# Patient Record
Sex: MALE | Race: WHITE | Employment: OTHER | URBAN - METROPOLITAN AREA
[De-identification: names, ages, dates, MRNs, and addresses within clinical notes are randomized per-mention and may not be internally consistent; named-entity substitution may affect disease eponyms.]

---

## 2018-03-19 ENCOUNTER — HOSPITAL ENCOUNTER (OUTPATIENT)
Age: 83
Discharge: HOME | End: 2018-03-21
Payer: MEDICARE

## 2018-03-19 LAB
ALBUMIN SERPL-MCNC: 4 G/DL (ref 3.5–5.2)
ALP BLD-CCNC: 94 U/L (ref 40–129)
ALT SERPL-CCNC: <5 U/L (ref 0–40)
ANION GAP SERPL CALCULATED.3IONS-SCNC: 15 MMOL/L (ref 7–16)
AST SERPL-CCNC: 12 U/L (ref 0–39)
BASOPHILS ABSOLUTE: 0.04 E9/L (ref 0–0.2)
BASOPHILS RELATIVE PERCENT: 0.7 % (ref 0–2)
BILIRUB SERPL-MCNC: 0.6 MG/DL (ref 0–1.2)
BUN BLDV-MCNC: 25 MG/DL (ref 8–23)
CALCIUM SERPL-MCNC: 9 MG/DL (ref 8.6–10.2)
CHLORIDE BLD-SCNC: 102 MMOL/L (ref 98–107)
CO2: 25 MMOL/L (ref 22–29)
CREAT SERPL-MCNC: 1.1 MG/DL (ref 0.7–1.2)
EOSINOPHILS ABSOLUTE: 0.09 E9/L (ref 0.05–0.5)
EOSINOPHILS RELATIVE PERCENT: 1.5 % (ref 0–6)
GFR AFRICAN AMERICAN: >60
GFR NON-AFRICAN AMERICAN: >60 ML/MIN/1.73
GLUCOSE BLD-MCNC: 96 MG/DL (ref 74–109)
HCT VFR BLD CALC: 35.8 % (ref 37–54)
HEMOGLOBIN: 11.5 G/DL (ref 12.5–16.5)
IMMATURE GRANULOCYTES #: 0.04 E9/L
IMMATURE GRANULOCYTES %: 0.7 % (ref 0–5)
LYMPHOCYTES ABSOLUTE: 1.51 E9/L (ref 1.5–4)
LYMPHOCYTES RELATIVE PERCENT: 25.4 % (ref 20–42)
MCH RBC QN AUTO: 33.8 PG (ref 26–35)
MCHC RBC AUTO-ENTMCNC: 32.1 % (ref 32–34.5)
MCV RBC AUTO: 105.3 FL (ref 80–99.9)
MONOCYTES ABSOLUTE: 0.5 E9/L (ref 0.1–0.95)
MONOCYTES RELATIVE PERCENT: 8.4 % (ref 2–12)
NEUTROPHILS ABSOLUTE: 3.76 E9/L (ref 1.8–7.3)
NEUTROPHILS RELATIVE PERCENT: 63.3 % (ref 43–80)
PDW BLD-RTO: 12.8 FL (ref 11.5–15)
PLATELET # BLD: 215 E9/L (ref 130–450)
PMV BLD AUTO: 10.2 FL (ref 7–12)
POTASSIUM SERPL-SCNC: 4.4 MMOL/L (ref 3.5–5)
RBC # BLD: 3.4 E12/L (ref 3.8–5.8)
SODIUM BLD-SCNC: 142 MMOL/L (ref 132–146)
TOTAL PROTEIN: 5.8 G/DL (ref 6.4–8.3)
WBC # BLD: 5.9 E9/L (ref 4.5–11.5)

## 2018-03-19 PROCEDURE — 80053 COMPREHEN METABOLIC PANEL: CPT

## 2018-03-19 PROCEDURE — 85025 COMPLETE CBC W/AUTO DIFF WBC: CPT

## 2018-03-19 PROCEDURE — 36415 COLL VENOUS BLD VENIPUNCTURE: CPT

## 2018-04-30 ENCOUNTER — OFFICE VISIT (OUTPATIENT)
Dept: NEUROSURGERY | Age: 83
End: 2018-04-30
Payer: MEDICARE

## 2018-04-30 VITALS
SYSTOLIC BLOOD PRESSURE: 110 MMHG | BODY MASS INDEX: 23.61 KG/M2 | DIASTOLIC BLOOD PRESSURE: 62 MMHG | HEART RATE: 76 BPM | HEIGHT: 74 IN | WEIGHT: 184 LBS

## 2018-04-30 DIAGNOSIS — G95.19 NEUROGENIC CLAUDICATION (HCC): ICD-10-CM

## 2018-04-30 DIAGNOSIS — M43.16 SPONDYLOLISTHESIS AT L4-L5 LEVEL: ICD-10-CM

## 2018-04-30 DIAGNOSIS — M81.0 AGE-RELATED OSTEOPOROSIS WITHOUT CURRENT PATHOLOGICAL FRACTURE: ICD-10-CM

## 2018-04-30 DIAGNOSIS — M85.80 OSTEOPENIA, UNSPECIFIED LOCATION: ICD-10-CM

## 2018-04-30 DIAGNOSIS — M48.061 SPINAL STENOSIS AT L4-L5 LEVEL: Primary | ICD-10-CM

## 2018-04-30 PROCEDURE — 99204 OFFICE O/P NEW MOD 45 MIN: CPT | Performed by: NEUROLOGICAL SURGERY

## 2018-04-30 RX ORDER — LEVOTHYROXINE SODIUM 0.03 MG/1
25 TABLET ORAL DAILY
COMMUNITY
End: 2019-05-13 | Stop reason: SDUPTHER

## 2018-04-30 RX ORDER — FOLIC ACID 1 MG/1
1 TABLET ORAL DAILY
COMMUNITY
End: 2019-11-13 | Stop reason: SDUPTHER

## 2018-04-30 ASSESSMENT — ENCOUNTER SYMPTOMS
PHOTOPHOBIA: 0
NAUSEA: 0
SHORTNESS OF BREATH: 0
STRIDOR: 0
BLURRED VISION: 0
BACK PAIN: 1
COUGH: 0
VOMITING: 0

## 2018-05-07 ENCOUNTER — HOSPITAL ENCOUNTER (OUTPATIENT)
Age: 83
Discharge: HOME | End: 2018-05-09
Payer: MEDICARE

## 2018-05-07 ENCOUNTER — HOSPITAL ENCOUNTER (OUTPATIENT)
Dept: GENERAL RADIOLOGY | Age: 83
Discharge: HOME | End: 2018-05-09
Payer: MEDICARE

## 2018-05-07 DIAGNOSIS — M48.061 SPINAL STENOSIS AT L4-L5 LEVEL: ICD-10-CM

## 2018-05-07 DIAGNOSIS — M43.16 SPONDYLOLISTHESIS AT L4-L5 LEVEL: ICD-10-CM

## 2018-05-07 PROCEDURE — 72120 X-RAY BEND ONLY L-S SPINE: CPT

## 2018-05-10 ENCOUNTER — OFFICE VISIT (OUTPATIENT)
Dept: NEUROLOGY | Age: 83
End: 2018-05-10
Payer: MEDICARE

## 2018-05-10 ENCOUNTER — TELEPHONE (OUTPATIENT)
Dept: NEUROSURGERY | Age: 83
End: 2018-05-10

## 2018-05-10 VITALS
DIASTOLIC BLOOD PRESSURE: 62 MMHG | TEMPERATURE: 98.2 F | SYSTOLIC BLOOD PRESSURE: 95 MMHG | BODY MASS INDEX: 23.61 KG/M2 | HEIGHT: 74 IN | RESPIRATION RATE: 18 BRPM | OXYGEN SATURATION: 97 % | WEIGHT: 184 LBS

## 2018-05-10 DIAGNOSIS — M54.17 LUMBOSACRAL RADICULOPATHY: ICD-10-CM

## 2018-05-10 DIAGNOSIS — G20 PARKINSON'S DISEASE (HCC): Primary | ICD-10-CM

## 2018-05-10 PROCEDURE — 99215 OFFICE O/P EST HI 40 MIN: CPT | Performed by: PSYCHIATRY & NEUROLOGY

## 2018-05-17 ENCOUNTER — HOSPITAL ENCOUNTER (OUTPATIENT)
Dept: GENERAL RADIOLOGY | Age: 83
Discharge: HOME | End: 2018-05-19
Payer: MEDICARE

## 2018-05-17 DIAGNOSIS — M43.16 SPONDYLOLISTHESIS AT L4-L5 LEVEL: ICD-10-CM

## 2018-05-17 DIAGNOSIS — M81.0 AGE-RELATED OSTEOPOROSIS WITHOUT CURRENT PATHOLOGICAL FRACTURE: ICD-10-CM

## 2018-05-17 DIAGNOSIS — M85.80 OSTEOPENIA, UNSPECIFIED LOCATION: ICD-10-CM

## 2018-05-17 PROCEDURE — 77080 DXA BONE DENSITY AXIAL: CPT

## 2018-06-21 ENCOUNTER — HOSPITAL ENCOUNTER (OUTPATIENT)
Age: 83
Discharge: HOME | End: 2018-06-23
Payer: MEDICARE

## 2018-06-21 LAB
ALBUMIN SERPL-MCNC: 4.5 G/DL (ref 3.5–5.2)
ALP BLD-CCNC: 100 U/L (ref 40–129)
ALT SERPL-CCNC: 8 U/L (ref 0–40)
ANION GAP SERPL CALCULATED.3IONS-SCNC: 13 MMOL/L (ref 7–16)
AST SERPL-CCNC: 15 U/L (ref 0–39)
BASOPHILS ABSOLUTE: 0.03 E9/L (ref 0–0.2)
BASOPHILS RELATIVE PERCENT: 0.5 % (ref 0–2)
BILIRUB SERPL-MCNC: 0.6 MG/DL (ref 0–1.2)
BUN BLDV-MCNC: 28 MG/DL (ref 8–23)
C-REACTIVE PROTEIN: <0.1 MG/DL (ref 0–0.4)
CALCIUM SERPL-MCNC: 9.4 MG/DL (ref 8.6–10.2)
CHLORIDE BLD-SCNC: 102 MMOL/L (ref 98–107)
CO2: 26 MMOL/L (ref 22–29)
CREAT SERPL-MCNC: 1.2 MG/DL (ref 0.7–1.2)
EOSINOPHILS ABSOLUTE: 0.07 E9/L (ref 0.05–0.5)
EOSINOPHILS RELATIVE PERCENT: 1.2 % (ref 0–6)
GFR AFRICAN AMERICAN: >60
GFR NON-AFRICAN AMERICAN: 58 ML/MIN/1.73
GLUCOSE BLD-MCNC: 106 MG/DL (ref 74–109)
HCT VFR BLD CALC: 37.3 % (ref 37–54)
HEMOGLOBIN: 12.3 G/DL (ref 12.5–16.5)
IMMATURE GRANULOCYTES #: 0.03 E9/L
IMMATURE GRANULOCYTES %: 0.5 % (ref 0–5)
LYMPHOCYTES ABSOLUTE: 1.14 E9/L (ref 1.5–4)
LYMPHOCYTES RELATIVE PERCENT: 19.8 % (ref 20–42)
MCH RBC QN AUTO: 34.3 PG (ref 26–35)
MCHC RBC AUTO-ENTMCNC: 33 % (ref 32–34.5)
MCV RBC AUTO: 103.9 FL (ref 80–99.9)
MONOCYTES ABSOLUTE: 0.56 E9/L (ref 0.1–0.95)
MONOCYTES RELATIVE PERCENT: 9.7 % (ref 2–12)
NEUTROPHILS ABSOLUTE: 3.94 E9/L (ref 1.8–7.3)
NEUTROPHILS RELATIVE PERCENT: 68.3 % (ref 43–80)
PDW BLD-RTO: 13 FL (ref 11.5–15)
PLATELET # BLD: 224 E9/L (ref 130–450)
PMV BLD AUTO: 10.2 FL (ref 7–12)
POTASSIUM SERPL-SCNC: 4.5 MMOL/L (ref 3.5–5)
RBC # BLD: 3.59 E12/L (ref 3.8–5.8)
SODIUM BLD-SCNC: 141 MMOL/L (ref 132–146)
TOTAL PROTEIN: 6.8 G/DL (ref 6.4–8.3)
WBC # BLD: 5.8 E9/L (ref 4.5–11.5)

## 2018-06-21 PROCEDURE — 86140 C-REACTIVE PROTEIN: CPT

## 2018-06-21 PROCEDURE — 80053 COMPREHEN METABOLIC PANEL: CPT

## 2018-06-21 PROCEDURE — 85025 COMPLETE CBC W/AUTO DIFF WBC: CPT

## 2018-10-04 ENCOUNTER — HOSPITAL ENCOUNTER (OUTPATIENT)
Age: 83
Discharge: HOME | End: 2018-10-06
Payer: MEDICARE

## 2018-10-04 PROCEDURE — 36415 COLL VENOUS BLD VENIPUNCTURE: CPT

## 2018-10-04 PROCEDURE — 80053 COMPREHEN METABOLIC PANEL: CPT

## 2018-10-04 PROCEDURE — 85025 COMPLETE CBC W/AUTO DIFF WBC: CPT

## 2018-10-04 PROCEDURE — 82607 VITAMIN B-12: CPT

## 2018-10-05 LAB
ALBUMIN SERPL-MCNC: 4.2 G/DL (ref 3.5–5.2)
ALP BLD-CCNC: 104 U/L (ref 40–129)
ALT SERPL-CCNC: <5 U/L (ref 0–40)
ANION GAP SERPL CALCULATED.3IONS-SCNC: 15 MMOL/L (ref 7–16)
AST SERPL-CCNC: 18 U/L (ref 0–39)
BASOPHILS ABSOLUTE: 0.03 E9/L (ref 0–0.2)
BASOPHILS RELATIVE PERCENT: 0.3 % (ref 0–2)
BILIRUB SERPL-MCNC: 0.5 MG/DL (ref 0–1.2)
BUN BLDV-MCNC: 23 MG/DL (ref 8–23)
CALCIUM SERPL-MCNC: 9.2 MG/DL (ref 8.6–10.2)
CHLORIDE BLD-SCNC: 99 MMOL/L (ref 98–107)
CO2: 25 MMOL/L (ref 22–29)
CREAT SERPL-MCNC: 1.2 MG/DL (ref 0.7–1.2)
EOSINOPHILS ABSOLUTE: 0.22 E9/L (ref 0.05–0.5)
EOSINOPHILS RELATIVE PERCENT: 2 % (ref 0–6)
GFR AFRICAN AMERICAN: >60
GFR NON-AFRICAN AMERICAN: 58 ML/MIN/1.73
GLUCOSE BLD-MCNC: 98 MG/DL (ref 74–109)
HCT VFR BLD CALC: 36.9 % (ref 37–54)
HEMOGLOBIN: 11.8 G/DL (ref 12.5–16.5)
IMMATURE GRANULOCYTES #: 0.2 E9/L
IMMATURE GRANULOCYTES %: 1.9 % (ref 0–5)
LYMPHOCYTES ABSOLUTE: 1.74 E9/L (ref 1.5–4)
LYMPHOCYTES RELATIVE PERCENT: 16.2 % (ref 20–42)
MCH RBC QN AUTO: 33.5 PG (ref 26–35)
MCHC RBC AUTO-ENTMCNC: 32 % (ref 32–34.5)
MCV RBC AUTO: 104.8 FL (ref 80–99.9)
MONOCYTES ABSOLUTE: 1.15 E9/L (ref 0.1–0.95)
MONOCYTES RELATIVE PERCENT: 10.7 % (ref 2–12)
NEUTROPHILS ABSOLUTE: 7.41 E9/L (ref 1.8–7.3)
NEUTROPHILS RELATIVE PERCENT: 68.9 % (ref 43–80)
PDW BLD-RTO: 13.4 FL (ref 11.5–15)
PLATELET # BLD: 223 E9/L (ref 130–450)
PMV BLD AUTO: 10.4 FL (ref 7–12)
POTASSIUM SERPL-SCNC: 5 MMOL/L (ref 3.5–5)
RBC # BLD: 3.52 E12/L (ref 3.8–5.8)
SODIUM BLD-SCNC: 139 MMOL/L (ref 132–146)
TOTAL PROTEIN: 6.3 G/DL (ref 6.4–8.3)
VITAMIN B-12: 406 PG/ML (ref 211–946)
WBC # BLD: 10.8 E9/L (ref 4.5–11.5)

## 2018-11-19 ENCOUNTER — OFFICE VISIT (OUTPATIENT)
Dept: NEUROLOGY | Age: 83
End: 2018-11-19
Payer: MEDICARE

## 2018-11-19 VITALS
BODY MASS INDEX: 24.26 KG/M2 | RESPIRATION RATE: 18 BRPM | OXYGEN SATURATION: 97 % | HEART RATE: 64 BPM | SYSTOLIC BLOOD PRESSURE: 128 MMHG | WEIGHT: 189 LBS | TEMPERATURE: 97.8 F | DIASTOLIC BLOOD PRESSURE: 72 MMHG | HEIGHT: 74 IN

## 2018-11-19 DIAGNOSIS — G20 PARKINSON'S DISEASE (HCC): Primary | ICD-10-CM

## 2018-11-19 DIAGNOSIS — M54.17 LUMBOSACRAL RADICULOPATHY: ICD-10-CM

## 2018-11-19 PROCEDURE — 99215 OFFICE O/P EST HI 40 MIN: CPT | Performed by: PSYCHIATRY & NEUROLOGY

## 2018-11-19 RX ORDER — IPRATROPIUM BROMIDE 42 UG/1
2 SPRAY, METERED NASAL PRN
COMMUNITY
End: 2019-03-19

## 2018-11-19 RX ORDER — LORATADINE 10 MG/1
10 TABLET ORAL DAILY
COMMUNITY
End: 2019-05-13 | Stop reason: SDUPTHER

## 2019-01-31 ENCOUNTER — HOSPITAL ENCOUNTER (OUTPATIENT)
Age: 84
Discharge: HOME | End: 2019-02-02
Payer: MEDICARE

## 2019-01-31 LAB
ALBUMIN SERPL-MCNC: 4.4 G/DL (ref 3.5–5.2)
ALP BLD-CCNC: 99 U/L (ref 40–129)
ALT SERPL-CCNC: 5 U/L (ref 0–40)
ANION GAP SERPL CALCULATED.3IONS-SCNC: 12 MMOL/L (ref 7–16)
AST SERPL-CCNC: 15 U/L (ref 0–39)
BILIRUB SERPL-MCNC: 0.3 MG/DL (ref 0–1.2)
BUN BLDV-MCNC: 25 MG/DL (ref 8–23)
C-REACTIVE PROTEIN: <0.1 MG/DL (ref 0–0.4)
CALCIUM SERPL-MCNC: 9.3 MG/DL (ref 8.6–10.2)
CHLORIDE BLD-SCNC: 101 MMOL/L (ref 98–107)
CO2: 26 MMOL/L (ref 22–29)
CREAT SERPL-MCNC: 1.4 MG/DL (ref 0.7–1.2)
GFR AFRICAN AMERICAN: 58
GFR NON-AFRICAN AMERICAN: 48 ML/MIN/1.73
GLUCOSE BLD-MCNC: 98 MG/DL (ref 74–99)
POTASSIUM SERPL-SCNC: 4.4 MMOL/L (ref 3.5–5)
RHEUMATOID FACTOR: <10 IU/ML (ref 0–13)
SODIUM BLD-SCNC: 139 MMOL/L (ref 132–146)
TOTAL PROTEIN: 6.5 G/DL (ref 6.4–8.3)

## 2019-01-31 PROCEDURE — 36415 COLL VENOUS BLD VENIPUNCTURE: CPT

## 2019-01-31 PROCEDURE — 86140 C-REACTIVE PROTEIN: CPT

## 2019-01-31 PROCEDURE — 86200 CCP ANTIBODY: CPT

## 2019-01-31 PROCEDURE — 86431 RHEUMATOID FACTOR QUANT: CPT

## 2019-01-31 PROCEDURE — 80053 COMPREHEN METABOLIC PANEL: CPT

## 2019-02-03 LAB — CCP IGG ANTIBODIES: 18 UNITS (ref 0–19)

## 2019-03-19 ENCOUNTER — OFFICE VISIT (OUTPATIENT)
Dept: NEUROLOGY | Age: 84
End: 2019-03-19
Payer: MEDICARE

## 2019-03-19 VITALS
RESPIRATION RATE: 18 BRPM | TEMPERATURE: 97.1 F | BODY MASS INDEX: 25.05 KG/M2 | WEIGHT: 189 LBS | HEART RATE: 57 BPM | DIASTOLIC BLOOD PRESSURE: 69 MMHG | OXYGEN SATURATION: 94 % | SYSTOLIC BLOOD PRESSURE: 119 MMHG | HEIGHT: 73 IN

## 2019-03-19 DIAGNOSIS — G20 PARKINSON'S DISEASE (HCC): Primary | ICD-10-CM

## 2019-03-19 DIAGNOSIS — M54.17 LUMBOSACRAL RADICULOPATHY: ICD-10-CM

## 2019-03-19 DIAGNOSIS — M72.2 PLANTAR FASCIITIS: ICD-10-CM

## 2019-03-19 PROCEDURE — 99215 OFFICE O/P EST HI 40 MIN: CPT | Performed by: PSYCHIATRY & NEUROLOGY

## 2019-03-25 RX ORDER — PREGABALIN 75 MG/1
75 CAPSULE ORAL DAILY
COMMUNITY
End: 2019-05-13

## 2019-03-25 RX ORDER — IPRATROPIUM BROMIDE 42 UG/1
2 SPRAY, METERED NASAL 4 TIMES DAILY
COMMUNITY
End: 2019-11-11

## 2019-05-07 ENCOUNTER — HOSPITAL ENCOUNTER (OUTPATIENT)
Age: 84
Discharge: HOME | End: 2019-05-09
Payer: MEDICARE

## 2019-05-07 LAB
ALBUMIN SERPL-MCNC: 4.1 G/DL (ref 3.5–5.2)
ALP BLD-CCNC: 99 U/L (ref 40–129)
ALT SERPL-CCNC: <5 U/L (ref 0–40)
ANION GAP SERPL CALCULATED.3IONS-SCNC: 16 MMOL/L (ref 7–16)
AST SERPL-CCNC: 17 U/L (ref 0–39)
BASOPHILS ABSOLUTE: 0.03 E9/L (ref 0–0.2)
BASOPHILS RELATIVE PERCENT: 0.5 % (ref 0–2)
BILIRUB SERPL-MCNC: 0.6 MG/DL (ref 0–1.2)
BUN BLDV-MCNC: 28 MG/DL (ref 8–23)
CALCIUM SERPL-MCNC: 9.2 MG/DL (ref 8.6–10.2)
CHLORIDE BLD-SCNC: 103 MMOL/L (ref 98–107)
CHOLESTEROL, TOTAL: 124 MG/DL (ref 0–199)
CO2: 22 MMOL/L (ref 22–29)
CREAT SERPL-MCNC: 1.2 MG/DL (ref 0.7–1.2)
EOSINOPHILS ABSOLUTE: 0.14 E9/L (ref 0.05–0.5)
EOSINOPHILS RELATIVE PERCENT: 2.3 % (ref 0–6)
GFR AFRICAN AMERICAN: >60
GFR NON-AFRICAN AMERICAN: 57 ML/MIN/1.73
GLUCOSE BLD-MCNC: 124 MG/DL (ref 74–99)
HBA1C MFR BLD: 5.6 % (ref 4–5.6)
HCT VFR BLD CALC: 36.5 % (ref 37–54)
HDLC SERPL-MCNC: 44 MG/DL
HEMOGLOBIN: 12.2 G/DL (ref 12.5–16.5)
IMMATURE GRANULOCYTES #: 0.02 E9/L
IMMATURE GRANULOCYTES %: 0.3 % (ref 0–5)
IRON: 86 MCG/DL (ref 59–158)
LDL CHOLESTEROL CALCULATED: 63 MG/DL (ref 0–99)
LYMPHOCYTES ABSOLUTE: 1.56 E9/L (ref 1.5–4)
LYMPHOCYTES RELATIVE PERCENT: 25.5 % (ref 20–42)
MAGNESIUM: 2.1 MG/DL (ref 1.6–2.6)
MCH RBC QN AUTO: 33.9 PG (ref 26–35)
MCHC RBC AUTO-ENTMCNC: 33.4 % (ref 32–34.5)
MCV RBC AUTO: 101.4 FL (ref 80–99.9)
MONOCYTES ABSOLUTE: 0.64 E9/L (ref 0.1–0.95)
MONOCYTES RELATIVE PERCENT: 10.5 % (ref 2–12)
NEUTROPHILS ABSOLUTE: 3.72 E9/L (ref 1.8–7.3)
NEUTROPHILS RELATIVE PERCENT: 60.9 % (ref 43–80)
PDW BLD-RTO: 12.4 FL (ref 11.5–15)
PLATELET # BLD: 222 E9/L (ref 130–450)
PMV BLD AUTO: 10.4 FL (ref 7–12)
POTASSIUM SERPL-SCNC: 4.2 MMOL/L (ref 3.5–5)
RBC # BLD: 3.6 E12/L (ref 3.8–5.8)
SODIUM BLD-SCNC: 141 MMOL/L (ref 132–146)
T4 TOTAL: 5.9 MCG/DL (ref 4.5–11.7)
TOTAL PROTEIN: 6.4 G/DL (ref 6.4–8.3)
TRIGL SERPL-MCNC: 84 MG/DL (ref 0–149)
TSH SERPL DL<=0.05 MIU/L-ACNC: 3.33 UIU/ML (ref 0.27–4.2)
VITAMIN D 25-HYDROXY: 33 NG/ML (ref 30–100)
VLDLC SERPL CALC-MCNC: 17 MG/DL
WBC # BLD: 6.1 E9/L (ref 4.5–11.5)

## 2019-05-07 PROCEDURE — 84436 ASSAY OF TOTAL THYROXINE: CPT

## 2019-05-07 PROCEDURE — 36415 COLL VENOUS BLD VENIPUNCTURE: CPT

## 2019-05-07 PROCEDURE — 84443 ASSAY THYROID STIM HORMONE: CPT

## 2019-05-07 PROCEDURE — 83540 ASSAY OF IRON: CPT

## 2019-05-07 PROCEDURE — 85025 COMPLETE CBC W/AUTO DIFF WBC: CPT

## 2019-05-07 PROCEDURE — 80053 COMPREHEN METABOLIC PANEL: CPT

## 2019-05-07 PROCEDURE — 80061 LIPID PANEL: CPT

## 2019-05-07 PROCEDURE — 82306 VITAMIN D 25 HYDROXY: CPT

## 2019-05-07 PROCEDURE — 83036 HEMOGLOBIN GLYCOSYLATED A1C: CPT

## 2019-05-07 PROCEDURE — 83735 ASSAY OF MAGNESIUM: CPT

## 2019-05-13 ENCOUNTER — OFFICE VISIT (OUTPATIENT)
Dept: PRIMARY CARE CLINIC | Age: 84
End: 2019-05-13
Payer: MEDICARE

## 2019-05-13 VITALS
HEIGHT: 74 IN | DIASTOLIC BLOOD PRESSURE: 78 MMHG | TEMPERATURE: 97.9 F | BODY MASS INDEX: 24.26 KG/M2 | WEIGHT: 189 LBS | SYSTOLIC BLOOD PRESSURE: 126 MMHG

## 2019-05-13 DIAGNOSIS — K21.9 GERD WITHOUT ESOPHAGITIS: ICD-10-CM

## 2019-05-13 DIAGNOSIS — J30.1 SEASONAL ALLERGIC RHINITIS DUE TO POLLEN: ICD-10-CM

## 2019-05-13 DIAGNOSIS — I10 ESSENTIAL HYPERTENSION: ICD-10-CM

## 2019-05-13 DIAGNOSIS — E11.9 TYPE 2 DIABETES MELLITUS WITHOUT COMPLICATION, WITHOUT LONG-TERM CURRENT USE OF INSULIN (HCC): ICD-10-CM

## 2019-05-13 DIAGNOSIS — E03.9 ACQUIRED HYPOTHYROIDISM: ICD-10-CM

## 2019-05-13 DIAGNOSIS — E78.2 MIXED HYPERLIPIDEMIA: Primary | ICD-10-CM

## 2019-05-13 PROCEDURE — 99214 OFFICE O/P EST MOD 30 MIN: CPT | Performed by: FAMILY MEDICINE

## 2019-05-13 RX ORDER — LOVASTATIN 20 MG/1
20 TABLET ORAL NIGHTLY
Qty: 90 TABLET | Refills: 12 | Status: SHIPPED | OUTPATIENT
Start: 2019-05-13 | End: 2019-08-14 | Stop reason: SDUPTHER

## 2019-05-13 RX ORDER — METOPROLOL SUCCINATE 25 MG/1
TABLET, EXTENDED RELEASE ORAL
Qty: 45 TABLET | Refills: 12 | Status: SHIPPED | OUTPATIENT
Start: 2019-05-13 | End: 2019-08-14 | Stop reason: SDUPTHER

## 2019-05-13 RX ORDER — PAROXETINE HYDROCHLORIDE 20 MG/1
20 TABLET, FILM COATED ORAL EVERY MORNING
Qty: 90 TABLET | Refills: 12 | Status: SHIPPED | OUTPATIENT
Start: 2019-05-13 | End: 2019-08-13 | Stop reason: SDUPTHER

## 2019-05-13 RX ORDER — OMEPRAZOLE 20 MG/1
20 CAPSULE, DELAYED RELEASE ORAL DAILY
Qty: 90 CAPSULE | Refills: 12 | Status: SHIPPED | OUTPATIENT
Start: 2019-05-13 | End: 2019-08-14 | Stop reason: SDUPTHER

## 2019-05-13 RX ORDER — LEVOTHYROXINE SODIUM 0.03 MG/1
25 TABLET ORAL DAILY
Qty: 90 TABLET | Refills: 12 | Status: SHIPPED | OUTPATIENT
Start: 2019-05-13 | End: 2019-08-14 | Stop reason: SDUPTHER

## 2019-05-13 RX ORDER — TERAZOSIN 1 MG/1
2 CAPSULE ORAL NIGHTLY
Qty: 90 CAPSULE | Refills: 12 | Status: SHIPPED | OUTPATIENT
Start: 2019-05-13 | End: 2019-06-12 | Stop reason: SDUPTHER

## 2019-05-13 RX ORDER — LEVOTHYROXINE SODIUM 0.03 MG/1
25 TABLET ORAL DAILY
Qty: 90 TABLET | Refills: 12 | Status: SHIPPED | OUTPATIENT
Start: 2019-05-13 | End: 2019-07-11 | Stop reason: SDUPTHER

## 2019-05-13 RX ORDER — LORATADINE 10 MG/1
10 TABLET ORAL DAILY
Qty: 90 TABLET | Refills: 12 | Status: SHIPPED | OUTPATIENT
Start: 2019-05-13 | End: 2019-08-14 | Stop reason: SDUPTHER

## 2019-05-13 RX ORDER — IPRATROPIUM BROMIDE 42 UG/1
SPRAY, METERED NASAL
Qty: 1 BOTTLE | Refills: 12 | Status: SHIPPED | OUTPATIENT
Start: 2019-05-13 | End: 2019-11-13 | Stop reason: SDUPTHER

## 2019-05-13 ASSESSMENT — ENCOUNTER SYMPTOMS
ALLERGIC/IMMUNOLOGIC NEGATIVE: 1
EYES NEGATIVE: 1
RESPIRATORY NEGATIVE: 1
GASTROINTESTINAL NEGATIVE: 1
BACK PAIN: 1

## 2019-05-13 NOTE — PROGRESS NOTES
19  Name: Burt Byrne    : 1933    Sex: male    Age: 80 y.o. Subjective:  Chief Complaint: Patient is here for 6 mo ck re bp and arthritis     6 mo ck  And   Mul c/o back and  Leg pain----no cp or sob    He  watns to see a back surgeon some day    Lab ntoed---ros neg  nose runs wit heating    gerd at times      Review of Systems   Constitutional: Negative. HENT: Negative. Eyes: Negative. Respiratory: Negative. Cardiovascular: Negative. Gastrointestinal: Negative. Endocrine: Negative. Genitourinary: Negative. Musculoskeletal: Positive for back pain and myalgias. Skin: Negative. Allergic/Immunologic: Negative. Neurological: Negative. Hematological: Negative. Psychiatric/Behavioral: Negative.           Current Outpatient Medications:     levothyroxine (SYNTHROID) 25 MCG tablet, Take 1 tablet by mouth Daily, Disp: 90 tablet, Rfl: 12    metoprolol succinate (TOPROL XL) 25 MG extended release tablet, Takes 1/2 tablet daily, Disp: 45 tablet, Rfl: 12    omeprazole (PRILOSEC) 20 MG delayed release capsule, Take 1 capsule by mouth daily, Disp: 90 capsule, Rfl: 12    lovastatin (MEVACOR) 20 MG tablet, Take 1 tablet by mouth nightly, Disp: 90 tablet, Rfl: 12    PARoxetine (PAXIL) 20 MG tablet, Take 1 tablet by mouth every morning, Disp: 90 tablet, Rfl: 12    terazosin (HYTRIN) 1 MG capsule, Take 2 capsules by mouth nightly, Disp: 90 capsule, Rfl: 12    levothyroxine (SYNTHROID) 25 MCG tablet, Take 1 tablet by mouth daily, Disp: 90 tablet, Rfl: 12    loratadine (CLARITIN) 10 MG tablet, Take 1 tablet by mouth daily, Disp: 90 tablet, Rfl: 12    ipratropium (ATROVENT) 0.06 % nasal spray, One squirt qid prn runny nose, Disp: 1 Bottle, Rfl: 12    ipratropium (ATROVENT) 0.06 % nasal spray, 2 sprays by Nasal route 4 times daily, Disp: , Rfl:     carbidopa-levodopa (SINEMET CR)  MG per extended release tablet, TAKE ONE TABLET BY MOUTH EVERY 4 HOURS (while awake), Disp: 120 tablet, Rfl: 11    diclofenac sodium 1 % GEL, Apply 2 g topically 2 times daily, Disp: , Rfl:     folic acid (FOLVITE) 1 MG tablet, Take 1 mg by mouth daily, Disp: , Rfl:     aspirin 81 MG tablet, Take 81 mg by mouth daily, Disp: , Rfl:     nitroGLYCERIN (NITROSTAT) 0.4 MG SL tablet, , Disp: , Rfl:     Multiple Vitamins-Minerals (OCUVITE) TABS oral tablet, Take 1 tablet by mouth daily, Disp: , Rfl:     vitamin D (CHOLECALCIFEROL) 1000 UNIT TABS tablet, Take 1,000 Units by mouth daily, Disp: , Rfl:     vitamin B-12 (CYANOCOBALAMIN) 1000 MCG tablet, Take 1,000 mcg by mouth Twice a Week , Disp: , Rfl:     methotrexate (RHEUMATREX) 2.5 MG chemo tablet, Take 2.5 mg by mouth once a week 4 tablets weekly. , Disp: , Rfl:   Allergies   Allergen Reactions    Pcn [Penicillins]     Prednisone      Social History     Socioeconomic History    Marital status:      Spouse name: Not on file    Number of children: Not on file    Years of education: Not on file    Highest education level: Not on file   Occupational History    Not on file   Social Needs    Financial resource strain: Not on file    Food insecurity:     Worry: Not on file     Inability: Not on file    Transportation needs:     Medical: Not on file     Non-medical: Not on file   Tobacco Use    Smoking status: Former Smoker     Years: 10.00     Types: Pipe, Cigars    Smokeless tobacco: Never Used   Substance and Sexual Activity    Alcohol use:  Yes     Alcohol/week: 0.0 oz     Comment: social    Drug use: No    Sexual activity: Not on file   Lifestyle    Physical activity:     Days per week: Not on file     Minutes per session: Not on file    Stress: Not on file   Relationships    Social connections:     Talks on phone: Not on file     Gets together: Not on file     Attends Muslim service: Not on file     Active member of club or organization: Not on file     Attends meetings of clubs or organizations: Not on file Hearing loss     Hematuria     Hyperlipidemia     Hypertension     Hypothyroidism     Impacted cerumen     Knee injuries     Low back pain     Lumbar spinal stenosis     severe    Macular degeneration     Osteoarthritis     Parkinson disease ([address]     Precancerous skin lesion     RA (rheumatoid arthritis) (HCC)     Shoulder joint pain     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 He appears well-developed and well-nourished. HENT:   Head: Normocephalic. Eyes: Pupils are equal, round, and reactive to light. EOM are normal.   Neck: Normal range of motion. Cardiovascular: Normal rate and regular rhythm. Pulmonary/Chest: Effort normal and breath sounds normal.   Abdominal: Soft. Musculoskeletal: Normal range of motion. He exhibits deformity (dec rom lumbar spine). Neurological: He is alert and oriented to person, place, and time. Skin: Skin is warm. Psychiatric: He has a normal mood and affect. His behavior is normal.   Vitals reviewed. Julito Houston was seen today for other. Diagnoses and all orders for this visit:    Mixed hyperlipidemia  -     Lipid Panel; Future    Essential hypertension  -     Comprehensive Metabolic Panel; Future    Acquired hypothyroidism  -     TSH without Reflex;  Future  -     T4; Future    GERD without esophagitis    Seasonal allergic rhinitis due to pollen  -     ipratropium (ATROVENT) 0.06 % nasal spray; One squirt qid prn runny nose    Type 2 diabetes mellitus without complication, without long-term current use of insulin (HCC)  -     Hemoglobin A1C; Future    Other orders  -     levothyroxine (SYNTHROID) 25 MCG tablet; Take 1 tablet by mouth Daily  -     metoprolol succinate (TOPROL XL) 25 MG extended release tablet; Takes 1/2 tablet daily  -     omeprazole (PRILOSEC) 20 MG delayed release capsule; Take 1 capsule by mouth daily  -     lovastatin (MEVACOR) 20 MG tablet; Take 1 tablet by mouth nightly  -     PARoxetine (PAXIL) 20 MG tablet; Take 1 tablet by mouth every morning  -     terazosin (HYTRIN) 1 MG capsule; Take 2 capsules by mouth nightly  -     levothyroxine (SYNTHROID) 25 MCG tablet; Take 1 tablet by mouth daily  -     loratadine (CLARITIN) 10 MG tablet; Take 1 tablet by mouth daily        Comments: add atrovent   Diet exer       [facility] cardio and rheum          Call if  Choking sx    A great deal of time spent reviewing medications, diet, exercise, social issues. Also reviewing the chart before entering the room with patient and finishing charting after leaving patient's room. More than half of that time was spent face to face with the patient in counseling and coordinating care. Follow Up: Return in about 6 months (around 11/13/2019) for ov and lab  before.      Seen by:  Philip Juarez DO

## 2019-05-30 ENCOUNTER — HOSPITAL ENCOUNTER (OUTPATIENT)
Age: 84
Discharge: HOME | End: 2019-06-01
Payer: MEDICARE

## 2019-05-30 LAB — PROSTATE SPECIFIC ANTIGEN: 1 NG/ML (ref 0–4)

## 2019-05-30 PROCEDURE — G0103 PSA SCREENING: HCPCS

## 2019-06-12 RX ORDER — TERAZOSIN 1 MG/1
2 CAPSULE ORAL NIGHTLY
Qty: 90 CAPSULE | Refills: 12 | Status: SHIPPED | OUTPATIENT
Start: 2019-06-12 | End: 2019-08-14 | Stop reason: SDUPTHER

## 2019-06-20 RX ORDER — TERAZOSIN 2 MG/1
2 CAPSULE ORAL NIGHTLY
Qty: 90 CAPSULE | Refills: 3 | Status: SHIPPED | OUTPATIENT
Start: 2019-06-20 | End: 2019-07-11 | Stop reason: SDUPTHER

## 2019-06-20 RX ORDER — TERAZOSIN 2 MG/1
1 CAPSULE ORAL 2 TIMES DAILY
COMMUNITY
Start: 2019-04-04 | End: 2019-06-20 | Stop reason: SDUPTHER

## 2019-07-11 ENCOUNTER — OFFICE VISIT (OUTPATIENT)
Dept: NEUROLOGY | Age: 84
End: 2019-07-11
Payer: MEDICARE

## 2019-07-11 VITALS
HEART RATE: 70 BPM | DIASTOLIC BLOOD PRESSURE: 63 MMHG | WEIGHT: 184 LBS | OXYGEN SATURATION: 95 % | HEIGHT: 74 IN | SYSTOLIC BLOOD PRESSURE: 108 MMHG | BODY MASS INDEX: 23.61 KG/M2 | RESPIRATION RATE: 18 BRPM

## 2019-07-11 DIAGNOSIS — G20 PARKINSON'S DISEASE (HCC): Primary | ICD-10-CM

## 2019-07-11 DIAGNOSIS — M72.2 PLANTAR FASCIITIS: ICD-10-CM

## 2019-07-11 DIAGNOSIS — M54.17 LUMBOSACRAL RADICULOPATHY: ICD-10-CM

## 2019-07-11 PROCEDURE — 99215 OFFICE O/P EST HI 40 MIN: CPT | Performed by: PSYCHIATRY & NEUROLOGY

## 2019-07-11 NOTE — PROGRESS NOTES
without rigidity or cogwheeling. His lungs were clear to auscultation. Cardiac examination was unrevealing---he displayed well-healed midline thoracotomy scars. Peripheral pulses were +1 without bruits. His limbs revealed arthritic knees with a well-healed right knee surgical scar. He still displayed flat feet one walking. Neurological examination produced an intact mental status. His voice was softer. Cranial nerve examination was unremarkable. Extraocular movements were intact. I found normal tone and bulk with 5/5 strength throughout. There was no resting tremor of the right hand today. I found no bradykinesia. Reflexes were barely elicited in the arms, +2 at the knees and absent at the ankles. There were no pathological reflexes. Sensory exam was intact to pinprick. Vibration was minimally reduced in both ankles, as before. Coordination was unrevealing. He walked with a slight limp as before. There was no festination. His neurological examination remained unchanged. Laboratory data were not pending. 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 He has responded moderately well to Sinemet----- 50/200 tablets every 4 hours while awake. However, his difficulties are again related to his missing his afternoon dose. He must take this medication every 4 hours. He will carry a pillbox with him every day and his son will obtain a timer watch for him. He must also exercise more to help his parkinsonism. I again find no evidence of dementia. He suffers from lumbosacral radiculopathies, fortunately without motor compromise. He also suffers from plantar fasciitis. He must wear his shoe inserts at all time. Medically he is stable despite his rheumatoid arthritis, degenerative joint disease and coronary artery disease. 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 There have been no recurrences. He will return in 4 months.   His son will

## 2019-08-13 ENCOUNTER — OFFICE VISIT (OUTPATIENT)
Dept: PRIMARY CARE CLINIC | Age: 84
End: 2019-08-13
Payer: MEDICARE

## 2019-08-13 VITALS
OXYGEN SATURATION: 98 % | TEMPERATURE: 97.2 F | HEART RATE: 68 BPM | BODY MASS INDEX: 24.65 KG/M2 | SYSTOLIC BLOOD PRESSURE: 128 MMHG | DIASTOLIC BLOOD PRESSURE: 78 MMHG | WEIGHT: 192 LBS

## 2019-08-13 DIAGNOSIS — E78.2 MIXED HYPERLIPIDEMIA: ICD-10-CM

## 2019-08-13 DIAGNOSIS — K21.9 GASTROESOPHAGEAL REFLUX DISEASE WITHOUT ESOPHAGITIS: ICD-10-CM

## 2019-08-13 DIAGNOSIS — N40.0 BENIGN PROSTATIC HYPERPLASIA WITHOUT LOWER URINARY TRACT SYMPTOMS: ICD-10-CM

## 2019-08-13 DIAGNOSIS — E03.9 ACQUIRED HYPOTHYROIDISM: ICD-10-CM

## 2019-08-13 DIAGNOSIS — F41.9 ANXIETY: Primary | ICD-10-CM

## 2019-08-13 DIAGNOSIS — I10 ESSENTIAL HYPERTENSION: ICD-10-CM

## 2019-08-13 DIAGNOSIS — J30.9 ALLERGIC RHINITIS, UNSPECIFIED SEASONALITY, UNSPECIFIED TRIGGER: ICD-10-CM

## 2019-08-13 DIAGNOSIS — E11.9 TYPE 2 DIABETES MELLITUS WITHOUT COMPLICATION, WITHOUT LONG-TERM CURRENT USE OF INSULIN (HCC): ICD-10-CM

## 2019-08-13 PROCEDURE — 99213 OFFICE O/P EST LOW 20 MIN: CPT | Performed by: FAMILY MEDICINE

## 2019-08-13 ASSESSMENT — ENCOUNTER SYMPTOMS
GASTROINTESTINAL NEGATIVE: 1
ALLERGIC/IMMUNOLOGIC NEGATIVE: 1
RESPIRATORY NEGATIVE: 1
EYES NEGATIVE: 1

## 2019-08-13 ASSESSMENT — PATIENT HEALTH QUESTIONNAIRE - PHQ9
SUM OF ALL RESPONSES TO PHQ QUESTIONS 1-9: 0
SUM OF ALL RESPONSES TO PHQ QUESTIONS 1-9: 0
2. FEELING DOWN, DEPRESSED OR HOPELESS: 0
1. LITTLE INTEREST OR PLEASURE IN DOING THINGS: 0
SUM OF ALL RESPONSES TO PHQ9 QUESTIONS 1 & 2: 0

## 2019-08-13 NOTE — PROGRESS NOTES
diclofenac sodium 1 % GEL, Apply 2 g topically 2 times daily, Disp: , Rfl:     folic acid (FOLVITE) 1 MG tablet, Take 1 mg by mouth daily, Disp: , Rfl:     aspirin 81 MG tablet, Take 81 mg by mouth daily, Disp: , Rfl:     nitroGLYCERIN (NITROSTAT) 0.4 MG SL tablet, , Disp: , Rfl:     Multiple Vitamins-Minerals (OCUVITE) TABS oral tablet, Take 1 tablet by mouth daily, Disp: , Rfl:     vitamin D (CHOLECALCIFEROL) 1000 UNIT TABS tablet, Take 1,000 Units by mouth daily, Disp: , Rfl:     vitamin B-12 (CYANOCOBALAMIN) 1000 MCG tablet, Take 1,000 mcg by mouth Twice a Week , Disp: , Rfl:     methotrexate (RHEUMATREX) 2.5 MG chemo tablet, Take 2.5 mg by mouth once a week 4 tablets weekly. , Disp: , Rfl:   Allergies   Allergen Reactions    Pcn [Penicillins]     Prednisone      Social History     Socioeconomic History    Marital status:      Spouse name: Not on file    Number of children: Not on file    Years of education: Not on file    Highest education level: Not on file   Occupational History    Not on file   Social Needs    Financial resource strain: Not on file    Food insecurity:     Worry: Not on file     Inability: Not on file    Transportation needs:     Medical: Not on file     Non-medical: Not on file   Tobacco Use    Smoking status: Former Smoker     Years: 10.00     Types: Pipe, Cigars    Smokeless tobacco: Never Used   Substance and Sexual Activity    Alcohol use:  Yes     Alcohol/week: 0.0 standard drinks     Comment: social    Drug use: No    Sexual activity: Not on file   Lifestyle    Physical activity:     Days per week: Not on file     Minutes per session: Not on file    Stress: Not on file   Relationships    Social connections:     Talks on phone: Not on file     Gets together: Not on file     Attends Latter day service: Not on file     Active member of club or organization: Not on file     Attends meetings of clubs or organizations: Not on file     Relationship status: Not on file    Intimate partner violence:     Fear of current or ex partner: Not on file     Emotionally abused: Not on file     Physically abused: Not on file     Forced sexual activity: Not on file   Other Topics Concern    Not on file   Social History Narrative        Colonoscopy - (10/29/2008)    Colonoscopy - (1/10/2014)    Zoster/Shingles Vaccine - given at [facility] ZOstavax    CAD WITH CABG    EARLY HYPOTHYROID----HYPOTHRYOID 3-18    ANXIETY    DEPRESSION    FATIGUE    BPH    MACULAR DEGENERATION    SKIN CA    APPY    BILAT ING HERNIA---ONE DONE THINKS WAS R    L ING HERNIA    COLON 6-99 AND 6-02----5T O 10 YRS DR POWERS---PT STATES WAS TOLD THAT HE SHOULD NTO    HAVE AGAIN DUE TO AGE    L KNEE OR X 2 DR Kymberly Knight AND CHRISSY    ANXIETY FROM PREDNISONE 2009    COLON 4-11------NEG---YRUICH    EGD 4-11 PEPTIC ULCER    L KNEE OR 6-11    [facility] CHINO YADI---TALL  AT [address] [place] [address] DR More Pedersen    EGD 12-13 DR CHEN--- ESO DILITATION    COLON DR CHEN 1-14----- TICS    PARKINSON DIC DX WITH DR JERNIGAN 2014-- THEN CHG TO DR RUVALCABA---then dr Jignesh Gupta    ----[facility] STUDY-WITH LIQUID ASPIRATION    R TOTAL KNEE 1-24-17 DR WEI    HEMATURIA 2-17 DR LAWRENCE Ceja [facility] PAIN--DR DANIELS THEN  [facility]    SEVERE LUMBAR SPINAL STENOSIS 2-18 DR BARTON--L-4-5 WITH ADRENAL    Dr. Steffi Humphreys, [facility]    Dr. Steffi Humphreys, [facility]---REFERRED TO NEURO SURG DR VINSON---THEN TO DR Anna Varela--    eval DR Darlin Carmona 5-18 AND SAID SHOT AND AND PT REFUSED DUE TO CONCERN WITH    PREDNISONE-----TO SEE HIM BACK      Past Medical History:   Diagnosis Date    Ankle pain     left    Anxiety     Ascending aortic aneurysm (HCC)     BPH (benign prostatic hyperplasia)     CAD (coronary artery disease)     Cancer ([facility]     Skin cacner removal 2/2019    Coronary arteriosclerosis     Depression     DJD (degenerative joint disease)     Fatigue     Hearing loss     Hematuria

## 2019-08-14 RX ORDER — LORATADINE 10 MG/1
10 TABLET ORAL DAILY
Qty: 90 TABLET | Refills: 12 | Status: SHIPPED | OUTPATIENT
Start: 2019-08-14 | End: 2019-11-22 | Stop reason: ALTCHOICE

## 2019-08-14 RX ORDER — METOPROLOL SUCCINATE 25 MG/1
TABLET, EXTENDED RELEASE ORAL
Qty: 45 TABLET | Refills: 12 | Status: SHIPPED | OUTPATIENT
Start: 2019-08-14 | End: 2019-11-14 | Stop reason: SDUPTHER

## 2019-08-14 RX ORDER — OMEPRAZOLE 20 MG/1
20 CAPSULE, DELAYED RELEASE ORAL DAILY
Qty: 90 CAPSULE | Refills: 12 | Status: SHIPPED | OUTPATIENT
Start: 2019-08-14 | End: 2019-11-13 | Stop reason: SDUPTHER

## 2019-08-14 RX ORDER — PAROXETINE HYDROCHLORIDE 20 MG/1
20 TABLET, FILM COATED ORAL EVERY MORNING
Qty: 90 TABLET | Refills: 12 | Status: SHIPPED | OUTPATIENT
Start: 2019-08-14 | End: 2019-11-13 | Stop reason: SDUPTHER

## 2019-08-14 RX ORDER — LEVOTHYROXINE SODIUM 0.03 MG/1
25 TABLET ORAL DAILY
Qty: 90 TABLET | Refills: 12 | Status: SHIPPED | OUTPATIENT
Start: 2019-08-14 | End: 2019-11-13 | Stop reason: SDUPTHER

## 2019-08-14 RX ORDER — LOVASTATIN 20 MG/1
20 TABLET ORAL NIGHTLY
Qty: 90 TABLET | Refills: 12 | Status: SHIPPED
Start: 2019-08-14 | End: 2020-06-24 | Stop reason: SDUPTHER

## 2019-08-14 RX ORDER — TERAZOSIN 1 MG/1
2 CAPSULE ORAL NIGHTLY
Qty: 180 CAPSULE | Refills: 12 | Status: SHIPPED | OUTPATIENT
Start: 2019-08-14 | End: 2019-11-13 | Stop reason: SDUPTHER

## 2019-11-04 ENCOUNTER — HOSPITAL ENCOUNTER (OUTPATIENT)
Age: 84
Discharge: HOME | End: 2019-11-06
Payer: MEDICARE

## 2019-11-04 DIAGNOSIS — E78.2 MIXED HYPERLIPIDEMIA: ICD-10-CM

## 2019-11-04 DIAGNOSIS — E03.9 ACQUIRED HYPOTHYROIDISM: ICD-10-CM

## 2019-11-04 DIAGNOSIS — I10 ESSENTIAL HYPERTENSION: ICD-10-CM

## 2019-11-04 DIAGNOSIS — E11.9 TYPE 2 DIABETES MELLITUS WITHOUT COMPLICATION, WITHOUT LONG-TERM CURRENT USE OF INSULIN (HCC): ICD-10-CM

## 2019-11-04 LAB
ALBUMIN SERPL-MCNC: 4.1 G/DL (ref 3.5–5.2)
ALP BLD-CCNC: 108 U/L (ref 40–129)
ALT SERPL-CCNC: 12 U/L (ref 0–40)
ANION GAP SERPL CALCULATED.3IONS-SCNC: 13 MMOL/L (ref 7–16)
AST SERPL-CCNC: 17 U/L (ref 0–39)
BILIRUB SERPL-MCNC: 0.9 MG/DL (ref 0–1.2)
BUN BLDV-MCNC: 23 MG/DL (ref 8–23)
CALCIUM SERPL-MCNC: 9.2 MG/DL (ref 8.6–10.2)
CHLORIDE BLD-SCNC: 101 MMOL/L (ref 98–107)
CHOLESTEROL, TOTAL: 118 MG/DL (ref 0–199)
CO2: 23 MMOL/L (ref 22–29)
CREAT SERPL-MCNC: 1.3 MG/DL (ref 0.7–1.2)
GFR AFRICAN AMERICAN: >60
GFR NON-AFRICAN AMERICAN: 52 ML/MIN/1.73
GLUCOSE BLD-MCNC: 109 MG/DL (ref 74–99)
HBA1C MFR BLD: 6.1 % (ref 4–5.6)
HDLC SERPL-MCNC: 42 MG/DL
LDL CHOLESTEROL CALCULATED: 59 MG/DL (ref 0–99)
POTASSIUM SERPL-SCNC: 4.5 MMOL/L (ref 3.5–5)
SODIUM BLD-SCNC: 137 MMOL/L (ref 132–146)
T4 TOTAL: 6.9 MCG/DL (ref 4.5–11.7)
TOTAL PROTEIN: 6.5 G/DL (ref 6.4–8.3)
TRIGL SERPL-MCNC: 84 MG/DL (ref 0–149)
TSH SERPL DL<=0.05 MIU/L-ACNC: 3.41 UIU/ML (ref 0.27–4.2)
VLDLC SERPL CALC-MCNC: 17 MG/DL

## 2019-11-04 PROCEDURE — 80053 COMPREHEN METABOLIC PANEL: CPT

## 2019-11-04 PROCEDURE — 80061 LIPID PANEL: CPT

## 2019-11-04 PROCEDURE — 36415 COLL VENOUS BLD VENIPUNCTURE: CPT

## 2019-11-04 PROCEDURE — 84436 ASSAY OF TOTAL THYROXINE: CPT

## 2019-11-04 PROCEDURE — 83036 HEMOGLOBIN GLYCOSYLATED A1C: CPT

## 2019-11-04 PROCEDURE — 84443 ASSAY THYROID STIM HORMONE: CPT

## 2019-11-11 ENCOUNTER — OFFICE VISIT (OUTPATIENT)
Dept: NEUROLOGY | Age: 84
End: 2019-11-11
Payer: MEDICARE

## 2019-11-11 VITALS
RESPIRATION RATE: 18 BRPM | HEART RATE: 74 BPM | WEIGHT: 189 LBS | DIASTOLIC BLOOD PRESSURE: 63 MMHG | HEIGHT: 74 IN | BODY MASS INDEX: 24.26 KG/M2 | SYSTOLIC BLOOD PRESSURE: 119 MMHG | OXYGEN SATURATION: 97 %

## 2019-11-11 DIAGNOSIS — M72.2 PLANTAR FASCIITIS: ICD-10-CM

## 2019-11-11 DIAGNOSIS — G20 PARKINSON'S DISEASE (HCC): Primary | ICD-10-CM

## 2019-11-11 DIAGNOSIS — M54.17 LUMBOSACRAL RADICULOPATHY: ICD-10-CM

## 2019-11-11 PROCEDURE — 99215 OFFICE O/P EST HI 40 MIN: CPT | Performed by: PSYCHIATRY & NEUROLOGY

## 2019-11-11 RX ORDER — KETOROLAC TROMETHAMINE 5 MG/ML
SOLUTION OPHTHALMIC
Refills: 0 | COMMUNITY
Start: 2019-09-23 | End: 2020-05-19 | Stop reason: ALTCHOICE

## 2019-11-11 RX ORDER — FINASTERIDE 5 MG/1
5 TABLET, FILM COATED ORAL
COMMUNITY
Start: 2014-11-24 | End: 2019-11-12

## 2019-11-12 ENCOUNTER — OFFICE VISIT (OUTPATIENT)
Dept: PRIMARY CARE CLINIC | Age: 84
End: 2019-11-12
Payer: MEDICARE

## 2019-11-12 VITALS
OXYGEN SATURATION: 92 % | WEIGHT: 192.4 LBS | RESPIRATION RATE: 16 BRPM | HEART RATE: 125 BPM | DIASTOLIC BLOOD PRESSURE: 72 MMHG | HEIGHT: 74 IN | SYSTOLIC BLOOD PRESSURE: 132 MMHG | BODY MASS INDEX: 24.69 KG/M2

## 2019-11-12 DIAGNOSIS — R73.03 PRE-DIABETES: ICD-10-CM

## 2019-11-12 DIAGNOSIS — E03.9 ACQUIRED HYPOTHYROIDISM: Chronic | ICD-10-CM

## 2019-11-12 DIAGNOSIS — I10 ESSENTIAL HYPERTENSION: Primary | Chronic | ICD-10-CM

## 2019-11-12 DIAGNOSIS — K21.9 GERD WITHOUT ESOPHAGITIS: Chronic | ICD-10-CM

## 2019-11-12 DIAGNOSIS — G20 PARKINSON'S DISEASE (HCC): ICD-10-CM

## 2019-11-12 DIAGNOSIS — E78.2 MIXED HYPERLIPIDEMIA: Chronic | ICD-10-CM

## 2019-11-12 PROCEDURE — 99214 OFFICE O/P EST MOD 30 MIN: CPT | Performed by: FAMILY MEDICINE

## 2019-11-13 DIAGNOSIS — N40.0 BENIGN PROSTATIC HYPERPLASIA WITHOUT LOWER URINARY TRACT SYMPTOMS: ICD-10-CM

## 2019-11-13 DIAGNOSIS — J30.1 SEASONAL ALLERGIC RHINITIS DUE TO POLLEN: ICD-10-CM

## 2019-11-13 DIAGNOSIS — I10 ESSENTIAL HYPERTENSION: ICD-10-CM

## 2019-11-13 DIAGNOSIS — K21.9 GASTROESOPHAGEAL REFLUX DISEASE WITHOUT ESOPHAGITIS: ICD-10-CM

## 2019-11-13 DIAGNOSIS — E03.9 ACQUIRED HYPOTHYROIDISM: ICD-10-CM

## 2019-11-13 DIAGNOSIS — F41.9 ANXIETY: ICD-10-CM

## 2019-11-13 PROBLEM — G20 PARKINSON'S DISEASE (HCC): Status: ACTIVE | Noted: 2019-11-13

## 2019-11-13 PROBLEM — E78.2 MIXED HYPERLIPIDEMIA: Chronic | Status: ACTIVE | Noted: 2019-05-13

## 2019-11-13 PROBLEM — G20.A1 PARKINSON'S DISEASE: Status: ACTIVE | Noted: 2019-11-13

## 2019-11-13 ASSESSMENT — ENCOUNTER SYMPTOMS
EYES NEGATIVE: 1
RESPIRATORY NEGATIVE: 1
GASTROINTESTINAL NEGATIVE: 1
ALLERGIC/IMMUNOLOGIC NEGATIVE: 1

## 2019-11-13 ASSESSMENT — PATIENT HEALTH QUESTIONNAIRE - PHQ9
2. FEELING DOWN, DEPRESSED OR HOPELESS: 0
1. LITTLE INTEREST OR PLEASURE IN DOING THINGS: 0
SUM OF ALL RESPONSES TO PHQ QUESTIONS 1-9: 0
SUM OF ALL RESPONSES TO PHQ QUESTIONS 1-9: 0
SUM OF ALL RESPONSES TO PHQ9 QUESTIONS 1 & 2: 0

## 2019-11-14 RX ORDER — IPRATROPIUM BROMIDE 42 UG/1
SPRAY, METERED NASAL
Qty: 1 BOTTLE | Refills: 12 | Status: SHIPPED
Start: 2019-11-14 | End: 2020-12-29

## 2019-11-14 RX ORDER — METOPROLOL SUCCINATE 25 MG/1
TABLET, EXTENDED RELEASE ORAL
Qty: 45 TABLET | Refills: 12 | Status: SHIPPED
Start: 2019-11-14 | End: 2020-06-24 | Stop reason: SDUPTHER

## 2019-11-14 RX ORDER — LEVOTHYROXINE SODIUM 0.03 MG/1
25 TABLET ORAL DAILY
Qty: 90 TABLET | Refills: 3 | Status: SHIPPED
Start: 2019-11-14 | End: 2020-06-24 | Stop reason: SDUPTHER

## 2019-11-14 RX ORDER — FOLIC ACID 1 MG/1
1 TABLET ORAL DAILY
Qty: 90 TABLET | Refills: 3 | Status: SHIPPED
Start: 2019-11-14 | End: 2020-08-30 | Stop reason: SDUPTHER

## 2019-11-14 RX ORDER — PAROXETINE HYDROCHLORIDE 20 MG/1
20 TABLET, FILM COATED ORAL EVERY MORNING
Qty: 90 TABLET | Refills: 3 | Status: SHIPPED
Start: 2019-11-14 | End: 2020-06-24 | Stop reason: SDUPTHER

## 2019-11-14 RX ORDER — OMEPRAZOLE 20 MG/1
20 CAPSULE, DELAYED RELEASE ORAL DAILY
Qty: 90 CAPSULE | Refills: 3 | Status: SHIPPED
Start: 2019-11-14 | End: 2020-06-24 | Stop reason: SDUPTHER

## 2019-11-14 RX ORDER — TERAZOSIN 1 MG/1
2 CAPSULE ORAL NIGHTLY
Qty: 180 CAPSULE | Refills: 3 | Status: SHIPPED
Start: 2019-11-14 | End: 2020-06-24 | Stop reason: SDUPTHER

## 2019-11-14 RX ORDER — CARBIDOPA AND LEVODOPA 50; 200 MG/1; MG/1
1 TABLET, EXTENDED RELEASE ORAL
Qty: 540 TABLET | Refills: 3 | Status: SHIPPED
Start: 2019-11-14 | End: 2021-01-28 | Stop reason: SDUPTHER

## 2019-11-22 ENCOUNTER — OFFICE VISIT (OUTPATIENT)
Dept: FAMILY MEDICINE CLINIC | Age: 84
End: 2019-11-22
Payer: MEDICARE

## 2019-11-22 ENCOUNTER — HOSPITAL ENCOUNTER (OUTPATIENT)
Age: 84
Discharge: HOME | End: 2019-11-24
Payer: MEDICARE

## 2019-11-22 VITALS
SYSTOLIC BLOOD PRESSURE: 128 MMHG | BODY MASS INDEX: 24.26 KG/M2 | RESPIRATION RATE: 22 BRPM | HEIGHT: 74 IN | OXYGEN SATURATION: 98 % | TEMPERATURE: 97.7 F | DIASTOLIC BLOOD PRESSURE: 82 MMHG | HEART RATE: 96 BPM | WEIGHT: 189 LBS

## 2019-11-22 DIAGNOSIS — R35.0 URINARY FREQUENCY: Primary | ICD-10-CM

## 2019-11-22 DIAGNOSIS — C44.91 BASAL CELL CARCINOMA (BCC), UNSPECIFIED SITE: Primary | ICD-10-CM

## 2019-11-22 DIAGNOSIS — N39.0 URINARY TRACT INFECTION WITH HEMATURIA, SITE UNSPECIFIED: ICD-10-CM

## 2019-11-22 DIAGNOSIS — C44.91 BASAL CELL CARCINOMA (BCC), UNSPECIFIED SITE: ICD-10-CM

## 2019-11-22 DIAGNOSIS — R31.9 URINARY TRACT INFECTION WITH HEMATURIA, SITE UNSPECIFIED: ICD-10-CM

## 2019-11-22 DIAGNOSIS — R35.0 URINARY FREQUENCY: ICD-10-CM

## 2019-11-22 LAB
BILIRUBIN, POC: NORMAL
BLOOD URINE, POC: NORMAL
CLARITY, POC: CLEAR
COLOR, POC: YELLOW
GLUCOSE URINE, POC: NEGATIVE
KETONES, POC: NORMAL
LEUKOCYTE EST, POC: NORMAL
NITRITE, POC: NEGATIVE
PH, POC: 5.5
PROTEIN, POC: 100
SPECIFIC GRAVITY, POC: >=1.03
UROBILINOGEN, POC: 1

## 2019-11-22 PROCEDURE — 81002 URINALYSIS NONAUTO W/O SCOPE: CPT | Performed by: PHYSICIAN ASSISTANT

## 2019-11-22 PROCEDURE — 99214 OFFICE O/P EST MOD 30 MIN: CPT | Performed by: PHYSICIAN ASSISTANT

## 2019-11-22 PROCEDURE — 87088 URINE BACTERIA CULTURE: CPT

## 2019-11-22 RX ORDER — CIPROFLOXACIN 500 MG/1
500 TABLET, FILM COATED ORAL 2 TIMES DAILY
Qty: 20 TABLET | Refills: 0 | Status: SHIPPED | OUTPATIENT
Start: 2019-11-22 | End: 2019-12-02

## 2019-11-24 LAB — URINE CULTURE, ROUTINE: NORMAL

## 2019-11-25 ENCOUNTER — TELEPHONE (OUTPATIENT)
Dept: FAMILY MEDICINE CLINIC | Age: 84
End: 2019-11-25

## 2019-12-03 ENCOUNTER — OFFICE VISIT (OUTPATIENT)
Dept: PRIMARY CARE CLINIC | Age: 84
End: 2019-12-03
Payer: MEDICARE

## 2019-12-03 VITALS
BODY MASS INDEX: 24.64 KG/M2 | WEIGHT: 192 LBS | DIASTOLIC BLOOD PRESSURE: 82 MMHG | HEIGHT: 74 IN | OXYGEN SATURATION: 97 % | TEMPERATURE: 97.3 F | SYSTOLIC BLOOD PRESSURE: 128 MMHG | HEART RATE: 82 BPM

## 2019-12-03 DIAGNOSIS — N39.0 URINARY TRACT INFECTION WITHOUT HEMATURIA, SITE UNSPECIFIED: Primary | ICD-10-CM

## 2019-12-03 DIAGNOSIS — I10 ESSENTIAL HYPERTENSION: Chronic | ICD-10-CM

## 2019-12-03 LAB
BILIRUBIN, POC: NORMAL
BLOOD URINE, POC: NORMAL
CLARITY, POC: CLEAR
COLOR, POC: YELLOW
GLUCOSE URINE, POC: NORMAL
KETONES, POC: NORMAL
LEUKOCYTE EST, POC: NORMAL
NITRITE, POC: NORMAL
PH, POC: 6
PROTEIN, POC: NORMAL
SPECIFIC GRAVITY, POC: 1.03
UROBILINOGEN, POC: 0.2

## 2019-12-03 PROCEDURE — 81002 URINALYSIS NONAUTO W/O SCOPE: CPT | Performed by: FAMILY MEDICINE

## 2019-12-03 PROCEDURE — 99213 OFFICE O/P EST LOW 20 MIN: CPT | Performed by: FAMILY MEDICINE

## 2019-12-03 ASSESSMENT — ENCOUNTER SYMPTOMS
EYES NEGATIVE: 1
ALLERGIC/IMMUNOLOGIC NEGATIVE: 1
GASTROINTESTINAL NEGATIVE: 1
RESPIRATORY NEGATIVE: 1

## 2019-12-04 PROBLEM — G20 PARKINSON'S DISEASE (HCC): Chronic | Status: ACTIVE | Noted: 2019-11-13

## 2019-12-04 PROBLEM — G20.A1 PARKINSON'S DISEASE: Chronic | Status: ACTIVE | Noted: 2019-11-13

## 2020-02-20 ENCOUNTER — HOSPITAL ENCOUNTER (OUTPATIENT)
Age: 85
Discharge: HOME | End: 2020-02-22
Payer: MEDICARE

## 2020-02-20 LAB
ALBUMIN SERPL-MCNC: 4.1 G/DL (ref 3.5–5.2)
ALP BLD-CCNC: 109 U/L (ref 40–129)
ALT SERPL-CCNC: 8 U/L (ref 0–40)
ANION GAP SERPL CALCULATED.3IONS-SCNC: 12 MMOL/L (ref 7–16)
AST SERPL-CCNC: 16 U/L (ref 0–39)
BASOPHILS ABSOLUTE: 0.05 E9/L (ref 0–0.2)
BASOPHILS RELATIVE PERCENT: 0.8 % (ref 0–2)
BILIRUB SERPL-MCNC: 0.4 MG/DL (ref 0–1.2)
BUN BLDV-MCNC: 25 MG/DL (ref 8–23)
C-REACTIVE PROTEIN: <0.1 MG/DL (ref 0–0.4)
CALCIUM SERPL-MCNC: 9.8 MG/DL (ref 8.6–10.2)
CHLORIDE BLD-SCNC: 102 MMOL/L (ref 98–107)
CO2: 25 MMOL/L (ref 22–29)
CREAT SERPL-MCNC: 1.2 MG/DL (ref 0.7–1.2)
EOSINOPHILS ABSOLUTE: 0.19 E9/L (ref 0.05–0.5)
EOSINOPHILS RELATIVE PERCENT: 2.9 % (ref 0–6)
GFR AFRICAN AMERICAN: >60
GFR NON-AFRICAN AMERICAN: 57 ML/MIN/1.73
GLUCOSE BLD-MCNC: 96 MG/DL (ref 74–99)
HCT VFR BLD CALC: 37.6 % (ref 37–54)
HEMOGLOBIN: 11.7 G/DL (ref 12.5–16.5)
IMMATURE GRANULOCYTES #: 0.04 E9/L
IMMATURE GRANULOCYTES %: 0.6 % (ref 0–5)
LYMPHOCYTES ABSOLUTE: 1.48 E9/L (ref 1.5–4)
LYMPHOCYTES RELATIVE PERCENT: 22.8 % (ref 20–42)
MCH RBC QN AUTO: 32.7 PG (ref 26–35)
MCHC RBC AUTO-ENTMCNC: 31.1 % (ref 32–34.5)
MCV RBC AUTO: 105 FL (ref 80–99.9)
MONOCYTES ABSOLUTE: 0.76 E9/L (ref 0.1–0.95)
MONOCYTES RELATIVE PERCENT: 11.7 % (ref 2–12)
NEUTROPHILS ABSOLUTE: 3.97 E9/L (ref 1.8–7.3)
NEUTROPHILS RELATIVE PERCENT: 61.2 % (ref 43–80)
PDW BLD-RTO: 12.8 FL (ref 11.5–15)
PLATELET # BLD: 235 E9/L (ref 130–450)
PMV BLD AUTO: 10.2 FL (ref 7–12)
POTASSIUM SERPL-SCNC: 5.1 MMOL/L (ref 3.5–5)
RBC # BLD: 3.58 E12/L (ref 3.8–5.8)
SODIUM BLD-SCNC: 139 MMOL/L (ref 132–146)
TOTAL PROTEIN: 6.6 G/DL (ref 6.4–8.3)
WBC # BLD: 6.5 E9/L (ref 4.5–11.5)

## 2020-02-20 PROCEDURE — 85025 COMPLETE CBC W/AUTO DIFF WBC: CPT

## 2020-02-20 PROCEDURE — 80053 COMPREHEN METABOLIC PANEL: CPT

## 2020-02-20 PROCEDURE — 36415 COLL VENOUS BLD VENIPUNCTURE: CPT

## 2020-02-20 PROCEDURE — 86140 C-REACTIVE PROTEIN: CPT

## 2020-05-19 ENCOUNTER — OFFICE VISIT (OUTPATIENT)
Dept: NEUROLOGY | Age: 85
End: 2020-05-19
Payer: MEDICARE

## 2020-05-19 VITALS
HEIGHT: 75 IN | TEMPERATURE: 97.9 F | HEART RATE: 72 BPM | BODY MASS INDEX: 23.62 KG/M2 | OXYGEN SATURATION: 97 % | DIASTOLIC BLOOD PRESSURE: 60 MMHG | RESPIRATION RATE: 18 BRPM | WEIGHT: 190 LBS | SYSTOLIC BLOOD PRESSURE: 101 MMHG

## 2020-05-19 PROCEDURE — 99215 OFFICE O/P EST HI 40 MIN: CPT | Performed by: PSYCHIATRY & NEUROLOGY

## 2020-05-19 RX ORDER — VITAMIN B COMPLEX
1 CAPSULE ORAL DAILY
Status: ON HOLD | COMMUNITY
End: 2020-08-14 | Stop reason: ALTCHOICE

## 2020-05-19 RX ORDER — DULOXETIN HYDROCHLORIDE 30 MG/1
30 CAPSULE, DELAYED RELEASE ORAL DAILY
Status: ON HOLD | COMMUNITY
End: 2020-08-20 | Stop reason: HOSPADM

## 2020-05-19 NOTE — PROGRESS NOTES
stationary bike. I again find no evidence of dementia. 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 He also suffers from plantar fasciitis. He must wear his shoe inserts. Medically he is stable despite his rheumatoid arthritis, degenerative joint disease and coronary artery disease. 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 There has been no recurrence    He will return in 4 months. He will continue his current treatment regimen. He will exercise on his bike at home. His wife will report back in 3 to 4 weeks. He will call at any time if problems arise. I spent 40 minutes with the patient with over 50 % spent in counseling and disease management. All patient issues were addressed and all questions were answered.

## 2020-05-21 ENCOUNTER — HOSPITAL ENCOUNTER (OUTPATIENT)
Age: 85
Discharge: HOME | End: 2020-05-23
Payer: MEDICARE

## 2020-05-21 LAB
ALBUMIN SERPL-MCNC: 3.9 G/DL (ref 3.5–5.2)
ALP BLD-CCNC: 98 U/L (ref 40–129)
ALT SERPL-CCNC: 8 U/L (ref 0–40)
ANION GAP SERPL CALCULATED.3IONS-SCNC: 12 MMOL/L (ref 7–16)
AST SERPL-CCNC: 17 U/L (ref 0–39)
BASOPHILS ABSOLUTE: 0.03 E9/L (ref 0–0.2)
BASOPHILS RELATIVE PERCENT: 0.5 % (ref 0–2)
BILIRUB SERPL-MCNC: 0.5 MG/DL (ref 0–1.2)
BUN BLDV-MCNC: 27 MG/DL (ref 8–23)
CALCIUM SERPL-MCNC: 9.3 MG/DL (ref 8.6–10.2)
CHLORIDE BLD-SCNC: 102 MMOL/L (ref 98–107)
CO2: 26 MMOL/L (ref 22–29)
CREAT SERPL-MCNC: 1.2 MG/DL (ref 0.7–1.2)
EOSINOPHILS ABSOLUTE: 0.15 E9/L (ref 0.05–0.5)
EOSINOPHILS RELATIVE PERCENT: 2.7 % (ref 0–6)
GFR AFRICAN AMERICAN: >60
GFR NON-AFRICAN AMERICAN: 57 ML/MIN/1.73
GLUCOSE BLD-MCNC: 90 MG/DL (ref 74–99)
HCT VFR BLD CALC: 36.1 % (ref 37–54)
HEMOGLOBIN: 11.6 G/DL (ref 12.5–16.5)
IMMATURE GRANULOCYTES #: 0.02 E9/L
IMMATURE GRANULOCYTES %: 0.4 % (ref 0–5)
LYMPHOCYTES ABSOLUTE: 1.3 E9/L (ref 1.5–4)
LYMPHOCYTES RELATIVE PERCENT: 23.8 % (ref 20–42)
MCH RBC QN AUTO: 33.7 PG (ref 26–35)
MCHC RBC AUTO-ENTMCNC: 32.1 % (ref 32–34.5)
MCV RBC AUTO: 104.9 FL (ref 80–99.9)
MONOCYTES ABSOLUTE: 0.79 E9/L (ref 0.1–0.95)
MONOCYTES RELATIVE PERCENT: 14.4 % (ref 2–12)
NEUTROPHILS ABSOLUTE: 3.18 E9/L (ref 1.8–7.3)
NEUTROPHILS RELATIVE PERCENT: 58.2 % (ref 43–80)
PDW BLD-RTO: 12.5 FL (ref 11.5–15)
PLATELET # BLD: 222 E9/L (ref 130–450)
PMV BLD AUTO: 10.4 FL (ref 7–12)
POTASSIUM SERPL-SCNC: 5.5 MMOL/L (ref 3.5–5)
RBC # BLD: 3.44 E12/L (ref 3.8–5.8)
SODIUM BLD-SCNC: 140 MMOL/L (ref 132–146)
TOTAL PROTEIN: 6.3 G/DL (ref 6.4–8.3)
VITAMIN B-12: 404 PG/ML (ref 211–946)
WBC # BLD: 5.5 E9/L (ref 4.5–11.5)

## 2020-05-21 PROCEDURE — 36415 COLL VENOUS BLD VENIPUNCTURE: CPT

## 2020-05-21 PROCEDURE — 82607 VITAMIN B-12: CPT

## 2020-05-21 PROCEDURE — 85025 COMPLETE CBC W/AUTO DIFF WBC: CPT

## 2020-05-21 PROCEDURE — 80053 COMPREHEN METABOLIC PANEL: CPT

## 2020-06-17 ENCOUNTER — HOSPITAL ENCOUNTER (OUTPATIENT)
Age: 85
Discharge: HOME | End: 2020-06-19
Payer: MEDICARE

## 2020-06-17 LAB
ALBUMIN SERPL-MCNC: 4.1 G/DL (ref 3.5–5.2)
ALP BLD-CCNC: 108 U/L (ref 40–129)
ALT SERPL-CCNC: 6 U/L (ref 0–40)
ANION GAP SERPL CALCULATED.3IONS-SCNC: 13 MMOL/L (ref 7–16)
AST SERPL-CCNC: 18 U/L (ref 0–39)
BASOPHILS ABSOLUTE: 0.04 E9/L (ref 0–0.2)
BASOPHILS RELATIVE PERCENT: 0.7 % (ref 0–2)
BILIRUB SERPL-MCNC: 0.7 MG/DL (ref 0–1.2)
BILIRUBIN URINE: NEGATIVE
BLOOD, URINE: NEGATIVE
BUN BLDV-MCNC: 27 MG/DL (ref 8–23)
CALCIUM SERPL-MCNC: 9.4 MG/DL (ref 8.6–10.2)
CHLORIDE BLD-SCNC: 105 MMOL/L (ref 98–107)
CHOLESTEROL, TOTAL: 114 MG/DL (ref 0–199)
CLARITY: CLEAR
CO2: 22 MMOL/L (ref 22–29)
COLOR: YELLOW
CREAT SERPL-MCNC: 1.3 MG/DL (ref 0.7–1.2)
EOSINOPHILS ABSOLUTE: 0.2 E9/L (ref 0.05–0.5)
EOSINOPHILS RELATIVE PERCENT: 3.5 % (ref 0–6)
GFR AFRICAN AMERICAN: >60
GFR NON-AFRICAN AMERICAN: 52 ML/MIN/1.73
GLUCOSE BLD-MCNC: 120 MG/DL (ref 74–99)
GLUCOSE URINE: NEGATIVE MG/DL
HBA1C MFR BLD: 6.2 % (ref 4–5.6)
HCT VFR BLD CALC: 36.9 % (ref 37–54)
HDLC SERPL-MCNC: 42 MG/DL
HEMOGLOBIN: 12 G/DL (ref 12.5–16.5)
IMMATURE GRANULOCYTES #: 0.02 E9/L
IMMATURE GRANULOCYTES %: 0.4 % (ref 0–5)
KETONES, URINE: NEGATIVE MG/DL
LDL CHOLESTEROL CALCULATED: 55 MG/DL (ref 0–99)
LEUKOCYTE ESTERASE, URINE: NEGATIVE
LYMPHOCYTES ABSOLUTE: 1.28 E9/L (ref 1.5–4)
LYMPHOCYTES RELATIVE PERCENT: 22.5 % (ref 20–42)
MCH RBC QN AUTO: 33.8 PG (ref 26–35)
MCHC RBC AUTO-ENTMCNC: 32.5 % (ref 32–34.5)
MCV RBC AUTO: 103.9 FL (ref 80–99.9)
MONOCYTES ABSOLUTE: 0.62 E9/L (ref 0.1–0.95)
MONOCYTES RELATIVE PERCENT: 10.9 % (ref 2–12)
NEUTROPHILS ABSOLUTE: 3.52 E9/L (ref 1.8–7.3)
NEUTROPHILS RELATIVE PERCENT: 62 % (ref 43–80)
NITRITE, URINE: NEGATIVE
PDW BLD-RTO: 12.5 FL (ref 11.5–15)
PH UA: 6 (ref 5–9)
PLATELET # BLD: 229 E9/L (ref 130–450)
PMV BLD AUTO: 10.2 FL (ref 7–12)
POTASSIUM SERPL-SCNC: 4.5 MMOL/L (ref 3.5–5)
PROTEIN UA: NEGATIVE MG/DL
RBC # BLD: 3.55 E12/L (ref 3.8–5.8)
SODIUM BLD-SCNC: 140 MMOL/L (ref 132–146)
SPECIFIC GRAVITY UA: 1.02 (ref 1–1.03)
T4 TOTAL: 6.4 MCG/DL (ref 4.5–11.7)
TOTAL PROTEIN: 6.9 G/DL (ref 6.4–8.3)
TRIGL SERPL-MCNC: 85 MG/DL (ref 0–149)
TSH SERPL DL<=0.05 MIU/L-ACNC: 4.35 UIU/ML (ref 0.27–4.2)
UROBILINOGEN, URINE: 0.2 E.U./DL
VLDLC SERPL CALC-MCNC: 17 MG/DL
WBC # BLD: 5.7 E9/L (ref 4.5–11.5)

## 2020-06-17 PROCEDURE — 80061 LIPID PANEL: CPT

## 2020-06-17 PROCEDURE — 85025 COMPLETE CBC W/AUTO DIFF WBC: CPT

## 2020-06-17 PROCEDURE — 84436 ASSAY OF TOTAL THYROXINE: CPT

## 2020-06-17 PROCEDURE — 80053 COMPREHEN METABOLIC PANEL: CPT

## 2020-06-17 PROCEDURE — 84443 ASSAY THYROID STIM HORMONE: CPT

## 2020-06-17 PROCEDURE — 36415 COLL VENOUS BLD VENIPUNCTURE: CPT

## 2020-06-17 PROCEDURE — 81003 URINALYSIS AUTO W/O SCOPE: CPT

## 2020-06-17 PROCEDURE — 83036 HEMOGLOBIN GLYCOSYLATED A1C: CPT

## 2020-06-24 ENCOUNTER — OFFICE VISIT (OUTPATIENT)
Dept: PRIMARY CARE CLINIC | Age: 85
End: 2020-06-24
Payer: MEDICARE

## 2020-06-24 VITALS
TEMPERATURE: 98 F | SYSTOLIC BLOOD PRESSURE: 127 MMHG | DIASTOLIC BLOOD PRESSURE: 82 MMHG | WEIGHT: 190 LBS | BODY MASS INDEX: 23.75 KG/M2

## 2020-06-24 PROCEDURE — 99214 OFFICE O/P EST MOD 30 MIN: CPT | Performed by: FAMILY MEDICINE

## 2020-06-24 RX ORDER — LOVASTATIN 20 MG/1
20 TABLET ORAL NIGHTLY
Qty: 90 TABLET | Refills: 12 | Status: SHIPPED
Start: 2020-06-24 | End: 2020-08-30 | Stop reason: SDUPTHER

## 2020-06-24 RX ORDER — LEVOTHYROXINE SODIUM 0.05 MG/1
25 TABLET ORAL DAILY
Qty: 90 TABLET | Refills: 12 | Status: SHIPPED
Start: 2020-06-24 | End: 2020-08-30 | Stop reason: SDUPTHER

## 2020-06-24 RX ORDER — OMEPRAZOLE 20 MG/1
20 CAPSULE, DELAYED RELEASE ORAL DAILY
Qty: 90 CAPSULE | Refills: 3 | Status: SHIPPED
Start: 2020-06-24 | End: 2020-08-30 | Stop reason: SDUPTHER

## 2020-06-24 RX ORDER — PAROXETINE HYDROCHLORIDE 20 MG/1
20 TABLET, FILM COATED ORAL EVERY MORNING
Qty: 90 TABLET | Refills: 3 | Status: SHIPPED
Start: 2020-06-24 | End: 2021-07-21 | Stop reason: SDUPTHER

## 2020-06-24 RX ORDER — TERAZOSIN 1 MG/1
2 CAPSULE ORAL NIGHTLY
Qty: 180 CAPSULE | Refills: 3 | Status: ON HOLD
Start: 2020-06-24 | End: 2020-08-20 | Stop reason: HOSPADM

## 2020-06-24 RX ORDER — METOPROLOL SUCCINATE 25 MG/1
TABLET, EXTENDED RELEASE ORAL
Qty: 45 TABLET | Refills: 12 | Status: SHIPPED
Start: 2020-06-24 | End: 2020-08-30 | Stop reason: SDUPTHER

## 2020-06-24 ASSESSMENT — PATIENT HEALTH QUESTIONNAIRE - PHQ9
SUM OF ALL RESPONSES TO PHQ9 QUESTIONS 1 & 2: 0
1. LITTLE INTEREST OR PLEASURE IN DOING THINGS: 0
SUM OF ALL RESPONSES TO PHQ QUESTIONS 1-9: 0
2. FEELING DOWN, DEPRESSED OR HOPELESS: 0
SUM OF ALL RESPONSES TO PHQ QUESTIONS 1-9: 0

## 2020-06-24 ASSESSMENT — ENCOUNTER SYMPTOMS
BACK PAIN: 1
GASTROINTESTINAL NEGATIVE: 1
RESPIRATORY NEGATIVE: 1
ALLERGIC/IMMUNOLOGIC NEGATIVE: 1
EYES NEGATIVE: 1

## 2020-06-24 NOTE — PROGRESS NOTES
on file    Intimate partner violence     Fear of current or ex partner: Not on file     Emotionally abused: Not on file     Physically abused: Not on file     Forced sexual activity: Not on file   Other Topics Concern    Not on file   Social History Narrative        Colonoscopy - (10/29/2008)    Colonoscopy - (1/10/2014)    Zoster/Shingles Vaccine - given at [facility]Ostavax    CAD WITH CABG    EARLY HYPOTHYROID----HYPOTHRYOID 3-18    ANXIETY    DEPRESSION    FATIGUE    BPH    MACULAR DEGENERATION    SKIN CA    APPY    BILAT ING HERNIA---ONE DONE THINKS WAS R    L ING HERNIA    COLON 6-99 AND 6-02----5T O 10 YRS DR POWERS---PT STATES WAS TOLD THAT HE SHOULD NTO    HAVE AGAIN DUE TO AGE    L KNEE OR X 2 DR Terrell Davis AND CHRISSY    ANXIETY FROM PREDNISONE 2009    COLON 4-11------NEG---YRUICH    EGD 4-11 PEPTIC ULCER    L KNEE OR 6-11    [facility] CHINO YADI---TALL  AT [address] [place] [address] DR RUSSO    EGD 12-13 DR CHEN--- ESO DILITATION    COLON DR CHEN 1-14----- TICS    PARKINSON DIC DX WITH DR JERNIGAN 2014-- THEN CHG TO DR RUVALCABA---then dr Samira Esteban    ----[facility] STUDY-WITH LIQUID ASPIRATION    R TOTAL KNEE 1-24-17 DR WEI    HEMATURIA 2-17 DR LAWRENCE Dixon Ike PAIN--DR DANIELS THEN  [facility]    SEVERE LUMBAR SPINAL STENOSIS 2-18 DR BARTON--L-4-5 WITH ADRENAL    Dr. Jagdeep Kan, [facility]    Dr. Jagdeep Kan, [facility]---REFERRED TO NEURO SURG DR VINSON---THEN TO DR Miguelina Reyes--    eval DR Bar Vazquez 5-18 AND SAID SHOT AND AND PT REFUSED DUE TO CONCERN WITH    PREDNISONE-----TO SEE HIM BACK      Past Medical History:   Diagnosis Date    Anxiety     Ascending aortic aneurysm ([facility]     BPH (benign prostatic hyperplasia)     CAD (coronary artery disease)     Cancer ([address]     skin    Coronary arteriosclerosis     Depression     DJD (degenerative joint disease)     Hyperlipidemia     Impacted cerumen     Lumbar spinal stenosis     severe    Macular

## 2020-07-22 ENCOUNTER — TELEPHONE (OUTPATIENT)
Dept: ADMINISTRATIVE | Age: 85
End: 2020-07-22

## 2020-07-22 NOTE — TELEPHONE ENCOUNTER
Pt requesting appt this week. Pt is having surgery on Monday and is having a hard time hearing. Pt uses hearing aids and he thinks his ears may have a lot of buildup.       Please advise patient,  [phone]    Thank you

## 2020-07-22 NOTE — TELEPHONE ENCOUNTER
Unable to reach pt. NO answer. NO voicemail. No available appts this week with Dr. Karolynn Siemens.   Pt should be advised to use "[employer]

## 2020-07-28 ENCOUNTER — HOSPITAL ENCOUNTER (OUTPATIENT)
Age: 85
Discharge: HOME | End: 2020-07-30

## 2020-07-28 LAB
ANION GAP SERPL CALCULATED.3IONS-SCNC: 9 MMOL/L (ref 7–16)
BUN BLDV-MCNC: 20 MG/DL (ref 8–23)
CALCIUM SERPL-MCNC: 8.9 MG/DL (ref 8.6–10.2)
CHLORIDE BLD-SCNC: 99 MMOL/L (ref 98–107)
CO2: 28 MMOL/L (ref 22–29)
CREAT SERPL-MCNC: 1.1 MG/DL (ref 0.7–1.2)
GFR AFRICAN AMERICAN: >60
GFR NON-AFRICAN AMERICAN: >60 ML/MIN/1.73
GLUCOSE BLD-MCNC: 122 MG/DL (ref 74–99)
HCT VFR BLD CALC: 35.3 % (ref 37–54)
HEMOGLOBIN: 11.5 G/DL (ref 12.5–16.5)
MCH RBC QN AUTO: 34.4 PG (ref 26–35)
MCHC RBC AUTO-ENTMCNC: 32.6 % (ref 32–34.5)
MCV RBC AUTO: 105.7 FL (ref 80–99.9)
PDW BLD-RTO: 12.5 FL (ref 11.5–15)
PLATELET # BLD: 189 E9/L (ref 130–450)
PMV BLD AUTO: 11.2 FL (ref 7–12)
POTASSIUM SERPL-SCNC: 4.8 MMOL/L (ref 3.5–5)
RBC # BLD: 3.34 E12/L (ref 3.8–5.8)
SODIUM BLD-SCNC: 136 MMOL/L (ref 132–146)
WBC # BLD: 11.4 E9/L (ref 4.5–11.5)

## 2020-07-28 PROCEDURE — 80048 BASIC METABOLIC PNL TOTAL CA: CPT

## 2020-07-28 PROCEDURE — 85027 COMPLETE CBC AUTOMATED: CPT

## 2020-07-29 ENCOUNTER — HOSPITAL ENCOUNTER (OUTPATIENT)
Age: 85
Discharge: HOME | End: 2020-07-31

## 2020-07-29 LAB
ANION GAP SERPL CALCULATED.3IONS-SCNC: 9 MMOL/L (ref 7–16)
BUN BLDV-MCNC: 17 MG/DL (ref 8–23)
CALCIUM SERPL-MCNC: 8.7 MG/DL (ref 8.6–10.2)
CHLORIDE BLD-SCNC: 101 MMOL/L (ref 98–107)
CO2: 27 MMOL/L (ref 22–29)
CREAT SERPL-MCNC: 1.1 MG/DL (ref 0.7–1.2)
GFR AFRICAN AMERICAN: >60
GFR NON-AFRICAN AMERICAN: >60 ML/MIN/1.73
GLUCOSE BLD-MCNC: 106 MG/DL (ref 74–99)
HCT VFR BLD CALC: 32.3 % (ref 37–54)
HEMOGLOBIN: 10.6 G/DL (ref 12.5–16.5)
MCH RBC QN AUTO: 34.5 PG (ref 26–35)
MCHC RBC AUTO-ENTMCNC: 32.8 % (ref 32–34.5)
MCV RBC AUTO: 105.2 FL (ref 80–99.9)
PDW BLD-RTO: 12.5 FL (ref 11.5–15)
PLATELET # BLD: 172 E9/L (ref 130–450)
PMV BLD AUTO: 10.9 FL (ref 7–12)
POTASSIUM SERPL-SCNC: 4.3 MMOL/L (ref 3.5–5)
RBC # BLD: 3.07 E12/L (ref 3.8–5.8)
SODIUM BLD-SCNC: 137 MMOL/L (ref 132–146)
WBC # BLD: 11.1 E9/L (ref 4.5–11.5)

## 2020-07-29 PROCEDURE — 85027 COMPLETE CBC AUTOMATED: CPT

## 2020-07-29 PROCEDURE — 80048 BASIC METABOLIC PNL TOTAL CA: CPT

## 2020-08-13 ENCOUNTER — APPOINTMENT (OUTPATIENT)
Dept: GENERAL RADIOLOGY | Age: 85
DRG: 312 | End: 2020-08-13
Payer: MEDICARE

## 2020-08-13 ENCOUNTER — APPOINTMENT (OUTPATIENT)
Dept: CT IMAGING | Age: 85
DRG: 312 | End: 2020-08-13
Payer: MEDICARE

## 2020-08-13 ENCOUNTER — HOSPITAL ENCOUNTER (INPATIENT)
Age: 85
LOS: 7 days | Discharge: NURSING, REHAB OR LONG-TERM CARE | DRG: 312 | End: 2020-08-20
Attending: EMERGENCY MEDICINE | Admitting: INTERNAL MEDICINE
Payer: MEDICARE

## 2020-08-13 PROBLEM — R33.9 URINARY RETENTION WITH INCOMPLETE BLADDER EMPTYING: Status: ACTIVE | Noted: 2020-08-13

## 2020-08-13 PROBLEM — Z96.651 HX OF TOTAL KNEE ARTHROPLASTY, RIGHT: Status: ACTIVE | Noted: 2020-08-13

## 2020-08-13 PROBLEM — D72.829 LEUKOCYTOSIS: Status: ACTIVE | Noted: 2020-08-13

## 2020-08-13 PROBLEM — I95.1 ORTHOSTATIC HYPOTENSION: Status: ACTIVE | Noted: 2020-08-13

## 2020-08-13 PROBLEM — D53.9 MACROCYTIC ANEMIA: Status: ACTIVE | Noted: 2020-08-13

## 2020-08-13 LAB
ALBUMIN SERPL-MCNC: 3.5 G/DL (ref 3.5–5.2)
ALP BLD-CCNC: 90 U/L (ref 40–129)
ALT SERPL-CCNC: <5 U/L (ref 0–40)
ANION GAP SERPL CALCULATED.3IONS-SCNC: 9 MMOL/L (ref 7–16)
AST SERPL-CCNC: 12 U/L (ref 0–39)
BACTERIA: ABNORMAL /HPF
BASOPHILS ABSOLUTE: 0.03 E9/L (ref 0–0.2)
BASOPHILS RELATIVE PERCENT: 0.2 % (ref 0–2)
BILIRUB SERPL-MCNC: 1 MG/DL (ref 0–1.2)
BILIRUBIN URINE: NEGATIVE
BLOOD, URINE: NEGATIVE
BUN BLDV-MCNC: 26 MG/DL (ref 8–23)
CALCIUM SERPL-MCNC: 8.7 MG/DL (ref 8.6–10.2)
CHLORIDE BLD-SCNC: 103 MMOL/L (ref 98–107)
CLARITY: CLEAR
CO2: 24 MMOL/L (ref 22–29)
COLOR: YELLOW
CREAT SERPL-MCNC: 1.1 MG/DL (ref 0.7–1.2)
EKG ATRIAL RATE: 69 BPM
EKG P AXIS: 29 DEGREES
EKG P-R INTERVAL: 186 MS
EKG Q-T INTERVAL: 446 MS
EKG QRS DURATION: 164 MS
EKG QTC CALCULATION (BAZETT): 477 MS
EKG R AXIS: -39 DEGREES
EKG T AXIS: 109 DEGREES
EKG VENTRICULAR RATE: 69 BPM
EOSINOPHILS ABSOLUTE: 0 E9/L (ref 0.05–0.5)
EOSINOPHILS RELATIVE PERCENT: 0 % (ref 0–6)
GFR AFRICAN AMERICAN: >60
GFR NON-AFRICAN AMERICAN: >60 ML/MIN/1.73
GLUCOSE BLD-MCNC: 128 MG/DL (ref 74–99)
GLUCOSE URINE: NEGATIVE MG/DL
HCT VFR BLD CALC: 30.7 % (ref 37–54)
HEMOGLOBIN: 10.5 G/DL (ref 12.5–16.5)
IMMATURE GRANULOCYTES #: 0.27 E9/L
IMMATURE GRANULOCYTES %: 1.4 % (ref 0–5)
KETONES, URINE: ABNORMAL MG/DL
LEUKOCYTE ESTERASE, URINE: NEGATIVE
LYMPHOCYTES ABSOLUTE: 0.45 E9/L (ref 1.5–4)
LYMPHOCYTES RELATIVE PERCENT: 2.4 % (ref 20–42)
MCH RBC QN AUTO: 34.9 PG (ref 26–35)
MCHC RBC AUTO-ENTMCNC: 34.2 % (ref 32–34.5)
MCV RBC AUTO: 102 FL (ref 80–99.9)
MONOCYTES ABSOLUTE: 1.52 E9/L (ref 0.1–0.95)
MONOCYTES RELATIVE PERCENT: 8 % (ref 2–12)
NEUTROPHILS ABSOLUTE: 16.7 E9/L (ref 1.8–7.3)
NEUTROPHILS RELATIVE PERCENT: 88 % (ref 43–80)
NITRITE, URINE: NEGATIVE
OVALOCYTES: ABNORMAL
PDW BLD-RTO: 12.4 FL (ref 11.5–15)
PH UA: 6 (ref 5–9)
PLATELET # BLD: 265 E9/L (ref 130–450)
PMV BLD AUTO: 9.1 FL (ref 7–12)
POIKILOCYTES: ABNORMAL
POTASSIUM SERPL-SCNC: 4.4 MMOL/L (ref 3.5–5)
PRO-BNP: 792 PG/ML (ref 0–450)
PROTEIN UA: NEGATIVE MG/DL
RBC # BLD: 3.01 E12/L (ref 3.8–5.8)
RBC UA: ABNORMAL /HPF (ref 0–2)
SODIUM BLD-SCNC: 136 MMOL/L (ref 132–146)
SPECIFIC GRAVITY UA: 1.01 (ref 1–1.03)
TOTAL PROTEIN: 6.2 G/DL (ref 6.4–8.3)
TROPONIN: <0.01 NG/ML (ref 0–0.03)
TSH SERPL DL<=0.05 MIU/L-ACNC: 1.32 UIU/ML (ref 0.27–4.2)
UROBILINOGEN, URINE: 0.2 E.U./DL
WBC # BLD: 19 E9/L (ref 4.5–11.5)
WBC UA: ABNORMAL /HPF (ref 0–5)

## 2020-08-13 PROCEDURE — 99223 1ST HOSP IP/OBS HIGH 75: CPT | Performed by: INTERNAL MEDICINE

## 2020-08-13 PROCEDURE — 2060000000 HC ICU INTERMEDIATE R&B

## 2020-08-13 PROCEDURE — 87186 SC STD MICRODIL/AGAR DIL: CPT

## 2020-08-13 PROCEDURE — 93005 ELECTROCARDIOGRAM TRACING: CPT | Performed by: EMERGENCY MEDICINE

## 2020-08-13 PROCEDURE — 85025 COMPLETE CBC W/AUTO DIFF WBC: CPT

## 2020-08-13 PROCEDURE — 83880 ASSAY OF NATRIURETIC PEPTIDE: CPT

## 2020-08-13 PROCEDURE — 81001 URINALYSIS AUTO W/SCOPE: CPT

## 2020-08-13 PROCEDURE — 74176 CT ABD & PELVIS W/O CONTRAST: CPT

## 2020-08-13 PROCEDURE — 99284 EMERGENCY DEPT VISIT MOD MDM: CPT

## 2020-08-13 PROCEDURE — 2580000003 HC RX 258: Performed by: EMERGENCY MEDICINE

## 2020-08-13 PROCEDURE — 71250 CT THORAX DX C-: CPT

## 2020-08-13 PROCEDURE — 93010 ELECTROCARDIOGRAM REPORT: CPT | Performed by: INTERNAL MEDICINE

## 2020-08-13 PROCEDURE — 71045 X-RAY EXAM CHEST 1 VIEW: CPT

## 2020-08-13 PROCEDURE — 84484 ASSAY OF TROPONIN QUANT: CPT

## 2020-08-13 PROCEDURE — 80053 COMPREHEN METABOLIC PANEL: CPT

## 2020-08-13 PROCEDURE — 87077 CULTURE AEROBIC IDENTIFY: CPT

## 2020-08-13 PROCEDURE — 99283 EMERGENCY DEPT VISIT LOW MDM: CPT

## 2020-08-13 PROCEDURE — 84443 ASSAY THYROID STIM HORMONE: CPT

## 2020-08-13 PROCEDURE — 87088 URINE BACTERIA CULTURE: CPT

## 2020-08-13 RX ORDER — ONDANSETRON 4 MG/1
4 TABLET, FILM COATED ORAL 4 TIMES DAILY PRN
Status: ON HOLD | COMMUNITY
End: 2020-08-14 | Stop reason: ALTCHOICE

## 2020-08-13 RX ORDER — ACETAMINOPHEN 500 MG
500 TABLET ORAL EVERY 6 HOURS PRN
COMMUNITY

## 2020-08-13 RX ORDER — 0.9 % SODIUM CHLORIDE 0.9 %
1000 INTRAVENOUS SOLUTION INTRAVENOUS ONCE
Status: COMPLETED | OUTPATIENT
Start: 2020-08-13 | End: 2020-08-13

## 2020-08-13 RX ORDER — TERAZOSIN 2 MG/1
2 CAPSULE ORAL NIGHTLY
Status: ON HOLD | COMMUNITY
End: 2020-08-20 | Stop reason: HOSPADM

## 2020-08-13 RX ORDER — 0.9 % SODIUM CHLORIDE 0.9 %
1000 INTRAVENOUS SOLUTION INTRAVENOUS ONCE
Status: DISCONTINUED | OUTPATIENT
Start: 2020-08-13 | End: 2020-08-20 | Stop reason: HOSPADM

## 2020-08-13 RX ADMIN — SODIUM CHLORIDE 1000 ML: 9 INJECTION, SOLUTION INTRAVENOUS at 17:45

## 2020-08-13 ASSESSMENT — ENCOUNTER SYMPTOMS
SORE THROAT: 0
EYE DISCHARGE: 0
SINUS PRESSURE: 0
EYE REDNESS: 0
BACK PAIN: 0
COUGH: 0
ABDOMINAL PAIN: 0
DIARRHEA: 0
NAUSEA: 0
EYE PAIN: 0
VOMITING: 0
WHEEZING: 0
SHORTNESS OF BREATH: 0

## 2020-08-13 NOTE — ED NOTES
Bed: 26  Expected date:   Expected time:   Means of arrival:   Comments:  EMS     Roxann Duran RN  08/13/20 [id number]

## 2020-08-14 LAB
ALBUMIN SERPL-MCNC: 3.5 G/DL (ref 3.5–5.2)
ALP BLD-CCNC: 93 U/L (ref 40–129)
ALT SERPL-CCNC: 9 U/L (ref 0–40)
ANION GAP SERPL CALCULATED.3IONS-SCNC: 10 MMOL/L (ref 7–16)
AST SERPL-CCNC: 13 U/L (ref 0–39)
BACTERIA: ABNORMAL /HPF
BASOPHILS ABSOLUTE: 0.02 E9/L (ref 0–0.2)
BASOPHILS RELATIVE PERCENT: 0.2 % (ref 0–2)
BILIRUB SERPL-MCNC: 1.1 MG/DL (ref 0–1.2)
BILIRUBIN URINE: NEGATIVE
BLOOD, URINE: ABNORMAL
BUN BLDV-MCNC: 22 MG/DL (ref 8–23)
C-REACTIVE PROTEIN: 12.5 MG/DL (ref 0–0.4)
CALCIUM SERPL-MCNC: 8.9 MG/DL (ref 8.6–10.2)
CHLORIDE BLD-SCNC: 98 MMOL/L (ref 98–107)
CLARITY: CLEAR
CO2: 26 MMOL/L (ref 22–29)
COLOR: YELLOW
CREAT SERPL-MCNC: 1.1 MG/DL (ref 0.7–1.2)
EOSINOPHILS ABSOLUTE: 0.04 E9/L (ref 0.05–0.5)
EOSINOPHILS RELATIVE PERCENT: 0.3 % (ref 0–6)
GFR AFRICAN AMERICAN: >60
GFR NON-AFRICAN AMERICAN: >60 ML/MIN/1.73
GLUCOSE BLD-MCNC: 117 MG/DL (ref 74–99)
GLUCOSE URINE: NEGATIVE MG/DL
HCT VFR BLD CALC: 39.4 % (ref 37–54)
HEMOGLOBIN: 13 G/DL (ref 12.5–16.5)
IMMATURE GRANULOCYTES #: 0.05 E9/L
IMMATURE GRANULOCYTES %: 0.4 % (ref 0–5)
KETONES, URINE: ABNORMAL MG/DL
LACTIC ACID: 1.4 MMOL/L (ref 0.5–2.2)
LEUKOCYTE ESTERASE, URINE: ABNORMAL
LYMPHOCYTES ABSOLUTE: 1.01 E9/L (ref 1.5–4)
LYMPHOCYTES RELATIVE PERCENT: 8.3 % (ref 20–42)
MCH RBC QN AUTO: 33.7 PG (ref 26–35)
MCHC RBC AUTO-ENTMCNC: 33 % (ref 32–34.5)
MCV RBC AUTO: 102.1 FL (ref 80–99.9)
MONOCYTES ABSOLUTE: 0.73 E9/L (ref 0.1–0.95)
MONOCYTES RELATIVE PERCENT: 6 % (ref 2–12)
NEUTROPHILS ABSOLUTE: 10.28 E9/L (ref 1.8–7.3)
NEUTROPHILS RELATIVE PERCENT: 84.8 % (ref 43–80)
NITRITE, URINE: NEGATIVE
PDW BLD-RTO: 12.7 FL (ref 11.5–15)
PH UA: 5.5 (ref 5–9)
PLATELET # BLD: 232 E9/L (ref 130–450)
PMV BLD AUTO: 9.4 FL (ref 7–12)
POTASSIUM REFLEX MAGNESIUM: 4.2 MMOL/L (ref 3.5–5)
PROCALCITONIN: 0.42 NG/ML (ref 0–0.08)
PROTEIN UA: NEGATIVE MG/DL
RBC # BLD: 3.86 E12/L (ref 3.8–5.8)
RBC UA: ABNORMAL /HPF (ref 0–2)
SEDIMENTATION RATE, ERYTHROCYTE: 18 MM/HR (ref 0–15)
SODIUM BLD-SCNC: 134 MMOL/L (ref 132–146)
SPECIFIC GRAVITY UA: 1.02 (ref 1–1.03)
TOTAL PROTEIN: 6.3 G/DL (ref 6.4–8.3)
UROBILINOGEN, URINE: 0.2 E.U./DL
WBC # BLD: 12.1 E9/L (ref 4.5–11.5)
WBC UA: ABNORMAL /HPF (ref 0–5)

## 2020-08-14 PROCEDURE — 2580000003 HC RX 258: Performed by: INTERNAL MEDICINE

## 2020-08-14 PROCEDURE — 84145 PROCALCITONIN (PCT): CPT

## 2020-08-14 PROCEDURE — 6370000000 HC RX 637 (ALT 250 FOR IP): Performed by: NURSE PRACTITIONER

## 2020-08-14 PROCEDURE — 6360000002 HC RX W HCPCS: Performed by: INTERNAL MEDICINE

## 2020-08-14 PROCEDURE — 85025 COMPLETE CBC W/AUTO DIFF WBC: CPT

## 2020-08-14 PROCEDURE — 6370000000 HC RX 637 (ALT 250 FOR IP): Performed by: INTERNAL MEDICINE

## 2020-08-14 PROCEDURE — 80053 COMPREHEN METABOLIC PANEL: CPT

## 2020-08-14 PROCEDURE — 81001 URINALYSIS AUTO W/SCOPE: CPT

## 2020-08-14 PROCEDURE — 2060000000 HC ICU INTERMEDIATE R&B

## 2020-08-14 PROCEDURE — 51798 US URINE CAPACITY MEASURE: CPT

## 2020-08-14 PROCEDURE — 99232 SBSQ HOSP IP/OBS MODERATE 35: CPT | Performed by: INTERNAL MEDICINE

## 2020-08-14 PROCEDURE — 85651 RBC SED RATE NONAUTOMATED: CPT

## 2020-08-14 PROCEDURE — 86140 C-REACTIVE PROTEIN: CPT

## 2020-08-14 PROCEDURE — 83605 ASSAY OF LACTIC ACID: CPT

## 2020-08-14 PROCEDURE — 36415 COLL VENOUS BLD VENIPUNCTURE: CPT

## 2020-08-14 RX ORDER — PROMETHAZINE HYDROCHLORIDE 25 MG/1
12.5 TABLET ORAL EVERY 6 HOURS PRN
Status: DISCONTINUED | OUTPATIENT
Start: 2020-08-14 | End: 2020-08-20 | Stop reason: HOSPADM

## 2020-08-14 RX ORDER — ATORVASTATIN CALCIUM 10 MG/1
10 TABLET, FILM COATED ORAL DAILY
Status: DISCONTINUED | OUTPATIENT
Start: 2020-08-14 | End: 2020-08-20 | Stop reason: HOSPADM

## 2020-08-14 RX ORDER — ONDANSETRON 2 MG/ML
4 INJECTION INTRAMUSCULAR; INTRAVENOUS EVERY 6 HOURS PRN
Status: DISCONTINUED | OUTPATIENT
Start: 2020-08-14 | End: 2020-08-20 | Stop reason: HOSPADM

## 2020-08-14 RX ORDER — CARBIDOPA AND LEVODOPA 50; 200 MG/1; MG/1
1 TABLET, EXTENDED RELEASE ORAL
Status: DISCONTINUED | OUTPATIENT
Start: 2020-08-14 | End: 2020-08-14

## 2020-08-14 RX ORDER — VITAMIN B COMPLEX
1000 TABLET ORAL DAILY
Status: DISCONTINUED | OUTPATIENT
Start: 2020-08-14 | End: 2020-08-20 | Stop reason: HOSPADM

## 2020-08-14 RX ORDER — ACETAMINOPHEN 650 MG/1
650 SUPPOSITORY RECTAL EVERY 6 HOURS PRN
Status: DISCONTINUED | OUTPATIENT
Start: 2020-08-14 | End: 2020-08-20 | Stop reason: HOSPADM

## 2020-08-14 RX ORDER — PAROXETINE HYDROCHLORIDE 20 MG/1
20 TABLET, FILM COATED ORAL EVERY MORNING
Status: DISCONTINUED | OUTPATIENT
Start: 2020-08-14 | End: 2020-08-20 | Stop reason: HOSPADM

## 2020-08-14 RX ORDER — POLYETHYLENE GLYCOL 3350 17 G/17G
17 POWDER, FOR SOLUTION ORAL DAILY PRN
Status: DISCONTINUED | OUTPATIENT
Start: 2020-08-14 | End: 2020-08-20 | Stop reason: HOSPADM

## 2020-08-14 RX ORDER — DULOXETIN HYDROCHLORIDE 30 MG/1
30 CAPSULE, DELAYED RELEASE ORAL DAILY
Status: DISCONTINUED | OUTPATIENT
Start: 2020-08-14 | End: 2020-08-14

## 2020-08-14 RX ORDER — ASPIRIN 325 MG
650 TABLET ORAL DAILY
Status: DISCONTINUED | OUTPATIENT
Start: 2020-08-14 | End: 2020-08-20 | Stop reason: HOSPADM

## 2020-08-14 RX ORDER — PANTOPRAZOLE SODIUM 40 MG/1
40 TABLET, DELAYED RELEASE ORAL
Status: DISCONTINUED | OUTPATIENT
Start: 2020-08-15 | End: 2020-08-20 | Stop reason: HOSPADM

## 2020-08-14 RX ORDER — FOLIC ACID 1 MG/1
1 TABLET ORAL DAILY
Status: DISCONTINUED | OUTPATIENT
Start: 2020-08-14 | End: 2020-08-20 | Stop reason: HOSPADM

## 2020-08-14 RX ORDER — CARBIDOPA AND LEVODOPA 25; 100 MG/1; MG/1
2 TABLET, EXTENDED RELEASE ORAL
Status: DISCONTINUED | OUTPATIENT
Start: 2020-08-14 | End: 2020-08-20 | Stop reason: HOSPADM

## 2020-08-14 RX ORDER — SODIUM CHLORIDE 9 MG/ML
INJECTION, SOLUTION INTRAVENOUS CONTINUOUS
Status: DISCONTINUED | OUTPATIENT
Start: 2020-08-14 | End: 2020-08-15 | Stop reason: ALTCHOICE

## 2020-08-14 RX ORDER — SODIUM CHLORIDE 0.9 % (FLUSH) 0.9 %
10 SYRINGE (ML) INJECTION PRN
Status: DISCONTINUED | OUTPATIENT
Start: 2020-08-14 | End: 2020-08-20 | Stop reason: HOSPADM

## 2020-08-14 RX ORDER — VITS A,C,E/LUTEIN/MINERALS 300MCG-200
1 TABLET ORAL DAILY
Status: DISCONTINUED | OUTPATIENT
Start: 2020-08-14 | End: 2020-08-20 | Stop reason: HOSPADM

## 2020-08-14 RX ORDER — SODIUM CHLORIDE 0.9 % (FLUSH) 0.9 %
10 SYRINGE (ML) INJECTION EVERY 12 HOURS SCHEDULED
Status: DISCONTINUED | OUTPATIENT
Start: 2020-08-14 | End: 2020-08-20 | Stop reason: HOSPADM

## 2020-08-14 RX ORDER — LEVOTHYROXINE SODIUM 0.03 MG/1
25 TABLET ORAL DAILY
Status: DISCONTINUED | OUTPATIENT
Start: 2020-08-14 | End: 2020-08-20 | Stop reason: HOSPADM

## 2020-08-14 RX ORDER — ACETAMINOPHEN 325 MG/1
650 TABLET ORAL EVERY 6 HOURS PRN
Status: DISCONTINUED | OUTPATIENT
Start: 2020-08-14 | End: 2020-08-20 | Stop reason: HOSPADM

## 2020-08-14 RX ADMIN — ATORVASTATIN CALCIUM 10 MG: 10 TABLET, FILM COATED ORAL at 13:40

## 2020-08-14 RX ADMIN — ENOXAPARIN SODIUM 40 MG: 40 INJECTION SUBCUTANEOUS at 09:02

## 2020-08-14 RX ADMIN — SODIUM CHLORIDE: 9 INJECTION, SOLUTION INTRAVENOUS at 21:42

## 2020-08-14 RX ADMIN — ACETAMINOPHEN 650 MG: 325 TABLET ORAL at 23:34

## 2020-08-14 RX ADMIN — ASPIRIN 650 MG: 325 TABLET, FILM COATED ORAL at 13:40

## 2020-08-14 RX ADMIN — Medication 10 ML: at 21:38

## 2020-08-14 RX ADMIN — Medication 10 ML: at 09:03

## 2020-08-14 RX ADMIN — DICLOFENAC 2 G: 10 GEL TOPICAL at 14:20

## 2020-08-14 RX ADMIN — DICLOFENAC 2 G: 10 GEL TOPICAL at 21:38

## 2020-08-14 RX ADMIN — LEVOTHYROXINE SODIUM 25 MCG: 25 TABLET ORAL at 13:41

## 2020-08-14 RX ADMIN — PAROXETINE HYDROCHLORIDE 20 MG: 20 TABLET, FILM COATED ORAL at 14:20

## 2020-08-14 RX ADMIN — FOLIC ACID 1 MG: 1 TABLET ORAL at 14:21

## 2020-08-14 RX ADMIN — CARBIDOPA AND LEVODOPA 2 TABLET: 25; 100 TABLET, EXTENDED RELEASE ORAL at 20:30

## 2020-08-14 RX ADMIN — Medication 1000 UNITS: at 14:20

## 2020-08-14 RX ADMIN — CARBIDOPA AND LEVODOPA 2 TABLET: 25; 100 TABLET, EXTENDED RELEASE ORAL at 14:19

## 2020-08-14 ASSESSMENT — PAIN SCALES - GENERAL: PAINLEVEL_OUTOF10: 5

## 2020-08-14 NOTE — CONSULTS
8/14/2020 10:29 AM  Service: Urology  Group: [facility] (Ad/Brigitte/Saurabh)    Familia Grier  [id number]     Chief Complaint: AUR    History of Present Illness:   The patient is a 80 y.o. male patient who presents with light headedness secondary to anemia  He does have a HO BPH  He has seen Dr John Santos  He is taking hytrin and proscar  He was found to have AUR  He did have another PVR done and low  He does have parkinson about 5 years  He did not have a johnson placed   He has not had issues with retention in the past  He has not had a TURP in the past   He has not seen a urologist in the past  He did have left knee surgery      Past Medical History:   Diagnosis Date    Anxiety     Ascending aortic aneurysm (HCC)     BPH (benign prostatic hyperplasia)     CAD (coronary artery disease)     Cancer ([address]     skin    Coronary arteriosclerosis     Depression     DJD (degenerative joint disease)     Hyperlipidemia     Impacted cerumen     Lumbar spinal stenosis     severe    Macular degeneration     Osteoarthritis     Parkinson disease ([address]     RA (rheumatoid arthritis) ([facility]        Past Surgical History:   Procedure Laterality Date    APPENDECTOMY      CATARACT REMOVAL WITH IMPLANT Left 04/19/2020    CHOLECYSTECTOMY      COLONOSCOPY      CORONARY ARTERY BYPASS GRAFT  2002    HERNIA REPAIR Bilateral     inguinal    KNEE SURGERY Left 06/2011    TOTAL KNEE ARTHROPLASTY Right 01/24/2017    tiffanie    UPPER GASTROINTESTINAL ENDOSCOPY      peptic ulcer       Medications Prior to Admission:    Medications Prior to Admission: terazosin (HYTRIN) 2 MG capsule, Take 2 mg by mouth nightly  acetaminophen (TYLENOL) 500 MG tablet, Take 500 mg by mouth every 6 hours as needed for Pain  levothyroxine (SYNTHROID) 50 MCG tablet, Take 0.5 tablets by mouth Daily  metoprolol succinate (TOPROL XL) 25 MG extended release tablet, Takes 1/2 tablet daily  omeprazole (PRILOSEC) 20 MG delayed release capsule, Take 1 capsule by mouth daily  terazosin (HYTRIN) 1 MG capsule, Take 2 capsules by mouth nightly  lovastatin (MEVACOR) 20 MG tablet, Take 1 tablet by mouth nightly  PARoxetine (PAXIL) 20 MG tablet, Take 1 tablet by mouth every morning  DULoxetine (CYMBALTA) 30 MG extended release capsule, Take 30 mg by mouth daily  diclofenac sodium (VOLTAREN) 1 % GEL, Apply 2 g topically 2 times daily  carbidopa-levodopa (SINEMET CR)  MG per extended release tablet, Take 1 tablet by mouth every 4 hours (while awake) No more than 6 per day  ipratropium (ATROVENT) 0.06 % nasal spray, One squirt qid prn runny nose  folic acid (FOLVITE) 1 MG tablet, Take 1 tablet by mouth daily  aspirin 81 MG tablet, Take 650 mg by mouth daily   vitamin D (CHOLECALCIFEROL) 1000 UNIT TABS tablet, Take 1,000 Units by mouth daily  methotrexate (RHEUMATREX) 2.5 MG chemo tablet, Take 2.5 mg by mouth once a week 4 tablets weekly. Multiple Vitamins-Minerals (OCUVITE) TABS oral tablet, Take 1 tablet by mouth daily    Allergies:    Pcn [penicillins] and Prednisone    Social History:    reports that he has quit smoking. His smoking use included pipe and cigars. He quit after 10.00 years of use. He has never used smokeless tobacco. He reports current alcohol use. He reports that he does not use drugs. Family History:   Non-contributory to this urological problem  family history includes Lung Cancer in his father; No Known Problems in his mother.     Review of Systems:  Respiratory: negative for cough and hemoptysis  Cardiovascular: negative for chest pain and dyspnea  Gastrointestinal: negative for abdominal pain, diarrhea, nausea and vomiting  Derm: negative for rash and skin lesion(s)  Neurological: negative for seizures and tremors  Endocrine: negative for diabetic symptoms including polydipsia and polyuria  : As above in the HPI, otherwise negative  All other reviews are negative    Physical Exam:   Vitals: BP (!) 125/47   Pulse 73   Temp 98.1 °F (36.7 °C) (Oral)   Resp 18   Ht 6' 2\" (1.88 m)   Wt 181 lb 6.4 oz (82.3 kg)   SpO2 94%   BMI 23.29 kg/m²   General:  Awake, alert, oriented X 3. Well developed, well nourished, well groomed. No apparent distress. HEENT:  Normocephalic, atraumatic. Pupils equal, round. No scleral icterus. No conjunctival injection. Normal lips, teeth, and gums. No nasal discharge. Neck:  Supple, no masses. Heart:  RRR  Lungs:  No audible wheezing. Respirations symmetric and non-labored. Abdomen:  soft, nontender, no masses, no organomegaly, no peritoneal signs  Extremities:  No clubbing, cyanosis, or edema  Skin:  Warm and dry, no open lesions or rashes  Neuro:  Cranial nerves 2-12 intact, no focal deficits  Rectal: deferred  Genitalia:  Johnson no  Labs:   Recent Labs     08/13/20  1530 08/14/20  0545   WBC 19.0* 12.1*   RBC 3.01* 3.86   HGB 10.5* 13.0   HCT 30.7* 39.4   .0* 102.1*   MCH 34.9 33.7   MCHC 34.2 33.0   RDW 12.4 12.7    232   MPV 9.1 9.4     Results for Jm Love (MRN [id number]) as of 8/14/2020 10:31   Ref. Range 5/30/2019 11:51   Prostatic Specific Ag Latest Ref Range: 0.00 - 4.00 ng/mL 1.00     Recent Labs     08/13/20  1530 08/14/20  0545   CREATININE 1.1 1.1     . Nonspecific 2.4 cm left adrenal mass, as is grossly stable as of    the MRI of the lumbar spine from 2/5/2018, favoring a benign etiology    (lipid poor adenoma).      2. Nonspecific perinephric fat stranding, which is often seen as an    incidental finding but may signify underlying medical renal disease. No stone or hydronephrosis. 3. Severe distal colonic diverticulosis without diverticulitis.         Images:                  Assessment: Chandrakantmartínez Buck 80 y.o. male     BPH with KUN  Parkinson's  Leukocytosis   Dehydration   Left adrenal mass   2 week post right knee surgery    Plan:    CT was reviewed  Hold on johnson  PVR's were low   Cont the hytrin  Will need outpatient eval   [facility] and PSA in the future   Ok to DC home from a  standpoint    Angelika Leiva Memo, DO   [facility]

## 2020-08-14 NOTE — PROGRESS NOTES
Called consult information to [facility] to Dr. Libra Pleitez office [address] [place] [address].  Electronically signed by Elaine Hernandez RN on 8/14/2020 at 9:48 AM

## 2020-08-14 NOTE — ED NOTES
Please call Pt's wife, Dinesh Aparicio @ [phone], with admission status once decided.       Lamberto Smyth RN  08/13/20 0592

## 2020-08-14 NOTE — PROGRESS NOTES
Called consult information to [facility] urology.  Electronically signed by Luis Hernandez RN on 8/14/2020 at 9:52 AM

## 2020-08-14 NOTE — FLOWSHEET NOTE
Spoke to Dr Carolyn Vides regarding home medications that need to be ordered.        Electronically signed by Kandi Dinh RN on 8/14/2020 at 10:35 AM

## 2020-08-14 NOTE — H&P
[facility] History and Physical        Chief Complaint:  lightheadness= presyncopal and profound weakness. History of Present Illness   The patient is a 80 y.o. male with past medical history of BPH, rheumatoid arthritis, HTN, CAD, AAA, and Parkinson disease. He was admitted from the ER for concerns of sepsis after having profound weakness and orthostatic hypotension. He describes \"not being able to get up\" from the toilet. He had right knee surgery 3 weeks ago. He notes that he had some difficulty urinating after surgery but resolved a few days after the catheter was removed. For the past 5 days, he admits to straining while urinating, urinary urgency, and urinary incontinence. Orthostatic hypotension vs vasovagal (sweating and diaphoresis noted during standing mictruition. +accidents can't get to bathroom recently - urinary urgency, denies dsyuria noted  +bactera urine ? perinephric stranding. Rule out Sepsis 2/2 -- rule out prostatitis vs BPH exac sp urinary catheterizaion during TKA surgery 3 weeks ago and recurrent recently     He also complains of constant moderate neck pain which began after surgery as well (sp intubation). +sweaty  He denies any falls, syncope, fever, chills, dysuria  -- no major cdif like diarrhea,    No new cough, shortness of breath, or COVID-like symptoms.     - hx taken from the patient, patient's daughter, patient's wife, and medical student   REVIEW OF SYSTEMS:  no fevers, chills, cp, sob, n/v, ha, vision/hearing changes, wt changes, hot/cold flashes, other open skin lesions, diarrhea, constipation, dysuria/hematuria unless noted in HPI. Complete ROS performed with the patient and is otherwise negative.     Past Medical History:      Diagnosis Date    Anxiety     Ascending aortic aneurysm (HCC)     BPH (benign prostatic hyperplasia)     CAD (coronary artery disease)     Cancer (HCC)     skin    Coronary arteriosclerosis     Depression     DJD (degenerative joint disease)     Hyperlipidemia     Impacted cerumen     Lumbar spinal stenosis     severe    Macular degeneration     Osteoarthritis     Parkinson disease (HCC)     RA (rheumatoid arthritis) ([facility] [place] [address]        Past Surgical History:        Procedure Laterality Date    APPENDECTOMY      CATARACT REMOVAL WITH IMPLANT Left 04/19/2020    CHOLECYSTECTOMY      COLONOSCOPY      CORONARY ARTERY BYPASS GRAFT  2002    HERNIA REPAIR Bilateral     inguinal    KNEE SURGERY Left 06/2011    TOTAL KNEE ARTHROPLASTY Right 01/24/2017    tiffanie    UPPER GASTROINTESTINAL ENDOSCOPY      peptic ulcer       Home Medications:  Prior to Admission medications    Medication Sig Start Date End Date Taking?  Authorizing Provider   terazosin (HYTRIN) 2 MG capsule Take 2 mg by mouth nightly   Yes Historical Provider, MD   acetaminophen (TYLENOL) 500 MG tablet Take 500 mg by mouth every 6 hours as needed for Pain   Yes Historical Provider, MD   ondansetron (ZOFRAN) 4 MG tablet Take 4 mg by mouth 4 times daily as needed for Nausea or Vomiting   Yes Historical Provider, MD   levothyroxine (SYNTHROID) 50 MCG tablet Take 0.5 tablets by mouth Daily 6/24/20  Yes Jasvir Pete,    metoprolol succinate (TOPROL XL) 25 MG extended release tablet Takes 1/2 tablet daily 6/24/20  Yes Jasvir Pete DO   omeprazole (PRILOSEC) 20 MG delayed release capsule Take 1 capsule by mouth daily 6/24/20  Yes Jasvir Pete DO   terazosin (HYTRIN) 1 MG capsule Take 2 capsules by mouth nightly 6/24/20  Yes Jasvir Pete DO   lovastatin (MEVACOR) 20 MG tablet Take 1 tablet by mouth nightly 6/24/20  Yes Jasvir Pete DO   PARoxetine (PAXIL) 20 MG tablet Take 1 tablet by mouth every morning 6/24/20  Yes Jasvir Pete DO   DULoxetine (CYMBALTA) 30 MG extended release capsule Take 30 mg by mouth daily   Yes Historical Provider, MD   diclofenac sodium (VOLTAREN) 1 % GEL Apply 2 g topically 2 times daily   Yes Historical Provider, MD   b complex vitamins capsule Take 1 capsule by mouth daily   Yes Historical Provider, MD   carbidopa-levodopa (SINEMET CR)  MG per extended release tablet Take 1 tablet by mouth every 4 hours (while awake) No more than 6 per day 11/14/19  Yes Jasvir Pete DO   ipratropium (ATROVENT) 0.06 % nasal spray One squirt qid prn runny nose 11/14/19  Yes Jasvir Pete DO   folic acid (FOLVITE) 1 MG tablet Take 1 tablet by mouth daily 11/14/19  Yes Jasvir Pete DO   aspirin 81 MG tablet Take 650 mg by mouth daily    Yes Historical Provider, MD   vitamin D (CHOLECALCIFEROL) 1000 UNIT TABS tablet Take 1,000 Units by mouth daily   Yes Historical Provider, MD   methotrexate (RHEUMATREX) 2.5 MG chemo tablet Take 2.5 mg by mouth once a week 4 tablets weekly. Yes Historical Provider, MD   Multiple Vitamins-Minerals (OCUVITE) TABS oral tablet Take 1 tablet by mouth daily    Historical Provider, MD       Allergies:  Pcn [penicillins] and Prednisone    Social History:   TOBACCO:   reports that he has quit smoking. His smoking use included pipe and cigars. He quit after 10.00 years of use. He has never used smokeless tobacco.  ETOH:   reports current alcohol use. Family History:       Problem Relation Age of Onset    Lung Cancer Father     No Known Problems Mother       Or deferred/otherwise considered non contributory to current admission  PHYSICAL EXAM:    VS: /71   Pulse 68   Temp 98.2 °F (36.8 °C) (Oral)   Resp 18   Ht 6' 2\" (1.88 m)   Wt 188 lb (85.3 kg)   SpO2 97%   BMI 24.14 kg/m²     General Appearance:     no acute distress. Psych:  HEENT:    A.O. As per HPI details  NC/AT, PERRL, no pallor no icterus, lips/ext mucous membrane grossly dry   Neck:   Supple, trachea midline, no obvious JVD   Resp:     Dec BS  No wheezes, No rhonchi   Chest wall:    No tenderness or deformity   Heart:    Regular rate and rhythm, S1 and S2 normal, no rub or gallop.    Abdomen:     Soft, block  Abnormal ECG  No previous ECGs available     Assessment & Plan   ACTIVE hospital problems being addressed/reassessed for this admission:  Code status/DVT prophylaxis and PLAN --see orders   Note extensive time spent coordinating care between ER docs, ER and floor nurses, and transitioning care over to day providers  Plan of care/ clinical impressions/communication specifics detailed below:      Orthostatic hypotension vs vasovagal (sweating and diaphoresis noted during standing mictruition.)  Inc WBC- urinary urgency, +bactera urine ? perinephric stranding. Rule out Sepsis 2/2 -- rule out prostatitis vs BPH exac sp urinary catheterizaion during TKA surgery 3 weeks ago and recurrent recently    -hx of BPH and urinary symptoms - on hytrin  Document progress note and notify- consult urology (wade) in am  if >250cc    Possible sepsis  -leukocytosis WBC 19.0 on admission   Mildly immunosuppressed on MTX for RA  -UA many bacteria but low WBC 0-1 in urine and , negative leukocyte esterase   -- new \"profound weakness\"- rule out parkinsons exac vs sepsis. Also Rule out sepsis bacteremia or knee- sp TKA  -- will consult ID  Treating for sepsis from ER, bc low BP, sweaty etc.  -blood cultures sent  -IV fluid boluses   -antibiotics         Anemia likely 2/2 chronic disease +acute blood loss anemia (recovery retic  +chronic MTX can INC mcv- sp TKA 3 weeks ago- blood loss )  -hx of RA - on mtx  -, Hgb 10.5     Hx of rheumatoid arthritis -stable  But AA cervical instability can be exac by recent intubation for TKA  Since surgery - neck has been hurting over past 3 weeks    -opiods for pain management- exac of neck pain 3 weeks now- post op knee  HTN - hold BP meds 2 to above    Hx of Parkinson disease- movement problem exac now (missed meds)  This can also autonomic instablity +orthostatics.       Samia Valverde MD   Night Officer, overnight admitting doctor at [facility] call day time doctor   for questions after 7:30am    Covering for [facility]  Hospitalist Service  If Qs please call [phone]  Electronically signed by Nir Mir MD on 8/13/2020 at 10:40 PM

## 2020-08-14 NOTE — CONSULTS
[facility]     Consult Note                                 [address] [facility] [address], [place], [address]                   Phone [phone]     Fax [phone]        Date:   8/14/2020  Patient name:  Rivas Lazar  Date of admission:  8/13/2020  2:58 PM  MRN:   [id number]  YOB: 1933    Reason for Consult: UTI    CC:   Chief Complaint   Patient presents with    Fatigue     fatigue for the past day, today unable to get off the toilet, total knee about 3 weeks ago       HISTORY OF PRESENT ILLNESS:                The patient is a 80 y.o. male who had right knee surgery done that included patella repair 3 weeks at a different hospital.  It was an uncomplicated surgery he was discharged home without a Barriga catheter. He was noticing increased draining and increased frequency of urination. He denies having any dysuria. He feels very weak and tired. No fever, chills. He came with a WBC count elevation of 19,000 which after hydration decreased to 12.1, his CRP is high at 12.5 procalcitonin 0.42, his UA shows no WBCs, his urine cultures growing more than 100,000 colonies of gram-negative rods currently in process. CT abdomen pelvis without contrast shows nonspecific 2.4 cm left adrenal mass, nonspecific perinephric fat stranding. Past Medical History:   has a past medical history of Anxiety, Ascending aortic aneurysm ([address] BPH (benign prostatic hyperplasia), CAD (coronary artery disease), Cancer ([address] Coronary arteriosclerosis, Depression, DJD (degenerative joint disease), Hyperlipidemia, Impacted cerumen, Lumbar spinal stenosis, Macular degeneration, Osteoarthritis, Parkinson disease ([address] and RA (rheumatoid arthritis) ([address] Past Surgical History:   has a past surgical history that includes Coronary artery bypass graft (2002); Cholecystectomy; Appendectomy;  Total knee arthroplasty (Right, 01/24/2017); Colonoscopy; Upper gastrointestinal endoscopy; hernia repair (Bilateral); knee surgery (Left, 06/2011); and Cataract removal with implant (Left, 04/19/2020). Home Medications:    Prior to Admission medications    Medication Sig Start Date End Date Taking?  Authorizing Provider   terazosin (HYTRIN) 2 MG capsule Take 2 mg by mouth nightly   Yes Historical Provider, MD   acetaminophen (TYLENOL) 500 MG tablet Take 500 mg by mouth every 6 hours as needed for Pain   Yes Historical Provider, MD   levothyroxine (SYNTHROID) 50 MCG tablet Take 0.5 tablets by mouth Daily 6/24/20  Yes Jasvir Pete DO   metoprolol succinate (TOPROL XL) 25 MG extended release tablet Takes 1/2 tablet daily 6/24/20  Yes Jasvir Pete DO   omeprazole (PRILOSEC) 20 MG delayed release capsule Take 1 capsule by mouth daily 6/24/20  Yes Jasvir Pete DO   terazosin (HYTRIN) 1 MG capsule Take 2 capsules by mouth nightly 6/24/20  Yes Jasvir Pete DO   lovastatin (MEVACOR) 20 MG tablet Take 1 tablet by mouth nightly 6/24/20  Yes Jasvir Pete DO   PARoxetine (PAXIL) 20 MG tablet Take 1 tablet by mouth every morning 6/24/20  Yes Jasvir Pete DO   DULoxetine (CYMBALTA) 30 MG extended release capsule Take 30 mg by mouth daily   Yes Historical Provider, MD   diclofenac sodium (VOLTAREN) 1 % GEL Apply 2 g topically 2 times daily   Yes Historical Provider, MD   carbidopa-levodopa (SINEMET CR)  MG per extended release tablet Take 1 tablet by mouth every 4 hours (while awake) No more than 6 per day 11/14/19  Yes Jasvir Pete DO   ipratropium (ATROVENT) 0.06 % nasal spray One squirt qid prn runny nose 11/14/19  Yes Jasvir Pete DO   folic acid (FOLVITE) 1 MG tablet Take 1 tablet by mouth daily 11/14/19  Yes Jasvir Pete DO   aspirin 81 MG tablet Take 650 mg by mouth daily    Yes Historical Provider, MD   vitamin D (CHOLECALCIFEROL) 1000 UNIT TABS tablet Take 1,000 Units by mouth daily   Yes Historical Provider, MD   methotrexate (RHEUMATREX) 2.5 MG chemo tablet Take 2.5 mg by mouth once a week 4 tablets weekly. Yes Historical Provider, MD   Multiple Vitamins-Minerals (OCUVITE) TABS oral tablet Take 1 tablet by mouth daily    Historical Provider, MD       Allergies:  Pcn [penicillins] and Prednisone    Social History:   reports that he has quit smoking. His smoking use included pipe and cigars. He quit after 10.00 years of use. He has never used smokeless tobacco. He reports current alcohol use. He reports that he does not use drugs. Family History: family history includes Lung Cancer in his father; No Known Problems in his mother. REVIEW OF SYSTEMS:    12 point ROS has been done and pertinent neg and positive has been included in HPI and rest are non contributory        PHYSICAL EXAM:    BP (!) 125/47   Pulse 73   Temp 98.1 °F (36.7 °C) (Oral)   Resp 18   Ht 6' 2\" (1.88 m)   Wt 181 lb 6.4 oz (82.3 kg)   SpO2 94%   BMI 23.29 kg/m²    VENT SETTINGS:   Vent Information  SpO2: 94 %    General appearance: Alert, Awake, Oriented times 3, no distress  Skin: Warm and dry  Eyes: Pink palpebral conjunctivae. PERRL  Ears: External ears normal.  Nose/Sinuses: Nares normal. Septum midline. Mucosa normal. No sinus tenderness. Oropharynx: Oropharynx clear with no exudates seen  Neck: Neck supple. No jugular venous distension, lymphadenopathy or thyromegaly Trachea midline  Lungs: Lungs clear to auscultation bilaterally. No rhonchi, crackles or wheezes  Heart: S1 S2  Regular rate and rhythm. No rub, murmur or gallop  Abdomen: Abdomen soft, non-tender.  BS normal. No masses, organomegaly  Extremities: No edema, Peripheral pulses palpable  Musculoskeletal: Right knee brace in place, incision site intact    DATA:    Labs:     Last 3 CBC:  Recent Labs     08/13/20  1530 08/14/20  0545   WBC 19.0* 12.1*   RBC 3.01* 3.86   HGB 10.5* 13.0    232   MPV 9.1 9.4       Last 3 BMP  Recent Labs 08/13/20  1530 08/14/20  0545    134   K 4.4 4.2    98   CO2 24 26   BUN 26* 22   CREATININE 1.1 1.1   GLUCOSE 128* 117*   CALCIUM 8.7 8.9       LIVER PROFILE:  Recent Labs     08/13/20  1530 08/14/20  0545   AST 12 13   ALT <5 9   LABALBU 3.5 3.5       URINARY CATHETER OUTPUT (Barriga):       DRAIN/TUBE OUTPUT:     Microbiology :  No results for input(s): BC in the last 72 hours. No results for input(s): Maritza Charlotte in the last 72 hours. Recent Labs     08/13/20  1533   LABURIN >100,000 CFU/ml  Identification and sensitivity to follow       No results for input(s): CULTRESP in the last 72 hours. No results for input(s): WNDABS in the last 72 hours. Radiology :  CT CHEST WO CONTRAST   Final Result      1. Signs of anemia. Cardiomegaly and coronary artery disease. 2. Dilated ascending aorta at 4.3 cm.   3. New 2.5 cm left adrenal mass with nonspecific characteristics. Consider MRI with in and out of phase imaging to assess for small   quantities of fat which would indicate a benign adenoma. CT ABDOMEN PELVIS WO CONTRAST Additional Contrast? None   Final Result         1. 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 2. Nonspecific perinephric fat stranding, which is often seen as an   incidental finding but may signify underlying medical renal disease. No stone or hydronephrosis. 3. Severe distal colonic diverticulosis without diverticulitis. XR CHEST PORTABLE   Final Result   No acute cardiopulmonary findings.                     Assessment and Plan:      · Generalized weakness  · Difficulty in urination-rule out BPH  · Leukocytosis resolved post hydration  · Asymptomatic bacteriuria    Plan  -At this time he was not given any antibiotics and with hydration his white count is resolved, I would advise to get a bladder scan and see if he has urinary retention, urology has been consulted as well    -Keep him off antibiotic  -UA is not suggestive of UTI, recheck to avoid any other  -We will follow him closely            Josey Ward MD

## 2020-08-14 NOTE — PROGRESS NOTES
[facility] Progress Note    Admitting Date and Time: 8/13/2020  2:58 PM  Admit Dx: Orthostatic hypotension [I95.1]  Orthostatic hypotension [I95.1]    Subjective:  Patient is being followed for Orthostatic hypotension [I95.1]  Orthostatic hypotension [I95.1]   Patient admitted last night with concerns for infection and severe weakness. WBC on admission noted to 19 but has improved to 12 this AM.   Barriga placed due to PVR 399cc after urinating 200cc  Urine culture pending. ID and urology have been consulted. Patient seen resting comfortably in bed. Reports neck pain which started last evening. States he was bale to ambulate to the bathroom and felt OK this AM.  Denies current dizziness, fever, chills, n/v. Still generalized weakness. ROS: denies fever, chills, cp, sob, n/v, HA unless stated above.  sodium chloride flush  10 mL Intravenous 2 times per day    enoxaparin  40 mg Subcutaneous Daily    sodium chloride  1,000 mL Intravenous Once     sodium chloride flush, 10 mL, PRN  acetaminophen, 650 mg, Q6H PRN    Or  acetaminophen, 650 mg, Q6H PRN  polyethylene glycol, 17 g, Daily PRN  promethazine, 12.5 mg, Q6H PRN    Or  ondansetron, 4 mg, Q6H PRN         Objective:    BP (!) 125/47   Pulse 73   Temp 98.1 °F (36.7 °C) (Oral)   Resp 18   Ht 6' 2\" (1.88 m)   Wt 181 lb 6.4 oz (82.3 kg)   SpO2 94%   BMI 23.29 kg/m²     General Appearance: alert and oriented to person, place and time and in no acute distress  Skin: warm and dry  Head: normocephalic and atraumatic  Neck: neck supple and non tender without mass.  Cervical tenderness to palpation, good ROM  Pulmonary/Chest: clear to auscultation bilaterally- no wheezes, rales or rhonchi, normal air movement, no respiratory distress  Cardiovascular: normal rate, normal S1 and S2  Abdomen: soft, non-tender, non-distended, normal bowel sounds, no masses or organomegaly  Extremities: no cyanosis, no clubbing and no edema  Neurologic: Awake, alert, oriented,  speech normal        Recent Labs     08/13/20  1530 08/14/20  0545    134   K 4.4 4.2    98   CO2 24 26   BUN 26* 22   CREATININE 1.1 1.1   GLUCOSE 128* 117*   CALCIUM 8.7 8.9       Recent Labs     08/13/20  1530 08/14/20  0545   WBC 19.0* 12.1*   RBC 3.01* 3.86   HGB 10.5* 13.0   HCT 30.7* 39.4   .0* 102.1*   MCH 34.9 33.7   MCHC 34.2 33.0   RDW 12.4 12.7    232   MPV 9.1 9.4         Assessment:    Principal Problem:    Orthostatic hypotension  Active Problems:    Essential hypertension    Parkinson's disease ([facility] [address]    Urinary retention with incomplete bladder emptying    Hx of total knee arthroplasty, right    Leukocytosis    Macrocytic anemia  Resolved Problems:    * No resolved hospital problems. *      Plan:  1. Orthostatic hypotension: noted in ER on admission, though unable to locate documentation of vitals. S/P IVF. Will repeat Ortho's today-plan to continue IVF if still positive. Hold Metoprolol and Hytrin for now. 2. Leukocytosis: 19.0 on admission but improved to 12.1 this AM. ? Volume contraction vs infection. Urine culture obtained in ER and pending. ID consulted on admission. 3. Urinary retention: PVR 399cc after voiding 200cc. Was straight cathed x 1. Urology consulted. OK for johnson. Urine culture pending. 4. BPH: On Hytrin at home but placed on hold due to orthostatic hypotension. Will resume as able. Urology following. 5. HTN: Bp medications on hold due to orthostatic hypotension  6. S/P right knee replacement: continue pain control, knee brace. PT to follow. 7. H/O Rheumatoid arthritis: currently stable. Methotrexate on hold for now during infection work-up. 8. H/O Prrkinson's: Continue Sinemet. 9. Hypothyroidism: Continue Synthroid  10. Anxiety/Depression: stable , continue current meds  11. Neck pain: reports pain starting yesterday. Likely positional from laying in bed and on ER cart. Monitor for improvement. Pain control.    12. Deconditioning: PT/Ot to follow. Electronically signed by FABIANO Ballard CNP on 8/14/2020 at 11:53 AM  HOSPITALIST ATTENDING PHYSICIAN NOTE 8/14/2020 1952PM:    Details of the evaluation - subjective assessment (including medication profile, past medical, family and social history when applicable), examination, review of lab and test data, diagnostic impressions and medical decision making - performed by FABIANO Ballard CNP, were discussed with me on the date of service and I agree with clinical information herein unless otherwise noted. The patient has been evaluated by me personally earlier today. Pt reports no fevers, chills,n/v.     Exam: heart rhythm reg at rate of 72,lungs cta, abd pos bs soft nt, ext neg for le edema    I agree with the assessment and plan of FABIANO Ballard CNP    Orthostatic hypotension  Leukocytosis  Urinary retention  bph  htn  Recent right knee replacement  RA  parkinsons disease  Hypothyroidism  Depression  Neck pain    Electronically signed by Clare Kelly D.O.   Hospitalist  [facility] Hospitalist Service at [place] [address]

## 2020-08-14 NOTE — CARE COORDINATION
8/14/2020  Social Work Discharge Planning: This worker met with Pt and his daughter to discuss  role and transition of care/discharge planning. Pt resides with his spouse in a 1 story home with 2 entrance steps. Pt uses a ww and is active with [facility] for nursing and PT -SW verified this with Adam Borrero. CHANDAN order will be needed. Pharmacy is [facility] on [phone]  Electronically signed by Thedora Habermann, LSW on 8/14/2020 at 11:36 AM

## 2020-08-15 ENCOUNTER — APPOINTMENT (OUTPATIENT)
Dept: GENERAL RADIOLOGY | Age: 85
DRG: 312 | End: 2020-08-15
Payer: MEDICARE

## 2020-08-15 LAB
ALBUMIN SERPL-MCNC: 3.5 G/DL (ref 3.5–5.2)
ALP BLD-CCNC: 98 U/L (ref 40–129)
ALT SERPL-CCNC: 6 U/L (ref 0–40)
ANION GAP SERPL CALCULATED.3IONS-SCNC: 10 MMOL/L (ref 7–16)
AST SERPL-CCNC: 24 U/L (ref 0–39)
BASOPHILS ABSOLUTE: 0.16 E9/L (ref 0–0.2)
BASOPHILS RELATIVE PERCENT: 2 % (ref 0–2)
BILIRUB SERPL-MCNC: 0.4 MG/DL (ref 0–1.2)
BUN BLDV-MCNC: 21 MG/DL (ref 8–23)
BURR CELLS: ABNORMAL
CALCIUM SERPL-MCNC: 8.8 MG/DL (ref 8.6–10.2)
CHLORIDE BLD-SCNC: 101 MMOL/L (ref 98–107)
CO2: 25 MMOL/L (ref 22–29)
CREAT SERPL-MCNC: 1 MG/DL (ref 0.7–1.2)
EOSINOPHILS ABSOLUTE: 0.08 E9/L (ref 0.05–0.5)
EOSINOPHILS RELATIVE PERCENT: 1 % (ref 0–6)
GFR AFRICAN AMERICAN: >60
GFR NON-AFRICAN AMERICAN: >60 ML/MIN/1.73
GLUCOSE BLD-MCNC: 166 MG/DL (ref 74–99)
HCT VFR BLD CALC: 34 % (ref 37–54)
HEMOGLOBIN: 11.2 G/DL (ref 12.5–16.5)
LYMPHOCYTES ABSOLUTE: 0.33 E9/L (ref 1.5–4)
LYMPHOCYTES RELATIVE PERCENT: 4 % (ref 20–42)
MAGNESIUM: 2.1 MG/DL (ref 1.6–2.6)
MCH RBC QN AUTO: 33.8 PG (ref 26–35)
MCHC RBC AUTO-ENTMCNC: 32.9 % (ref 32–34.5)
MCV RBC AUTO: 102.7 FL (ref 80–99.9)
MONOCYTES ABSOLUTE: 0.08 E9/L (ref 0.1–0.95)
MONOCYTES RELATIVE PERCENT: 1 % (ref 2–12)
NEUTROPHILS ABSOLUTE: 7.54 E9/L (ref 1.8–7.3)
NEUTROPHILS RELATIVE PERCENT: 92 % (ref 43–80)
ORGANISM: ABNORMAL
PDW BLD-RTO: 12.6 FL (ref 11.5–15)
PHOSPHORUS: 1.7 MG/DL (ref 2.5–4.5)
PLATELET # BLD: 281 E9/L (ref 130–450)
PMV BLD AUTO: 9.7 FL (ref 7–12)
POIKILOCYTES: ABNORMAL
POTASSIUM SERPL-SCNC: 4 MMOL/L (ref 3.5–5)
RBC # BLD: 3.31 E12/L (ref 3.8–5.8)
SODIUM BLD-SCNC: 136 MMOL/L (ref 132–146)
TOTAL PROTEIN: 6.3 G/DL (ref 6.4–8.3)
URINE CULTURE, ROUTINE: ABNORMAL
WBC # BLD: 8.2 E9/L (ref 4.5–11.5)

## 2020-08-15 PROCEDURE — 93005 ELECTROCARDIOGRAM TRACING: CPT | Performed by: INTERNAL MEDICINE

## 2020-08-15 PROCEDURE — 71045 X-RAY EXAM CHEST 1 VIEW: CPT

## 2020-08-15 PROCEDURE — 6360000002 HC RX W HCPCS

## 2020-08-15 PROCEDURE — 85025 COMPLETE CBC W/AUTO DIFF WBC: CPT

## 2020-08-15 PROCEDURE — 2580000003 HC RX 258: Performed by: SPECIALIST

## 2020-08-15 PROCEDURE — 6370000000 HC RX 637 (ALT 250 FOR IP): Performed by: INTERNAL MEDICINE

## 2020-08-15 PROCEDURE — 6360000002 HC RX W HCPCS: Performed by: SPECIALIST

## 2020-08-15 PROCEDURE — 6360000002 HC RX W HCPCS: Performed by: INTERNAL MEDICINE

## 2020-08-15 PROCEDURE — 36415 COLL VENOUS BLD VENIPUNCTURE: CPT

## 2020-08-15 PROCEDURE — 2060000000 HC ICU INTERMEDIATE R&B

## 2020-08-15 PROCEDURE — 2580000003 HC RX 258: Performed by: INTERNAL MEDICINE

## 2020-08-15 PROCEDURE — 80053 COMPREHEN METABOLIC PANEL: CPT

## 2020-08-15 PROCEDURE — 2500000003 HC RX 250 WO HCPCS: Performed by: INTERNAL MEDICINE

## 2020-08-15 PROCEDURE — APPSS30 APP SPLIT SHARED TIME 16-30 MINUTES: Performed by: PHYSICIAN ASSISTANT

## 2020-08-15 PROCEDURE — 99232 SBSQ HOSP IP/OBS MODERATE 35: CPT | Performed by: INTERNAL MEDICINE

## 2020-08-15 PROCEDURE — 84100 ASSAY OF PHOSPHORUS: CPT

## 2020-08-15 PROCEDURE — 83735 ASSAY OF MAGNESIUM: CPT

## 2020-08-15 PROCEDURE — 2700000000 HC OXYGEN THERAPY PER DAY

## 2020-08-15 PROCEDURE — 51702 INSERT TEMP BLADDER CATH: CPT

## 2020-08-15 PROCEDURE — 99291 CRITICAL CARE FIRST HOUR: CPT | Performed by: INTERNAL MEDICINE

## 2020-08-15 PROCEDURE — 94761 N-INVAS EAR/PLS OXIMETRY MLT: CPT

## 2020-08-15 PROCEDURE — 6370000000 HC RX 637 (ALT 250 FOR IP): Performed by: NURSE PRACTITIONER

## 2020-08-15 PROCEDURE — 99222 1ST HOSP IP/OBS MODERATE 55: CPT | Performed by: INTERNAL MEDICINE

## 2020-08-15 RX ORDER — KETOROLAC TROMETHAMINE 30 MG/ML
15 INJECTION, SOLUTION INTRAMUSCULAR; INTRAVENOUS EVERY 6 HOURS PRN
Status: DISCONTINUED | OUTPATIENT
Start: 2020-08-15 | End: 2020-08-16

## 2020-08-15 RX ORDER — SODIUM CHLORIDE 9 MG/ML
INJECTION, SOLUTION INTRAVENOUS CONTINUOUS
Status: DISCONTINUED | OUTPATIENT
Start: 2020-08-15 | End: 2020-08-19

## 2020-08-15 RX ORDER — 0.9 % SODIUM CHLORIDE 0.9 %
1000 INTRAVENOUS SOLUTION INTRAVENOUS ONCE
Status: COMPLETED | OUTPATIENT
Start: 2020-08-15 | End: 2020-08-15

## 2020-08-15 RX ORDER — KETOROLAC TROMETHAMINE 30 MG/ML
INJECTION, SOLUTION INTRAMUSCULAR; INTRAVENOUS
Status: COMPLETED
Start: 2020-08-15 | End: 2020-08-15

## 2020-08-15 RX ORDER — CIPROFLOXACIN 500 MG/1
500 TABLET, FILM COATED ORAL EVERY 12 HOURS SCHEDULED
Status: DISCONTINUED | OUTPATIENT
Start: 2020-08-15 | End: 2020-08-15

## 2020-08-15 RX ORDER — 0.9 % SODIUM CHLORIDE 0.9 %
500 INTRAVENOUS SOLUTION INTRAVENOUS ONCE
Status: DISCONTINUED | OUTPATIENT
Start: 2020-08-15 | End: 2020-08-20 | Stop reason: HOSPADM

## 2020-08-15 RX ORDER — DEXAMETHASONE 1 MG
1 TABLET ORAL ONCE
Status: COMPLETED | OUTPATIENT
Start: 2020-08-15 | End: 2020-08-15

## 2020-08-15 RX ADMIN — Medication 1 TABLET: at 09:18

## 2020-08-15 RX ADMIN — LEVOTHYROXINE SODIUM 25 MCG: 25 TABLET ORAL at 06:00

## 2020-08-15 RX ADMIN — SODIUM CHLORIDE: 9 INJECTION, SOLUTION INTRAVENOUS at 20:45

## 2020-08-15 RX ADMIN — ASPIRIN 650 MG: 325 TABLET, FILM COATED ORAL at 09:19

## 2020-08-15 RX ADMIN — DICLOFENAC 2 G: 10 GEL TOPICAL at 09:19

## 2020-08-15 RX ADMIN — SODIUM CHLORIDE 1000 ML: 9 INJECTION, SOLUTION INTRAVENOUS at 20:00

## 2020-08-15 RX ADMIN — CARBIDOPA AND LEVODOPA 2 TABLET: 25; 100 TABLET, EXTENDED RELEASE ORAL at 09:19

## 2020-08-15 RX ADMIN — KETOROLAC TROMETHAMINE 15 MG: 30 INJECTION, SOLUTION INTRAMUSCULAR at 20:04

## 2020-08-15 RX ADMIN — CARBIDOPA AND LEVODOPA 2 TABLET: 25; 100 TABLET, EXTENDED RELEASE ORAL at 20:39

## 2020-08-15 RX ADMIN — SODIUM CHLORIDE: 9 INJECTION, SOLUTION INTRAVENOUS at 17:56

## 2020-08-15 RX ADMIN — DEXAMETHASONE 1 MG: 1 TABLET ORAL at 18:51

## 2020-08-15 RX ADMIN — CARBIDOPA AND LEVODOPA 2 TABLET: 25; 100 TABLET, EXTENDED RELEASE ORAL at 12:40

## 2020-08-15 RX ADMIN — ENOXAPARIN SODIUM 40 MG: 40 INJECTION SUBCUTANEOUS at 09:19

## 2020-08-15 RX ADMIN — PAROXETINE HYDROCHLORIDE 20 MG: 20 TABLET, FILM COATED ORAL at 09:19

## 2020-08-15 RX ADMIN — DICLOFENAC 2 G: 10 GEL TOPICAL at 20:39

## 2020-08-15 RX ADMIN — PANTOPRAZOLE SODIUM 40 MG: 40 TABLET, DELAYED RELEASE ORAL at 06:00

## 2020-08-15 RX ADMIN — CEFTRIAXONE 1 G: 1 INJECTION, POWDER, FOR SOLUTION INTRAMUSCULAR; INTRAVENOUS at 20:05

## 2020-08-15 RX ADMIN — CARBIDOPA AND LEVODOPA 2 TABLET: 25; 100 TABLET, EXTENDED RELEASE ORAL at 16:22

## 2020-08-15 RX ADMIN — FOLIC ACID 1 MG: 1 TABLET ORAL at 09:19

## 2020-08-15 RX ADMIN — CIPROFLOXACIN HYDROCHLORIDE 500 MG: 500 TABLET, FILM COATED ORAL at 18:52

## 2020-08-15 RX ADMIN — ATORVASTATIN CALCIUM 10 MG: 10 TABLET, FILM COATED ORAL at 09:19

## 2020-08-15 RX ADMIN — SODIUM PHOSPHATE, MONOBASIC, MONOHYDRATE 10 MMOL: 276; 142 INJECTION, SOLUTION INTRAVENOUS at 20:39

## 2020-08-15 RX ADMIN — Medication 10 ML: at 20:15

## 2020-08-15 RX ADMIN — Medication 10 ML: at 09:30

## 2020-08-15 RX ADMIN — ACETAMINOPHEN 650 MG: 650 SUPPOSITORY RECTAL at 20:04

## 2020-08-15 RX ADMIN — Medication 1000 UNITS: at 09:19

## 2020-08-15 ASSESSMENT — PAIN SCALES - GENERAL
PAINLEVEL_OUTOF10: 0
PAINLEVEL_OUTOF10: 5

## 2020-08-15 NOTE — CONSULTS
Wt 181 lb 6.4 oz (82.3 kg)   SpO2 92%   BMI 23.29 kg/m²   Wt Readings from Last 6 Encounters:   08/14/20 181 lb 6.4 oz (82.3 kg)   06/24/20 190 lb (86.2 kg)   05/19/20 190 lb (86.2 kg)   12/03/19 192 lb (87.1 kg)   11/22/19 189 lb (85.7 kg)   11/12/19 192 lb 6.4 oz (87.3 kg)       Physical examination:  General: awake alert, oriented x3, no abnormal position or movements.    HEENT: normocephalic non traumatic,   Neck: supple, no thyroid tenderness,  Pulm: Clear equal air entry no added sounds  CVS: S1 + S2  Abd: soft lax, no tenderness,  Skin: warm, no lesions, multiple bruises   Neuro: CN intact, sensation notmal , muscle power normal. No tremors   Psych: normal mood, and affect    Review of Laboratory Data:  I personally reviewed the following labs:  Recent Labs     08/13/20  1530 08/14/20  0545 08/15/20  1032   WBC 19.0* 12.1* 8.2   RBC 3.01* 3.86 3.31*   HGB 10.5* 13.0 11.2*   HCT 30.7* 39.4 34.0*   .0* 102.1* 102.7*   MCH 34.9 33.7 33.8   MCHC 34.2 33.0 32.9   RDW 12.4 12.7 12.6    232 281   MPV 9.1 9.4 9.7     Recent Labs     08/13/20  1530 08/14/20  0545 08/15/20  1032    134 136   K 4.4 4.2 4.0    98 101   CO2 24 26 25   BUN 26* 22 21   CREATININE 1.1 1.1 1.0   GLUCOSE 128* 117* 166*   CALCIUM 8.7 8.9 8.8   PROT 6.2* 6.3* 6.3*   LABALBU 3.5 3.5 3.5   BILITOT 1.0 1.1 0.4   ALKPHOS 90 93 98   AST 12 13 24   ALT <5 9 6     No results found for: BHYDRXBUT  Lab Results   Component Value Date    LABA1C 6.2 06/17/2020    LABA1C 6.1 11/04/2019    LABA1C 5.6 05/07/2019     Lab Results   Component Value Date/Time    TSH 1.320 08/13/2020 03:30 PM    W6JYYCV 6.4 06/17/2020 09:29 AM     Lab Results   Component Value Date    LABA1C 6.2 06/17/2020    GLUCOSE 166 08/15/2020    GLUCOSE 103 06/19/2011     Lab Results   Component Value Date    TRIG 85 06/17/2020    HDL 42 06/17/2020    LDLCALC 55 06/17/2020    CHOL 114 06/17/2020       Blood culture   No results found for: [facility]    Radiology:  CT CHEST goal, continue Levothyroxine 25 mcg daily     The above issues were reviewed with the patient who understood and agreed with the plan. Thank you for allowing us to participate in the care of this patient. Please do not hesitate to contact us with any additional questions. Mireya Mcarthur MD  Endocrinologist, [facility] [place] and Endocrinology   [address] [place] [id number]   Phone: [phone]  Fax: [phone]  ---------------------------------  An electronic signature was used to authenticate this note.  [address] Arden Ghosh MD on 8/15/2020 at 2:01 PM

## 2020-08-15 NOTE — PROGRESS NOTES
Tino Ball Hospitalist   Progress Note    Admitting Date and Time: 8/13/2020  2:58 PM  Admit Dx: Orthostatic hypotension [I95.1]  Orthostatic hypotension [I95.1]    Subjective:    Patient was admitted with Orthostatic hypotension [I95.1]  Orthostatic hypotension [I95.1]. Patient sitting in chair at bedside complaining of \"shivering\" patient states that he did have some nausea while he was in the room but no episodes of emesis. Patient reports that he has been having these Rigors taking a shower this morning. Per RN: No acute events overnight    ROS: denies fever, chills, cp, sob, v, HA unless stated above.      sodium chloride flush  10 mL Intravenous 2 times per day    enoxaparin  40 mg Subcutaneous Daily    aspirin  650 mg Oral Daily    diclofenac sodium  2 g Topical BID    folic acid  1 mg Oral Daily    levothyroxine  25 mcg Oral Daily    atorvastatin  10 mg Oral Daily    pantoprazole  40 mg Oral QAM AC    PARoxetine  20 mg Oral QAM    Vitamin D  1,000 Units Oral Daily    carbidopa-levodopa  2 tablet Oral Q4H WA    ocuvite-lutein  1 tablet Oral Daily    sodium chloride  1,000 mL Intravenous Once     sodium chloride flush, 10 mL, PRN  acetaminophen, 650 mg, Q6H PRN    Or  acetaminophen, 650 mg, Q6H PRN  polyethylene glycol, 17 g, Daily PRN  promethazine, 12.5 mg, Q6H PRN    Or  ondansetron, 4 mg, Q6H PRN         Objective:    BP (!) 145/75   Pulse 78   Temp 98.6 °F (37 °C) (Oral)   Resp 16   Ht 6' 2\" (1.88 m)   Wt 181 lb 6.4 oz (82.3 kg)   SpO2 92%   BMI 23.29 kg/m²   General Appearance: in no acute distress, alert and actively shivering  Skin: warm and dry, no rash or erythema  Pulmonary/Chest: clear to auscultation bilaterally- no wheezes, rales or rhonchi, normal air movement, no respiratory distress  Cardiovascular: normal rate, regular rhythm, normal S1 and S2, no murmurs, no gallops and no JVD  Abdomen: soft, non-tender, non-distended, normal bowel sounds, no masses or organomegaly  Extremities: no cyanosis, no clubbing and no edema      Recent Labs     08/13/20  1530 08/14/20  0545    134   K 4.4 4.2    98   CO2 24 26   BUN 26* 22   CREATININE 1.1 1.1   GLUCOSE 128* 117*   CALCIUM 8.7 8.9       Recent Labs     08/13/20  1530 08/14/20  0545   WBC 19.0* 12.1*   RBC 3.01* 3.86   HGB 10.5* 13.0   HCT 30.7* 39.4   .0* 102.1*   MCH 34.9 33.7   MCHC 34.2 33.0   RDW 12.4 12.7    232   MPV 9.1 9.4       Radiology:   CT CHEST WO CONTRAST   Final Result      1. Signs of anemia. Cardiomegaly and coronary artery disease. 2. Dilated ascending aorta at 4.3 cm.   3. New 2.5 cm left adrenal mass with nonspecific characteristics. Consider MRI with in and out of phase imaging to assess for small   quantities of fat which would indicate a benign adenoma. CT ABDOMEN PELVIS WO CONTRAST Additional Contrast? None   Final Result         1. 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 2. Nonspecific perinephric fat stranding, which is often seen as an   incidental finding but may signify underlying medical renal disease. No stone or hydronephrosis. 3. Severe distal colonic diverticulosis without diverticulitis. XR CHEST PORTABLE   Final Result   No acute cardiopulmonary findings. Assessment:    Principal Problem:    Orthostatic hypotension  Active Problems:    Essential hypertension    Parkinson's disease ([address]    Urinary retention with incomplete bladder emptying    Hx of total knee arthroplasty, right    Leukocytosis    Macrocytic anemia  Resolved Problems:    * No resolved hospital problems. *      Plan:  1. Orthostatic hypotension  -Patient started on IV fluids, BPs are now improving. We will repeat orthostatic vitals this afternoon.  -Metoprolol and Hytrin on hold.     2. UTI with Leukocytosis  -Urine culture Hospitalist Service at [facility]

## 2020-08-15 NOTE — PROGRESS NOTES
[address] [place] [address] [facility]  [facility]  Progress Note    SUBJECTIVE:  Chief Complaint   Patient presents with    Fatigue     fatigue for the past day, today unable to get off the toilet, total knee about 3 weeks ago     Patient is tolerating medications. No reported adverse drug reactions. No nausea, vomiting, diarrhea. No fevers. Had urinary frequency, urgency this am as well as shaking chills. Currently visiting patient and he is chilling and shaking right as  evaluated him at 7 PM  UA had pyuria and it, cultures are positive for Kleb      Review of systems:  As stated above in the chief complaint, otherwise negative. Medications:  Scheduled Meds:   sodium chloride flush  10 mL Intravenous 2 times per day    enoxaparin  40 mg Subcutaneous Daily    aspirin  650 mg Oral Daily    diclofenac sodium  2 g Topical BID    folic acid  1 mg Oral Daily    levothyroxine  25 mcg Oral Daily    atorvastatin  10 mg Oral Daily    pantoprazole  40 mg Oral QAM AC    PARoxetine  20 mg Oral QAM    Vitamin D  1,000 Units Oral Daily    carbidopa-levodopa  2 tablet Oral Q4H WA    ocuvite-lutein  1 tablet Oral Daily    sodium chloride  1,000 mL Intravenous Once     Continuous Infusions:   sodium chloride 50 mL/hr at 20 2142     PRN Meds:sodium chloride flush, acetaminophen **OR** acetaminophen, polyethylene glycol, promethazine **OR** ondansetron    OBJECTIVE:  BP (!) 169/78   Pulse 77   Temp 98.4 °F (36.9 °C) (Oral)   Resp 22   Ht 6' 2\" (1.88 m)   Wt 181 lb 6.4 oz (82.3 kg)   SpO2 92%   BMI 23.29 kg/m²   Temp  Av.4 °F (36.9 °C)  Min: 97.6 °F (36.4 °C)  Max: 98.8 °F (37.1 °C)  Constitutional: The patient is awake, alert, and oriented. Skin: Warm and dry. No rashes were noted. HEENT: Round and reactive pupils. Moist mucous membranes. No ulcerations or thrush. Neck: Supple to movements. Chest: No use of accessory muscles to breathe. Symmetrical expansion.   No wheezing, crackles or rhonchi. Cardiovascular: S1 and S2 are rhythmic and regular. No murmurs appreciated. Abdomen: Positive bowel sounds to auscultation. Benign to palpation. No masses felt. No hepatosplenomegaly. Extremities: No clubbing, no cyanosis, no edema. Lines: peripheral    Laboratory and Tests Review:  Lab Results   Component Value Date    WBC 8.2 08/15/2020    WBC 12.1 (H) 08/14/2020    WBC 19.0 (H) 08/13/2020    HGB 11.2 (L) 08/15/2020    HCT 34.0 (L) 08/15/2020    .7 (H) 08/15/2020     08/15/2020     Lab Results   Component Value Date    NEUTROABS 7.54 (H) 08/15/2020    NEUTROABS 10.28 (H) 08/14/2020    NEUTROABS 16.70 (H) 08/13/2020     No results found for: [facility]  Lab Results   Component Value Date    ALT 6 08/15/2020    AST 24 08/15/2020    ALKPHOS 98 08/15/2020    BILITOT 0.4 08/15/2020     Lab Results   Component Value Date     08/15/2020    K 4.0 08/15/2020    K 4.2 08/14/2020     08/15/2020    CO2 25 08/15/2020    BUN 21 08/15/2020    CREATININE 1.0 08/15/2020    CREATININE 1.1 08/14/2020    CREATININE 1.1 08/13/2020    GFRAA >60 08/15/2020    LABGLOM >60 08/15/2020    GLUCOSE 166 08/15/2020    GLUCOSE 103 06/19/2011    PROT 6.3 08/15/2020    LABALBU 3.5 08/15/2020    CALCIUM 8.8 08/15/2020    BILITOT 0.4 08/15/2020    ALKPHOS 98 08/15/2020    AST 24 08/15/2020    ALT 6 08/15/2020     Lab Results   Component Value Date    CRP 12.5 (H) 08/14/2020    CRP <0.1 02/20/2020    CRP <0.1 01/31/2019     Lab Results   Component Value Date    SEDRATE 18 (H) 08/14/2020    SEDRATE 2 01/22/2015     Radiology:      Microbiology:   Urine cx klebsiella    ASSESSMENT:  · Generalized weakness  · BPH - urology following  · Leukocytosis resolved post hydration  · Asymptomatic bacteriuria     Plan  -start ceftriaxone after blood cultures are obtained  -We will follow him closely    April [address] [facility],[address]  1:39 PM  8/15/2020     Pt seen and examined. Above discussed agree with advanced practice nurse.  Labs, cultures, and radiographs reviewed. Face to Face encounter occurred. Changes made as necessary.      Travis Bazzi MD

## 2020-08-16 ENCOUNTER — APPOINTMENT (OUTPATIENT)
Dept: CT IMAGING | Age: 85
DRG: 312 | End: 2020-08-16
Payer: MEDICARE

## 2020-08-16 LAB
ACANTHOCYTES: ABNORMAL
ALBUMIN SERPL-MCNC: 2.8 G/DL (ref 3.5–5.2)
ALP BLD-CCNC: 123 U/L (ref 40–129)
ALT SERPL-CCNC: 31 U/L (ref 0–40)
ANION GAP SERPL CALCULATED.3IONS-SCNC: 10 MMOL/L (ref 7–16)
ANION GAP SERPL CALCULATED.3IONS-SCNC: 15 MMOL/L (ref 7–16)
AST SERPL-CCNC: 372 U/L (ref 0–39)
BASOPHILS ABSOLUTE: 0 E9/L (ref 0–0.2)
BASOPHILS RELATIVE PERCENT: 0 % (ref 0–2)
BILIRUB SERPL-MCNC: 0.7 MG/DL (ref 0–1.2)
BUN BLDV-MCNC: 24 MG/DL (ref 8–23)
BUN BLDV-MCNC: 28 MG/DL (ref 8–23)
BURR CELLS: ABNORMAL
CALCIUM SERPL-MCNC: 8 MG/DL (ref 8.6–10.2)
CALCIUM SERPL-MCNC: 8.4 MG/DL (ref 8.6–10.2)
CHLORIDE BLD-SCNC: 102 MMOL/L (ref 98–107)
CHLORIDE BLD-SCNC: 97 MMOL/L (ref 98–107)
CK MB: 10.2 NG/ML (ref 0–7.7)
CK MB: 7.6 NG/ML (ref 0–7.7)
CO2: 21 MMOL/L (ref 22–29)
CO2: 22 MMOL/L (ref 22–29)
CORTISOL TOTAL: 32.31 MCG/DL (ref 2.68–18.4)
CREAT SERPL-MCNC: 1.3 MG/DL (ref 0.7–1.2)
CREAT SERPL-MCNC: 1.5 MG/DL (ref 0.7–1.2)
EOSINOPHILS ABSOLUTE: 0 E9/L (ref 0.05–0.5)
EOSINOPHILS RELATIVE PERCENT: 0 % (ref 0–6)
GFR AFRICAN AMERICAN: 54
GFR AFRICAN AMERICAN: >60
GFR NON-AFRICAN AMERICAN: 44 ML/MIN/1.73
GFR NON-AFRICAN AMERICAN: 52 ML/MIN/1.73
GLUCOSE BLD-MCNC: 111 MG/DL (ref 74–99)
GLUCOSE BLD-MCNC: 128 MG/DL (ref 74–99)
HCT VFR BLD CALC: 30.8 % (ref 37–54)
HEMOGLOBIN: 10.1 G/DL (ref 12.5–16.5)
LV EF: 35 %
LVEF MODALITY: NORMAL
LYMPHOCYTES ABSOLUTE: 0.2 E9/L (ref 1.5–4)
LYMPHOCYTES RELATIVE PERCENT: 1 % (ref 20–42)
MAGNESIUM: 1.7 MG/DL (ref 1.6–2.6)
MCH RBC QN AUTO: 34.2 PG (ref 26–35)
MCHC RBC AUTO-ENTMCNC: 32.8 % (ref 32–34.5)
MCV RBC AUTO: 104.4 FL (ref 80–99.9)
METAMYELOCYTES RELATIVE PERCENT: 2 % (ref 0–1)
MONOCYTES ABSOLUTE: 0.41 E9/L (ref 0.1–0.95)
MONOCYTES RELATIVE PERCENT: 2 % (ref 2–12)
NEUTROPHILS ABSOLUTE: 19.79 E9/L (ref 1.8–7.3)
NEUTROPHILS RELATIVE PERCENT: 95 % (ref 43–80)
PDW BLD-RTO: 12.7 FL (ref 11.5–15)
PHOSPHORUS: 3 MG/DL (ref 2.5–4.5)
PLATELET # BLD: 211 E9/L (ref 130–450)
PMV BLD AUTO: 10 FL (ref 7–12)
POIKILOCYTES: ABNORMAL
POTASSIUM SERPL-SCNC: 3.5 MMOL/L (ref 3.5–5)
POTASSIUM SERPL-SCNC: 3.7 MMOL/L (ref 3.5–5)
PROCALCITONIN: 41.18 NG/ML (ref 0–0.08)
RBC # BLD: 2.95 E12/L (ref 3.8–5.8)
SODIUM BLD-SCNC: 133 MMOL/L (ref 132–146)
SODIUM BLD-SCNC: 134 MMOL/L (ref 132–146)
TOTAL CK: 349 U/L (ref 20–200)
TOTAL CK: 615 U/L (ref 20–200)
TOTAL PROTEIN: 5.2 G/DL (ref 6.4–8.3)
TROPONIN: 0.56 NG/ML (ref 0–0.03)
TROPONIN: 0.84 NG/ML (ref 0–0.03)
VACUOLATED NEUTROPHILS: ABNORMAL
WBC # BLD: 20.4 E9/L (ref 4.5–11.5)

## 2020-08-16 PROCEDURE — 2060000000 HC ICU INTERMEDIATE R&B

## 2020-08-16 PROCEDURE — 82550 ASSAY OF CK (CPK): CPT

## 2020-08-16 PROCEDURE — 6360000002 HC RX W HCPCS: Performed by: SPECIALIST

## 2020-08-16 PROCEDURE — 6360000004 HC RX CONTRAST MEDICATION: Performed by: RADIOLOGY

## 2020-08-16 PROCEDURE — 6370000000 HC RX 637 (ALT 250 FOR IP): Performed by: INTERNAL MEDICINE

## 2020-08-16 PROCEDURE — 2580000003 HC RX 258: Performed by: SPECIALIST

## 2020-08-16 PROCEDURE — 83835 ASSAY OF METANEPHRINES: CPT

## 2020-08-16 PROCEDURE — 93005 ELECTROCARDIOGRAM TRACING: CPT | Performed by: INTERNAL MEDICINE

## 2020-08-16 PROCEDURE — 2580000003 HC RX 258: Performed by: INTERNAL MEDICINE

## 2020-08-16 PROCEDURE — 82533 TOTAL CORTISOL: CPT

## 2020-08-16 PROCEDURE — 84244 ASSAY OF RENIN: CPT

## 2020-08-16 PROCEDURE — 85025 COMPLETE CBC W/AUTO DIFF WBC: CPT

## 2020-08-16 PROCEDURE — 84484 ASSAY OF TROPONIN QUANT: CPT

## 2020-08-16 PROCEDURE — 99232 SBSQ HOSP IP/OBS MODERATE 35: CPT | Performed by: INTERNAL MEDICINE

## 2020-08-16 PROCEDURE — 36415 COLL VENOUS BLD VENIPUNCTURE: CPT

## 2020-08-16 PROCEDURE — 94761 N-INVAS EAR/PLS OXIMETRY MLT: CPT

## 2020-08-16 PROCEDURE — 84100 ASSAY OF PHOSPHORUS: CPT

## 2020-08-16 PROCEDURE — APPSS30 APP SPLIT SHARED TIME 16-30 MINUTES: Performed by: PHYSICIAN ASSISTANT

## 2020-08-16 PROCEDURE — 80048 BASIC METABOLIC PNL TOTAL CA: CPT

## 2020-08-16 PROCEDURE — 82088 ASSAY OF ALDOSTERONE: CPT

## 2020-08-16 PROCEDURE — 84145 PROCALCITONIN (PCT): CPT

## 2020-08-16 PROCEDURE — 87040 BLOOD CULTURE FOR BACTERIA: CPT

## 2020-08-16 PROCEDURE — 2700000000 HC OXYGEN THERAPY PER DAY

## 2020-08-16 PROCEDURE — 82553 CREATINE MB FRACTION: CPT

## 2020-08-16 PROCEDURE — 71275 CT ANGIOGRAPHY CHEST: CPT

## 2020-08-16 PROCEDURE — 2580000003 HC RX 258: Performed by: RADIOLOGY

## 2020-08-16 PROCEDURE — 6370000000 HC RX 637 (ALT 250 FOR IP): Performed by: NURSE PRACTITIONER

## 2020-08-16 PROCEDURE — 6360000004 HC RX CONTRAST MEDICATION: Performed by: INTERNAL MEDICINE

## 2020-08-16 PROCEDURE — 83735 ASSAY OF MAGNESIUM: CPT

## 2020-08-16 PROCEDURE — 80053 COMPREHEN METABOLIC PANEL: CPT

## 2020-08-16 PROCEDURE — 6360000002 HC RX W HCPCS: Performed by: INTERNAL MEDICINE

## 2020-08-16 PROCEDURE — 93306 TTE W/DOPPLER COMPLETE: CPT

## 2020-08-16 RX ORDER — MIDODRINE HYDROCHLORIDE 5 MG/1
5 TABLET ORAL
Status: DISCONTINUED | OUTPATIENT
Start: 2020-08-16 | End: 2020-08-18

## 2020-08-16 RX ORDER — SODIUM CHLORIDE 0.9 % (FLUSH) 0.9 %
10 SYRINGE (ML) INJECTION ONCE
Status: COMPLETED | OUTPATIENT
Start: 2020-08-16 | End: 2020-08-16

## 2020-08-16 RX ORDER — CLONAZEPAM 0.5 MG/1
0.5 TABLET ORAL 2 TIMES DAILY PRN
Status: DISCONTINUED | OUTPATIENT
Start: 2020-08-16 | End: 2020-08-20 | Stop reason: HOSPADM

## 2020-08-16 RX ORDER — MIDODRINE HYDROCHLORIDE 5 MG/1
2.5 TABLET ORAL
Status: DISCONTINUED | OUTPATIENT
Start: 2020-08-16 | End: 2020-08-16

## 2020-08-16 RX ORDER — 0.9 % SODIUM CHLORIDE 0.9 %
500 INTRAVENOUS SOLUTION INTRAVENOUS ONCE
Status: COMPLETED | OUTPATIENT
Start: 2020-08-16 | End: 2020-08-16

## 2020-08-16 RX ORDER — METOPROLOL SUCCINATE 25 MG/1
12.5 TABLET, EXTENDED RELEASE ORAL DAILY
Status: DISCONTINUED | OUTPATIENT
Start: 2020-08-16 | End: 2020-08-20 | Stop reason: HOSPADM

## 2020-08-16 RX ADMIN — Medication 1 TABLET: at 08:13

## 2020-08-16 RX ADMIN — SODIUM CHLORIDE 500 ML: 9 INJECTION, SOLUTION INTRAVENOUS at 01:45

## 2020-08-16 RX ADMIN — PAROXETINE HYDROCHLORIDE 20 MG: 20 TABLET, FILM COATED ORAL at 08:13

## 2020-08-16 RX ADMIN — METOPROLOL SUCCINATE 12.5 MG: 25 TABLET, EXTENDED RELEASE ORAL at 11:24

## 2020-08-16 RX ADMIN — CLONAZEPAM 0.5 MG: 0.5 TABLET ORAL at 14:39

## 2020-08-16 RX ADMIN — FOLIC ACID 1 MG: 1 TABLET ORAL at 08:13

## 2020-08-16 RX ADMIN — CARBIDOPA AND LEVODOPA 2 TABLET: 25; 100 TABLET, EXTENDED RELEASE ORAL at 21:12

## 2020-08-16 RX ADMIN — ENOXAPARIN SODIUM 40 MG: 40 INJECTION SUBCUTANEOUS at 08:12

## 2020-08-16 RX ADMIN — CARBIDOPA AND LEVODOPA 2 TABLET: 25; 100 TABLET, EXTENDED RELEASE ORAL at 08:13

## 2020-08-16 RX ADMIN — MIDODRINE HYDROCHLORIDE 5 MG: 5 TABLET ORAL at 18:42

## 2020-08-16 RX ADMIN — IOPAMIDOL 80 ML: 755 INJECTION, SOLUTION INTRAVENOUS at 02:40

## 2020-08-16 RX ADMIN — DICLOFENAC 2 G: 10 GEL TOPICAL at 08:14

## 2020-08-16 RX ADMIN — PANTOPRAZOLE SODIUM 40 MG: 40 TABLET, DELAYED RELEASE ORAL at 06:09

## 2020-08-16 RX ADMIN — DICLOFENAC 2 G: 10 GEL TOPICAL at 21:11

## 2020-08-16 RX ADMIN — LEVOTHYROXINE SODIUM 25 MCG: 25 TABLET ORAL at 06:09

## 2020-08-16 RX ADMIN — CEFTRIAXONE 1 G: 1 INJECTION, POWDER, FOR SOLUTION INTRAMUSCULAR; INTRAVENOUS at 18:42

## 2020-08-16 RX ADMIN — CARBIDOPA AND LEVODOPA 2 TABLET: 25; 100 TABLET, EXTENDED RELEASE ORAL at 15:52

## 2020-08-16 RX ADMIN — CARBIDOPA AND LEVODOPA 2 TABLET: 25; 100 TABLET, EXTENDED RELEASE ORAL at 11:24

## 2020-08-16 RX ADMIN — ATORVASTATIN CALCIUM 10 MG: 10 TABLET, FILM COATED ORAL at 08:13

## 2020-08-16 RX ADMIN — Medication 10 ML: at 02:40

## 2020-08-16 RX ADMIN — Medication 1000 UNITS: at 08:13

## 2020-08-16 RX ADMIN — Medication 10 ML: at 08:14

## 2020-08-16 RX ADMIN — SODIUM CHLORIDE: 9 INJECTION, SOLUTION INTRAVENOUS at 17:41

## 2020-08-16 RX ADMIN — PERFLUTREN 1.65 MG: 6.52 INJECTION, SUSPENSION INTRAVENOUS at 12:00

## 2020-08-16 RX ADMIN — SODIUM CHLORIDE: 9 INJECTION, SOLUTION INTRAVENOUS at 08:54

## 2020-08-16 RX ADMIN — ASPIRIN 650 MG: 325 TABLET, FILM COATED ORAL at 08:13

## 2020-08-16 ASSESSMENT — PAIN - FUNCTIONAL ASSESSMENT: PAIN_FUNCTIONAL_ASSESSMENT: ACTIVITIES ARE NOT PREVENTED

## 2020-08-16 ASSESSMENT — PAIN DESCRIPTION - FREQUENCY: FREQUENCY: INTERMITTENT

## 2020-08-16 ASSESSMENT — PAIN DESCRIPTION - ONSET: ONSET: GRADUAL

## 2020-08-16 ASSESSMENT — PAIN DESCRIPTION - LOCATION: LOCATION: SHOULDER

## 2020-08-16 ASSESSMENT — PAIN DESCRIPTION - PROGRESSION: CLINICAL_PROGRESSION: GRADUALLY WORSENING

## 2020-08-16 ASSESSMENT — PAIN DESCRIPTION - ORIENTATION: ORIENTATION: RIGHT

## 2020-08-16 ASSESSMENT — PAIN DESCRIPTION - PAIN TYPE: TYPE: ACUTE PAIN

## 2020-08-16 ASSESSMENT — PAIN SCALES - GENERAL
PAINLEVEL_OUTOF10: 0
PAINLEVEL_OUTOF10: 3
PAINLEVEL_OUTOF10: 0

## 2020-08-16 NOTE — PROGRESS NOTES
Placed a call out for Dr. Lidia Franz of heart [place] [facility] regarding consult.   Calos Denson RN

## 2020-08-16 NOTE — PROGRESS NOTES
[address] [place] [address] [facility]  [facility]  Progress Note    SUBJECTIVE:  Chief Complaint   Patient presents with    Fatigue     fatigue for the past day, today unable to get off the toilet, total knee about 3 weeks ago     Patient is tolerating medications. No reported adverse drug reactions. No nausea, vomiting, diarrhea. No fevers. Temp 101.8 yesterday evening with shaking chills and low O2 sat, was placed on 15 L/min O2,down to 1L/min now. Coughing with water per nursing - concern for aspirating.  - f/c placed. Review of systems:  As stated above in the chief complaint, otherwise negative. Medications:  Scheduled Meds:   metoprolol succinate  12.5 mg Oral Daily    cefTRIAXone (ROCEPHIN) IV  1 g Intravenous Q24H    sodium chloride  500 mL Intravenous Once    sodium chloride flush  10 mL Intravenous 2 times per day    enoxaparin  40 mg Subcutaneous Daily    aspirin  650 mg Oral Daily    diclofenac sodium  2 g Topical BID    folic acid  1 mg Oral Daily    levothyroxine  25 mcg Oral Daily    atorvastatin  10 mg Oral Daily    pantoprazole  40 mg Oral QAM AC    PARoxetine  20 mg Oral QAM    Vitamin D  1,000 Units Oral Daily    carbidopa-levodopa  2 tablet Oral Q4H WA    ocuvite-lutein  1 tablet Oral Daily    sodium chloride  1,000 mL Intravenous Once     Continuous Infusions:   sodium chloride 125 mL/hr at 20 0854     PRN Meds:sodium chloride flush, acetaminophen **OR** acetaminophen, polyethylene glycol, promethazine **OR** ondansetron    OBJECTIVE:  BP (!) 97/59   Pulse 77   Temp 97.4 °F (36.3 °C) (Temporal)   Resp 18   Ht 6' 2\" (1.88 m)   Wt 188 lb 14.4 oz (85.7 kg)   SpO2 98%   BMI 24.25 kg/m²   Temp  Av.3 °F (36.8 °C)  Min: 96.2 °F (35.7 °C)  Max: 101.8 °F (38.8 °C)  Constitutional: The patient is awake, alert, and oriented. Sitting up in chair in NAD. Chronically ill in appearance  Skin: Warm and dry. No rashes were noted.  pale   HEENT: Round and reactive pupils. Moist mucous membranes. No ulcerations or thrush. Neck: Supple to movements. Chest: crackles b/l bases. Respirations slightly labored at rest on O2  Cardiovascular: S1 and S2 are rhythmic and regular. No murmurs appreciated. Abdomen: Positive bowel sounds to auscultation. Benign to palpation. Extremities: No clubbing, no cyanosis, no edema. Lines: peripheral  F/c draining clear janet urine    Laboratory and Tests Review:  Lab Results   Component Value Date    WBC 20.4 (H) 08/16/2020    WBC 8.2 08/15/2020    WBC 12.1 (H) 08/14/2020    HGB 10.1 (L) 08/16/2020    HCT 30.8 (L) 08/16/2020    .4 (H) 08/16/2020     08/16/2020     Lab Results   Component Value Date    NEUTROABS 19.79 (H) 08/16/2020    NEUTROABS 7.54 (H) 08/15/2020    NEUTROABS 10.28 (H) 08/14/2020     No results found for: [facility]  Lab Results   Component Value Date    ALT 31 08/16/2020     (H) 08/16/2020    ALKPHOS 123 08/16/2020    BILITOT 0.7 08/16/2020     Lab Results   Component Value Date     08/16/2020    K 3.7 08/16/2020    K 4.2 08/14/2020     08/16/2020    CO2 22 08/16/2020    BUN 28 08/16/2020    CREATININE 1.5 08/16/2020    CREATININE 1.3 08/16/2020    CREATININE 1.0 08/15/2020    GFRAA 54 08/16/2020    LABGLOM 44 08/16/2020    GLUCOSE 111 08/16/2020    GLUCOSE 103 06/19/2011    PROT 5.2 08/16/2020    LABALBU 2.8 08/16/2020    CALCIUM 8.0 08/16/2020    BILITOT 0.7 08/16/2020    ALKPHOS 123 08/16/2020     08/16/2020    ALT 31 08/16/2020     Lab Results   Component Value Date    CRP 12.5 (H) 08/14/2020    CRP <0.1 02/20/2020    CRP <0.1 01/31/2019     Lab Results   Component Value Date    SEDRATE 18 (H) 08/14/2020    SEDRATE 2 01/22/2015     Radiology:  CTA chest . No pulmonary embolism.  No dissection is seen. 2. Signs of chronic pulmonary artery hypertension.      Microbiology:   Urine cx klebsiella  Blood cx pending    ASSESSMENT:  · Fever, UTI, urinary retention  · Now with likely aspiration d/t Parkinson's  · BPH - urology following. Creat up to 1.5  · Leukocytosis - abruptly back up to 20. 4.      Plan  -continue ceftriaxone d2  -ST eval. Thickened liquids for now. D/w pt's daughter - neurologist Dr. Jl Dykes had previously recommended thickened liquids at home for aspiration d/t Parkinson's. Had long d/w pt regarding need for compliance, risk of aspiration - pneumonitis, pneumonia, respiratory failure. -f/u blood cx    April Cinthia Cleary CNP  2:02 PM  8/16/2020     Pt seen and examined. Above discussed agree with advanced practice nurse. Labs, cultures, and radiographs reviewed. Face to Face encounter occurred. Changes made as necessary.      Sheela Niar MD

## 2020-08-16 NOTE — CONSULTS
[facility] [address] [place] [address]    Name: Tamaar Kelly    Age: 80 y.o. Date of Admission: 8/13/2020  2:58 PM    Date of Service: 8/16/2020    Reason for Consultation: troponin    Referring Physician: Dr Main Pacheco  Primary Care Physician: Jeff Montoya DO    History of Present Illness: The patient is a 80y.o. year old male with CAD, admitted 8/13 for weakness, feeling hot,  unable to get off the commode at home. Profound weakness since TKA 3 weeks ago. Felt to be orthostatic and hytrin and toprol stopped, given hydration. Having issues with urinary frequency,  bacturia- started on antibiotics. Apparently yesterday evening started having increased heart rate,dry heaves, some respiratory distress and RRT called- sent for CTA -no PE, cardiac enzymes drawn which showed a bump of troponin 0.5 so cardiology consulted. Patient denies any chest pain or palpitations. Comfortable today. PMHx CABG P>[id number], cath 2005 patent graft, 80% proximal LAD stenosis, mild 30% blockages diagonal and circumflex, normal nondominant RCA. Last stress lexiscan 2015 normal, echo 2015 EF 60% 1+ AI,TR. Chronic LBBB, parkinsons, RA, HTN, hyperlipidemia, mild aortic aneurysm.     Past Medical History:   Past Medical History:   Diagnosis Date    Anxiety     Ascending aortic aneurysm (HCC)     BPH (benign prostatic hyperplasia)     CAD (coronary artery disease)     Cancer (HCC)     skin    Coronary arteriosclerosis     Depression     DJD (degenerative joint disease)     Hyperlipidemia     Impacted cerumen     Lumbar spinal stenosis     severe    Macular degeneration     Osteoarthritis     Parkinson disease ([address]     RA (rheumatoid arthritis) ([address]        Review of Systems:   Constitutional: No fever, chills, sweats  Cardiac: As per HPI  Pulmonary: No cough, wheeze, hemoptysis  HEENT: No visual disturbances, difficult swallowing  GI: occasional  nausea, no vomiting, diarrhea, abdominal pain, rectal bleeding  : as per HPI  Endocrine: No excessive thirst, heat or cold intolerance. Musculoskeletal: No joint pain or muscle aches. No claudication  Skin: No skin breakdown or rashes  Neuro: No headache, confusion, or seizures + tremors  Psych: No depression, anxiety    Family History:  Family History   Problem Relation Age of Onset    Lung Cancer Father     No Known Problems Mother        Social History:  Social History     Socioeconomic History    Marital status:      Spouse name: Not on file    Number of children: Not on file    Years of education: Not on file    Highest education level: Not on file   Occupational History    Not on file   Social Needs    Financial resource strain: Not on file    Food insecurity     Worry: Not on file     Inability: Not on file    Transportation needs     Medical: Not on file     Non-medical: Not on file   Tobacco Use    Smoking status: Former Smoker     Years: 10.00     Types: Pipe, Cigars    Smokeless tobacco: Never Used   Substance and Sexual Activity    Alcohol use:  Yes     Alcohol/week: 0.0 standard drinks     Comment: social    Drug use: No    Sexual activity: Not Currently   Lifestyle    Physical activity     Days per week: Not on file     Minutes per session: Not on file    Stress: Not on file   Relationships    Social connections     Talks on phone: Not on file     Gets together: Not on file     Attends Rastafari service: Not on file     Active member of club or organization: Not on file     Attends meetings of clubs or organizations: Not on file     Relationship status: Not on file    Intimate partner violence     Fear of current or ex partner: Not on file     Emotionally abused: Not on file     Physically abused: Not on file     Forced sexual activity: Not on file   Other Topics Concern    Not on file   Social History Narrative        Colonoscopy - (10/29/2008)    Colonoscopy - (1/10/2014)    Zoster/Shingles Vaccine - given at [facility] ZOstavax    CAD WITH CABG    EARLY HYPOTHYROID----HYPOTHRYOID 3-18    ANXIETY    DEPRESSION    FATIGUE    BPH    MACULAR DEGENERATION    SKIN CA    APPY    BILAT ING HERNIA---ONE DONE THINKS WAS R    L ING HERNIA    COLON 6-99 AND 6-02----5T O 10 YRS DR POWERS---PT STATES WAS TOLD THAT HE SHOULD NTO    HAVE AGAIN DUE TO AGE    L KNEE OR X 2 DR Ramu Buck AND CHRISSY    ANXIETY FROM PREDNISONE 2009    COLON 4-11------NEG---YRUICH    EGD 4-11 PEPTIC ULCER    L KNEE OR 6-11    GRANDSON CHINO GRULLON---TALL  AT [facility]    DAUGHTER DATES JAMES BARCENAS    STEROIDS CUASE ANXIETY    RA DR RUSSO    EGD 12-13 DR CHEN--- ESO DILITATION    COLON DR CHEN 1-14----- TICS    PARKINSON DIC DX WITH DR JERNIGAN 2014-- THEN CHG TO DR RUVALCABA---then dr Jenifer Faye    ----rFoy Ramos STUDY-WITH LIQUID ASPIRATION    R TOTAL KNEE 1-24-17 DR WEI    HEMATURIA 2-17 DR LAWRENCE PAYAN    L ANKLE PAIN--DR DANIELS THEN  [facility]ILDR[facility]    SEVERE LUMBAR SPINAL STENOSIS 2-18 DR BARTON--L-4-5 WITH ADRENAL    Dr. Dali Ayoub, [facility]    Dr. Dali Ayoub, [facility]---REFERRED TO NEURO SURG DR VINSON---THEN TO DR Armando White--    eval DR Brody Grayson 5-18 AND SAID SHOT AND AND PT REFUSED DUE TO CONCERN WITH    PREDNISONE-----TO SEE HIM BACK       Allergies: Allergies   Allergen Reactions    Pcn [Penicillins]     Prednisone        Home Medications:  Prior to Admission medications    Medication Sig Start Date End Date Taking?  Authorizing Provider   terazosin (HYTRIN) 2 MG capsule Take 2 mg by mouth nightly   Yes Historical Provider, MD   acetaminophen (TYLENOL) 500 MG tablet Take 500 mg by mouth every 6 hours as needed for Pain   Yes Historical Provider, MD   levothyroxine (SYNTHROID) 50 MCG tablet Take 0.5 tablets by mouth Daily 6/24/20  Yes Jasvir Pete,    metoprolol succinate (TOPROL XL) 25 MG extended release tablet Takes 1/2 tablet daily 6/24/20  Yes Jasvir Pete, DO   omeprazole (PRILOSEC) 20 MG delayed release capsule Take 1 capsule by mouth daily 6/24/20  Yes Jasvir Pete DO   terazosin (HYTRIN) 1 MG capsule Take 2 capsules by mouth nightly 6/24/20  Yes Jsavir Pete DO   lovastatin (MEVACOR) 20 MG tablet Take 1 tablet by mouth nightly 6/24/20  Yes Jasvir Pete DO   PARoxetine (PAXIL) 20 MG tablet Take 1 tablet by mouth every morning 6/24/20  Yes Jasvir Pete DO   DULoxetine (CYMBALTA) 30 MG extended release capsule Take 30 mg by mouth daily   Yes Historical Provider, MD   diclofenac sodium (VOLTAREN) 1 % GEL Apply 2 g topically 2 times daily   Yes Historical Provider, MD   carbidopa-levodopa (SINEMET CR)  MG per extended release tablet Take 1 tablet by mouth every 4 hours (while awake) No more than 6 per day 11/14/19  Yes Jasvir Pete DO   ipratropium (ATROVENT) 0.06 % nasal spray One squirt qid prn runny nose 11/14/19  Yes Jasvir Pete DO   folic acid (FOLVITE) 1 MG tablet Take 1 tablet by mouth daily 11/14/19  Yes Jasvir Pete DO   aspirin 81 MG tablet Take 650 mg by mouth daily    Yes Historical Provider, MD   vitamin D (CHOLECALCIFEROL) 1000 UNIT TABS tablet Take 1,000 Units by mouth daily   Yes Historical Provider, MD   methotrexate (RHEUMATREX) 2.5 MG chemo tablet Take 2.5 mg by mouth once a week 4 tablets weekly.    Yes Historical Provider, MD   Multiple Vitamins-Minerals (OCUVITE) TABS oral tablet Take 1 tablet by mouth daily    Historical Provider, MD       Current Medications:  Current Facility-Administered Medications   Medication Dose Route Frequency Provider Last Rate Last Dose    cefTRIAXone (ROCEPHIN) 1 g in sterile water 10 mL IV syringe  1 g Intravenous Q24H Susan Strong MD   1 g at 08/15/20 2005    0.9 % sodium chloride bolus  500 mL Intravenous Once Charles Hric, DO   Stopped at 08/15/20 2122    0.9 % sodium chloride infusion   Intravenous Continuous Charles Hric,  mL/hr at 08/15/20 2045      sodium chloride flush 0.9 % injection 10 mL  10 mL Intravenous 2 times per day Isabell Greenwood MD Brendan   10 mL at 08/16/20 0814    sodium chloride flush 0.9 % injection 10 mL  10 mL Intravenous PRN Sofía Goss MD        acetaminophen (TYLENOL) tablet 650 mg  650 mg Oral Q6H PRN Sofía Goss MD   650 mg at 08/14/20 2334    Or    acetaminophen (TYLENOL) suppository 650 mg  650 mg Rectal Q6H PRN Sofía Goss MD   650 mg at 08/15/20 2004    polyethylene glycol (GLYCOLAX) packet 17 g  17 g Oral Daily PRN Sofía Goss MD        promethazine (PHENERGAN) tablet 12.5 mg  12.5 mg Oral Q6H PRN Sofía Goss MD        Or    [facility]ron [facility] injection 4 mg  4 mg Intravenous Q6H PRN Sofía Goss MD        enoxaparin (LOVENOX) injection 40 mg  40 mg Subcutaneous Daily Sofía Goss MD   40 mg at 08/16/20 8844    aspirin tablet 650 mg  650 mg Oral Daily Blaine Ros, APRN - CNP   650 mg at 08/16/20 0813    diclofenac sodium (VOLTAREN) 1 % gel 2 g  2 g Topical BID Blaine Ros, APRN - CNP   2 g at 05/97/82 2365    folic acid (FOLVITE) tablet 1 mg  1 mg Oral Daily Blaine Ros, APRN - CNP   1 mg at 08/16/20 0813    levothyroxine (SYNTHROID) tablet 25 mcg  25 mcg Oral Daily Blaine Ros, APRN - CNP   25 mcg at 08/16/20 9338    atorvastatin (LIPITOR) tablet 10 mg  10 mg Oral Daily Blaine Ros, APRN - CNP   10 mg at 08/16/20 0813    pantoprazole (PROTONIX) tablet 40 mg  40 mg Oral QAM AC Blaine Ros, APRN - CNP   40 mg at 08/16/20 9160    PARoxetine (PAXIL) tablet 20 mg  20 mg Oral QAM Blaine Ros, APRN - CNP   20 mg at 08/16/20 0813    vitamin D (CHOLECALCIFEROL) tablet 1,000 Units  1,000 Units Oral Daily Blaine Ros, APRN - CNP   1,000 Units at 08/16/20 0813    carbidopa-levodopa (SINEMET CR)  MG per extended release tablet 2 tablet  2 tablet Oral Q4H WA Blaine Saucedo, APRN - CNP   2 tablet at 08/16/20 0813    antioxidant multivitamin (OCUVITE) tablet  1 tablet Oral Daily Blaine Saucedo, APRN - CNP   1 tablet at 08/16/20 0813    0.9 % sodium chloride bolus  1,000 mL Intravenous Once Rivka Rodriguez  mL/hr at 08/16/20 0854 1,000 mL at 08/16/20 0854      sodium chloride 125 mL/hr at 08/15/20 2045       Physical Exam:  BP (!) 91/52   Pulse 79   Temp 97.4 °F (36.3 °C) (Oral)   Resp 20   Ht 6' 2\" (1.88 m)   Wt 188 lb 14.4 oz (85.7 kg)   SpO2 99%   BMI 24.25 kg/m²   Weight change: Wt Readings from Last 3 Encounters:   08/16/20 188 lb 14.4 oz (85.7 kg)   06/24/20 190 lb (86.2 kg)   05/19/20 190 lb (86.2 kg)     General: frail elderly man up in chair Awake, alert, oriented x3, no acute distress  HEENT: Normocephalic and atraumatic, extraocular movements intact, pupils equal and round, moist mucus membranes, sclera anicteric  Neck: No JVD, no carotid bruits, no thyromegaly, no adenopathy  Cardiac: Regular rate and rhythm, normal S1 and physiologically split S2, no S3, no S4. Apical impulse is nondisplaced. No murmurs, no pericardial rubs, no clicks. Carotid upstrokes brisk. Respiratory: Clear bilaterally; no wheezes, no rales, no rhonchi. Unlabored respirations  Abdomen: Soft, nontender, nondistended, bowel sounds+, no hepatomegaly, no masses, no abdominal bruits  Extremities: No edema, no cyanosis, no clubbing. Distal pulses intact. ALANNAH hose  Skin: Intact, warm and dry, no rashes, no breakdown  Musculoskeletal: normal tone and strength in the upper and lower extremities bilaterally  Neuro: No focal deficits. Moves all extremities appropriately to command. Normal sensation in the upper and lower extremities bilaterally, tremor  Psychiatric: Cooperative, and normal affect    Intake/Output:    Intake/Output Summary (Last 24 hours) at 8/16/2020 1038  Last data filed at 8/16/2020 0600  Gross per 24 hour   Intake 2975 ml   Output 200 ml   Net 2775 ml     No intake/output data recorded.     Laboratory Tests:  Last 3 CBC:  Recent Labs     08/14/20  0545 08/15/20  1032 08/16/20  0625   WBC 12.1* 8.2 20.4*   RBC 3.86 3.31* 2.95*   HGB 13.0 last 72 hours. Lactic Acid:  Recent Labs     08/14/20  0545   LACTA 1.4         Radiology:  RAD Results:  CTA PULMONARY W CONTRAST   Final Result   1. No pulmonary embolism. No dissection is seen. 2. Signs of chronic pulmonary artery hypertension. This report has been electronically signed by Martínez Escobar MD.      XR CHEST PORTABLE   Final Result   No acute cardiopulmonary abnormality. Cardiomegaly without evidence of overt failure. CT CHEST WO CONTRAST   Final Result      1. Signs of anemia. Cardiomegaly and coronary artery disease. 2. Dilated ascending aorta at 4.3 cm.   3. New 2.5 cm left adrenal mass with nonspecific characteristics. Consider MRI with in and out of phase imaging to assess for small   quantities of fat which would indicate a benign adenoma. CT ABDOMEN PELVIS WO CONTRAST Additional Contrast? None   Final Result         1. 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 2. Nonspecific perinephric fat stranding, which is often seen as an   incidental finding but may signify underlying medical renal disease. No stone or hydronephrosis. 3. Severe distal colonic diverticulosis without diverticulitis. XR CHEST PORTABLE   Final Result   No acute cardiopulmonary findings. EKG and Telemetry:  12-lead EKG personally reviewed and shows sinus rhthym LBBB(chronic)    Telemetry personally reviewed and shows sinus rhythm and sinus tachycardia        ASSESSMENT / PLAN:    1. elevated troponin of unknown significance- No chest pain, but known CAD. Suspect type II release from infection. Will get echo , can cycle enzymes- should resume toprol- was such a low dose-doubt contributed much to his orthostasis and might actually help blunt orthostasis. . Got fluids since admit almost 3 L hay have contributed to SOB  And distress yesterday. 2 LBBB- chronic  3. CAD remote single vessel CABG, no recent ischemic assessment, but has been asymptomatic. Once over current issues could consider updating lexiscan- probably as outpatient.  4 ?UTI- started on antibiotics, hytrin was stopped  5 Parkinsons on sinemet  6 Recent TKA            Thank you for consultation.     Lala Westbrook MD, [facility] [place]  The [address] at [facility]    Electronically signed by Lala Westbrook MD on 8/16/2020 at 10:38 AM

## 2020-08-16 NOTE — PROGRESS NOTES
wheezes, rales or rhonchi, normal air movement, no respiratory distress  Cardiovascular: normal rate, regular rhythm, normal S1 and S2, no murmurs and no JVD  Abdomen: soft, non-tender, non-distended, normal bowel sounds, no masses or organomegaly  Extremities: no cyanosis, no clubbing and no edema      Recent Labs     08/15/20  1032 08/16/20  0020 08/16/20  0625    133 134   K 4.0 3.5 3.7    97* 102   CO2 25 21* 22   BUN 21 24* 28*   CREATININE 1.0 1.3* 1.5*   GLUCOSE 166* 128* 111*   CALCIUM 8.8 8.4* 8.0*       Recent Labs     08/14/20  0545 08/15/20  1032 08/16/20  0625   WBC 12.1* 8.2 20.4*   RBC 3.86 3.31* 2.95*   HGB 13.0 11.2* 10.1*   HCT 39.4 34.0* 30.8*   .1* 102.7* 104.4*   MCH 33.7 33.8 34.2   MCHC 33.0 32.9 32.8   RDW 12.7 12.6 12.7    281 211   MPV 9.4 9.7 10.0       Radiology:   CTA PULMONARY W CONTRAST   Final Result   1. No pulmonary embolism. No dissection is seen. 2. Signs of chronic pulmonary artery hypertension. This report has been electronically signed by Irish Morgan MD.      XR CHEST PORTABLE   Final Result   No acute cardiopulmonary abnormality. Cardiomegaly without evidence of overt failure. CT CHEST WO CONTRAST   Final Result      1. Signs of anemia. Cardiomegaly and coronary artery disease. 2. Dilated ascending aorta at 4.3 cm.   3. New 2.5 cm left adrenal mass with nonspecific characteristics. Consider MRI with in and out of phase imaging to assess for small   quantities of fat which would indicate a benign adenoma. CT ABDOMEN PELVIS WO CONTRAST Additional Contrast? None   Final Result         1. 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 2. Nonspecific perinephric fat stranding, which is often seen as an   incidental finding but may signify underlying medical renal disease. No stone or hydronephrosis. signed by Denver Loaiza PA-C on 8/16/2020 at 8:16 AM  HOSPITALIST [facility] NOTE 8/16/2020 2112PM:    Details of the evaluation - subjective assessment (including medication profile, past medical, family and social history when applicable), examination, review of lab and test data, diagnostic impressions and medical decision making - performed by Denver Loaiza PA-C, were discussed with me on the date of service and I agree with clinical information herein unless otherwise noted. The patient has been evaluated by me personally earlier today. Pt reports no fevers, chills,n/v.     Exam: heart reg at rate of 80,lungs cta, abd pos bs soft nt, ext neg for le edema        I agree with the assessment and plan of Clare Early PA-C    Elevated troponin suspect type II MI  Orthostatic hypotension  sepsis  Leukocytosis with possible uti and/or possible aspiration pneumonia  Urinary retention  bph  htn  Recent right knee replacement  RA  parkinsons disease  Hypothyroidism  Depression  Neck pain  Left adrenal mass        Electronically signed by Deidra Thompson D.O.   Hospitalist  [facility] at [facility]

## 2020-08-16 NOTE — PROGRESS NOTES
ENDOCRINOLOGY PROGRESS NOTE      Date of Service: 8/16/2020  Date of Admission: 8/13/2020  Admitting Physician: León Gray MD   Primary Care Physician: Ant Oreilly DO  Consultant physician: Gee Bautista MD     Reason for the consultation:  Left adrenal incidentaloma     History of Present Illness: The history is provided by the patient. Accuracy of the patient data is excellent. 80 y. o. male with PMH of Parkinsons disease, HLD and other listed below admitted to [facility] because of worsening fatigue, decrease po intake and hypotension concerning for sepsis. Endocrine service was consulted for evaluation on adrenal incidentaloma    Subjective:  Patient seen and examined, RRT called this morning because of respiratory distress and increased heart rate. CTA was negative for PE     Point of care glucose monitoring:   Independently reviewed   No results for input(s): GLUMET in the last 72 hours.     Scheduled Meds:   metoprolol succinate  12.5 mg Oral Daily    midodrine  5 mg Oral TID WC    cefTRIAXone (ROCEPHIN) IV  1 g Intravenous Q24H    sodium chloride  500 mL Intravenous Once    sodium chloride flush  10 mL Intravenous 2 times per day    enoxaparin  40 mg Subcutaneous Daily    aspirin  650 mg Oral Daily    diclofenac sodium  2 g Topical BID    folic acid  1 mg Oral Daily    levothyroxine  25 mcg Oral Daily    atorvastatin  10 mg Oral Daily    pantoprazole  40 mg Oral QAM AC    PARoxetine  20 mg Oral QAM    Vitamin D  1,000 Units Oral Daily    carbidopa-levodopa  2 tablet Oral Q4H WA    ocuvite-lutein  1 tablet Oral Daily    sodium chloride  1,000 mL Intravenous Once     PRN Meds:   clonazePAM, 0.5 mg, BID PRN  sodium chloride flush, 10 mL, PRN  acetaminophen, 650 mg, Q6H PRN    Or  acetaminophen, 650 mg, Q6H PRN  polyethylene glycol, 17 g, Daily PRN  promethazine, 12.5 mg, Q6H PRN    Or  ondansetron, 4 mg, Q6H PRN      Continuous Infusions:   sodium chloride 100 mL/hr at 08/16/20 1849 HDL 42 06/17/2020    LDLCALC 55 06/17/2020    CHOL 114 06/17/2020       Blood culture   No results found for: [facility]    Radiology:  CTA PULMONARY W CONTRAST   Final Result   1. No pulmonary embolism. No dissection is seen. 2. Signs of chronic pulmonary artery hypertension. This report has been electronically signed by Asia Elliott MD.      XR CHEST PORTABLE   Final Result   No acute cardiopulmonary abnormality. Cardiomegaly without evidence of overt failure. CT CHEST WO CONTRAST   Final Result      1. Signs of anemia. Cardiomegaly and coronary artery disease. 2. Dilated ascending aorta at 4.3 cm.   3. New 2.5 cm left adrenal mass with nonspecific characteristics. Consider MRI with in and out of phase imaging to assess for small   quantities of fat which would indicate a benign adenoma. CT ABDOMEN PELVIS WO CONTRAST Additional Contrast? None   Final Result         1. 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 2. Nonspecific perinephric fat stranding, which is often seen as an   incidental finding but may signify underlying medical renal disease. No stone or hydronephrosis. 3. Severe distal colonic diverticulosis without diverticulitis. XR CHEST PORTABLE   Final Result   No acute cardiopulmonary findings. Medical Records/Labs/Images review:   I personally reviewed and summarized previous records   All labs and imaging were reviewed independently     Tiarra, a 80 y.o.-old male seen in for management of adrenal incidentaloma      Left side Adrenal incidentaloma   · Adrenal mass is found in at least 3-5% of persons older than 50 years.  Although most incidentally discovered adrenal masses are nonfunctional, up to 15% may hormonally active.   · I have personally reviewed CT images myself and compare with MRI study

## 2020-08-16 NOTE — SIGNIFICANT EVENT
RRT note    ctsp for change in status    Of note, pt had been having some increase in hr earlier and labs ordered along with cxr. Pt had noted by me earlier that he appeared to choke while drinking through a straw. PHYSICAL EXAM:    Vitals:  /72   Pulse 112   Temp 97.8 °F (36.6 °C) (Oral)   Resp 20   Ht 6' 2\" (1.88 m)   Wt 181 lb 6.4 oz (82.3 kg)   SpO2 92%   BMI 23.29 kg/m²     General:  Appears lethargic uncomfortable. Answers minimally to questions and cooperative with exam  HEENT:  Mucous membranes moist. No erythema, rhinorrhea, or post-nasal drip noted. Neck:  No carotid bruits. Heart:  Rhythm regular at rate of 110  Lungs:  CTA. No wheeze, rales, or rhonchi. Tachypnea and dyspnea noted  Abdomen:  Positive bowel sounds positive. Soft. Non-tender. No guarding, rebound or rigidity. Breast/Rectal/Genitourinary: not pertinent. Extremities:  Negative for lower extremity edema  Skin:  Very warm and dry  Vascular: 2/4 Dorsalis Pedis pulses bilaterally. Neuro:  Cranial nerves 2-12 grossly intact, no focal weakness or change in sensation noted. Extraocular muscles intact. Pupils equal, round, reactive to light. Pt with SIRS. Pt already ordered to have rocephin given and told nursing to give despite blood cultures not drawn. Pt noted to have minimal urinary output and johnson placed. Pt was given tylenol and toradol to bring down fever asap as this was what was feeding the SIRS. Pt was not hypotensive during event and was able to appear more comfortable and able to interact more with breathing less labored. With the elevated metabolic effort by patient waning, pt able to start weaning down on oxygen. cxr done earlier reviewed. ekg obtained and reviewed.      Sepsis  Possible uti vs possible aspiration pneumonia  parkinsons  htn  Hypothyroidism     Critical care time is 35 min

## 2020-08-17 LAB
ALBUMIN SERPL-MCNC: 2.6 G/DL (ref 3.5–5.2)
ALP BLD-CCNC: 124 U/L (ref 40–129)
ALT SERPL-CCNC: 25 U/L (ref 0–40)
ANION GAP SERPL CALCULATED.3IONS-SCNC: 9 MMOL/L (ref 7–16)
AST SERPL-CCNC: 142 U/L (ref 0–39)
BASOPHILS ABSOLUTE: 0.03 E9/L (ref 0–0.2)
BASOPHILS RELATIVE PERCENT: 0.1 % (ref 0–2)
BILIRUB SERPL-MCNC: 0.3 MG/DL (ref 0–1.2)
BUN BLDV-MCNC: 30 MG/DL (ref 8–23)
CALCIUM SERPL-MCNC: 8.3 MG/DL (ref 8.6–10.2)
CHLORIDE BLD-SCNC: 102 MMOL/L (ref 98–107)
CO2: 24 MMOL/L (ref 22–29)
CREAT SERPL-MCNC: 1.3 MG/DL (ref 0.7–1.2)
EKG ATRIAL RATE: 135 BPM
EKG ATRIAL RATE: 99 BPM
EKG P AXIS: 50 DEGREES
EKG P AXIS: 9 DEGREES
EKG P-R INTERVAL: 128 MS
EKG P-R INTERVAL: 186 MS
EKG Q-T INTERVAL: 340 MS
EKG Q-T INTERVAL: 428 MS
EKG QRS DURATION: 156 MS
EKG QRS DURATION: 162 MS
EKG QTC CALCULATION (BAZETT): 510 MS
EKG QTC CALCULATION (BAZETT): 549 MS
EKG R AXIS: -41 DEGREES
EKG R AXIS: -47 DEGREES
EKG T AXIS: 143 DEGREES
EKG T AXIS: 78 DEGREES
EKG VENTRICULAR RATE: 135 BPM
EKG VENTRICULAR RATE: 99 BPM
EOSINOPHILS ABSOLUTE: 0.09 E9/L (ref 0.05–0.5)
EOSINOPHILS RELATIVE PERCENT: 0.4 % (ref 0–6)
GFR AFRICAN AMERICAN: >60
GFR NON-AFRICAN AMERICAN: 52 ML/MIN/1.73
GLUCOSE BLD-MCNC: 97 MG/DL (ref 74–99)
HCT VFR BLD CALC: 29.1 % (ref 37–54)
HEMOGLOBIN: 9.6 G/DL (ref 12.5–16.5)
IMMATURE GRANULOCYTES #: 0.15 E9/L
IMMATURE GRANULOCYTES %: 0.7 % (ref 0–5)
LYMPHOCYTES ABSOLUTE: 1.1 E9/L (ref 1.5–4)
LYMPHOCYTES RELATIVE PERCENT: 5.5 % (ref 20–42)
MCH RBC QN AUTO: 33.9 PG (ref 26–35)
MCHC RBC AUTO-ENTMCNC: 33 % (ref 32–34.5)
MCV RBC AUTO: 102.8 FL (ref 80–99.9)
MONOCYTES ABSOLUTE: 1.16 E9/L (ref 0.1–0.95)
MONOCYTES RELATIVE PERCENT: 5.8 % (ref 2–12)
NEUTROPHILS ABSOLUTE: 17.52 E9/L (ref 1.8–7.3)
NEUTROPHILS RELATIVE PERCENT: 87.5 % (ref 43–80)
PDW BLD-RTO: 12.9 FL (ref 11.5–15)
PLATELET # BLD: 182 E9/L (ref 130–450)
PMV BLD AUTO: 10.3 FL (ref 7–12)
POTASSIUM SERPL-SCNC: 4.6 MMOL/L (ref 3.5–5)
RBC # BLD: 2.83 E12/L (ref 3.8–5.8)
SODIUM BLD-SCNC: 135 MMOL/L (ref 132–146)
TOTAL PROTEIN: 5.4 G/DL (ref 6.4–8.3)
WBC # BLD: 20.1 E9/L (ref 4.5–11.5)

## 2020-08-17 PROCEDURE — 36415 COLL VENOUS BLD VENIPUNCTURE: CPT

## 2020-08-17 PROCEDURE — 6370000000 HC RX 637 (ALT 250 FOR IP): Performed by: INTERNAL MEDICINE

## 2020-08-17 PROCEDURE — 93010 ELECTROCARDIOGRAM REPORT: CPT | Performed by: INTERNAL MEDICINE

## 2020-08-17 PROCEDURE — 94761 N-INVAS EAR/PLS OXIMETRY MLT: CPT

## 2020-08-17 PROCEDURE — 80053 COMPREHEN METABOLIC PANEL: CPT

## 2020-08-17 PROCEDURE — 2700000000 HC OXYGEN THERAPY PER DAY

## 2020-08-17 PROCEDURE — 6370000000 HC RX 637 (ALT 250 FOR IP): Performed by: NURSE PRACTITIONER

## 2020-08-17 PROCEDURE — 2060000000 HC ICU INTERMEDIATE R&B

## 2020-08-17 PROCEDURE — 99233 SBSQ HOSP IP/OBS HIGH 50: CPT | Performed by: INTERNAL MEDICINE

## 2020-08-17 PROCEDURE — 85025 COMPLETE CBC W/AUTO DIFF WBC: CPT

## 2020-08-17 PROCEDURE — 97161 PT EVAL LOW COMPLEX 20 MIN: CPT

## 2020-08-17 PROCEDURE — 92526 ORAL FUNCTION THERAPY: CPT | Performed by: SPEECH-LANGUAGE PATHOLOGIST

## 2020-08-17 PROCEDURE — 6360000002 HC RX W HCPCS: Performed by: INTERNAL MEDICINE

## 2020-08-17 PROCEDURE — 92610 EVALUATE SWALLOWING FUNCTION: CPT | Performed by: SPEECH-LANGUAGE PATHOLOGIST

## 2020-08-17 PROCEDURE — 2580000003 HC RX 258: Performed by: INTERNAL MEDICINE

## 2020-08-17 RX ADMIN — FOLIC ACID 1 MG: 1 TABLET ORAL at 08:46

## 2020-08-17 RX ADMIN — CARBIDOPA AND LEVODOPA 2 TABLET: 25; 100 TABLET, EXTENDED RELEASE ORAL at 23:04

## 2020-08-17 RX ADMIN — MIDODRINE HYDROCHLORIDE 5 MG: 5 TABLET ORAL at 13:03

## 2020-08-17 RX ADMIN — DICLOFENAC 2 G: 10 GEL TOPICAL at 08:47

## 2020-08-17 RX ADMIN — MIDODRINE HYDROCHLORIDE 5 MG: 5 TABLET ORAL at 16:18

## 2020-08-17 RX ADMIN — CLONAZEPAM 0.5 MG: 0.5 TABLET ORAL at 10:52

## 2020-08-17 RX ADMIN — Medication 1000 UNITS: at 08:46

## 2020-08-17 RX ADMIN — METOPROLOL SUCCINATE 12.5 MG: 25 TABLET, EXTENDED RELEASE ORAL at 08:46

## 2020-08-17 RX ADMIN — Medication 1 TABLET: at 08:46

## 2020-08-17 RX ADMIN — ASPIRIN 650 MG: 325 TABLET, FILM COATED ORAL at 08:46

## 2020-08-17 RX ADMIN — LEVOTHYROXINE SODIUM 25 MCG: 25 TABLET ORAL at 07:13

## 2020-08-17 RX ADMIN — CLONAZEPAM 0.5 MG: 0.5 TABLET ORAL at 03:31

## 2020-08-17 RX ADMIN — CARBIDOPA AND LEVODOPA 2 TABLET: 25; 100 TABLET, EXTENDED RELEASE ORAL at 16:17

## 2020-08-17 RX ADMIN — ATORVASTATIN CALCIUM 10 MG: 10 TABLET, FILM COATED ORAL at 08:46

## 2020-08-17 RX ADMIN — PANTOPRAZOLE SODIUM 40 MG: 40 TABLET, DELAYED RELEASE ORAL at 07:13

## 2020-08-17 RX ADMIN — ENOXAPARIN SODIUM 40 MG: 40 INJECTION SUBCUTANEOUS at 08:47

## 2020-08-17 RX ADMIN — PAROXETINE HYDROCHLORIDE 20 MG: 20 TABLET, FILM COATED ORAL at 08:46

## 2020-08-17 RX ADMIN — CARBIDOPA AND LEVODOPA 2 TABLET: 25; 100 TABLET, EXTENDED RELEASE ORAL at 13:02

## 2020-08-17 RX ADMIN — MIDODRINE HYDROCHLORIDE 5 MG: 5 TABLET ORAL at 08:46

## 2020-08-17 RX ADMIN — DICLOFENAC 2 G: 10 GEL TOPICAL at 22:00

## 2020-08-17 RX ADMIN — SODIUM CHLORIDE: 9 INJECTION, SOLUTION INTRAVENOUS at 16:17

## 2020-08-17 ASSESSMENT — PAIN SCALES - GENERAL
PAINLEVEL_OUTOF10: 0

## 2020-08-17 NOTE — PROGRESS NOTES
Physical Therapy    Facility/Department: [address] INTERNAL MEDICINE 2  Initial Assessment    NAME: Tamara Kelly  : 1933  MRN: [id number]    Date of Service: 2020       REQUIRES PT FOLLOW UP: Yes       Patient Diagnosis(es): The primary encounter diagnosis was Orthostatic hypotension. A diagnosis of Leukocytosis, unspecified type was also pertinent to this visit. has a past medical history of Anxiety, Ascending aortic aneurysm ([facility] [address] BPH (benign prostatic hyperplasia), CAD (coronary artery disease), Cancer ([facility] [address] Coronary arteriosclerosis, Depression, DJD (degenerative joint disease), Hyperlipidemia, Impacted cerumen, Lumbar spinal stenosis, Macular degeneration, Osteoarthritis, Parkinson disease ([facility] [address] and RA (rheumatoid arthritis) ([facility] [address] has a past surgical history that includes Coronary artery bypass graft (); Cholecystectomy; Appendectomy; Total knee arthroplasty (Right, 2017); Colonoscopy; Upper gastrointestinal endoscopy; hernia repair (Bilateral); knee surgery (Left, 2011); and Cataract removal with implant (Left, 2020). Evaluating Therapist: Bob Rodriguez, PT     Referring Provider:  Dr. Brennen Irizarry #:  [id number]   DIAGNOSIS:  Orthostatic htn   Additional Pertinent History: recent R knee surgery  \" bone chip repaired \" per pt   PRECAUTIONS:  Falls, R LE knee brace, pt reports WBAT , [place] , continuous pulse ox    Social:  Pt lives with  Wife   in a 1  floor plan 2 steps and 1 rails to enter. Prior to admission pt walked with ww since surgery      Initial Evaluation  Date:  2020 Treatment      Short Term/ Long Term   Goals   Was pt agreeable to Eval/treatment?   yes      Does pt have pain?  none reported      Bed Mobility  Rolling:  NT   Supine to sit:  NT   Sit to supine:  NT   Scooting:  Min assist in sit    SBA    Transfers Sit to stand:  Min assist   Stand to sit: min assist   Stand pivot:  NT    SBA    Ambulation     130  feet with ww  with  SBA   150  feet with ww  with  SBA        Stair negotiation: ascended and descended NT    2  steps with  1 rail with SBA    LE ROM  WFL, R knee NT , decreased B ankle df      LE strength  R LE not formally tested due to brace. L LE 4/ 5      AM- PAC RAW score  17/ 24            Pt is alert and Oriented x  3      Balance:  SBA/CGA    Endurance: decreased   Bed/Chair alarm: Yes      ASSESSMENT  Pt displays functional ability as noted in the objective portion of this evaluation. Treatment/Education:    Pt reports he is very tired. Mobility as above. Pulse ox on RA at rest 97%, decreased to 95% after gait, recovered to 96%. Labored breathing after gait. RN informed and pt left on RA per RN instructione  Pt with decreased R foot clearance with gait, increasing fall risk     Pt educated on fall risk,  Safe and proper technique with all mobility       Patient response to education:   Pt verbalized understanding Pt demonstrated skill Pt requires further education in this area   x With cues   x       Comments:  Pt left  In recliner chair with LEs elevated after session, with call light in reach. Rehab potential is Good for reaching above PT goals. Pts/ family goals   1.  home    Patient and or family understand(s) diagnosis, prognosis, and plan of care. -  Yes     PLAN  PT care will be provided in accordance with the objectives noted above. Whenever appropriate, clear delegation orders will be provided for nursing staff. Exercises and functional mobility practice will be used as well as appropriate assistive devices or modalities to obtain goals. Patient and family education will also be administered as needed. Frequency of treatments will be 2-5x/week x  7 days.     Time in:  1007  Time out:  1023       Evaluation Time includes thorough review of current medical information, gathering information on past medical history/social history and prior level of function, completion of standardized testing/informal observation of tasks, assessment of data and education on plan of care and goals.     CPT codes:  [x] Low Complexity PT evaluation 81086  [] Moderate Complexity PT evaluation 70120  [] High Complexity PT evaluation 84959  [] PT Re-evaluation 59110  [] Gait training 21425  minutes  [] Therapeutic activities 86098  minutes  [] Therapeutic exercises 46468  minutes  [] Neuromuscular reeducation 13731  minutes       Prasanth 18 number:  PT 8902

## 2020-08-17 NOTE — CARE COORDINATION
Assessment /discharge planning initiated per [employer]. PT am-pac 17. Plan to return home on discharge. Active w/ [facility]-will need resume order on discharge. Currently on iv abx- anticipate change to oral abx on discharge per ID note.  Will follow Rodriguez Francisco

## 2020-08-17 NOTE — PROGRESS NOTES
right    Leukocytosis    Macrocytic anemia    Hypothyroidism    Sepsis ([facility] [address]  Resolved Problems:    * No resolved hospital problems. *      Past Medical History:  Past Medical History:   Diagnosis Date    Anxiety     Ascending aortic aneurysm (HCC)     BPH (benign prostatic hyperplasia)     CAD (coronary artery disease)     Cancer (HCC)     skin    Coronary arteriosclerosis     Depression     DJD (degenerative joint disease)     Hyperlipidemia     Impacted cerumen     Lumbar spinal stenosis     severe    Macular degeneration     Osteoarthritis     Parkinson disease (HCC)     RA (rheumatoid arthritis) ([facility] [address]        Allergies:   Allergies   Allergen Reactions    Pcn [Penicillins]     Prednisone        Current Medications:  Current Facility-Administered Medications   Medication Dose Route Frequency Provider Last Rate Last Dose    metoprolol succinate (TOPROL XL) extended release tablet 12.5 mg  12.5 mg Oral Daily Jj Toro MD   12.5 mg at 08/17/20 0846    clonazePAM (KLONOPIN) tablet 0.5 mg  0.5 mg Oral BID PRN Chalres Hric, DO   0.5 mg at 08/17/20 1052    midodrine (PROAMATINE) tablet 5 mg  5 mg Oral TID  Jj Toro MD   5 mg at 08/17/20 1303    cefTRIAXone (ROCEPHIN) 1 g in sterile water 10 mL IV syringe  1 g Intravenous Q24H Katherine Aguilar MD   1 g at 08/16/20 1842    0.9 % sodium chloride bolus  500 mL Intravenous Once Toshia Fitzhugh, DO   Stopped at 08/15/20 2122    0.9 % sodium chloride infusion   Intravenous Continuous Charles Hric,  mL/hr at 08/16/20 1843      sodium chloride flush 0.9 % injection 10 mL  10 mL Intravenous 2 times per day Rhonda Randhawa MD   10 mL at 08/16/20 0814    sodium chloride flush 0.9 % injection 10 mL  10 mL Intravenous PRN Rhonda Randhawa MD        acetaminophen (TYLENOL) tablet 650 mg  650 mg Oral Q6H PRN Rhonda Randhawa MD   650 mg at 08/14/20 2334    Or    acetaminophen (TYLENOL) suppository 650 mg  650 mg Rectal Q6H PRN Adriane Mitchell MD Brendan   650 mg at 08/15/20 2004    polyethylene glycol (GLYCOLAX) packet 17 g  17 g Oral Daily PRN Tita Pollock MD        [facility]) tablet 12.5 mg  12.5 mg Oral Q6H PRN Tita Pollock MD        Or    ondansetron [facility]) injection 4 mg  4 mg Intravenous Q6H PRN Tita Pollock MD        enoxaparin (LOVENOX) injection 40 mg  40 mg Subcutaneous Daily Tita Pollock MD   40 mg at 08/17/20 0847    aspirin tablet 650 mg  650 mg Oral Daily [facility], APRN - CNP   650 mg at 08/17/20 0846    diclofenac sodium (VOLTAREN) 1 % gel 2 g  2 g Topical BID [facility], APRN - CNP   2 g at 39/35/00 3137    folic acid (FOLVITE) tablet 1 mg  1 mg Oral Daily [facility], APRN - CNP   1 mg at 08/17/20 0846    levothyroxine (SYNTHROID) tablet 25 mcg  25 mcg Oral Daily [facility], APRN - CNP   25 mcg at 08/17/20 2352    atorvastatin (LIPITOR) tablet 10 mg  10 mg Oral Daily [facility], APRN - CNP   10 mg at 08/17/20 0846    pantoprazole (PROTONIX) tablet 40 mg  40 mg Oral QAM AC [facility], APRN - CNP   40 mg at 08/17/20 0713    PARoxetine (PAXIL) tablet 20 mg  20 mg Oral QAM [facility], APRN - CNP   20 mg at 08/17/20 0846    vitamin D (CHOLECALCIFEROL) tablet 1,000 Units  1,000 Units Oral Daily [facility], APRN - CNP   1,000 Units at 08/17/20 0846    carbidopa-levodopa (SINEMET CR)  MG per extended release tablet 2 tablet  2 tablet Oral Q4H WA [facility] APRN - CNP   2 tablet at 08/17/20 1302    antioxidant multivitamin (OCUVITE) tablet  1 tablet Oral Daily [facility], APRN - CNP   1 tablet at 08/17/20 0846    0.9 % sodium chloride bolus  1,000 mL Intravenous Once Tita Pollock MD          sodium chloride 100 mL/hr at 08/16/20 3080       Physical Exam:  /67   Pulse 66   Temp 97.7 °F (36.5 °C)   Resp 20   Ht 6' 2\" (1.88 m)   Wt 190 lb 6.4 oz (86.4 kg)   SpO2 97%   BMI 24.45 kg/m²   Weight change: 1 lb 8 oz (0.68 kg)  Wt Readings from Last 3 Encounters:   08/17/20 190 lb 6.4 oz (86.4 kg)   06/24/20 190 lb (86.2 kg)   05/19/20 190 lb (86.2 kg)     General: Awake, alert, oriented x3, no acute distress  Neck: No JVD, carotid bruits, thyromegaly, or lymphadenopathy  Cardiac: Regular rate and rhythm, normal S1 and split S2, no murmurs, no S3 or S4, no pericardial rubs. Carotid upstrokes brisk  Resp: clear bilaterally without wheezes, rhonchi, or rales; unlabored respirations  Abdomen: soft, nontender, nondistended, BS+; no masses, bruits, or hepatomegaly  Extremities: no cyanosis, clubbing, or edema. Distal pulses intact  Skin: Warm and dry, no rashes or lesions  Neuro: moves all extremities to command, no focal deficits noted    Intake/Output:    Intake/Output Summary (Last 24 hours) at 8/17/2020 1507  Last data filed at 8/17/2020 1351  Gross per 24 hour   Intake 1160 ml   Output 625 ml   Net 535 ml     No intake/output data recorded.     Laboratory Tests:  Last 3 CBC:  Recent Labs     08/15/20  1032 08/16/20  0625 08/17/20  0430   WBC 8.2 20.4* 20.1*   RBC 3.31* 2.95* 2.83*   HGB 11.2* 10.1* 9.6*   HCT 34.0* 30.8* 29.1*   .7* 104.4* 102.8*   MCH 33.8 34.2 33.9   MCHC 32.9 32.8 33.0   RDW 12.6 12.7 12.9    211 182   MPV 9.7 10.0 10.3       Last 3 CMP:    Recent Labs     08/15/20  1032 08/16/20  0020 08/16/20  0625 08/17/20  0430    133 134 135   K 4.0 3.5 3.7 4.6    97* 102 102   CO2 25 21* 22 24   BUN 21 24* 28* 30*   CREATININE 1.0 1.3* 1.5* 1.3*   GLUCOSE 166* 128* 111* 97   CALCIUM 8.8 8.4* 8.0* 8.3*   PROT 6.3*  --  5.2* 5.4*   LABALBU 3.5  --  2.8* 2.6*   BILITOT 0.4  --  0.7 0.3   ALKPHOS 98  --  123 124   AST 24  --  372* 142*   ALT 6  --  31 25       Last 3 Mag/Phos:  Recent Labs     08/15/20  1032 08/16/20  0625   MG 2.1 1.7   PHOS 1.7* 3.0       Last 3 CK, CKMB, Troponin  Recent Labs     08/16/20  0020 08/16/20  0625   CKTOTAL 349* 615*   CKMB 7.6 10.2*   TROPONINI 0.84* 0.56*       Last 3 BNP:  No results for input(s): BNP in the last 72 hours. No results found for: BNP    Last 3 Glucose:     Recent Labs     08/16/20  0020 08/16/20  0625 08/17/20  0430   GLUCOSE 128* 111* 97       Last 3 Coags:  No results for input(s): PROTIME, INR, PTT in the last 72 hours. Lab Results   Component Value Date    PROTIME 14.3 06/19/2011    INR 1.5 06/19/2011       Last 3 Lipid Panel:  No results for input(s): LDLCALC, HDL, TRIG in the last 72 hours. Invalid input(s): CHLPL  Lab Results   Component Value Date    LDLCALC 55 06/17/2020    LDLCALC 59 11/04/2019    LDLCALC 63 05/07/2019     Lab Results   Component Value Date    HDL 42 06/17/2020    HDL 42 11/04/2019    HDL 44 05/07/2019     Lab Results   Component Value Date    TRIG 85 06/17/2020    TRIG 84 11/04/2019    TRIG 84 05/07/2019     No components found for: CHLPL    TSH:  No results for input(s): TSH in the last 72 hours. Lab Results   Component Value Date    TSH 1.320 08/13/2020           Radiology:  CTA PULMONARY W CONTRAST   Final Result   1. No pulmonary embolism. No dissection is seen. 2. Signs of chronic pulmonary artery hypertension. This report has been electronically signed by Elayne Sykes MD.      XR CHEST PORTABLE   Final Result   No acute cardiopulmonary abnormality. Cardiomegaly without evidence of overt failure. CT CHEST WO CONTRAST   Final Result      1. Signs of anemia. Cardiomegaly and coronary artery disease. 2. Dilated ascending aorta at 4.3 cm.   3. New 2.5 cm left adrenal mass with nonspecific characteristics. Consider MRI with in and out of phase imaging to assess for small   quantities of fat which would indicate a benign adenoma. CT ABDOMEN PELVIS WO CONTRAST Additional Contrast? None   Final Result         1. 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      2. Nonspecific perinephric fat stranding, which is often seen as an   incidental finding but may signify underlying medical renal disease. No stone or hydronephrosis. 3. Severe distal colonic diverticulosis without diverticulitis. XR CHEST PORTABLE   Final Result   No acute cardiopulmonary findings. ASSESSMENT / PLAN:  1.         elevated troponin of unknown significance- No chest pain, but known CAD. Suspect type II release from infection. Echo pending-resumed toprol- was such a low dose-doubt contributed much to his orthostasis and might actually help blunt orthostasis. . Got fluids since admit almost 3 L hay have contributed to SOB  and distress Saturday. Improved to only 2L + now . 2          LBBB- chronic  3. CAD remote single vessel CABG, no recent ischemic assessment, but has been asymptomatic.  Once over current issues could consider updating lexiscan- probably as outpatient.  4          ?UTI- started on antibiotics, hytrin was stopped  5          Parkinsons on sinemet  6          Recent TKA  7  orthostasis- likely secondary to parkinsons and debilitation+/- hytrin which was stopped- midodrine may be helping      Sarah Nicole MD, [address] [place] [address] at [facility]    Electronically signed by Sarah Nicole MD on 8/17/2020 at 3:07 PM

## 2020-08-17 NOTE — PROGRESS NOTES
Occupational Therapy  Date:8/17/2020  Patient Name: Radha Olsen  Room: 1127/5842-W     Occupational Therapy (OT) order received, patient's medical record reviewed, and OT evaluation attempted this afternoon; patient's family member requested that patient be allowed to rest and that OT evaluation be re-attempted at later date. OT evaluation to be re-attempted at later date, as able/appropriate. Donya Patiño, OTR/L  License Number: [id number]

## 2020-08-17 NOTE — PROGRESS NOTES
SPEECH/LANGUAGE PATHOLOGY  CLINICAL ASSESSMENT OF SWALLOWING FUNCTION    PATIENT NAME:  Ronak Barber      :  1933      TODAY'S DATE:  2020  ROOM:  0410/0410-A    SUMMARY OF EVALUATION    Chart reviewed reviewed including most recent chest radiograph which was clear. Video swallow eval completed at this facility in 2016 recommended thickened liquids, which pt/daughter report pt did not use once d/c'd from hospital.      DYSPHAGIA DIAGNOSIS:  Oropharyngeal dysphagia-overt s/s of aspiration observed with thin liquids    SLP discussed with pt and daughter possible repeat video swallow eval to assess for least restrictive diet. Pt stated that he will most likely refuse honey thick liquids (which is what was recommended after previous MBS). Pt would like to try nectar thick liquids at this time to see if tolerated. DIET RECOMMENDATIONS:  Regular consistency solids with nectar consistency (mildly thick) liquids as tolerated; pt to choose soft items he is able to masticate. If coughing noted with NTL, recommend attempt HTL at that time. FEEDING RECOMMENDATIONS:     Assistance level:  Stand by assistance is needed during all oral intake      Compensatory strategies recommended: Small bites/sips and Alternate solids and liquids    THERAPY RECOMMENDATIONS:      Dysphagia therapy is recommended 3-5 times per week for LOS or when goals are met. Pt will complete BOTR strength/ ROM exercises to reduce pharyngeal residuals and improve epiglottic inversion with  moderate verbal prompts.    Pt will complete laryngeal strength/ ROM therapeutic exercises to improve airway protection for the least restrictive PO diet with  moderate verbal prompts   Patient will participate in DPNS to address functional deficits identified during swallow evaluation  Ongoing meal endurance analysis to provide diet modification and compensatory strategy implementation to minimize risk of aspiration associated with PO intake                   PROCEDURE     Consistencies Administered During the Evaluation   Liquids: thin liquid, nectar thick liquid and honey thick liquid   Solids:  pureed foods and solid foods      Method of Intake:   cup, straw, spoon  Self fed, Fed by clinician      Position:   Seated, upright                  RESULTS     Oral Stage:         Decreased mastication due to:  poor/missing dentition   and decreased lingual control and Delayed A-P transit due to: reduced lingual strength       Pharyngeal Stage:      No signs of aspiration were noted during this evaluation for NTL, puree or solid however, silent aspiration cannot be ruled out at bedside. If silent aspiration is suspected, a Videofluoroscopic Study of Swallowing (MBS) is recommended and requires a physician order. Latent wet cough was noted after presentation of thin liquid                  The Speech Language Pathologist (SLP) completed education with the patient regarding results of evaluation. Explained that Speech Pathology intervention is warranted  at this time   Prognosis for improvements is fair     This plan will be re-evaluated and revised in 1 week  if warranted. Patient stated goals: Agreed with above,   Treatment goals discussed with Patient and Family   The Patient and Family understand(s) the diagnosis, prognosis and plan of care         INTERVENTION/EDUCATION    Pt educated on above results and plan of care. Pt trained on compensatory strategies for safe swallow with good outcome. Pt encouraged to engage in question/answer session. All questions answered and pt verbalized understanding of above. CPT code:  63047  bedside swallow eval, 28168 dysphagia therapy 15 mins      [x]The admitting diagnosis and active problem list, as listed below have been reviewed prior to initiation of this evaluation.      ADMITTING DIAGNOSIS: Orthostatic hypotension [I95.1]  Orthostatic hypotension [I95.1]     ACTIVE PROBLEM LIST:   Patient Active Problem List   Diagnosis    [facility]    Seasonal allergic rhinitis due to pollen    GERD without esophagitis    Acquired hypothyroidism    Essential hypertension    Mixed hyperlipidemia    [facility]'s [facility]    Urinary retention with incomplete bladder emptying    Orthostatic hypotension    Hx of total knee arthroplasty, right    Leukocytosis    Macrocytic anemia    Hypothyroidism    [facility] ([facility]

## 2020-08-17 NOTE — CARE COORDINATION
Social Work:    Follow-up visit was made today by social service with Mr. Irish Sr and his daughter Claudean December. We discussed goals for discharge planning. Mr. Irish Sr recently underwent knee surgery at [facility] and was active at home with [facility]. He resides in a ranch home with his wife and has a walker. Mr. Irish Sr is familiar with skilled rehab, advising that he had prior bilateral knee surgery years ago. He & Claudean December are hoping to return home with [facility]. Social work to [facility] after therapy and physician treatment plans are determined. [facility] is following.     Electronically signed by ALPHONSO Galo on 8/17/2020 at 11:00 AM

## 2020-08-17 NOTE — PROGRESS NOTES
[facility] Progress Note    Admitting Date and Time: 8/13/2020  2:58 PM  Admit Dx: Orthostatic hypotension [I95.1]  Orthostatic hypotension [I95.1]    Subjective:  Patient is being followed for Orthostatic hypotension [I95.1]  Orthostatic hypotension [I95.1]   Pt feels tired      ROS: denies fever, chills, cp, sob, n/v, HA unless stated above.       metoprolol succinate  12.5 mg Oral Daily    midodrine  5 mg Oral TID WC    cefTRIAXone (ROCEPHIN) IV  1 g Intravenous Q24H    sodium chloride  500 mL Intravenous Once    sodium chloride flush  10 mL Intravenous 2 times per day    enoxaparin  40 mg Subcutaneous Daily    aspirin  650 mg Oral Daily    diclofenac sodium  2 g Topical BID    folic acid  1 mg Oral Daily    levothyroxine  25 mcg Oral Daily    atorvastatin  10 mg Oral Daily    pantoprazole  40 mg Oral QAM AC    PARoxetine  20 mg Oral QAM    Vitamin D  1,000 Units Oral Daily    carbidopa-levodopa  2 tablet Oral Q4H WA    ocuvite-lutein  1 tablet Oral Daily    sodium chloride  1,000 mL Intravenous Once     clonazePAM, 0.5 mg, BID PRN  sodium chloride flush, 10 mL, PRN  acetaminophen, 650 mg, Q6H PRN    Or  acetaminophen, 650 mg, Q6H PRN  polyethylene glycol, 17 g, Daily PRN  promethazine, 12.5 mg, Q6H PRN    Or  ondansetron, 4 mg, Q6H PRN         Objective:    /66   Pulse 78   Temp 97.7 °F (36.5 °C)   Resp 20   Ht 6' 2\" (1.88 m)   Wt 190 lb 6.4 oz (86.4 kg)   SpO2 97%   BMI 24.45 kg/m²     General Appearance: alert and oriented to person, place and time and fatigued   Skin: warm and dry  Head: normocephalic and atraumatic  Eyes: pupils equal, round, and reactive to light, extraocular eye movements intact, conjunctivae normal  Neck: neck supple and non tender without mass   Pulmonary/Chest: clear to auscultation bilaterally- no wheezes, rales or rhonchi, normal air movement, no respiratory distress  Cardiovascular: soft systolic murmur,  normal rate, normal S1 and S2 and no carotid bruits  Abdomen: soft, non-tender, non-distended, normal bowel sounds, no masses or organomegaly  Extremities: no cyanosis, no clubbing and no edema  Neurologic: no cranial nerve deficit and speech normal        Recent Labs     08/16/20  0020 08/16/20  0625 08/17/20  0430    134 135   K 3.5 3.7 4.6   CL 97* 102 102   CO2 21* 22 24   BUN 24* 28* 30*   CREATININE 1.3* 1.5* 1.3*   GLUCOSE 128* 111* 97   CALCIUM 8.4* 8.0* 8.3*       Recent Labs     08/15/20  1032 08/16/20  0625 08/17/20  0430   WBC 8.2 20.4* 20.1*   RBC 3.31* 2.95* 2.83*   HGB 11.2* 10.1* 9.6*   HCT 34.0* 30.8* 29.1*   .7* 104.4* 102.8*   MCH 33.8 34.2 33.9   MCHC 32.9 32.8 33.0   RDW 12.6 12.7 12.9    211 182   MPV 9.7 10.0 10.3         Assessment:    Principal Problem:    Orthostatic hypotension  Active Problems:    Essential hypertension    Parkinson's disease ([address]    Urinary retention with incomplete bladder emptying    Hx of total knee arthroplasty, right    Leukocytosis    Macrocytic anemia    Hypothyroidism    Sepsis ([address]  Resolved Problems:    * No resolved hospital problems. *      Plan:  1. Orthostatic hypotension, improved with IV fluids, Hytrin was on hold, cardiology resumed metoprolol. 2.  UTI no leukocytosis, urine culture growing Klebsiella appreciate ID input continue ceftriaxone. 3.  Urinary retention in setting of BPH probably predisposing for UTI, appreciate urology input, ended up with a Barriga catheter. 4.  Adrenal mass incidental finding, appreciate endocrinology input, most likely benign will continue to follow-up. 5.  Elevated troponin, thought to be due to demand ischemia, appreciate cardiology input, echo was ordered, no results yet. 6.  Dysphasia, speech evaluation was ordered. Recommending thickened liquids  7. Acute hypoxic respiratory failure, initially oxygen saturation was below 90% requiring 10 L of nasal cannula, currently weaned down to 3 L, continue to wean off oxygen.   8. Disposition, appreciate PT and OT input, scored 17 out of 24, patient would like to go home, I do not think this would be a safe discharge to go home        NOTE: This report was transcribed using voice recognition software. Every effort was made to ensure accuracy; however, inadvertent computerized transcription errors may be present.   Electronically signed by Lizett Crow MD on 8/17/2020 at 9:36 AM

## 2020-08-17 NOTE — PROGRESS NOTES
bases. Respirations slightly labored at rest on O2  Cardiovascular: S1 and S2 are rhythmic and regular. No murmurs appreciated. Abdomen: Positive bowel sounds to auscultation. Benign to palpation. Extremities: No clubbing, no cyanosis, no edema. Lines: peripheral  F/c draining clear janet urine    Laboratory and Tests Review:  Lab Results   Component Value Date    WBC 20.1 (H) 08/17/2020    WBC 20.4 (H) 08/16/2020    WBC 8.2 08/15/2020    HGB 9.6 (L) 08/17/2020    HCT 29.1 (L) 08/17/2020    .8 (H) 08/17/2020     08/17/2020     Lab Results   Component Value Date    NEUTROABS 17.52 (H) 08/17/2020    NEUTROABS 19.79 (H) 08/16/2020    NEUTROABS 7.54 (H) 08/15/2020     No results found for: [facility]  Lab Results   Component Value Date    ALT 25 08/17/2020     (H) 08/17/2020    ALKPHOS 124 08/17/2020    BILITOT 0.3 08/17/2020     Lab Results   Component Value Date     08/17/2020    K 4.6 08/17/2020    K 4.2 08/14/2020     08/17/2020    CO2 24 08/17/2020    BUN 30 08/17/2020    CREATININE 1.3 08/17/2020    CREATININE 1.5 08/16/2020    CREATININE 1.3 08/16/2020    GFRAA >60 08/17/2020    LABGLOM 52 08/17/2020    GLUCOSE 97 08/17/2020    GLUCOSE 103 06/19/2011    PROT 5.4 08/17/2020    LABALBU 2.6 08/17/2020    CALCIUM 8.3 08/17/2020    BILITOT 0.3 08/17/2020    ALKPHOS 124 08/17/2020     08/17/2020    ALT 25 08/17/2020     Lab Results   Component Value Date    CRP 12.5 (H) 08/14/2020    CRP <0.1 02/20/2020    CRP <0.1 01/31/2019     Lab Results   Component Value Date    SEDRATE 18 (H) 08/14/2020    SEDRATE 2 01/22/2015     Radiology:  CTA chest . No pulmonary embolism.  No dissection is seen. 2. Signs of chronic pulmonary artery hypertension. Microbiology:   Urine cx klebsiella  Blood cx pending    ASSESSMENT:  · Fever, UTI, urinary retention - k.pneumoniae  · Parkinson's disease  · BPH - urology following.  Creat up to 1.3  · Leukocytosis - abruptly back up to 20.4 now 20.1    Plan  - repeat UA s/o UTI  - ceftriaxone  -Feels overall tired today, which were in the hospital and expect p.o. antibiotics at the time of discharge  -Not ready for discharge from [facility],10:12 AM  8/17/2020

## 2020-08-18 ENCOUNTER — APPOINTMENT (OUTPATIENT)
Dept: NUCLEAR MEDICINE | Age: 85
DRG: 312 | End: 2020-08-18
Payer: MEDICARE

## 2020-08-18 ENCOUNTER — APPOINTMENT (OUTPATIENT)
Dept: NON INVASIVE DIAGNOSTICS | Age: 85
DRG: 312 | End: 2020-08-18
Payer: MEDICARE

## 2020-08-18 LAB
ALBUMIN SERPL-MCNC: 2.7 G/DL (ref 3.5–5.2)
ALP BLD-CCNC: 197 U/L (ref 40–129)
ALT SERPL-CCNC: 24 U/L (ref 0–40)
AMMONIA: <10 UMOL/L (ref 16–60)
ANION GAP SERPL CALCULATED.3IONS-SCNC: 13 MMOL/L (ref 7–16)
AST SERPL-CCNC: 96 U/L (ref 0–39)
B.E.: -5.4 MMOL/L (ref -3–3)
BASOPHILS ABSOLUTE: 0.05 E9/L (ref 0–0.2)
BASOPHILS RELATIVE PERCENT: 0.3 % (ref 0–2)
BILIRUB SERPL-MCNC: 0.8 MG/DL (ref 0–1.2)
BUN BLDV-MCNC: 27 MG/DL (ref 8–23)
CALCIUM SERPL-MCNC: 8.5 MG/DL (ref 8.6–10.2)
CHLORIDE BLD-SCNC: 105 MMOL/L (ref 98–107)
CO2: 22 MMOL/L (ref 22–29)
COHB: 0.2 % (ref 0–1.5)
CREAT SERPL-MCNC: 1.1 MG/DL (ref 0.7–1.2)
CRITICAL: ABNORMAL
DATE ANALYZED: ABNORMAL
DATE OF COLLECTION: ABNORMAL
EOSINOPHILS ABSOLUTE: 0.18 E9/L (ref 0.05–0.5)
EOSINOPHILS RELATIVE PERCENT: 1 % (ref 0–6)
GFR AFRICAN AMERICAN: >60
GFR NON-AFRICAN AMERICAN: >60 ML/MIN/1.73
GLUCOSE BLD-MCNC: 99 MG/DL (ref 74–99)
HCO3: 17.2 MMOL/L (ref 22–26)
HCT VFR BLD CALC: 29.5 % (ref 37–54)
HEMOGLOBIN: 9.8 G/DL (ref 12.5–16.5)
HHB: 2.8 % (ref 0–5)
IMMATURE GRANULOCYTES #: 0.25 E9/L
IMMATURE GRANULOCYTES %: 1.4 % (ref 0–5)
LAB: ABNORMAL
LV EF: 30 %
LVEF MODALITY: NORMAL
LYMPHOCYTES ABSOLUTE: 1.25 E9/L (ref 1.5–4)
LYMPHOCYTES RELATIVE PERCENT: 7.2 % (ref 20–42)
Lab: ABNORMAL
MCH RBC QN AUTO: 34.3 PG (ref 26–35)
MCHC RBC AUTO-ENTMCNC: 33.2 % (ref 32–34.5)
MCV RBC AUTO: 103.1 FL (ref 80–99.9)
METHB: 0.2 % (ref 0–1.5)
MODE: ABNORMAL
MONOCYTES ABSOLUTE: 0.97 E9/L (ref 0.1–0.95)
MONOCYTES RELATIVE PERCENT: 5.6 % (ref 2–12)
NEUTROPHILS ABSOLUTE: 14.58 E9/L (ref 1.8–7.3)
NEUTROPHILS RELATIVE PERCENT: 84.5 % (ref 43–80)
O2 CONTENT: 15.4 ML/DL
O2 SATURATION: 97.2 % (ref 92–98.5)
O2HB: 96.8 % (ref 94–97)
OPERATOR ID: ABNORMAL
PATIENT TEMP: 37 C
PCO2: 25.2 MMHG (ref 35–45)
PDW BLD-RTO: 13 FL (ref 11.5–15)
PH BLOOD GAS: 7.45 (ref 7.35–7.45)
PLATELET # BLD: 204 E9/L (ref 130–450)
PMV BLD AUTO: 10.7 FL (ref 7–12)
PO2: 101 MMHG (ref 75–100)
POTASSIUM SERPL-SCNC: 4 MMOL/L (ref 3.5–5)
RBC # BLD: 2.86 E12/L (ref 3.8–5.8)
SODIUM BLD-SCNC: 140 MMOL/L (ref 132–146)
SOURCE, BLOOD GAS: ABNORMAL
THB: 11.2 G/DL (ref 11.5–16.5)
TIME ANALYZED: 1439
TOTAL PROTEIN: 5.3 G/DL (ref 6.4–8.3)
WBC # BLD: 17.3 E9/L (ref 4.5–11.5)

## 2020-08-18 PROCEDURE — A9500 TC99M SESTAMIBI: HCPCS | Performed by: RADIOLOGY

## 2020-08-18 PROCEDURE — 6360000002 HC RX W HCPCS: Performed by: INTERNAL MEDICINE

## 2020-08-18 PROCEDURE — 92526 ORAL FUNCTION THERAPY: CPT | Performed by: SPEECH-LANGUAGE PATHOLOGIST

## 2020-08-18 PROCEDURE — 97530 THERAPEUTIC ACTIVITIES: CPT

## 2020-08-18 PROCEDURE — 2580000003 HC RX 258: Performed by: INTERNAL MEDICINE

## 2020-08-18 PROCEDURE — 80053 COMPREHEN METABOLIC PANEL: CPT

## 2020-08-18 PROCEDURE — 85025 COMPLETE CBC W/AUTO DIFF WBC: CPT

## 2020-08-18 PROCEDURE — 6370000000 HC RX 637 (ALT 250 FOR IP): Performed by: NURSE PRACTITIONER

## 2020-08-18 PROCEDURE — 99233 SBSQ HOSP IP/OBS HIGH 50: CPT | Performed by: INTERNAL MEDICINE

## 2020-08-18 PROCEDURE — 82805 BLOOD GASES W/O2 SATURATION: CPT

## 2020-08-18 PROCEDURE — 3430000000 HC RX DIAGNOSTIC RADIOPHARMACEUTICAL: Performed by: RADIOLOGY

## 2020-08-18 PROCEDURE — 2060000000 HC ICU INTERMEDIATE R&B

## 2020-08-18 PROCEDURE — 82140 ASSAY OF AMMONIA: CPT

## 2020-08-18 PROCEDURE — 2700000000 HC OXYGEN THERAPY PER DAY

## 2020-08-18 PROCEDURE — U0003 INFECTIOUS AGENT DETECTION BY NUCLEIC ACID (DNA OR RNA); SEVERE ACUTE RESPIRATORY SYNDROME CORONAVIRUS 2 (SARS-COV-2) (CORONAVIRUS DISEASE [COVID-19]), AMPLIFIED PROBE TECHNIQUE, MAKING USE OF HIGH THROUGHPUT TECHNOLOGIES AS DESCRIBED BY CMS-2020-01-R: HCPCS

## 2020-08-18 PROCEDURE — 93017 CV STRESS TEST TRACING ONLY: CPT

## 2020-08-18 PROCEDURE — 6370000000 HC RX 637 (ALT 250 FOR IP): Performed by: INTERNAL MEDICINE

## 2020-08-18 PROCEDURE — 2580000003 HC RX 258: Performed by: SPECIALIST

## 2020-08-18 PROCEDURE — 78452 HT MUSCLE IMAGE SPECT MULT: CPT

## 2020-08-18 PROCEDURE — 36415 COLL VENOUS BLD VENIPUNCTURE: CPT

## 2020-08-18 PROCEDURE — 6360000002 HC RX W HCPCS: Performed by: SPECIALIST

## 2020-08-18 PROCEDURE — 99232 SBSQ HOSP IP/OBS MODERATE 35: CPT | Performed by: INTERNAL MEDICINE

## 2020-08-18 RX ORDER — MIDODRINE HYDROCHLORIDE 2.5 MG/1
2.5 TABLET ORAL
Status: DISCONTINUED | OUTPATIENT
Start: 2020-08-18 | End: 2020-08-19

## 2020-08-18 RX ADMIN — CEFTRIAXONE 1 G: 1 INJECTION, POWDER, FOR SOLUTION INTRAMUSCULAR; INTRAVENOUS at 20:58

## 2020-08-18 RX ADMIN — Medication 1 TABLET: at 13:14

## 2020-08-18 RX ADMIN — FOLIC ACID 1 MG: 1 TABLET ORAL at 13:14

## 2020-08-18 RX ADMIN — CARBIDOPA AND LEVODOPA 2 TABLET: 25; 100 TABLET, EXTENDED RELEASE ORAL at 20:56

## 2020-08-18 RX ADMIN — MIDODRINE HYDROCHLORIDE 2.5 MG: 2.5 TABLET ORAL at 16:59

## 2020-08-18 RX ADMIN — MIDODRINE HYDROCHLORIDE 5 MG: 5 TABLET ORAL at 13:14

## 2020-08-18 RX ADMIN — Medication 30 MILLICURIE: at 10:30

## 2020-08-18 RX ADMIN — Medication 10 MILLICURIE: at 09:15

## 2020-08-18 RX ADMIN — PAROXETINE HYDROCHLORIDE 20 MG: 20 TABLET, FILM COATED ORAL at 13:14

## 2020-08-18 RX ADMIN — CARBIDOPA AND LEVODOPA 2 TABLET: 25; 100 TABLET, EXTENDED RELEASE ORAL at 13:15

## 2020-08-18 RX ADMIN — DICLOFENAC 2 G: 10 GEL TOPICAL at 13:16

## 2020-08-18 RX ADMIN — PANTOPRAZOLE SODIUM 40 MG: 40 TABLET, DELAYED RELEASE ORAL at 05:11

## 2020-08-18 RX ADMIN — LEVOTHYROXINE SODIUM 25 MCG: 25 TABLET ORAL at 05:11

## 2020-08-18 RX ADMIN — CARBIDOPA AND LEVODOPA 2 TABLET: 25; 100 TABLET, EXTENDED RELEASE ORAL at 16:59

## 2020-08-18 RX ADMIN — SODIUM CHLORIDE: 9 INJECTION, SOLUTION INTRAVENOUS at 16:59

## 2020-08-18 RX ADMIN — Medication 1000 UNITS: at 13:14

## 2020-08-18 RX ADMIN — CLONAZEPAM 0.5 MG: 0.5 TABLET ORAL at 02:47

## 2020-08-18 RX ADMIN — Medication 10 ML: at 21:12

## 2020-08-18 RX ADMIN — REGADENOSON 0.4 MG: 0.08 INJECTION, SOLUTION INTRAVENOUS at 11:14

## 2020-08-18 RX ADMIN — ATORVASTATIN CALCIUM 10 MG: 10 TABLET, FILM COATED ORAL at 13:15

## 2020-08-18 RX ADMIN — DICLOFENAC 2 G: 10 GEL TOPICAL at 21:11

## 2020-08-18 RX ADMIN — ENOXAPARIN SODIUM 40 MG: 40 INJECTION SUBCUTANEOUS at 13:15

## 2020-08-18 RX ADMIN — ASPIRIN 650 MG: 325 TABLET, FILM COATED ORAL at 13:14

## 2020-08-18 RX ADMIN — METOPROLOL SUCCINATE 12.5 MG: 25 TABLET, EXTENDED RELEASE ORAL at 13:15

## 2020-08-18 ASSESSMENT — PAIN SCALES - GENERAL
PAINLEVEL_OUTOF10: 0

## 2020-08-18 NOTE — CARE COORDINATION
For stress test today. Plan remains to return home w/ [facility] and family assist on discharge pending progress. Active w/ [facility]-will need resume order on discharge. Currently on iv abx- anticipate change to oral abx on discharge per ID note. Community Transportation list given to daughter as requested. Will follow  Eli Roldan     PT am-pac 14. COVID send out pending. Discharge planning again discussed w/ daughter Sabine. [facility] list offered and declined- requesting [facility]- VM left w/ Conner @ [facility] for referral-awaiting response. [facility] list given to daughter to review for other choices pending [facility] acceptance.  Eli Roldan       Per Britton Casper @ [facility], no beds available @ [facility]- beds are available @ [facility] [place]- daughter notified- to discuss w/ choices w/ pt and pt's wife- will follow up for further [facility] choices Eli Roldan

## 2020-08-18 NOTE — PROGRESS NOTES
Stress test completed with procedure masks worn by Physician, RN, Nuclear tech. Pt. Removed his mask during stress portion of test for breathing purposes.

## 2020-08-18 NOTE — PROGRESS NOTES
[facility] [address]    Name: Tamara Kelly    Age: 80 y.o. PCP: Jeff Montoya DO    Date of Admission: 8/13/2020  2:58 PM    Date of Service: 8/18/2020    Chief Complaint: Follow-up for troponin, othostasis  The patient is a 80y.o. year old male with CAD, admitted 8/13 for weakness, feeling hot,  unable to get off the commode at home. Profound weakness since TKA 3 weeks ago. Felt to be orthostatic and hytrin and toprol stopped, given hydration. Having issues with urinary frequency,  bacturia- started on antibiotics. Apparently yesterday evening started having increased heart rate,dry heaves, some respiratory distress and RRT called- sent for CTA -no PE, cardiac enzymes drawn which showed a bump of troponin 0.5 so cardiology consulted. Patient denies any chest pain or palpitations. Comfortable today.     PMHx CABG [id number], cath 2005 patent graft, 80% proximal LAD stenosis, mild 30% blockages diagonal and circumflex, normal nondominant RCA. Last stress lexiscan 2015 normal, echo 2015 EF 60% 1+ AI,TR. Chronic LBBB, parkinsons, RA, HTN, hyperlipidemia, mild aortic aneurysm. Interim History:  No new overnight cardiac complaints. Currently with no complaints of CP, SOB, palpitations, dizziness, or lightheadedness. Still weak. No distress. Reviewed with patietn and daughter echo EF 35-40% - hasn't had any ischemic w/u since 2015.     Telemetry personally reviewed and showed sinus    Review of Systems:   Cardiac: As per HPI  General: No fever, chills  Pulmonary: No cough, wheeze, or shortness of breath  GI: No nausea, vomiting,or abdominal pain  Neuro: No headache or confusion    Problem List:  Principal Problem:    Orthostatic hypotension  Active Problems:    Essential hypertension    Parkinson's disease ([address]    Urinary retention with incomplete bladder emptying    Hx of total knee arthroplasty, right    Leukocytosis    Macrocytic anemia    Hypothyroidism Sepsis ([facility]  Resolved Problems:    * No resolved hospital problems. *      Past Medical History:  Past Medical History:   Diagnosis Date    Anxiety     Ascending aortic aneurysm (HCC)     BPH (benign prostatic hyperplasia)     CAD (coronary artery disease)     Cancer (HCC)     skin    Coronary arteriosclerosis     Depression     DJD (degenerative joint disease)     Hyperlipidemia     Impacted cerumen     Lumbar spinal stenosis     severe    Macular degeneration     Osteoarthritis     Parkinson disease (HCC)     RA (rheumatoid arthritis) ([facility]        Allergies:   Allergies   Allergen Reactions    Pcn [Penicillins]     Prednisone        Current Medications:  Current Facility-Administered Medications   Medication Dose Route Frequency Provider Last Rate Last Dose    regadenoson (LEXISCAN) injection 0.4 mg  0.4 mg Intravenous ONCE PRN Remington Albarado MD        metoprolol succinate (TOPROL XL) extended release tablet 12.5 mg  12.5 mg Oral Daily Remington Albarado MD   12.5 mg at 08/17/20 0846    clonazePAM (KLONOPIN) tablet 0.5 mg  0.5 mg Oral BID PRN Charles Hric, DO   0.5 mg at 08/18/20 0247    midodrine (PROAMATINE) tablet 5 mg  5 mg Oral TID  Remington Albarado MD   5 mg at 08/17/20 1618    cefTRIAXone (ROCEPHIN) 1 g in sterile water 10 mL IV syringe  1 g Intravenous Q24H Todd Larsen MD   1 g at 08/16/20 1842    0.9 % sodium chloride bolus  500 mL Intravenous Once La Joya Lacer, DO   Stopped at 08/15/20 2122    0.9 % sodium chloride infusion   Intravenous Continuous Charles Hric,  mL/hr at 08/17/20 2200      sodium chloride flush 0.9 % injection 10 mL  10 mL Intravenous 2 times per day Sydney Baltazar MD   10 mL at 08/16/20 0814    sodium chloride flush 0.9 % injection 10 mL  10 mL Intravenous PRN Sydney Baltazar MD        acetaminophen (TYLENOL) tablet 650 mg  650 mg Oral Q6H PRN Sydney Baltazar MD   650 mg at 08/14/20 2334    Or    acetaminophen (TYLENOL) suppository 650 mg 650 mg Rectal Q6H PRN Renata Griffin MD   650 mg at 08/15/20 2004    polyethylene glycol (GLYCOLAX) packet 17 g  17 g Oral Daily PRN Renata Griffin MD        promethazine (PHENERGAN) tablet 12.5 mg  12.5 mg Oral Q6H PRN Renata Griffin MD        Or    ondansetron [facility]) injection 4 mg  4 mg Intravenous Q6H PRN Renata Griffin MD        enoxaparin (LOVENOX) injection 40 mg  40 mg Subcutaneous Daily Renata Griffin MD   40 mg at 08/17/20 0847    aspirin tablet 650 mg  650 mg Oral Daily Patricio Ocean, APRN - CNP   650 mg at 08/17/20 0846    diclofenac sodium (VOLTAREN) 1 % gel 2 g  2 g Topical BID Patricio Ocean, APRN - CNP   2 g at 92/45/86 6102    folic acid (FOLVITE) tablet 1 mg  1 mg Oral Daily Patricio Wetzel, APRN - CNP   1 mg at 08/17/20 0846    levothyroxine (SYNTHROID) tablet 25 mcg  25 mcg Oral Daily Patricio Ocean, APRN - CNP   25 mcg at 08/18/20 0511    atorvastatin (LIPITOR) tablet 10 mg  10 mg Oral Daily [facility], APRN - CNP   10 mg at 08/17/20 0846    pantoprazole (PROTONIX) tablet 40 mg  40 mg Oral QAM AC Patricio Ocean, APRN - CNP   40 mg at 08/18/20 0511    PARoxetine (PAXIL) tablet 20 mg  20 mg Oral QAM Patricio Ocean, APRN - CNP   20 mg at 08/17/20 0846    vitamin D (CHOLECALCIFEROL) tablet 1,000 Units  1,000 Units Oral Daily Patricio Ocean, APRN - CNP   1,000 Units at 08/17/20 0846    carbidopa-levodopa (SINEMET CR)  MG per extended release tablet 2 tablet  2 tablet Oral Q4H WA Patricio Ocean, APRN - CNP   2 tablet at 08/17/20 2304    antioxidant multivitamin (OCUVITE) tablet  1 tablet Oral Daily Patricio Ocean, APRN - CNP   1 tablet at 08/17/20 0846    0.9 % sodium chloride bolus  1,000 mL Intravenous Once Renata Griffin MD          sodium chloride 100 mL/hr at 08/17/20 2200       Physical Exam:  /74   Pulse 72   Temp 97 °F (36.1 °C) (Axillary)   Resp 22   Ht 6' 2\" (1.88 m)   Wt 196 lb 8 oz (89.1 kg)   SpO2 95%   BMI 25.23 kg/m² Weight change: 6 lb 1.6 oz (2.767 kg)  Wt Readings from Last 3 Encounters:   08/18/20 196 lb 8 oz (89.1 kg)   06/24/20 190 lb (86.2 kg)   05/19/20 190 lb (86.2 kg)     General: Awake, alert, oriented x3, up in chair no acute distress  Neck: No JVD, carotid bruits, thyromegaly, or lymphadenopathy  Cardiac: Regular rate and rhythm, normal S1 and split S2, no murmurs, no S3 or S4, no pericardial rubs. Carotid upstrokes brisk  Resp: clear bilaterally without wheezes, rhonchi, or rales; unlabored respirations  Abdomen: soft, nontender, nondistended, BS+; no masses, bruits, or hepatomegaly  Extremities: no cyanosis, clubbing, or edema. Distal pulses intact  Skin: Warm and dry, no rashes or lesions  Neuro: moves all extremities to command, no focal deficits noted    Intake/Output:    Intake/Output Summary (Last 24 hours) at 8/18/2020 0754  Last data filed at 8/18/2020 0634  Gross per 24 hour   Intake 2160 ml   Output 2050 ml   Net 110 ml     No intake/output data recorded.     Laboratory Tests:  Last 3 CBC:  Recent Labs     08/16/20 0625 08/17/20 0430 08/18/20  0405   WBC 20.4* 20.1* 17.3*   RBC 2.95* 2.83* 2.86*   HGB 10.1* 9.6* 9.8*   HCT 30.8* 29.1* 29.5*   .4* 102.8* 103.1*   MCH 34.2 33.9 34.3   MCHC 32.8 33.0 33.2   RDW 12.7 12.9 13.0    182 204   MPV 10.0 10.3 10.7       Last 3 CMP:    Recent Labs     08/16/20 0625 08/17/20  0430 08/18/20  0405    135 140   K 3.7 4.6 4.0    102 105   CO2 22 24 22   BUN 28* 30* 27*   CREATININE 1.5* 1.3* 1.1   GLUCOSE 111* 97 99   CALCIUM 8.0* 8.3* 8.5*   PROT 5.2* 5.4* 5.3*   LABALBU 2.8* 2.6* 2.7*   BILITOT 0.7 0.3 0.8   ALKPHOS 123 124 197*   * 142* 96*   ALT 31 25 24       Last 3 Mag/Phos:  Recent Labs     08/15/20  1032 08/16/20  0625   MG 2.1 1.7   PHOS 1.7* 3.0       Last 3 CK, CKMB, Troponin  Recent Labs     08/16/20  0020 08/16/20  0625   CKTOTAL 349* 615*   CKMB 7.6 10.2*   TROPONINI 0.84* 0.56*       Last 3 BNP:  No results for input(s): BNP in the last 72 hours. No results found for: BNP    Last 3 Glucose:     Recent Labs     08/16/20  0625 08/17/20  0430 08/18/20  0405   GLUCOSE 111* 97 99       Last 3 Coags:  No results for input(s): PROTIME, INR, PTT in the last 72 hours. Lab Results   Component Value Date    PROTIME 14.3 06/19/2011    INR 1.5 06/19/2011       Last 3 Lipid Panel:  No results for input(s): LDLCALC, HDL, TRIG in the last 72 hours. Invalid input(s): CHLPL  Lab Results   Component Value Date    LDLCALC 55 06/17/2020    LDLCALC 59 11/04/2019    LDLCALC 63 05/07/2019     Lab Results   Component Value Date    HDL 42 06/17/2020    HDL 42 11/04/2019    HDL 44 05/07/2019     Lab Results   Component Value Date    TRIG 85 06/17/2020    TRIG 84 11/04/2019    TRIG 84 05/07/2019     No components found for: CHLPL    TSH:  No results for input(s): TSH in the last 72 hours. Lab Results   Component Value Date    TSH 1.320 08/13/2020           Radiology:  CTA PULMONARY W CONTRAST   Final Result   1. No pulmonary embolism. No dissection is seen. 2. Signs of chronic pulmonary artery hypertension. This report has been electronically signed by Jasper De MD.      XR CHEST PORTABLE   Final Result   No acute cardiopulmonary abnormality. Cardiomegaly without evidence of overt failure. CT CHEST WO CONTRAST   Final Result      1. Signs of anemia. Cardiomegaly and coronary artery disease. 2. Dilated ascending aorta at 4.3 cm.   3. New 2.5 cm left adrenal mass with nonspecific characteristics. Consider MRI with in and out of phase imaging to assess for small   quantities of fat which would indicate a benign adenoma. CT ABDOMEN PELVIS WO CONTRAST Additional Contrast? None   Final Result         1. 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      2. Nonspecific perinephric fat stranding, which is often seen as an   incidental finding but may signify underlying medical renal disease. No stone or hydronephrosis. 3. Severe distal colonic diverticulosis without diverticulitis. XR CHEST PORTABLE   Final Result   No acute cardiopulmonary findings. NM Cardiac Stress Test Nuclear Imaging    (Results Pending)           ASSESSMENT / PLAN:  1.         elevated troponin of unknown significance- No chest pain, but known CAD. Suspect type II release from infection. Echo pending-resumed toprol- was such a low dose-doubt contributed much to his orthostasis and might actually help blunt orthostasis. . Got fluids since admit almost 3 L hay have contributed to SOB  and distress Saturday. Improved to only 2L + now . 2          LBBB- chronic  3. CAD remote single vessel CABG, no recent ischemic assessment, but has been asymptomatic. Echo showing  EF down from 2015- would be reasonable to just move forward with lexiscan to sort this out.  Hold breakfast.  4          ?UTI- started on antibiotics, hytrin was stopped, WBC 20->17  5          Parkinsons on sinemet  6          Recent TKA  7  orthostasis- likely secondary to parkinsons and debilitation+/- hytrin which was stopped- midodrine may be helping      Clarence Trujillo MD, [facility]    Electronically signed by Clarence Trujillo MD on 8/18/2020 at 7:54 AM

## 2020-08-18 NOTE — PROGRESS NOTES
ABG drawn x 2 from Right Radial. Patient had NormalAllen's Test.  Patient was on 2 liters/min via nasal cannula  at time of puncture. Pressure held for 5. No bleeding or bruising noted at puncture site. Patient tolerated procedure well.       Performed by Lisa Gonzalez

## 2020-08-18 NOTE — PROGRESS NOTES
Occupational Therapy  Date:8/18/2020  Patient Name: Marcie Ruth  Room: 6184/2141-E     Occupational Therapy (OT) evaluation attempted this morning; patient unavailable due to being off of unit. OT evaluation to be re-attempted at later time/date, as able/appropriate. Donya Morataya, OTR/L  License Number: [id number]

## 2020-08-18 NOTE — PROGRESS NOTES
Physical Therapy  Facility/Department: [address] INTERNAL MEDICINE 2  Daily Treatment Note  NAME: Jovan Benitez  : 1933  MRN: [id number]    Date of Service: 2020    Patient Diagnosis(es): The primary encounter diagnosis was Orthostatic hypotension. A diagnosis of Leukocytosis, unspecified type was also pertinent to this visit. has a past medical history of Anxiety, Ascending aortic aneurysm ([facility] BPH (benign prostatic hyperplasia), CAD (coronary artery disease), Cancer ([facility] Coronary arteriosclerosis, Depression, DJD (degenerative joint disease), Hyperlipidemia, Impacted cerumen, Lumbar spinal stenosis, Macular degeneration, Osteoarthritis, Parkinson disease ([facility] and RA (rheumatoid arthritis) ([facility] has a past surgical history that includes Coronary artery bypass graft (); Cholecystectomy; Appendectomy; Total knee arthroplasty (Right, 2017); Colonoscopy; Upper gastrointestinal endoscopy; hernia repair (Bilateral); knee surgery (Left, 2011); and Cataract removal with implant (Left, 2020). Evaluating Therapist: Irish Morgan, PT      Referring Provider:  Dr. Christina Coleman #:  [id number]   DIAGNOSIS:  Orthostatic htn   Additional Pertinent History: recent R knee surgery  \" bone chip repaired \" per pt   PRECAUTIONS:  Falls, R LE knee brace, pt reports WBAT , [place] , continuous pulse ox     Social:  Pt lives with  Wife   in a 1  floor plan 2 steps and 1 rails to enter. Prior to admission pt walked with ww since surgery        Initial Evaluation  Date:  2020 Treatment    20  Short Term/ Long Term   Goals   Was pt agreeable to Eval/treatment?   yes  yes      Does pt have pain?  none reported  None reported      Bed Mobility  Rolling:  NT   Supine to sit:  NT   Sit to supine:  NT   Scooting:  Min assist in sit   NT  SBA    Transfers Sit to stand:  Min assist   Stand to sit: min assist   Stand pivot:  NT   sit to stand mod assist  Stand to sit min assist  SBA Ambulation     130  feet with ww  with  SBA  25 feet x2 with ww min to mod assist  150  feet with ww  with  SBA          Stair negotiation: ascended and descended NT     2  steps with  1 rail with SBA    LE ROM  WFL, R knee NT , decreased B ankle df        LE strength  R LE not formally tested due to brace. L LE 4/ 5        AM- PAC RAW score  17/ 24 14/24          Patient education  Pt was educated on transfer technique    Patient response to education:   Pt verbalized understanding Pt demonstrated skill Pt requires further education in this area   yes With cues and assist  yes     Additional Comments: Pt states he feels very weak today. Heavy labored breathing during session. Pt with strong posterior lean with sit <> stand transfers. Difficulty with R hand  on ww. Unsteady and fatigued with ambulation. Pt was left in chair with call light left by patient. Chair/bed alarm: yes    Time out: 0835    Total treatment time 15 minutes  Pt is making  progress toward established Physical Therapy goals. Continue with physical therapy current plan of care.     Allegra PT [id number]      CPT codes:  [] Low Complexity PT evaluation 96243  [] Moderate Complexity PT evaluation 54336  [] High Complexity PT evaluation 53619  [] PT Re-evaluation 90628  [] Gait training 28655  minutes  [] Manual therapy 19361    minutes  [x] Therapeutic activities 25519 15   minutes  [] Therapeutic exercises 95349     minutes  [] Neuromuscular reeducation 61720     minutes

## 2020-08-18 NOTE — PROGRESS NOTES
Cardiology:  Reviewed lexiscan- reported to be inadequate study due to poor uptake of sestimibi- reviewed images- indeed pretty poor uptake of the radioisotope, so would be difficult to make any meaningful interpretation. Would probably be best  to let the radioisotope wash out and try at a later date. Will arrange through the office. Continue asa, statin betablocker. As bp has been much better will cut midodrine to 2.5 mg tid

## 2020-08-18 NOTE — PROGRESS NOTES
Physician Progress Note      PATIENT:               Temo Sanz  CSN #:                  [id number]  :                       1933  ADMIT DATE:       2020 2:58 PM  [address] [place] DATE:  RESPONDING  PROVIDER #:        Fawn Mabry MD          QUERY TEXT:    Pt admitted with weakness. IM progress note on  states   \". ..sepsis. Geralene Edi Geralene Edi Leukocytosis with possible uti and/or possible aspiration   pneumonia. Geralene Edi Geralene Edi \". If possible, please document in progress notes and discharge   summary if Sepsis was present on admission (POA): The medical record reflects the following:  Risk Factors: advanced age  Clinical Indicators: per H&P, \". Geralene Edi Geralene Edi Possible sepsis-leukocytosis WBC 19.0 on   admission. Geralene Edi Geralene Edi Mildly immunosuppressed on MTX for RA. Geralene Edi Geralene Edi \"; on admission:  WBC 19;   procal 0.42(increased to 41.18 on ); CRP 12.5; temp 101.8 (8/15);  per IM   note on , \". Geralene Edi Geralene Edi Orthostatic hypotension. ..leukocytosis. Geralene Edi Geralene Edi \"; per IM note,   8/15, \". Geralene Edi Geralene Edi Orthostatic hypotension  Leukocytosis with possible uti?\"; per IM note , \". Geralene Edi Geralene Edi Orthostatic   hypotension. ..sepsis. Geralene Edi Geralene Edi Leukocytosis with possible uti and/or possible   aspiration pneumonia. Geralene Edi Geralene Edi \"  Treatment: ID consult; IV fluid; IV antibiotics; Options provided:  -- Yes, Sepsis was present at the time of the order to admit to the hospital  -- No, Sepsis was not present on admission and developed during the inpatient   stay  -- Other - I will add my own diagnosis  -- Disagree - Not applicable / Not valid  -- Disagree - Clinically unable to determine / Unknown  -- Refer to Clinical Documentation Reviewer    PROVIDER RESPONSE TEXT:    No, Sepsis was not present on admission and developed during the inpatient   stay.     Query created by: Ginny Holder on 2020 2:11 PM      Electronically signed by:  Fawn Mabry MD 2020 3:35 PM

## 2020-08-18 NOTE — PROGRESS NOTES
[facility] Progress Note    Admitting Date and Time: 8/13/2020  2:58 PM  Admit Dx: Orthostatic hypotension [I95.1]  Orthostatic hypotension [I95.1]    Subjective:  Patient is being followed for Orthostatic hypotension [I95.1]  Orthostatic hypotension [I95.1]   Pt feels tired,    ROS: denies fever, chills, cp, sob, n/v, HA unless stated above.       metoprolol succinate  12.5 mg Oral Daily    midodrine  5 mg Oral TID WC    cefTRIAXone (ROCEPHIN) IV  1 g Intravenous Q24H    sodium chloride  500 mL Intravenous Once    sodium chloride flush  10 mL Intravenous 2 times per day    enoxaparin  40 mg Subcutaneous Daily    aspirin  650 mg Oral Daily    diclofenac sodium  2 g Topical BID    folic acid  1 mg Oral Daily    levothyroxine  25 mcg Oral Daily    atorvastatin  10 mg Oral Daily    pantoprazole  40 mg Oral QAM AC    PARoxetine  20 mg Oral QAM    Vitamin D  1,000 Units Oral Daily    carbidopa-levodopa  2 tablet Oral Q4H WA    ocuvite-lutein  1 tablet Oral Daily    sodium chloride  1,000 mL Intravenous Once     regadenoson, 0.4 mg, ONCE PRN  clonazePAM, 0.5 mg, BID PRN  sodium chloride flush, 10 mL, PRN  acetaminophen, 650 mg, Q6H PRN    Or  acetaminophen, 650 mg, Q6H PRN  polyethylene glycol, 17 g, Daily PRN  promethazine, 12.5 mg, Q6H PRN    Or  ondansetron, 4 mg, Q6H PRN         Objective:    /74   Pulse 72   Temp 97 °F (36.1 °C) (Axillary)   Resp 22   Ht 6' 2\" (1.88 m)   Wt 196 lb 8 oz (89.1 kg)   SpO2 95%   BMI 25.23 kg/m²     General Appearance: alert and oriented to person, place and time and fatigued   Skin: warm and dry  Head: normocephalic and atraumatic  Eyes: pupils equal, round, and reactive to light, extraocular eye movements intact, conjunctivae normal  Neck: neck supple and non tender without mass   Pulmonary/Chest: clear to auscultation bilaterally- no wheezes, rales or rhonchi, normal air movement, no respiratory distress  Cardiovascular: soft systolic murmur,  normal rate, normal S1 and S2 and no carotid bruits  Abdomen: soft, non-tender, non-distended, normal bowel sounds, no masses or organomegaly  Extremities: no cyanosis, no clubbing and no edema  Neurologic: no cranial nerve deficit and speech normal        Recent Labs     08/16/20 0625 08/17/20  0430 08/18/20  0405    135 140   K 3.7 4.6 4.0    102 105   CO2 22 24 22   BUN 28* 30* 27*   CREATININE 1.5* 1.3* 1.1   GLUCOSE 111* 97 99   CALCIUM 8.0* 8.3* 8.5*       Recent Labs     08/16/20  0625 08/17/20  0430 08/18/20  0405   WBC 20.4* 20.1* 17.3*   RBC 2.95* 2.83* 2.86*   HGB 10.1* 9.6* 9.8*   HCT 30.8* 29.1* 29.5*   .4* 102.8* 103.1*   MCH 34.2 33.9 34.3   MCHC 32.8 33.0 33.2   RDW 12.7 12.9 13.0    182 204   MPV 10.0 10.3 10.7         Assessment:    Principal Problem:    Orthostatic hypotension  Active Problems:    Essential hypertension    Parkinson's disease ([address]    Urinary retention with incomplete bladder emptying    Hx of total knee arthroplasty, right    Leukocytosis    Macrocytic anemia    Hypothyroidism    Sepsis ([address]  Resolved Problems:    * No resolved hospital problems. *      Plan:  1. Orthostatic hypotension, improved with IV fluids, Hytrin was on hold, cardiology resumed metoprolol. 2.  UTI, urine culture growing Klebsiella appreciate ID input continue ceftriaxone. Leukocytosis improving  3. Urinary retention in setting of BPH probably predisposing for UTI, appreciate urology input, ended up with a Barriga catheter. 4.  Adrenal mass incidental finding, appreciate endocrinology input, most likely benign will continue to follow-up. 5.  Elevated troponin, thought to be due to demand ischemia, appreciate cardiology input, echo was done that showed systolic heart failure with EF of 94%, diastolic stage I dysfunction, hypo to akinetic mid to distal inferoseptal wall, right ventricle with mildly reduced global systolic function.  Will need to rule out ischemic etiology, stress test today  6. Dysphasia, speech evaluation was ordered. Recommending thickened liquids  7. Acute hypoxic respiratory failure, initially oxygen saturation was below 90% requiring 10 L of nasal cannula, currently weaned down to 3 L, continue to wean off oxygen. 8.  Parkinson's disease, continue sinemet  9. Disposition, appreciate PT and OT input, scored 14 out of 24, patient would like to go home, I do not think this would be a safe discharge to home. I discussed with the family/the daughter who was present in the room who agreed that the patient would be better served going to rehab. I will discuss with social work        NOTE: This report was transcribed using voice recognition software. Every effort was made to ensure accuracy; however, inadvertent computerized transcription errors may be present.   Electronically signed by Leigh Rice MD on 8/18/2020 at 10:54 AM

## 2020-08-18 NOTE — PROGRESS NOTES
SPEECH LANGUAGE PATHOLOGY  DAILY PROGRESS NOTE        PATIENT NAME:  Marquis He      :  1933          TODAY'S DATE:  2020 ROOM:  0410/0410-A        SWALLOWING    Pt upright in chair, daughter present. Reports min oral intake this date due to testing/procedures. Daughter educated on CBSE results and POC. Questions answered. Difficult to keep pt alert for oral trial during session. DYSPHAGIA DIAGNOSIS:  Oropharyngeal dysphagia-overt s/s of aspiration observed with thin liquids     SLP discussed with pt and daughter possible repeat video swallow eval to assess for least restrictive diet. Pt stated that he will most likely refuse honey thick liquids (which is what was recommended after previous MBS). Pt would like to try nectar thick liquids at this time to see if tolerated.                                DIET RECOMMENDATIONS:  Regular consistency solids with nectar consistency (mildly thick) liquids as tolerated; pt to choose soft items he is able to masticate.      If coughing noted with NTL, recommend attempt HTL at that time.                  FEEDING RECOMMENDATIONS:                           Assistance level:  Stand by assistance is needed during all oral intake                             Compensatory strategies recommended: Small bites/sips and Alternate solids and liquids           Education- Pt educated on results and POC. Pt trained on compensatory strategies for safe swallow with fair outcome. Questions answered. Will continue SP intervention as per previously established POC. Birdie SEO CCC/SLP [id number]  Speech Pathologist              CPT code(s) 70437  dysphagia tx  Total minutes :  15 minutes

## 2020-08-18 NOTE — PROGRESS NOTES
crackles b/l bases. Respirations slightly labored at rest on O2  Cardiovascular: S1 and S2 are rhythmic and regular. No murmurs appreciated. Abdomen: Positive bowel sounds to auscultation. Benign to palpation. Extremities: No clubbing, no cyanosis, no edema. Lines: peripheral  F/c draining clear janet urine    Laboratory and Tests Review:  Lab Results   Component Value Date    WBC 17.3 (H) 08/18/2020    WBC 20.1 (H) 08/17/2020    WBC 20.4 (H) 08/16/2020    HGB 9.8 (L) 08/18/2020    HCT 29.5 (L) 08/18/2020    .1 (H) 08/18/2020     08/18/2020     Lab Results   Component Value Date    NEUTROABS 14.58 (H) 08/18/2020    NEUTROABS 17.52 (H) 08/17/2020    NEUTROABS 19.79 (H) 08/16/2020     No results found for: [facility]  Lab Results   Component Value Date    ALT 24 08/18/2020    AST 96 (H) 08/18/2020    ALKPHOS 197 (H) 08/18/2020    BILITOT 0.8 08/18/2020     Lab Results   Component Value Date     08/18/2020    K 4.0 08/18/2020    K 4.2 08/14/2020     08/18/2020    CO2 22 08/18/2020    BUN 27 08/18/2020    CREATININE 1.1 08/18/2020    CREATININE 1.3 08/17/2020    CREATININE 1.5 08/16/2020    GFRAA >60 08/18/2020    LABGLOM >60 08/18/2020    GLUCOSE 99 08/18/2020    GLUCOSE 103 06/19/2011    PROT 5.3 08/18/2020    LABALBU 2.7 08/18/2020    CALCIUM 8.5 08/18/2020    BILITOT 0.8 08/18/2020    ALKPHOS 197 08/18/2020    AST 96 08/18/2020    ALT 24 08/18/2020     Lab Results   Component Value Date    CRP 12.5 (H) 08/14/2020    CRP <0.1 02/20/2020    CRP <0.1 01/31/2019     Lab Results   Component Value Date    SEDRATE 18 (H) 08/14/2020    SEDRATE 2 01/22/2015     Radiology:  CTA chest . No pulmonary embolism.  No dissection is seen. 2. Signs of chronic pulmonary artery hypertension. Microbiology:   Urine cx klebsiella  Blood cx pending    ASSESSMENT:  · Fever, UTI, urinary retention - k.pneumoniae  · Parkinson's disease  · BPH - urology following.  Creat up to 1.3  · Leukocytosis -resolving     Plan  -Continue with ceftriaxone while in the hospital  -He is showing improvement but slow, his Parkinson's is also making him tired  -When he is ready for discharge will send him on p.o.  Omnicef 300 mg twice a day for 10 days

## 2020-08-19 LAB
ALBUMIN SERPL-MCNC: 2.5 G/DL (ref 3.5–5.2)
ALDOSTERONE: 14.9 NG/DL
ALP BLD-CCNC: 164 U/L (ref 40–129)
ALT SERPL-CCNC: 30 U/L (ref 0–40)
ANION GAP SERPL CALCULATED.3IONS-SCNC: 9 MMOL/L (ref 7–16)
ANISOCYTOSIS: ABNORMAL
AST SERPL-CCNC: 48 U/L (ref 0–39)
BASOPHILS ABSOLUTE: 0.19 E9/L (ref 0–0.2)
BASOPHILS RELATIVE PERCENT: 1.7 % (ref 0–2)
BILIRUB SERPL-MCNC: 0.4 MG/DL (ref 0–1.2)
BUN BLDV-MCNC: 21 MG/DL (ref 8–23)
BURR CELLS: ABNORMAL
CALCIUM SERPL-MCNC: 8.4 MG/DL (ref 8.6–10.2)
CHLORIDE BLD-SCNC: 108 MMOL/L (ref 98–107)
CO2: 23 MMOL/L (ref 22–29)
CREAT SERPL-MCNC: 1.2 MG/DL (ref 0.7–1.2)
EOSINOPHILS ABSOLUTE: 0.5 E9/L (ref 0.05–0.5)
EOSINOPHILS RELATIVE PERCENT: 4.4 % (ref 0–6)
GFR AFRICAN AMERICAN: >60
GFR NON-AFRICAN AMERICAN: 57 ML/MIN/1.73
GLUCOSE BLD-MCNC: 92 MG/DL (ref 74–99)
HCT VFR BLD CALC: 30.2 % (ref 37–54)
HEMOGLOBIN: 10.1 G/DL (ref 12.5–16.5)
LYMPHOCYTES ABSOLUTE: 2.62 E9/L (ref 1.5–4)
LYMPHOCYTES RELATIVE PERCENT: 22.6 % (ref 20–42)
MCH RBC QN AUTO: 34 PG (ref 26–35)
MCHC RBC AUTO-ENTMCNC: 33.4 % (ref 32–34.5)
MCV RBC AUTO: 101.7 FL (ref 80–99.9)
MONOCYTES ABSOLUTE: 0.34 E9/L (ref 0.1–0.95)
MONOCYTES RELATIVE PERCENT: 2.6 % (ref 2–12)
NEUTROPHILS ABSOLUTE: 7.75 E9/L (ref 1.8–7.3)
NEUTROPHILS RELATIVE PERCENT: 67.8 % (ref 43–80)
NUCLEATED RED BLOOD CELLS: 0 /100 WBC
OVALOCYTES: ABNORMAL
PDW BLD-RTO: 13.2 FL (ref 11.5–15)
PLATELET # BLD: 214 E9/L (ref 130–450)
PMV BLD AUTO: 10.5 FL (ref 7–12)
POIKILOCYTES: ABNORMAL
POTASSIUM SERPL-SCNC: 4.2 MMOL/L (ref 3.5–5)
PROMYELOCYTES PERCENT: 0.9 % (ref 0–0)
RBC # BLD: 2.97 E12/L (ref 3.8–5.8)
RENIN ACTIVITY: 0.7 NG/ML/HR
SODIUM BLD-SCNC: 140 MMOL/L (ref 132–146)
TOTAL PROTEIN: 5.1 G/DL (ref 6.4–8.3)
WBC # BLD: 11.4 E9/L (ref 4.5–11.5)

## 2020-08-19 PROCEDURE — 97530 THERAPEUTIC ACTIVITIES: CPT

## 2020-08-19 PROCEDURE — 6360000002 HC RX W HCPCS: Performed by: INTERNAL MEDICINE

## 2020-08-19 PROCEDURE — 99232 SBSQ HOSP IP/OBS MODERATE 35: CPT | Performed by: INTERNAL MEDICINE

## 2020-08-19 PROCEDURE — 85025 COMPLETE CBC W/AUTO DIFF WBC: CPT

## 2020-08-19 PROCEDURE — 6360000002 HC RX W HCPCS: Performed by: SPECIALIST

## 2020-08-19 PROCEDURE — 6370000000 HC RX 637 (ALT 250 FOR IP): Performed by: NURSE PRACTITIONER

## 2020-08-19 PROCEDURE — 36415 COLL VENOUS BLD VENIPUNCTURE: CPT

## 2020-08-19 PROCEDURE — 2580000003 HC RX 258: Performed by: INTERNAL MEDICINE

## 2020-08-19 PROCEDURE — 80053 COMPREHEN METABOLIC PANEL: CPT

## 2020-08-19 PROCEDURE — 2060000000 HC ICU INTERMEDIATE R&B

## 2020-08-19 PROCEDURE — 6370000000 HC RX 637 (ALT 250 FOR IP): Performed by: INTERNAL MEDICINE

## 2020-08-19 PROCEDURE — 2580000003 HC RX 258: Performed by: SPECIALIST

## 2020-08-19 PROCEDURE — 97165 OT EVAL LOW COMPLEX 30 MIN: CPT

## 2020-08-19 PROCEDURE — 92526 ORAL FUNCTION THERAPY: CPT | Performed by: SPEECH-LANGUAGE PATHOLOGIST

## 2020-08-19 RX ORDER — CEFDINIR 300 MG/1
300 CAPSULE ORAL 2 TIMES DAILY
Qty: 18 CAPSULE | Refills: 0 | Status: SHIPPED | OUTPATIENT
Start: 2020-08-19 | End: 2020-08-28

## 2020-08-19 RX ORDER — FUROSEMIDE 10 MG/ML
20 INJECTION INTRAMUSCULAR; INTRAVENOUS ONCE
Status: COMPLETED | OUTPATIENT
Start: 2020-08-19 | End: 2020-08-19

## 2020-08-19 RX ORDER — MIDODRINE HYDROCHLORIDE 5 MG/1
2.5 TABLET ORAL 2 TIMES DAILY WITH MEALS
Status: DISCONTINUED | OUTPATIENT
Start: 2020-08-19 | End: 2020-08-20 | Stop reason: HOSPADM

## 2020-08-19 RX ADMIN — PAROXETINE HYDROCHLORIDE 20 MG: 20 TABLET, FILM COATED ORAL at 08:59

## 2020-08-19 RX ADMIN — CARBIDOPA AND LEVODOPA 2 TABLET: 25; 100 TABLET, EXTENDED RELEASE ORAL at 20:18

## 2020-08-19 RX ADMIN — ENOXAPARIN SODIUM 40 MG: 40 INJECTION SUBCUTANEOUS at 08:59

## 2020-08-19 RX ADMIN — CARBIDOPA AND LEVODOPA 2 TABLET: 25; 100 TABLET, EXTENDED RELEASE ORAL at 09:01

## 2020-08-19 RX ADMIN — MIDODRINE HYDROCHLORIDE 2.5 MG: 2.5 TABLET ORAL at 08:59

## 2020-08-19 RX ADMIN — MIDODRINE HYDROCHLORIDE 2.5 MG: 5 TABLET ORAL at 16:25

## 2020-08-19 RX ADMIN — ATORVASTATIN CALCIUM 10 MG: 10 TABLET, FILM COATED ORAL at 08:59

## 2020-08-19 RX ADMIN — CARBIDOPA AND LEVODOPA 2 TABLET: 25; 100 TABLET, EXTENDED RELEASE ORAL at 16:25

## 2020-08-19 RX ADMIN — ASPIRIN 650 MG: 325 TABLET, FILM COATED ORAL at 08:58

## 2020-08-19 RX ADMIN — DICLOFENAC 2 G: 10 GEL TOPICAL at 09:00

## 2020-08-19 RX ADMIN — FOLIC ACID 1 MG: 1 TABLET ORAL at 08:59

## 2020-08-19 RX ADMIN — SODIUM CHLORIDE: 9 INJECTION, SOLUTION INTRAVENOUS at 03:09

## 2020-08-19 RX ADMIN — Medication 10 ML: at 09:00

## 2020-08-19 RX ADMIN — CARBIDOPA AND LEVODOPA 2 TABLET: 25; 100 TABLET, EXTENDED RELEASE ORAL at 11:36

## 2020-08-19 RX ADMIN — FUROSEMIDE 20 MG: 10 INJECTION, SOLUTION INTRAVENOUS at 11:36

## 2020-08-19 RX ADMIN — LEVOTHYROXINE SODIUM 25 MCG: 25 TABLET ORAL at 05:50

## 2020-08-19 RX ADMIN — CEFTRIAXONE 1 G: 1 INJECTION, POWDER, FOR SOLUTION INTRAMUSCULAR; INTRAVENOUS at 20:17

## 2020-08-19 RX ADMIN — PANTOPRAZOLE SODIUM 40 MG: 40 TABLET, DELAYED RELEASE ORAL at 05:50

## 2020-08-19 RX ADMIN — Medication 1 TABLET: at 08:59

## 2020-08-19 RX ADMIN — Medication 1000 UNITS: at 08:59

## 2020-08-19 RX ADMIN — DICLOFENAC 2 G: 10 GEL TOPICAL at 20:22

## 2020-08-19 RX ADMIN — Medication 10 ML: at 20:18

## 2020-08-19 RX ADMIN — METOPROLOL SUCCINATE 12.5 MG: 25 TABLET, EXTENDED RELEASE ORAL at 08:59

## 2020-08-19 ASSESSMENT — PAIN SCALES - GENERAL
PAINLEVEL_OUTOF10: 0
PAINLEVEL_OUTOF10: 0

## 2020-08-19 NOTE — PROGRESS NOTES
[facility] Progress Note    Admitting Date and Time: 8/13/2020  2:58 PM  Admit Dx: Orthostatic hypotension [I95.1]  Orthostatic hypotension [I95.1]    Subjective:  Patient is being followed for Orthostatic hypotension [I95.1]  Orthostatic hypotension [I95.1]   Pt feels fine today, no complaints    ROS: denies fever, chills, cp, sob, n/v, HA unless stated above.       midodrine  2.5 mg Oral TID WC    metoprolol succinate  12.5 mg Oral Daily    cefTRIAXone (ROCEPHIN) IV  1 g Intravenous Q24H    sodium chloride  500 mL Intravenous Once    sodium chloride flush  10 mL Intravenous 2 times per day    enoxaparin  40 mg Subcutaneous Daily    aspirin  650 mg Oral Daily    diclofenac sodium  2 g Topical BID    folic acid  1 mg Oral Daily    levothyroxine  25 mcg Oral Daily    atorvastatin  10 mg Oral Daily    pantoprazole  40 mg Oral QAM AC    PARoxetine  20 mg Oral QAM    Vitamin D  1,000 Units Oral Daily    carbidopa-levodopa  2 tablet Oral Q4H WA    ocuvite-lutein  1 tablet Oral Daily    sodium chloride  1,000 mL Intravenous Once     clonazePAM, 0.5 mg, BID PRN  sodium chloride flush, 10 mL, PRN  acetaminophen, 650 mg, Q6H PRN    Or  acetaminophen, 650 mg, Q6H PRN  polyethylene glycol, 17 g, Daily PRN  promethazine, 12.5 mg, Q6H PRN    Or  ondansetron, 4 mg, Q6H PRN         Objective:    /66   Pulse 66   Temp 97.6 °F (36.4 °C) (Oral)   Resp 20   Ht 6' 2\" (1.88 m)   Wt 196 lb 8 oz (89.1 kg)   SpO2 98%   BMI 25.23 kg/m²     General Appearance: alert and oriented to person, place and time and fatigued   Skin: warm and dry  Head: normocephalic and atraumatic  Eyes: pupils equal, round, and reactive to light, extraocular eye movements intact, conjunctivae normal  Neck: neck supple and non tender without mass   Pulmonary/Chest: clear to auscultation bilaterally- no wheezes, rales or rhonchi, normal air movement, no respiratory distress  Cardiovascular: soft systolic murmur, normal rate, normal S1 and S2 and no carotid bruits  Abdomen: soft, non-tender, non-distended, normal bowel sounds, no masses or organomegaly  Extremities: no cyanosis, no clubbing and no edema  Neurologic: no cranial nerve deficit and speech normal        Recent Labs     08/17/20  0430 08/18/20  0405 08/19/20  0345    140 140   K 4.6 4.0 4.2    105 108*   CO2 24 22 23   BUN 30* 27* 21   CREATININE 1.3* 1.1 1.2   GLUCOSE 97 99 92   CALCIUM 8.3* 8.5* 8.4*       Recent Labs     08/17/20  0430 08/18/20  0405 08/19/20  0345   WBC 20.1* 17.3* 11.4   RBC 2.83* 2.86* 2.97*   HGB 9.6* 9.8* 10.1*   HCT 29.1* 29.5* 30.2*   .8* 103.1* 101.7*   MCH 33.9 34.3 34.0   MCHC 33.0 33.2 33.4   RDW 12.9 13.0 13.2    204 214   MPV 10.3 10.7 10.5     Stress test:    1. The study is technically inadequate, with diminished activity    throughout the majority of the left ventricle, showing only minimal    activity in the basilar region of the anterior and lateral walls and    minimal activity inferior wall. 2. Ejection fraction is 30 %, which is diminished in this patient with    mild ventricular dilation. .    3. Wall motion was not adequately assessed due to the lack of tracer    distribution through majority of the the left ventricular myocardium          Assessment:    Principal Problem:    Orthostatic hypotension  Active Problems:    Essential hypertension    Parkinson's disease ([address]    Urinary retention with incomplete bladder emptying    Hx of total knee arthroplasty, right    Leukocytosis    Macrocytic anemia    Hypothyroidism    Sepsis ([address]  Resolved Problems:    * No resolved hospital problems. *      Plan:  1. Orthostatic hypotension, improved with IV fluids, Hytrin was on hold, cardiology resumed metoprolol. 2.  UTI, urine culture growing Klebsiella appreciate ID input continue ceftriaxone.  Leukocytosis improving, appreciate ID input continue on ceftriaxone for now, switch to BENJAMÍN VASQUEZ when ready to

## 2020-08-19 NOTE — PROGRESS NOTES
ENDOCRINOLOGY PROGRESS NOTE      Date of Service: 8/18/2020  Date of Admission: 8/13/2020  Admitting Physician: Ewa South MD   Primary Care Physician: Guido Alvarado DO  Consultant physician: Isidro Tovar MD     Reason for the consultation:  Left adrenal incidentaloma     History of Present Illness: The history is provided by the patient. Accuracy of the patient data is excellent. 80 y. o. male with PMH of Parkinsons disease, HLD and other listed below admitted to [facility] because of worsening fatigue, decrease po intake and hypotension concerning for sepsis. Endocrine service was consulted for evaluation on adrenal incidentaloma    Subjective:  Seen and examined, not acute issues     Point of care glucose monitoring:   Independently reviewed   No results for input(s): GLUMET in the last 72 hours. Scheduled Meds:   midodrine  2.5 mg Oral TID WC    metoprolol succinate  12.5 mg Oral Daily    cefTRIAXone (ROCEPHIN) IV  1 g Intravenous Q24H    sodium chloride  500 mL Intravenous Once    sodium chloride flush  10 mL Intravenous 2 times per day    enoxaparin  40 mg Subcutaneous Daily    aspirin  650 mg Oral Daily    diclofenac sodium  2 g Topical BID    folic acid  1 mg Oral Daily    levothyroxine  25 mcg Oral Daily    atorvastatin  10 mg Oral Daily    pantoprazole  40 mg Oral QAM AC    PARoxetine  20 mg Oral QAM    Vitamin D  1,000 Units Oral Daily    carbidopa-levodopa  2 tablet Oral Q4H WA    ocuvite-lutein  1 tablet Oral Daily    sodium chloride  1,000 mL Intravenous Once     PRN Meds:   clonazePAM, 0.5 mg, BID PRN  sodium chloride flush, 10 mL, PRN  acetaminophen, 650 mg, Q6H PRN    Or  acetaminophen, 650 mg, Q6H PRN  polyethylene glycol, 17 g, Daily PRN  promethazine, 12.5 mg, Q6H PRN    Or  ondansetron, 4 mg, Q6H PRN      Continuous Infusions:   sodium chloride 100 mL/hr at 08/18/20 2598       Review of Systems  All systems reviewed.  All negative except for symptoms mentioned in HPI OBJECTIVE    /78   Pulse 69   Temp 97.4 °F (36.3 °C) (Oral)   Resp 20   Ht 6' 2\" (1.88 m)   Wt 196 lb 8 oz (89.1 kg)   SpO2 99%   BMI 25.23 kg/m²     Intake/Output Summary (Last 24 hours) at 8/18/2020 2131  Last data filed at 8/18/2020 1411  Gross per 24 hour   Intake 1620 ml   Output 1750 ml   Net -130 ml       Physical examination:  General: awake alert, oriented x3, no abnormal position or movements. HEENT: normocephalic non-traumatic  Neck: supple  Pulm: equal air entry, basal inspiratory crackles bilateral   CVS: S1 + S2  Abd: soft lax, no tenderness  Skin: warm, no lesions, no rash.     Psych: normal mood, and affect    Review of Laboratory Data:  I have reviewed the following:  Recent Labs     08/16/20 0625 08/17/20 0430 08/18/20  0405   WBC 20.4* 20.1* 17.3*   RBC 2.95* 2.83* 2.86*   HGB 10.1* 9.6* 9.8*   HCT 30.8* 29.1* 29.5*   .4* 102.8* 103.1*   MCH 34.2 33.9 34.3   MCHC 32.8 33.0 33.2   RDW 12.7 12.9 13.0    182 204   MPV 10.0 10.3 10.7     Recent Labs     08/16/20 0625 08/17/20  0430 08/18/20  0405    135 140   K 3.7 4.6 4.0    102 105   CO2 22 24 22   BUN 28* 30* 27*   CREATININE 1.5* 1.3* 1.1   GLUCOSE 111* 97 99   CALCIUM 8.0* 8.3* 8.5*   PROT 5.2* 5.4* 5.3*   LABALBU 2.8* 2.6* 2.7*   BILITOT 0.7 0.3 0.8   ALKPHOS 123 124 197*   * 142* 96*   ALT 31 25 24     No results found for: BHYDRXBUT  Lab Results   Component Value Date    LABA1C 6.2 06/17/2020    LABA1C 6.1 11/04/2019    LABA1C 5.6 05/07/2019     Lab Results   Component Value Date/Time    TSH 1.320 08/13/2020 03:30 PM    H4REGAE 6.4 06/17/2020 09:29 AM     Lab Results   Component Value Date    LABA1C 6.2 06/17/2020    GLUCOSE 99 08/18/2020    GLUCOSE 103 06/19/2011     Lab Results   Component Value Date    TRIG 85 06/17/2020    HDL 42 06/17/2020    LDLCALC 55 06/17/2020    CHOL 114 06/17/2020       Blood culture   Lab Results   Component Value Date    BC 24 Hours no growth 08/16/2020 Radiology:  NM Cardiac Stress Test Nuclear Imaging   Final Result   1. The study is technically inadequate, with diminished activity   throughout the majority of the left ventricle, showing only minimal   activity in the basilar region of the anterior and lateral walls and   minimal activity inferior wall. 2. Ejection fraction is 30 %, which is diminished in this patient with   mild ventricular dilation. .   3. Wall motion was not adequately assessed due to the lack of tracer   distribution through majority of the the left ventricular myocardium         CTA PULMONARY W CONTRAST   Final Result   1. No pulmonary embolism. No dissection is seen. 2. Signs of chronic pulmonary artery hypertension. This report has been electronically signed by Geri Lipscomb MD.      XR CHEST PORTABLE   Final Result   No acute cardiopulmonary abnormality. Cardiomegaly without evidence of overt failure. CT CHEST WO CONTRAST   Final Result      1. Signs of anemia. Cardiomegaly and coronary artery disease. 2. Dilated ascending aorta at 4.3 cm.   3. New 2.5 cm left adrenal mass with nonspecific characteristics. Consider MRI with in and out of phase imaging to assess for small   quantities of fat which would indicate a benign adenoma. CT ABDOMEN PELVIS WO CONTRAST Additional Contrast? None   Final Result         1. 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 2. Nonspecific perinephric fat stranding, which is often seen as an   incidental finding but may signify underlying medical renal disease. No stone or hydronephrosis. 3. Severe distal colonic diverticulosis without diverticulitis. XR CHEST PORTABLE   Final Result   No acute cardiopulmonary findings.                 Medical Records/Labs/Images review:   I personally reviewed and summarized previous records   All labs and

## 2020-08-19 NOTE — CARE COORDINATION
Social Work:    Social service placed a call to [facility] [employer] at [facility] and she still has not received authorization for transfer.   (mariam, n-17, ambulance done)    Electronically signed by ALPHONSO Gillespie on 8/19/2020 at 1:24 PM

## 2020-08-19 NOTE — DISCHARGE INSTR - COC
Continuity of Care Form    Patient Name: Toya Quintero   :  1933  MRN:  [id number]    Admit date:  2020  Discharge date:  20    Code Status Order: Full Code   Advance Directives:   [facility] Documentation     Date/Time Healthcare Directive Type of Healthcare Directive Copy in [address] Agent's Name Healthcare Agent's Phone Number    20 0301  Yes, patient has an advance directive for healthcare treatment  --  --  --  --  --          Admitting Physician:  Daniella Crawford MD  PCP: Laquita Cabral DO    Discharging Nurse: [facility] Unit/Room#: 0325/1458-B  Discharging Unit Phone Number: [phone]    Emergency Contact:   Extended Emergency Contact Information  Primary Emergency Contact: Maryellen Grimaldo  Address: [address] [place] [address] Phone: [phone]  Relation: Spouse  Secondary Emergency Contact: [address] [place] [address] Phone: [phone]  Relation: Child    Past Surgical History:  Past Surgical History:   Procedure Laterality Date    APPENDECTOMY      CATARACT REMOVAL WITH IMPLANT Left 2020    CHOLECYSTECTOMY      COLONOSCOPY      CORONARY ARTERY BYPASS GRAFT  2002    HERNIA REPAIR Bilateral     inguinal    KNEE SURGERY Left 2011    [facility] 2017    [facility]    UPPER GASTROINTESTINAL ENDOSCOPY      peptic ulcer       Immunization History:   Immunization History   Administered Date(s) Administered    Tetanus 2008    Tetanus Toxoid, absorbed 2008       Active Problems:  Patient Active Problem List   Diagnosis Code    Ascending aortic aneurysm ([facility] I71.2    Seasonal allergic rhinitis due to pollen J30.1    GERD without esophagitis K21.9    Acquired hypothyroidism E03.9    Essential hypertension I10    Mixed hyperlipidemia E78.2    Parkinson's disease ([facility] G20    Urinary retention with incomplete bladder emptying R33.9    Orthostatic hypotension I95.1    Hx of total knee arthroplasty, right Z96.651    Leukocytosis D72.829    Macrocytic anemia D53.9    Hypothyroidism E03.9    Sepsis (HCC) A41.9       Isolation/Infection:   Isolation          No Isolation        Patient Infection Status     Infection Onset Added Last Indicated Last Indicated By Review Planned Expiration Resolved Resolved By    COVID-19 Rule Out 08/18/20 08/18/20 08/18/20 Covid-19 Ambulatory (Ordered) 08/25/20 09/01/20            Nurse Assessment:  Last Vital Signs: BP (!) 104/57   Pulse 78   Temp 98.3 °F (36.8 °C) (Oral)   Resp 24   Ht 6' 2\" (1.88 m)   Wt 195 lb 3.2 oz (88.5 kg)   SpO2 94%   BMI 25.06 kg/m²     Last documented pain score (0-10 scale): Pain Level: 0  Last Weight:   Wt Readings from Last 1 Encounters:   08/19/20 195 lb 3.2 oz (88.5 kg)     Mental Status:  oriented    IV Access:  - None    Nursing Mobility/ADLs:  Walking   Assisted x2  Transfer  Assisted  Bathing  Assisted  Dressing  Assisted  Toileting  Assisted  Feeding  Assisted  Med Admin  Assisted  Med Delivery   whole with thickened liquids, 2 at a time    Wound Care Documentation and Therapy:        Elimination:  Continence:   · Bowel: Yes  · Bladder: Yes  Urinary Catheter: Removal Date 8/20/20 at 1130   Colostomy/Ileostomy/Ileal Conduit: No       Date of Last BM: 8/19    Intake/Output Summary (Last 24 hours) at 8/19/2020 1325  Last data filed at 8/19/2020 1158  Gross per 24 hour   Intake 740 ml   Output 1750 ml   Net -1010 ml     I/O last 3 completed shifts: In: 620 [P.O.:120; I.V.:500]  Out: 1300 [Urine:1300]    Safety Concerns:      At Risk for Falls    Impairments/Disabilities:      None    Nutrition Therapy:  Current Nutrition Therapy:   - Oral Diet:  General    Routes of Feeding: Oral  Liquids: Nectar Thick Liquids, hernández free water protocol  Daily Fluid Restriction: no  Last Modified Barium Swallow with Video (Video Swallowing Test): not done this admission    Treatments at the Time of Hospital Discharge:   Respiratory Treatments:   Oxygen Therapy:  is not on home oxygen therapy. Ventilator:    - No ventilator support    Rehab Therapies: Physical Therapy, Occupational Therapy and Speech/Language Therapy  Weight Bearing Status/Restrictions: No weight bearing restirctions  Other Medical Equipment (for information only, NOT a DME order):  walker, bedside commode and hospital bed  Other Treatments: Needs to continue to wear his brace - to early to discontinue. May need the straps in the back adjusted - PT can assist with that (per Dr. Medardo Anguiano)    Patient's personal belongings (please select all that are sent with patient):  Saji GONZALEZ SIGNATURE:  {Esignature:[id number]} Electronically signed by Rosmery Benson RN on 8/20/2020 at 12:19 PM      CASE MANAGEMENT/SOCIAL WORK SECTION    Inpatient Status Date: ***    Readmission Risk Assessment Score:  Readmission Risk              Risk of Unplanned Readmission:        16           Discharging to Facility/ Agency   · Name: [address] [place] [address]  · Address:  · Phone:[phone]  · Fax:    Dialysis Facility (if applicable)   · Name:  · Address:  · Dialysis Schedule:  · Phone:  · Fax:    / signature: Electronically signed by ALPHONSO Roy on 8/19/2020 at 1:25 PM    PHYSICIAN SECTION    Prognosis: {Prognosis:7723504310}    Condition at Discharge: [address] Patient Condition:[id number]}    Rehab Potential (if transferring to Rehab): {Prognosis:3794212116}    Recommended Labs or Other Treatments After Discharge: ***    Physician Certification: I certify the above information and transfer of Marquis He  is necessary for the continuing treatment of the diagnosis listed and that he requires [facility] for less 30 days.      Update Admission H&P: {CHP DME Changes in [facility]:678990073}    PHYSICIAN SIGNATURE:  {Esignature:[id number]}

## 2020-08-19 NOTE — PLAN OF CARE
Problem: Falls - Risk of:  Goal: Will remain free from falls  Description: Will remain free from falls  Outcome: Met This Shift  Goal: Absence of physical injury  Description: Absence of physical injury  Outcome: Met This Shift     Problem: Pain:  Goal: Pain level will decrease  Description: Pain level will decrease  Outcome: Met This Shift  Goal: Control of acute pain  Description: Control of acute pain  Outcome: Met This Shift     Problem: Injury - Risk of, Physical Injury:  Goal: Will remain free from falls  Description: Will remain free from falls  Outcome: Met This Shift  Goal: Absence of physical injury  Description: Absence of physical injury  Outcome: Met This Shift

## 2020-08-19 NOTE — PROGRESS NOTES
Physical Therapy  Facility/Department: [address] INTERNAL MEDICINE 2  Daily Treatment Note  NAME: Graham Nieto  : 1933  MRN: [id number]    Date of Service: 2020          Patient Diagnosis(es): The primary encounter diagnosis was Orthostatic hypotension. A diagnosis of Leukocytosis, unspecified type was also pertinent to this visit. has a past medical history of Anxiety, Ascending aortic aneurysm ([facility] [address] [facility] BPH (benign prostatic hyperplasia), CAD (coronary artery disease), Cancer ([facility] Coronary arteriosclerosis, Depression, DJD (degenerative joint disease), Hyperlipidemia, Impacted cerumen, Lumbar spinal stenosis, Macular degeneration, Osteoarthritis, Parkinson disease ([facility] [address] [facility] and RA [facility]rheumatoid [facility]) [facility] has a past surgical history that includes Coronary artery bypass graft (); Cholecystectomy; Appendectomy; Total knee arthroplasty (Right, 2017); Colonoscopy; Upper gastrointestinal endoscopy; hernia repair (Bilateral); knee surgery (Left, 2011); and Cataract removal with implant (Left, 2020). Evaluating Therapist: Claudell Junes, PT      Referring Provider:  Dr. Kateryna Nunn #:  [id number]   DIAGNOSIS:  Orthostatic htn   Additional Pertinent History: recent R knee surgery  \" bone chip repaired \" per pt   PRECAUTIONS:  Falls, R LE knee brace, pt reports WBAT , [place] , continuous pulse ox     Social:  Pt lives with  Wife   in a 1  floor plan 2 steps and 1 rails to enter. Prior to admission pt walked with ww since surgery        Initial Evaluation  Date:  2020 Treatment   2020   Short Term/ Long Term   Goals   Was pt agreeable to Eval/treatment?   yes   yes     Does pt have pain?  none reported   feels \"funny\" lower abd, RN informed      Bed Mobility  Rolling:  NT   Supine to sit:  NT   Sit to supine:  NT   Scooting:  Min assist in sit   NT , pt in chair upon arrival   SBA    Transfers Sit to stand:  Min assist   Stand to sit: min assist   Stand pivot:  NT   Sit to stand : mod assist   Stand to sit : min assist   SBA    Ambulation     130  feet with ww  with  SBA  80 feet x 1 with ww SBA   150  feet with ww  with  SBA          Stair negotiation: ascended and descended NT     2  steps with  1 rail with SBA    LE ROM  WFL, R knee NT , decreased B ankle df        LE strength  R LE not formally tested due to brace. L LE 4/ 5        AM- PAC RAW score  17/ 24   15/ 24               Pt is alert and Oriented x  3       Balance:  SBA/CGA     Endurance: decreased   Bed/Chair alarm: Yes      ASSESSMENT    Pt displays functional ability as noted in the objective portion of this treatment             Treatment/Education:    R knee brace adjusted prior to mobility. Mobility as above. Pulse ox on RA at rest  96%, decreased to 93% after gait, recovered to 94%. Labored breathing after gait. Cues for hand placement with transfers. Pt reports funny feeling in abd after gait, RN informed        Pt educated on fall risk,  Safe and proper technique with all mobility        Patient response to education:   Pt verbalized understanding Pt demonstrated skill Pt requires further education in this area   x With cues   x         Comments:  Pt left  In recliner chair  after session, with call light in reach.             PLAN    PT care will be provided in accordance with the objectives noted above. Whenever appropriate, clear delegation orders will be provided for nursing staff. Exercises and functional mobility practice will be used as well as appropriate assistive devices or modalities to obtain goals. Patient and family education will also be administered as needed.     Frequency of treatments will be 2-5x/week x  7 days. Con't with PT POC      Time in:  1343   Time out:  1400           CPT codes:  []? Low Complexity PT evaluation [id number]  []? Moderate Complexity PT evaluation 01206  []? High Complexity PT evaluation [id number]  []? PT Re-evaluation [id number]  []? Gait training 83610  minutes  [x]? Therapeutic activities 57209 15  minutes  []? Therapeutic exercises 32338  minutes  []?  Neuromuscular reeducation 95648  minutes         Prasanth 18 number:  PT 0224

## 2020-08-19 NOTE — PROGRESS NOTES
now.  Min A   Bed Mobility  Not assessed. Functional Transfers Sit-to-Stand: Mod A   from bedside chair  SBA   Functional Mobility Min A   (with walker) within patient's room and hallway; increased time needed. SBA - with use of device, as needed/appropriate   Balance Sitting: Fair+  (at edge of chair)  Standing: Fair-  (with walker)  Fair+ dynamic standing balance during completion of ADLs and other functional tasks. Activity Tolerance Fair  Patient will demonstrate Good understanding and consistent implementation of energy conservation techniques and work simplification techniques into ADL/IADL routines. Visual/  Perceptual Impaired  Patient reported that he has macular degeneration in one eye. N/A   B UE Strength 3+/5 grossly  Patient will demonstrate 4/5 B UE strength in order to maximize independence with ADLs and functional transfers. Long-Term Goal: Patient will increase functional independence to PLOF in order to allow patient to live in least restrictive environment. ROM: Additional Information:    R UE  AROM WFL    L UE AROM WFL    Hand Dominance: Right    Hearing: Impaired  Sensation: No complaints of numbness/tingling in B UEs. Tone: WFL  Edema: No    Comments: RN approved patient's participation in [address] [place] [address] activities. Upon arrival, patient seated in bedside chair. At end of session, patient seated in bedside chair with call light and phone within reach, chair alarm activated, family member present, and all lines and tubes intact. Patient would benefit from continued skilled OT to increase safety and independence with completion of ADL/IADL tasks for functional independence and quality of life. Patient education provided regardin) safe transfer techniques, 2) potential benefits of continued therapy services, 3) importance of having assistance with OOB activities to prevent falls. Patient verbalized understanding.     Eval Complexity: Low    Assessment of Current Deficits:   Functional Mobility [x]  ADLs [x] Strength [x]  Cognition []  Functional Transfers  [x] IADLs [x] Safety Awareness [x]  Endurance [x]  Fine Motor Coordination [] Balance [x] Vision/Perception [] Sensation []   Gross Motor Coordination [] ROM [] Delirium []                  Motor Control []    Plan of Care:   OT treatment to be provided 2-5x/week for 3-7 days to address the following, as needed, during hospitalization:  ADL Retraining [x]   Equipment Needs [x]   Neuromuscular Re-Education [x] Energy Conservation Techniques [x]  Functional Transfer Training [x] Patient and/or Family Education [x]  Functional Mobility Training [x] Environmental Modifications [x]  Cognitive Re-Training []   Compensatory Techniques for ADLs [x]  Splinting Needs []   Positioning to Improve Overall Function [x]   Therapeutic Activity [x]  Therapeutic Exercise  [x]  Visual/Perceptual: []    Delirium Prevention/Treatment  []  Other:  []    Rehab Potential: Good for established goals. Patient / Family Goal: Patient's daughter noted that she anticipates for patient to go to a SNF/[facility] upon discharge. Patient and/or family were instructed on functional diagnosis, prognosis/goals, and OT plan of care. Demonstrated Good understanding. Low complexity evaluation + 0 timed treatment minutes  Time Out: 1400    Evaluation time includes thorough review of current medical information, gathering information on past medical history/social history and prior level of function, completion of standardized testing/informal observation of tasks, assessment of data, and development of POC/Goals. NEENA [place]/L  License Number: [id number]

## 2020-08-19 NOTE — PROGRESS NOTES
Dr. Myra Phalen updated on speech therapy recommendations for [facility] free water protocol. Orders given.

## 2020-08-19 NOTE — PROGRESS NOTES
[facility] [address]    Name: Didier Basilio    Age: 80 y.o. PCP: Rafael Marino DO    Date of Admission: 8/13/2020  2:58 PM    Date of Service: 8/19/2020    Chief Complaint: Follow-up for troponin, othostasis  The patient is a 80y.o. year old male with CAD, admitted 8/13 for weakness, feeling hot,  unable to get off the commode at home. Profound weakness since TKA 3 weeks ago. Felt to be orthostatic and hytrin and toprol stopped, given hydration. Having issues with urinary frequency,  bacturia- started on antibiotics. Apparently yesterday evening started having increased heart rate,dry heaves, some respiratory distress and RRT called- sent for CTA -no PE, cardiac enzymes drawn which showed a bump of troponin 0.5 so cardiology consulted. Patient denies any chest pain or palpitations. Comfortable today.     PMHx CABG [id number], cath 2005 patent graft, 80% proximal LAD stenosis, mild 30% blockages diagonal and circumflex, normal nondominant RCA. Last stress lexiscan 2015 normal, echo 2015 EF 60% 1+ AI,TR. Chronic LBBB, parkinsons, RA, HTN, hyperlipidemia, mild aortic aneurysm. Interim History:  No new overnight cardiac complaints. Currently with no complaints of CP, SOB, palpitations, dizziness, or lightheadedness. Still weak. Dry mouth, rubs belly, but denies nausea. No distress. Reviewed with patient and daughter echo EF 35-40% lexiscan was inadequate. No longer orhtostatic, had backed midodrine to 2.5 tid yesterday    Telemetry personally reviewed and showed sinus    Review of Systems:   Cardiac: As per HPI  General: No fever, chills  Pulmonary: No cough, wheeze, or shortness of breath  GI: No nausea, vomiting,or abdominal pain  Neuro: No headache or confusion    Problem List:  Principal Problem:    Orthostatic hypotension  Active Problems:    Essential hypertension    Parkinson's disease ([facility]    Urinary retention with incomplete bladder emptying    Hx of total knee arthroplasty, right    Leukocytosis    Macrocytic anemia    Hypothyroidism    Sepsis ([facility] [address]  Resolved Problems:    * No resolved hospital problems. *      Past Medical History:  Past Medical History:   Diagnosis Date    Anxiety     Ascending aortic aneurysm (HCC)     BPH (benign prostatic hyperplasia)     CAD (coronary artery disease)     Cancer (HCC)     skin    Coronary arteriosclerosis     Depression     DJD (degenerative joint disease)     Hyperlipidemia     Impacted cerumen     Lumbar spinal stenosis     severe    Macular degeneration     Osteoarthritis     Parkinson disease (HCC)     RA (rheumatoid arthritis) ([facility] [address]        Allergies:   Allergies   Allergen Reactions    Pcn [Penicillins]     Prednisone        Current Medications:  Current Facility-Administered Medications   Medication Dose Route Frequency Provider Last Rate Last Dose    midodrine (PROAMATINE) tablet 2.5 mg  2.5 mg Oral TID  Sarah Nicole MD   2.5 mg at 08/19/20 0859    metoprolol succinate (TOPROL XL) extended release tablet 12.5 mg  12.5 mg Oral Daily Sarah Nicole MD   12.5 mg at 08/19/20 0859    clonazePAM (KLONOPIN) tablet 0.5 mg  0.5 mg Oral BID PRN Charles Godinez, DO   0.5 mg at 08/18/20 0247    cefTRIAXone (ROCEPHIN) 1 g in sterile water 10 mL IV syringe  1 g Intravenous Q24H Cesar Black MD   1 g at 08/18/20 2058    0.9 % sodium chloride bolus  500 mL Intravenous Once Ramirez Boone, DO   Stopped at 08/15/20 2122    sodium chloride flush 0.9 % injection 10 mL  10 mL Intravenous 2 times per day Gina Alvarez MD   10 mL at 08/19/20 0900    sodium chloride flush 0.9 % injection 10 mL  10 mL Intravenous PRN Gina Alvarez MD        acetaminophen (TYLENOL) tablet 650 mg  650 mg Oral Q6H PRN Gina Alvarez MD   650 mg at 08/14/20 2334    Or    acetaminophen (TYLENOL) suppository 650 mg  650 mg Rectal Q6H PRN Gina Alvarez MD   650 mg at 08/15/20 2004    polyethylene glycol (GLYCOLAX) packet 17 g  17 g Oral Daily PRN Nir Mir MD        promethazine [facility]) tablet 12.5 mg  12.5 mg Oral Q6H PRN Nir Mir MD        Or    ondansetron [facility]) injection 4 mg  4 mg Intravenous Q6H PRN Nir Mir MD        enoxaparin (LOVENOX) injection 40 mg  40 mg Subcutaneous Daily Nir Mir MD   40 mg at 08/19/20 0859    aspirin tablet 650 mg  650 mg Oral Daily Daniella Jay, APRN - CNP   650 mg at 08/19/20 0858    diclofenac sodium (VOLTAREN) 1 % gel 2 g  2 g Topical BID Daniella Jay, APRN - CNP   2 g at 41/95/94 6310    folic acid (FOLVITE) tablet 1 mg  1 mg Oral Daily Daniella Jay, APRN - CNP   1 mg at 08/19/20 0859    levothyroxine (SYNTHROID) tablet 25 mcg  25 mcg Oral Daily Daniella Jay, APRN - CNP   25 mcg at 08/19/20 0550    atorvastatin (LIPITOR) tablet 10 mg  10 mg Oral Daily Daniella Jay, APRN - CNP   10 mg at 08/19/20 0859    pantoprazole (PROTONIX) tablet 40 mg  40 mg Oral QAM AC Daniella Jay, APRN - CNP   40 mg at 08/19/20 0550    PARoxetine (PAXIL) tablet 20 mg  20 mg Oral QAM Daniella Jay, APRN - CNP   20 mg at 08/19/20 0859    vitamin D (CHOLECALCIFEROL) tablet 1,000 Units  1,000 Units Oral Daily Daniella Jay, APRN - CNP   1,000 Units at 08/19/20 0859    carbidopa-levodopa (SINEMET CR)  MG per extended release tablet 2 tablet  2 tablet Oral Q4H WA Daniella Jay, APRN - CNP   2 tablet at 08/18/20 2056    antioxidant multivitamin (OCUVITE) tablet  1 tablet Oral Daily Daniella Jay, APRN - CNP   1 tablet at 08/19/20 0859    0.9 % sodium chloride bolus  1,000 mL Intravenous Once Nir Mir MD             Physical Exam:  BP (!) 104/57   Pulse 78   Temp 98.3 °F (36.8 °C) (Oral)   Resp 24   Ht 6' 2\" (1.88 m)   Wt 195 lb 3.2 oz (88.5 kg)   SpO2 94%   BMI 25.06 kg/m²   Weight change: -1 lb 4.8 oz (-0.59 kg)  Wt Readings from Last 3 Encounters:   08/19/20 195 lb 3.2 oz (88.5 kg)   06/24/20 190 lb (86.2 kg)   05/19/20 190 lb (86.2 kg)     General: Awake, alert, oriented x3, frail elderly man in bed no acute distress but looks weary  Neck: No JVD, carotid bruits, thyromegaly, or lymphadenopathy  Cardiac: Regular rate and rhythm, normal S1 and split S2, no murmurs, no S3 or S4, no pericardial rubs. Carotid upstrokes brisk  Resp: clear bilaterally without wheezes, rhonchi, or rales; unlabored respirations  Abdomen: soft, nontender, nondistended, BS+; no masses, bruits, or hepatomegaly  Extremities: no cyanosis, clubbing, or edema. Distal pulses intact  Skin: Warm and dry, no rashes or lesions  Neuro: moves all extremities to command, no focal deficits noted    Intake/Output:    Intake/Output Summary (Last 24 hours) at 8/19/2020 0901  Last data filed at 8/19/2020 0514  Gross per 24 hour   Intake 620 ml   Output 1300 ml   Net -680 ml     No intake/output data recorded. Laboratory Tests:  Last 3 CBC:  Recent Labs     08/17/20 0430 08/18/20  0405 08/19/20  0345   WBC 20.1* 17.3* 11.4   RBC 2.83* 2.86* 2.97*   HGB 9.6* 9.8* 10.1*   HCT 29.1* 29.5* 30.2*   .8* 103.1* 101.7*   MCH 33.9 34.3 34.0   MCHC 33.0 33.2 33.4   RDW 12.9 13.0 13.2    204 214   MPV 10.3 10.7 10.5       Last 3 CMP:    Recent Labs     08/17/20  0430 08/18/20  0405 08/19/20  0345    140 140   K 4.6 4.0 4.2    105 108*   CO2 24 22 23   BUN 30* 27* 21   CREATININE 1.3* 1.1 1.2   GLUCOSE 97 99 92   CALCIUM 8.3* 8.5* 8.4*   PROT 5.4* 5.3* 5.1*   LABALBU 2.6* 2.7* 2.5*   BILITOT 0.3 0.8 0.4   ALKPHOS 124 197* 164*   * 96* 48*   ALT 25 24 30       Last 3 Mag/Phos:  No results for input(s): MG, PHOS in the last 72 hours. Last 3 CK, CKMB, Troponin  No results for input(s): CKTOTAL, CKMB, TROPONINI in the last 72 hours. Last 3 BNP:  No results for input(s): BNP in the last 72 hours.   No results found for: BNP    Last 3 Glucose:     Recent Labs     08/17/20  0430 08/18/20  0405 08/19/20  0345   GLUCOSE 97 99 92       Last 3 Coags:  No results for input(s): PROTIME, INR, PTT in the last 72 hours. Lab Results   Component Value Date    PROTIME 14.3 06/19/2011    INR 1.5 06/19/2011       Last 3 Lipid Panel:  No results for input(s): LDLCALC, HDL, TRIG in the last 72 hours. Invalid input(s): CHLPL  Lab Results   Component Value Date    LDLCALC 55 06/17/2020    LDLCALC 59 11/04/2019    LDLCALC 63 05/07/2019     Lab Results   Component Value Date    HDL 42 06/17/2020    HDL 42 11/04/2019    HDL 44 05/07/2019     Lab Results   Component Value Date    TRIG 85 06/17/2020    TRIG 84 11/04/2019    TRIG 84 05/07/2019     No components found for: CHLPL    TSH:  No results for input(s): TSH in the last 72 hours. Lab Results   Component Value Date    TSH 1.320 08/13/2020           Radiology:  NM Cardiac Stress Test Nuclear Imaging   Final Result   1. The study is technically inadequate, with diminished activity   throughout the majority of the left ventricle, showing only minimal   activity in the basilar region of the anterior and lateral walls and   minimal activity inferior wall. 2. Ejection fraction is 30 %, which is diminished in this patient with   mild ventricular dilation. .   3. Wall motion was not adequately assessed due to the lack of tracer   distribution through majority of the the left ventricular myocardium         CTA PULMONARY W CONTRAST   Final Result   1. No pulmonary embolism. No dissection is seen. 2. Signs of chronic pulmonary artery hypertension. This report has been electronically signed by Bob Rodriguez MD.      XR CHEST PORTABLE   Final Result   No acute cardiopulmonary abnormality. Cardiomegaly without evidence of overt failure. CT CHEST WO CONTRAST   Final Result      1. Signs of anemia. Cardiomegaly and coronary artery disease. 2. Dilated ascending aorta at 4.3 cm.   3. New 2.5 cm left adrenal mass with nonspecific characteristics.    Consider MRI with in and out of phase imaging to assess for small   quantities of fat which would indicate a benign adenoma. CT ABDOMEN PELVIS WO CONTRAST Additional Contrast? None   Final Result         1. 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 2. Nonspecific perinephric fat stranding, which is often seen as an   incidental finding but may signify underlying medical renal disease. No stone or hydronephrosis. 3. Severe distal colonic diverticulosis without diverticulitis. XR CHEST PORTABLE   Final Result   No acute cardiopulmonary findings. ASSESSMENT / PLAN:  1.         CAD remote CABG, ischemic cardiomyopathy with echo showing EF 35-40% and wall motion abnormality . I think this in old as the distal septal wall in thinned on echo. I had a long discussion with the daughter today regarding options of medical management vs cath. I think that he is high risk for complications with very little gain with intervention. I think best served by continue toprol statin aspirin. ( would like an ACE inh, but bp has been an issue, so maybe at a later date). Weight up 8 lbs, may benefit from a dose of lasix. Once he's back to baseline would try to repeat the lexiscan, but not persue aggressive measures unless a large reversible defect- daughter agrees with this plan. 2          LBBB- chronic  3.        orthostasis- likely secondary to parkinsons and debilitation+/- hytrin which was stopped- midodrine I think has helped, would use the least amount that helps, will cut to bid  4          ? UTI- started on antibiotics, hytrin was stopped, WBC 20->17>11.4  5          Parkinsons on sinemet  6          Recent TKA      Elías Coburn MD, [facility]  The [address] at [facility]    Electronically signed by Elías Coburn MD on 8/19/2020 at 9:01 AM

## 2020-08-19 NOTE — PROGRESS NOTES
SPEECH LANGUAGE PATHOLOGY  DAILY PROGRESS NOTE        PATIENT NAME:  Arron Rojas      :  1933          TODAY'S DATE:  2020 ROOM:  0410/0410-A        SWALLOWING    Pt upright in chair, daughter present. Pt reports dislike of thickened liquids. SLP discussed options with pt, daughter and wife (by phone) including waiver, Alejandra Gar.     Pt is requesting use of R2G Protocol at this time with physician approval. Pt, family and nursing educated on protocol and handouts provided. DYSPHAGIA DIAGNOSIS:  Oropharyngeal dysphagia-overt s/s of aspiration observed with thin liquids                               DIET RECOMMENDATIONS:  Regular consistency solids with nectar consistency (mildly thick) liquids as tolerated; pt to choose soft items he is able to masticate.      If coughing noted with NTL, recommend attempt HTL at that time. Recommend R2G Protocol at this time.                  FEEDING RECOMMENDATIONS:                           Assistance level:  Stand by assistance is needed during all oral intake                             Compensatory strategies recommended: Small bites/sips and Alternate solids and liquids           Education- Pt educated on results and POC. Pt trained on compensatory strategies for safe swallow with fair outcome. Questions answered. Will continue SP intervention as per previously established POC. Delaney SEO CCC/SLP [id number]  Speech Pathologist              CPT code(s) 70977  dysphagia tx  Total minutes :  30 minutes

## 2020-08-19 NOTE — PROGRESS NOTES
[address] [place] [address] [facility]  [facility]  Progress Note    SUBJECTIVE:  Chief Complaint   Patient presents with    Fatigue     fatigue for the past day, today unable to get off the toilet, total knee about 3 weeks ago     Leucocytosis resolved  lexiscan showed wall motion abnormality   Currently sitting and having his dinner, discussed with the daughter at bedside  Review of systems:  As stated above in the chief complaint, otherwise negative. Medications:  Scheduled Meds:   furosemide  20 mg Intravenous Once    midodrine  2.5 mg Oral BID WC    metoprolol succinate  12.5 mg Oral Daily    cefTRIAXone (ROCEPHIN) IV  1 g Intravenous Q24H    sodium chloride  500 mL Intravenous Once    sodium chloride flush  10 mL Intravenous 2 times per day    enoxaparin  40 mg Subcutaneous Daily    aspirin  650 mg Oral Daily    diclofenac sodium  2 g Topical BID    folic acid  1 mg Oral Daily    levothyroxine  25 mcg Oral Daily    atorvastatin  10 mg Oral Daily    pantoprazole  40 mg Oral QAM AC    PARoxetine  20 mg Oral QAM    Vitamin D  1,000 Units Oral Daily    carbidopa-levodopa  2 tablet Oral Q4H WA    ocuvite-lutein  1 tablet Oral Daily    sodium chloride  1,000 mL Intravenous Once     Continuous Infusions:    PRN Meds:clonazePAM, sodium chloride flush, acetaminophen **OR** acetaminophen, polyethylene glycol, promethazine **OR** ondansetron    OBJECTIVE:  BP (!) 104/57   Pulse 78   Temp 98.3 °F (36.8 °C) (Oral)   Resp 24   Ht 6' 2\" (1.88 m)   Wt 195 lb 3.2 oz (88.5 kg)   SpO2 94%   BMI 25.06 kg/m²   Temp  Av.7 °F (36.5 °C)  Min: 97.4 °F (36.3 °C)  Max: 98.3 °F (36.8 °C)  Constitutional: The patient is awake, alert, and oriented. Sitting up in chair in NAD. Chronically ill in appearance  Skin: Warm and dry. No rashes were noted. pale   HEENT: Round and reactive pupils. Moist mucous membranes. No ulcerations or thrush. Neck: Supple to movements. Chest: crackles b/l bases. Respirations slightly labored at rest on O2  Cardiovascular: S1 and S2 are rhythmic and regular. No murmurs appreciated. Abdomen: Positive bowel sounds to auscultation. Benign to palpation. Extremities: No clubbing, no cyanosis, no edema. Lines: peripheral  F/c draining clear janet urine    Laboratory and Tests Review:  Lab Results   Component Value Date    WBC 11.4 08/19/2020    WBC 17.3 (H) 08/18/2020    WBC 20.1 (H) 08/17/2020    HGB 10.1 (L) 08/19/2020    HCT 30.2 (L) 08/19/2020    .7 (H) 08/19/2020     08/19/2020     Lab Results   Component Value Date    NEUTROABS 7.75 (H) 08/19/2020    NEUTROABS 14.58 (H) 08/18/2020    NEUTROABS 17.52 (H) 08/17/2020     No results found for: [facility]  Lab Results   Component Value Date    ALT 30 08/19/2020    AST 48 (H) 08/19/2020    ALKPHOS 164 (H) 08/19/2020    BILITOT 0.4 08/19/2020     Lab Results   Component Value Date     08/19/2020    K 4.2 08/19/2020    K 4.2 08/14/2020     08/19/2020    CO2 23 08/19/2020    BUN 21 08/19/2020    CREATININE 1.2 08/19/2020    CREATININE 1.1 08/18/2020    CREATININE 1.3 08/17/2020    GFRAA >60 08/19/2020    LABGLOM 57 08/19/2020    GLUCOSE 92 08/19/2020    GLUCOSE 103 06/19/2011    PROT 5.1 08/19/2020    LABALBU 2.5 08/19/2020    CALCIUM 8.4 08/19/2020    BILITOT 0.4 08/19/2020    ALKPHOS 164 08/19/2020    AST 48 08/19/2020    ALT 30 08/19/2020     Lab Results   Component Value Date    CRP 12.5 (H) 08/14/2020    CRP <0.1 02/20/2020    CRP <0.1 01/31/2019     Lab Results   Component Value Date    SEDRATE 18 (H) 08/14/2020    SEDRATE 2 01/22/2015     Radiology:  CTA chest . No pulmonary embolism.  No dissection is seen. 2. Signs of chronic pulmonary artery hypertension. Microbiology:   Urine cx klebsiella  Blood cx pending    ASSESSMENT:  · Fever, UTI, urinary retention - k.pneumoniae  · Parkinson's disease  · BPH - urology following.  Creat up to 1.3  · Leukocytosis -resolved     Plan  -abnormal lexiscan , however given his age and risk factors, plan as epr cardiology to go with conservative management  -ok for d/c on  p.o.  Omnicef 300 mg twice a day for 10 days

## 2020-08-20 VITALS
HEART RATE: 74 BPM | DIASTOLIC BLOOD PRESSURE: 84 MMHG | TEMPERATURE: 97.8 F | SYSTOLIC BLOOD PRESSURE: 149 MMHG | BODY MASS INDEX: 24.58 KG/M2 | HEIGHT: 74 IN | WEIGHT: 191.5 LBS | OXYGEN SATURATION: 99 % | RESPIRATION RATE: 18 BRPM

## 2020-08-20 LAB
ALBUMIN SERPL-MCNC: 2.7 G/DL (ref 3.5–5.2)
ALP BLD-CCNC: 163 U/L (ref 40–129)
ALT SERPL-CCNC: 33 U/L (ref 0–40)
ANION GAP SERPL CALCULATED.3IONS-SCNC: 12 MMOL/L (ref 7–16)
AST SERPL-CCNC: 32 U/L (ref 0–39)
BASOPHILS ABSOLUTE: 0 E9/L (ref 0–0.2)
BASOPHILS RELATIVE PERCENT: 0 % (ref 0–2)
BILIRUB SERPL-MCNC: 0.3 MG/DL (ref 0–1.2)
BUN BLDV-MCNC: 17 MG/DL (ref 8–23)
CALCIUM SERPL-MCNC: 8.7 MG/DL (ref 8.6–10.2)
CHLORIDE BLD-SCNC: 107 MMOL/L (ref 98–107)
CO2: 22 MMOL/L (ref 22–29)
CREAT SERPL-MCNC: 1 MG/DL (ref 0.7–1.2)
DOHLE BODIES: ABNORMAL
EOSINOPHILS ABSOLUTE: 0.31 E9/L (ref 0.05–0.5)
EOSINOPHILS RELATIVE PERCENT: 2.6 % (ref 0–6)
GFR AFRICAN AMERICAN: >60
GFR NON-AFRICAN AMERICAN: >60 ML/MIN/1.73
GLUCOSE BLD-MCNC: 101 MG/DL (ref 74–99)
HCT VFR BLD CALC: 31.7 % (ref 37–54)
HEMOGLOBIN: 10.7 G/DL (ref 12.5–16.5)
LYMPHOCYTES ABSOLUTE: 1.53 E9/L (ref 1.5–4)
LYMPHOCYTES RELATIVE PERCENT: 13.2 % (ref 20–42)
MCH RBC QN AUTO: 34 PG (ref 26–35)
MCHC RBC AUTO-ENTMCNC: 33.8 % (ref 32–34.5)
MCV RBC AUTO: 100.6 FL (ref 80–99.9)
METAMYELOCYTES RELATIVE PERCENT: 0.9 % (ref 0–1)
METANEPH/PLASMA INTERP: ABNORMAL
METANEPHRINE FREE PLASMA: 1.23 NMOL/L (ref 0–0.49)
MONOCYTES ABSOLUTE: 1.06 E9/L (ref 0.1–0.95)
MONOCYTES RELATIVE PERCENT: 8.8 % (ref 2–12)
MYELOCYTE PERCENT: 2.6 % (ref 0–0)
NEUTROPHILS ABSOLUTE: 8.85 E9/L (ref 1.8–7.3)
NEUTROPHILS RELATIVE PERCENT: 71 % (ref 43–80)
NORMETANEPHRINE FREE PLASMA: 2.54 NMOL/L (ref 0–0.89)
NUCLEATED RED BLOOD CELLS: 0 /100 WBC
OVALOCYTES: ABNORMAL
PDW BLD-RTO: 13.1 FL (ref 11.5–15)
PLASMA CELLS PERCENT: 0.9 % (ref 0–0)
PLATELET # BLD: 240 E9/L (ref 130–450)
PMV BLD AUTO: 10.7 FL (ref 7–12)
POIKILOCYTES: ABNORMAL
POLYCHROMASIA: ABNORMAL
POTASSIUM SERPL-SCNC: 3.7 MMOL/L (ref 3.5–5)
RBC # BLD: 3.15 E12/L (ref 3.8–5.8)
SODIUM BLD-SCNC: 141 MMOL/L (ref 132–146)
TEAR DROP CELLS: ABNORMAL
TOTAL PROTEIN: 5.5 G/DL (ref 6.4–8.3)
WBC # BLD: 11.8 E9/L (ref 4.5–11.5)

## 2020-08-20 PROCEDURE — 92526 ORAL FUNCTION THERAPY: CPT

## 2020-08-20 PROCEDURE — 6360000002 HC RX W HCPCS: Performed by: INTERNAL MEDICINE

## 2020-08-20 PROCEDURE — 85025 COMPLETE CBC W/AUTO DIFF WBC: CPT

## 2020-08-20 PROCEDURE — 80053 COMPREHEN METABOLIC PANEL: CPT

## 2020-08-20 PROCEDURE — 6370000000 HC RX 637 (ALT 250 FOR IP): Performed by: INTERNAL MEDICINE

## 2020-08-20 PROCEDURE — 99232 SBSQ HOSP IP/OBS MODERATE 35: CPT | Performed by: INTERNAL MEDICINE

## 2020-08-20 PROCEDURE — 99239 HOSP IP/OBS DSCHRG MGMT >30: CPT | Performed by: INTERNAL MEDICINE

## 2020-08-20 PROCEDURE — 6370000000 HC RX 637 (ALT 250 FOR IP): Performed by: NURSE PRACTITIONER

## 2020-08-20 PROCEDURE — APPSS45 APP SPLIT SHARED TIME 31-45 MINUTES: Performed by: PHYSICIAN ASSISTANT

## 2020-08-20 PROCEDURE — 2580000003 HC RX 258: Performed by: INTERNAL MEDICINE

## 2020-08-20 PROCEDURE — 36415 COLL VENOUS BLD VENIPUNCTURE: CPT

## 2020-08-20 RX ORDER — MIDODRINE HYDROCHLORIDE 2.5 MG/1
2.5 TABLET ORAL 2 TIMES DAILY WITH MEALS
Qty: 90 TABLET | Refills: 3 | DISCHARGE
Start: 2020-08-20 | End: 2020-08-30 | Stop reason: SDUPTHER

## 2020-08-20 RX ADMIN — LEVOTHYROXINE SODIUM 25 MCG: 25 TABLET ORAL at 06:03

## 2020-08-20 RX ADMIN — CARBIDOPA AND LEVODOPA 2 TABLET: 25; 100 TABLET, EXTENDED RELEASE ORAL at 11:09

## 2020-08-20 RX ADMIN — FOLIC ACID 1 MG: 1 TABLET ORAL at 08:09

## 2020-08-20 RX ADMIN — Medication 1000 UNITS: at 08:09

## 2020-08-20 RX ADMIN — ENOXAPARIN SODIUM 40 MG: 40 INJECTION SUBCUTANEOUS at 08:08

## 2020-08-20 RX ADMIN — METOPROLOL SUCCINATE 12.5 MG: 25 TABLET, EXTENDED RELEASE ORAL at 08:09

## 2020-08-20 RX ADMIN — CARBIDOPA AND LEVODOPA 2 TABLET: 25; 100 TABLET, EXTENDED RELEASE ORAL at 16:07

## 2020-08-20 RX ADMIN — MIDODRINE HYDROCHLORIDE 2.5 MG: 5 TABLET ORAL at 08:09

## 2020-08-20 RX ADMIN — MIDODRINE HYDROCHLORIDE 2.5 MG: 5 TABLET ORAL at 16:07

## 2020-08-20 RX ADMIN — PAROXETINE HYDROCHLORIDE 20 MG: 20 TABLET, FILM COATED ORAL at 08:09

## 2020-08-20 RX ADMIN — DICLOFENAC 2 G: 10 GEL TOPICAL at 08:11

## 2020-08-20 RX ADMIN — CLONAZEPAM 0.5 MG: 0.5 TABLET ORAL at 06:03

## 2020-08-20 RX ADMIN — Medication 1 TABLET: at 08:09

## 2020-08-20 RX ADMIN — CARBIDOPA AND LEVODOPA 2 TABLET: 25; 100 TABLET, EXTENDED RELEASE ORAL at 08:09

## 2020-08-20 RX ADMIN — Medication 10 ML: at 08:11

## 2020-08-20 RX ADMIN — ASPIRIN 650 MG: 325 TABLET, FILM COATED ORAL at 08:09

## 2020-08-20 RX ADMIN — ATORVASTATIN CALCIUM 10 MG: 10 TABLET, FILM COATED ORAL at 08:09

## 2020-08-20 RX ADMIN — PANTOPRAZOLE SODIUM 40 MG: 40 TABLET, DELAYED RELEASE ORAL at 06:03

## 2020-08-20 ASSESSMENT — PAIN SCALES - GENERAL
PAINLEVEL_OUTOF10: 0
PAINLEVEL_OUTOF10: 0

## 2020-08-20 NOTE — PROGRESS NOTES
[address] [place] [address] [facility]  [facility]  Progress Note    SUBJECTIVE:  Chief Complaint   Patient presents with    Fatigue     fatigue for the past day, today unable to get off the toilet, total knee about 3 weeks ago     Leucocytosis resolved  lexiscan showed wall motion abnormality   No acute issues, he is still tired  Review of systems:  As stated above in the chief complaint, otherwise negative. Medications:  Scheduled Meds:   midodrine  2.5 mg Oral BID WC    metoprolol succinate  12.5 mg Oral Daily    cefTRIAXone (ROCEPHIN) IV  1 g Intravenous Q24H    sodium chloride  500 mL Intravenous Once    sodium chloride flush  10 mL Intravenous 2 times per day    enoxaparin  40 mg Subcutaneous Daily    aspirin  650 mg Oral Daily    diclofenac sodium  2 g Topical BID    folic acid  1 mg Oral Daily    levothyroxine  25 mcg Oral Daily    atorvastatin  10 mg Oral Daily    pantoprazole  40 mg Oral QAM AC    PARoxetine  20 mg Oral QAM    Vitamin D  1,000 Units Oral Daily    carbidopa-levodopa  2 tablet Oral Q4H WA    ocuvite-lutein  1 tablet Oral Daily    sodium chloride  1,000 mL Intravenous Once     Continuous Infusions:    PRN Meds:clonazePAM, sodium chloride flush, acetaminophen **OR** acetaminophen, polyethylene glycol, promethazine **OR** ondansetron    OBJECTIVE:  BP (!) 149/84   Pulse 74   Temp 97.8 °F (36.6 °C) (Axillary)   Resp 18   Ht 6' 2\" (1.88 m)   Wt 191 lb 8 oz (86.9 kg)   SpO2 99%   BMI 24.59 kg/m²   Temp  Av.7 °F (36.5 °C)  Min: 97.4 °F (36.3 °C)  Max: 98.1 °F (36.7 °C)  Constitutional: The patient is awake, alert, and oriented. Sitting up in chair in NAD. Chronically ill in appearance  Skin: Warm and dry. No rashes were noted. pale   HEENT: Round and reactive pupils. Moist mucous membranes. No ulcerations or thrush. Neck: Supple to movements. Chest: crackles b/l bases.  Respirations slightly labored at rest on O2  Cardiovascular: S1 and S2 are rhythmic and regular. No murmurs appreciated. Abdomen: Positive bowel sounds to auscultation. Benign to palpation. Extremities: No clubbing, no cyanosis, no edema. Lines: peripheral  F/c draining clear janet urine    Laboratory and Tests Review:  Lab Results   Component Value Date    WBC 11.8 (H) 08/20/2020    WBC 11.4 08/19/2020    WBC 17.3 (H) 08/18/2020    HGB 10.7 (L) 08/20/2020    HCT 31.7 (L) 08/20/2020    .6 (H) 08/20/2020     08/20/2020     Lab Results   Component Value Date    NEUTROABS 8.85 (H) 08/20/2020    NEUTROABS 7.75 (H) 08/19/2020    NEUTROABS 14.58 (H) 08/18/2020     No results found for: [facility]  Lab Results   Component Value Date    ALT 33 08/20/2020    AST 32 08/20/2020    ALKPHOS 163 (H) 08/20/2020    BILITOT 0.3 08/20/2020     Lab Results   Component Value Date     08/20/2020    K 3.7 08/20/2020    K 4.2 08/14/2020     08/20/2020    CO2 22 08/20/2020    BUN 17 08/20/2020    CREATININE 1.0 08/20/2020    CREATININE 1.2 08/19/2020    CREATININE 1.1 08/18/2020    GFRAA >60 08/20/2020    LABGLOM >60 08/20/2020    GLUCOSE 101 08/20/2020    GLUCOSE 103 06/19/2011    PROT 5.5 08/20/2020    LABALBU 2.7 08/20/2020    CALCIUM 8.7 08/20/2020    BILITOT 0.3 08/20/2020    ALKPHOS 163 08/20/2020    AST 32 08/20/2020    ALT 33 08/20/2020     Lab Results   Component Value Date    CRP 12.5 (H) 08/14/2020    CRP <0.1 02/20/2020    CRP <0.1 01/31/2019     Lab Results   Component Value Date    SEDRATE 18 (H) 08/14/2020    SEDRATE 2 01/22/2015     Radiology:  CTA chest . No pulmonary embolism.  No dissection is seen. 2. Signs of chronic pulmonary artery hypertension. Microbiology:   Urine cx klebsiella  Blood cx pending    ASSESSMENT:  · Fever, UTI, urinary retention - k.pneumoniae  · Parkinson's disease  · BPH - urology following.  Creat up to 1.3  · Leukocytosis -resolved     Plan  -abnormal lexiscan , however given his age and risk factors, plan as epr cardiology to go with conservative management  -Endocrinology following for left-sided adrenal incidentaloma  -ok for d/c on  p.o.  Omnicef 300 mg twice a day for 10 days

## 2020-08-20 NOTE — PROGRESS NOTES
Transport delayed per Dr. Alvares Gave to notify Dr. Thee Fleming of labs.     [facility] notified of  change to 1800

## 2020-08-20 NOTE — PROGRESS NOTES
[facility] [address]    Name: Didier Basilio    Age: 80 y.o. PCP: Rafael Marino DO    Date of Admission: 8/13/2020  2:58 PM    Date of Service: 8/20/2020    Chief Complaint: Follow-up for troponin, othostasis  The patient is a 80y.o. year old male with CAD, admitted 8/13 for weakness, feeling hot,  unable to get off the commode at home. Profound weakness since TKA 3 weeks ago. Felt to be orthostatic and hytrin and toprol stopped, given hydration. Having issues with urinary frequency,  bacturia- started on antibiotics. Apparently yesterday evening started having increased heart rate,dry heaves, some respiratory distress and RRT called- sent for CTA -no PE, cardiac enzymes drawn which showed a bump of troponin 0.5 so cardiology consulted. Patient denies any chest pain or palpitations. Comfortable today.     PMHx CABG [id number], cath 2005 patent graft, 80% proximal LAD stenosis, mild 30% blockages diagonal and circumflex, normal nondominant RCA. Last stress lexiscan 2015 normal, echo 2015 EF 60% 1+ AI,TR. Chronic LBBB, parkinsons, RA, HTN, hyperlipidemia, mild aortic aneurysm. Interim History:  No new overnight cardiac complaints. Currently with no complaints of CP, SOB, palpitations, nausea, dizziness, or lightheadedness. Still weak. No distress. Reviewed with patient and daughter echo EF 35-40% lexiscan was inadequate. No longer othostatic, had backed midodrine to 2.5bid yesterday. Gave one dose lasix and diuresed a little. Daughter concerned that when he sleep sometimes stops breathing a couple seconds then resumes-sounds like cheyne - monte.  WBC 11.8, on abiotic for 10 more days  Telemetry personally reviewed and showed sinus    Review of Systems:   Cardiac: As per HPI  General: No fever, chills  Pulmonary: No cough, wheeze, or shortness of breath  GI: No nausea, vomiting,or abdominal pain  Neuro: No headache or confusion    Problem List:  Principal Problem:    Orthostatic hypotension  Active Problems:    Essential hypertension    Parkinson's disease ([facility]    Urinary retention with incomplete bladder emptying    Hx of total knee arthroplasty, right    Leukocytosis    Macrocytic anemia    Hypothyroidism    Sepsis ([facility]  Resolved Problems:    * No resolved hospital problems. *      Past Medical History:  Past Medical History:   Diagnosis Date    Anxiety     Ascending aortic aneurysm (HCC)     BPH (benign prostatic hyperplasia)     CAD (coronary artery disease)     Cancer (HCC)     skin    Coronary arteriosclerosis     Depression     DJD (degenerative joint disease)     Hyperlipidemia     Impacted cerumen     Lumbar spinal stenosis     severe    Macular degeneration     Osteoarthritis     Parkinson disease (HCC)     RA (rheumatoid arthritis) ([facility]        Allergies:   Allergies   Allergen Reactions    Pcn [Penicillins]     Prednisone        Current Medications:  Current Facility-Administered Medications   Medication Dose Route Frequency Provider Last Rate Last Dose    midodrine (PROAMATINE) tablet 2.5 mg  2.5 mg Oral BID  Allan Gama MD   2.5 mg at 08/19/20 1625    metoprolol succinate (TOPROL XL) extended release tablet 12.5 mg  12.5 mg Oral Daily Allan Gama MD   12.5 mg at 08/19/20 0859    clonazePAM (KLONOPIN) tablet 0.5 mg  0.5 mg Oral BID PRN Charles Darbyic, DO   0.5 mg at 08/20/20 0603    cefTRIAXone (ROCEPHIN) 1 g in sterile water 10 mL IV syringe  1 g Intravenous Q24H Yuni Mahoney MD   1 g at 08/19/20 2017    0.9 % sodium chloride bolus  500 mL Intravenous Once Delaney Viji, DO   Stopped at 08/15/20 2122    sodium chloride flush 0.9 % injection 10 mL  10 mL Intravenous 2 times per day Shirley He MD   10 mL at 08/19/20 2018    sodium chloride flush 0.9 % injection 10 mL  10 mL Intravenous PRN Shirley He MD        acetaminophen (TYLENOL) tablet 650 mg  650 mg Oral Q6H PRN Shirley He MD   650 mg at 08/14/20 2334    Or    acetaminophen (TYLENOL) suppository 650 mg  650 mg Rectal Q6H PRN Khoa Cardenas MD   650 mg at 08/15/20 2004    polyethylene glycol (GLYCOLAX) packet 17 g  17 g Oral Daily PRN Khoa Cardenas MD        promethazine (PHENERGAN) tablet 12.5 mg  12.5 mg Oral Q6H PRN Khoa Cardenas MD        Or    ondansetron [facility] injection 4 mg  4 mg Intravenous Q6H PRN Khoa Cardenas MD        enoxaparin (LOVENOX) injection 40 mg  40 mg Subcutaneous Daily Khoa Cardenas MD   40 mg at 08/19/20 0859    aspirin tablet 650 mg  650 mg Oral Daily Sofía Glen, APRN - CNP   650 mg at 08/19/20 0858    diclofenac sodium (VOLTAREN) 1 % gel 2 g  2 g Topical BID Sofía Glen, APRN - CNP   2 g at 13/65/92 5183    folic acid (FOLVITE) tablet 1 mg  1 mg Oral Daily Sofía Glen, APRN - CNP   1 mg at 08/19/20 0859    levothyroxine (SYNTHROID) tablet 25 mcg  25 mcg Oral Daily Sofía Glen, APRN - CNP   25 mcg at 08/20/20 0603    atorvastatin (LIPITOR) tablet 10 mg  10 mg Oral Daily Sofía Glen, APRN - CNP   10 mg at 08/19/20 0859    pantoprazole (PROTONIX) tablet 40 mg  40 mg Oral QAM AC Sofía Glen, APRN - CNP   40 mg at 08/20/20 0603    PARoxetine (PAXIL) tablet 20 mg  20 mg Oral QAM Sofía Glen, APRN - CNP   20 mg at 08/19/20 0859    vitamin D (CHOLECALCIFEROL) tablet 1,000 Units  1,000 Units Oral Daily Sofía Glen, APRN - CNP   1,000 Units at 08/19/20 0859    carbidopa-levodopa (SINEMET CR)  MG per extended release tablet 2 tablet  2 tablet Oral Q4H WA Sofía Glen, APRN - CNP   2 tablet at 08/19/20 2018    antioxidant multivitamin (OCUVITE) tablet  1 tablet Oral Daily Sofía Glen, APRN - CNP   1 tablet at 08/19/20 0859    0.9 % sodium chloride bolus  1,000 mL Intravenous Once Khoa Cardenas MD             Physical Exam:  /76 Comment: Simultaneous filing. User may not have seen previous data. Pulse 72 Comment: Simultaneous filing.  User may not have seen previous data. Temp 98.1 °F (36.7 °C) (Oral) Comment: Simultaneous filing. User may not have seen previous data. Comment (Src): Simultaneous filing. User may not have seen previous data. Resp 18 Comment: Simultaneous filing. User may not have seen previous data. Ht 6' 2\" (1.88 m)   Wt 191 lb 8 oz (86.9 kg)   SpO2 97% Comment: Simultaneous filing. User may not have seen previous data. BMI 24.59 kg/m²   Weight change: -3 lb 11.2 oz (-1.678 kg)  Wt Readings from Last 3 Encounters:   08/20/20 191 lb 8 oz (86.9 kg)   06/24/20 190 lb (86.2 kg)   05/19/20 190 lb (86.2 kg)     General: Awake, alert, oriented x3, frail elderly man in bed no acute distress but looks weary  Neck: No JVD, carotid bruits, thyromegaly, or lymphadenopathy  Cardiac: Regular rate and rhythm, normal S1 and split S2, no murmurs, no S3 or S4, no pericardial rubs. Carotid upstrokes brisk  Resp: clear bilaterally without wheezes, rhonchi, or rales; unlabored respirations  Abdomen: soft, nontender, nondistended, BS+; no masses, bruits, or hepatomegaly  Extremities: no cyanosis, clubbing, or edema. Distal pulses intact  Skin: Warm and dry, no rashes or lesions  Neuro: moves all extremities to command, no focal deficits noted    Intake/Output:    Intake/Output Summary (Last 24 hours) at 8/20/2020 0754  Last data filed at 8/20/2020 0518  Gross per 24 hour   Intake 420 ml   Output 3125 ml   Net -2705 ml     No intake/output data recorded.     Laboratory Tests:  Last 3 CBC:  Recent Labs     08/18/20  0405 08/19/20  0345 08/20/20  0325   WBC 17.3* 11.4 11.8*   RBC 2.86* 2.97* 3.15*   HGB 9.8* 10.1* 10.7*   HCT 29.5* 30.2* 31.7*   .1* 101.7* 100.6*   MCH 34.3 34.0 34.0   MCHC 33.2 33.4 33.8   RDW 13.0 13.2 13.1    214 240   MPV 10.7 10.5 10.7       Last 3 CMP:    Recent Labs     08/18/20  0405 08/19/20  0345 08/20/20  0325    140 141   K 4.0 4.2 3.7    108* 107   CO2 22 23 22   BUN 27* 21 17   CREATININE 1.1 1.2 1.0   GLUCOSE 99 92 101*   CALCIUM 8.5* 8.4* 8.7   PROT 5.3* 5.1* 5.5*   LABALBU 2.7* 2.5* 2.7*   BILITOT 0.8 0.4 0.3   ALKPHOS 197* 164* 163*   AST 96* 48* 32   ALT 24 30 33       Last 3 Mag/Phos:  No results for input(s): MG, PHOS in the last 72 hours. Last 3 CK, CKMB, Troponin  No results for input(s): CKTOTAL, CKMB, TROPONINI in the last 72 hours. Last 3 BNP:  No results for input(s): BNP in the last 72 hours. No results found for: BNP    Last 3 Glucose:     Recent Labs     08/18/20  0405 08/19/20  0345 08/20/20  0325   GLUCOSE 99 92 101*       Last 3 Coags:  No results for input(s): PROTIME, INR, PTT in the last 72 hours. Lab Results   Component Value Date    PROTIME 14.3 06/19/2011    INR 1.5 06/19/2011       Last 3 Lipid Panel:  No results for input(s): LDLCALC, HDL, TRIG in the last 72 hours. Invalid input(s): CHLPL  Lab Results   Component Value Date    LDLCALC 55 06/17/2020    LDLCALC 59 11/04/2019    LDLCALC 63 05/07/2019     Lab Results   Component Value Date    HDL 42 06/17/2020    HDL 42 11/04/2019    HDL 44 05/07/2019     Lab Results   Component Value Date    TRIG 85 06/17/2020    TRIG 84 11/04/2019    TRIG 84 05/07/2019     No components found for: CHLPL    TSH:  No results for input(s): TSH in the last 72 hours. Lab Results   Component Value Date    TSH 1.320 08/13/2020           Radiology:  NM Cardiac Stress Test Nuclear Imaging   Final Result   1. The study is technically inadequate, with diminished activity   throughout the majority of the left ventricle, showing only minimal   activity in the basilar region of the anterior and lateral walls and   minimal activity inferior wall. 2. Ejection fraction is 30 %, which is diminished in this patient with   mild ventricular dilation. .   3. Wall motion was not adequately assessed due to the lack of tracer   distribution through majority of the the left ventricular myocardium         CTA PULMONARY W CONTRAST   Final Result   1.  No pulmonary embolism. No dissection is seen. 2. Signs of chronic pulmonary artery hypertension. This report has been electronically signed by Ruddy Reis MD.      XR CHEST PORTABLE   Final Result   No acute cardiopulmonary abnormality. Cardiomegaly without evidence of overt failure. CT CHEST WO CONTRAST   Final Result      1. Signs of anemia. Cardiomegaly and coronary artery disease. 2. Dilated ascending aorta at 4.3 cm.   3. New 2.5 cm left adrenal mass with nonspecific characteristics. Consider MRI with in and out of phase imaging to assess for small   quantities of fat which would indicate a benign adenoma. CT ABDOMEN PELVIS WO CONTRAST Additional Contrast? None   Final Result         1. 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 2. Nonspecific perinephric fat stranding, which is often seen as an   incidental finding but may signify underlying medical renal disease. No stone or hydronephrosis. 3. Severe distal colonic diverticulosis without diverticulitis. XR CHEST PORTABLE   Final Result   No acute cardiopulmonary findings. ASSESSMENT / PLAN:  1.         CAD remote CABG, ischemic cardiomyopathy with echo showing EF 35-40% and wall motion abnormality . I think this in old as the distal septal wall in thinned on echo. I had a long discussion with the daughter today regarding options of medical management vs cath. I think that he is high risk for complications with very little gain with intervention. I think best served by continue toprol statin aspirin. ( would like an ACE inh, but bp has been an issue, so maybe at a later date). Weight was up 8 lbs, diuresed 4 lbs . Once he's back to baseline would try to repeat the lexiscan, but not persue aggressive measures unless a large reversible defect- daughter agrees with this plan.  He is stable for ECF from a cardiac standpoint. Will arrange outpatient lexiscan and cardiology f/u through the office. 2          LBBB- chronic  3.        orthostasis- likely secondary to parkinsons and debilitation+/- hytrin which was stopped- midodrine I think has helped, would use the least amount that helps, will cut to bid.  4          ?UTI- started on antibiotics, hytrin was stopped, WBC 20->17>11.4>11.8  5          Parkinsons- suspect it contributing to his orthostasis, dysphagia, cheyne-monte. At risk for aspiration. - on sinemet  6          Recent TKA      Rossy Pickett MD, [facility]    Electronically signed by Rossy Pickett MD on 8/20/2020 at 7:54 AM

## 2020-08-20 NOTE — PROGRESS NOTES
Stress Test Nuclear Imaging   Final Result   1. The study is technically inadequate, with diminished activity   throughout the majority of the left ventricle, showing only minimal   activity in the basilar region of the anterior and lateral walls and   minimal activity inferior wall. 2. Ejection fraction is 30 %, which is diminished in this patient with   mild ventricular dilation. .   3. Wall motion was not adequately assessed due to the lack of tracer   distribution through majority of the the left ventricular myocardium         CTA PULMONARY W CONTRAST   Final Result   1. No pulmonary embolism. No dissection is seen. 2. Signs of chronic pulmonary artery hypertension. This report has been electronically signed by Chris Velazco MD.      XR CHEST PORTABLE   Final Result   No acute cardiopulmonary abnormality. Cardiomegaly without evidence of overt failure. CT CHEST WO CONTRAST   Final Result      1. Signs of anemia. Cardiomegaly and coronary artery disease. 2. Dilated ascending aorta at 4.3 cm.   3. New 2.5 cm left adrenal mass with nonspecific characteristics. Consider MRI with in and out of phase imaging to assess for small   quantities of fat which would indicate a benign adenoma. CT ABDOMEN PELVIS WO CONTRAST Additional Contrast? None   Final Result         1. 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 2. Nonspecific perinephric fat stranding, which is often seen as an   incidental finding but may signify underlying medical renal disease. No stone or hydronephrosis. 3. Severe distal colonic diverticulosis without diverticulitis. XR CHEST PORTABLE   Final Result   No acute cardiopulmonary findings.                 Medical Records/Labs/Images review:   I personally reviewed and summarized previous records   All labs and imaging were reviewed independently     Tiarra, a 80 y.o.-old male seen in for management of adrenal incidentaloma      Left side Adrenal incidentaloma   · I have personally reviewed CT images myself and compare with MRI study that was done in 2/2018. Radiological characteristics of the mass (homogeneous, size <4 cm, smooth borders) in favor of benign lipid poor adenoma. This lesion is stable as of the MRI from 2/2018 and   · Plasma metanephrines in process   · Renin not suppressed   · Lack of suppression of AM cortisol following 1 mg DST likely false positive because that morning pt had an acute event of SOB and AMS required RRT involvement. We are planning to repeat the test few weeks after discharge   · Pt will follow with us after discharge. Endocrine follow up visit Wednesday 9/16 at 10:40     UTI  · WBC trending down. 11.8 this am  · On Rocephin   · Managed by primary and ID team   · Blood Cx no growth   · Urine Cx growing klebsiella pneumoniae      Hypothyroidism   · At goal, continue Levothyroxine 25 mcg daily     The above issues were reviewed with the patient who understood and agreed with the plan. Thank you for allowing us to participate in the care of this patient. Please do not hesitate to contact us with any additional questions. Ryder Moody MD  Endocrinologist, [facility]   [address] [facility] [address]   Phone: [phone]  Fax: [phone]  --------------------------------------------  An electronic signature was used to authenticate this note.  Joby Lauren MD on 8/20/2020 at 8:10 PM

## 2020-08-20 NOTE — DISCHARGE SUMMARY
[facility] [place] Physician Discharge Summary       Ant Oreilly, [address] [facility] [address]  [phone]    Call in 1 week      Damon Sanchez, [address]  [facility] [address]  [phone]    On 9/16/2020  Endocrine follow up visit Wednesday 9/16 at 10:40    [facility] [address]  [facility] [address] [facility]        Elías Coburn MD  [address] [facility] [address]  [facility] [phone]    Call in 1 week      Evonne Munoz MD  [address]  [address] [phone]    Call in 2 weeks        Activity level: As tolerated    Diet: DIET GENERAL; Mildly Thick (Nectar)    Dispo:SNF    Condition at discharge: Stable    Patient ID:  Marcie Ruth  [id number]  80 y.o.  11/9/1933    Admit date: 8/13/2020    Discharge date and time:  8/20/2020  2:02 PM    Admission Diagnoses: Principal Problem:    Orthostatic hypotension  Active Problems:    Essential hypertension    Parkinson's disease ([address]    Urinary retention with incomplete bladder emptying    Hx of total knee arthroplasty, right    Leukocytosis    Macrocytic anemia    Hypothyroidism    Sepsis ([address]  Resolved Problems:    * No resolved hospital problems. *      Discharge Diagnoses: Principal Problem:    Orthostatic hypotension  Active Problems:    Essential hypertension    Parkinson's disease ([address]    Urinary retention with incomplete bladder emptying    Hx of total knee arthroplasty, right    Leukocytosis    Macrocytic anemia    Hypothyroidism    Sepsis ([address]  Resolved Problems:    * No resolved hospital problems.  *      Consults:  IP CONSULT TO INFECTIOUS DISEASES  IP CONSULT TO UROLOGY  IP CONSULT TO ENDOCRINOLOGY  IP CONSULT TO CARDIOLOGY  IP CONSULT TO IV TEAM    Procedures:   Echo: 8-16-20   Summary   Ejection fraction is visually estimated at 35% +/-5%%. Mild asymmetric septal hypertrophy. Stage I diastolic dysfunction. hypo to akinetic mid to distal inferoseptal wall   Mildly dilated right ventricle. Right ventricle global systolic function is mildly reduced . The left atrium is mildly dilated. Mildly dilated aortic root. Mild mitral regurgitation is present. The aortic valve is trileaflet. The aortic valve appears mildly sclerotic. Mild-to-moderate aortic regurgitation is noted. Moderate tricuspid regurgitation. RVSP is 36 mmHg. Stress test: 8-18-20  1. The study is technically inadequate, with diminished activity    throughout the majority of the left ventricle, showing only minimal    activity in the basilar region of the anterior and lateral walls and    minimal activity inferior wall. 2. Ejection fraction is 30 %, which is diminished in this patient with    mild ventricular dilation. .    3. Wall motion was not adequately assessed due to the lack of tracer    distribution through majority of the the left ventricular myocardium          Hospital Course: Patient was admitted with Orthostatic hypotension [I95.1]  Orthostatic hypotension [I95.1]. Patient is a 78-year-old male past medical history of BPH, rheumatoid arthritis, HTN, CAD, AAA, and Parkinson disease who presented to the ED on 8-13-20 complaining of presyncope and weakness. Patient was found to have orthostatic hypotension on admission, he was given IV fluids. Patient did have Klebsiella growing in urine, infectious disease followed. Patient was treated with antibiotics will be discharged on p.o. Omnicef. There was incidental finding of adrenal mass, urology was consulted as well as endocrinology. Patient is to follow-up as outpatient with endocrinology for this. Cardiology states that they think persistent orthostatic hypotension may be secondary to Parkinson's disease.   During his stay patient did have elevated troponin, cardiology states they feel secondary to demand ischemia echocardiogram was completed which showed EF of 35%. Ischemic work-up completed. Wall motion abnormality noted, cardiology did discuss options of medical management versus cath and elected at this time to continue with medical management and will repeat Sherry Titus as outpatient. Cardiology states the patient stable for discharge from their standpoint. Patient was evaluated by physical therapy and will be discharged to rehab physical therapy. Discharge Exam:  Vitals:    08/19/20 2000 08/20/20 0015 08/20/20 0518 08/20/20 0745   BP: 124/71 122/76  (!) 149/84   Pulse: 68 72  74   Resp: 18 18 18   Temp: 97.6 °F (36.4 °C) 98.1 °F (36.7 °C)  97.8 °F (36.6 °C)   TempSrc: Oral Oral  Axillary   SpO2: 97% 97%  99%   Weight:   191 lb 8 oz (86.9 kg)    Height:           General Appearance: in no acute distress and alert  Skin: warm and dry, no rash or erythema  Pulmonary/Chest: clear to auscultation bilaterally- no wheezes, rales or rhonchi, normal air movement, no respiratory distress  Cardiovascular: normal rate, normal S1 and S2, no murmurs, no gallops and no JVD  Abdomen: soft, non-tender, non-distended, normal bowel sounds, no masses or organomegaly  Extremities: no cyanosis, no clubbing and no edema    I/O last 3 completed shifts:   In: 420 [P.O.:420]  Out: 3125 [UROKD:4526]  I/O this shift:  In: 240 [P.O.:240]  Out: 600 [Urine:600]      LABS:  Recent Labs     08/18/20  0405 08/19/20  0345 08/20/20  0325    140 141   K 4.0 4.2 3.7    108* 107   CO2 22 23 22   BUN 27* 21 17   CREATININE 1.1 1.2 1.0   GLUCOSE 99 92 101*   CALCIUM 8.5* 8.4* 8.7       Recent Labs     08/18/20  0405 08/19/20  0345 08/20/20  0325   WBC 17.3* 11.4 11.8*   RBC 2.86* 2.97* 3.15*   HGB 9.8* 10.1* 10.7*   HCT 29.5* 30.2* 31.7*   .1* 101.7* 100.6*   MCH 34.3 34.0 34.0   MCHC 33.2 33.4 33.8   RDW 13.0 13.2 13.1    214 240   MPV 10.7 10.5 10.7       No results for input(s): POCSHARU in the last 72 hours. Imaging:   NM Cardiac Stress Test Nuclear Imaging   Final Result   1. The study is technically inadequate, with diminished activity   throughout the majority of the left ventricle, showing only minimal   activity in the basilar region of the anterior and lateral walls and   minimal activity inferior wall. 2. Ejection fraction is 30 %, which is diminished in this patient with   mild ventricular dilation. .   3. Wall motion was not adequately assessed due to the lack of tracer   distribution through majority of the the left ventricular myocardium         CTA PULMONARY W CONTRAST   Final Result   1. No pulmonary embolism. No dissection is seen. 2. Signs of chronic pulmonary artery hypertension. This report has been electronically signed by Garrison Burkitt MD.      XR CHEST PORTABLE   Final Result   No acute cardiopulmonary abnormality. Cardiomegaly without evidence of overt failure. CT CHEST WO CONTRAST   Final Result      1. Signs of anemia. Cardiomegaly and coronary artery disease. 2. Dilated ascending aorta at 4.3 cm.   3. New 2.5 cm left adrenal mass with nonspecific characteristics. Consider MRI with in and out of phase imaging to assess for small   quantities of fat which would indicate a benign adenoma. CT ABDOMEN PELVIS WO CONTRAST Additional Contrast? None   Final Result         1. 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 2. Nonspecific perinephric fat stranding, which is often seen as an   incidental finding but may signify underlying medical renal disease. No stone or hydronephrosis. 3. Severe distal colonic diverticulosis without diverticulitis. XR CHEST PORTABLE   Final Result   No acute cardiopulmonary findings.                 Patient Instructions:      Medication List      START taking these medications    cefdinir [address] [facility] [address]    Phone:  [phone]   · cefdinir 300 MG capsule     Information about where to get these medications is not yet available    Ask your nurse or doctor about these medications  · midodrine 2.5 MG tablet           Note that more than 30 minutes was spent in preparing discharge papers, discussing discharge with patient, medication review, etc.    Signed:  Electronically signed by Gloria Mtz PA-C on 8/20/2020 at 2:02 PM    NOTE: This report was transcribed using voice recognition software. Every effort was made to ensure accuracy; however, inadvertent computerized transcription errors may be present. HOSPITALIST ATTENDING PHYSICIAN NOTE 8/20/2020 1725PM:    Details of the evaluation - subjective assessment (including medication profile, past medical, family and social history when applicable), examination, review of lab and test data, diagnostic impressions and medical decision making - performed by Gloria Mtz PA-C, were discussed with me on the date of service and I agree with clinical information herein unless otherwise noted. The patient has been evaluated by me personally earlier today. Pt reports no fevers, chills,n/v.     Exam: heart reg at rate of 76,lungs cta, abd pos bs soft nt, ext neg for le edema     I agree with the assessment and plan of Clare Early PA-C    CAD/Elevated troponin suspect type II MI  Orthostatic hypotension  sepsis  Leukocytosis with possible uti and/or possible aspiration pneumonia  Urinary retention  bph  htn  Recent right knee replacement  RA  parkinsons disease  Hypothyroidism  Depression  Neck pain  Left adrenal mass    Total time for discharge is 37 min    Electronically signed by Jonny Flores D.O.   Hospitalist  4M Hospitalist Service at [facility]

## 2020-08-20 NOTE — PROGRESS NOTES
SPEECH LANGUAGE PATHOLOGY  DAILY PROGRESS NOTE        PATIENT NAME:  Tamara Kelly      :  1933          TODAY'S DATE:  2020 ROOM:  0410/0410-A        SWALLOWING    Pt upright in chair, daughter present. Pt reports dislike of thickened liquids. SLP provided education at length regarding food preparation to avoid thi nliquids (soup, cereal, etc) as well as parameters of [place] water protocol. All questions answered to satisfaction per verbal response. SLP repositioned pt in bed and provided him with ice chips which he was able to orally manipulate and swallow without overt clinical indicators aspiration x3. Pt is requesting use of [place] Protocol at this time with physician approval. Pt, family and nursing educated on protocol and handouts provided. DYSPHAGIA DIAGNOSIS:  Oropharyngeal dysphagia-overt s/s of aspiration observed with thin liquids                               DIET RECOMMENDATIONS:  Regular consistency solids with nectar consistency (mildly thick) liquids as tolerated; pt to choose soft items he is able to masticate. Recommend [employer] Protocol at this time.                  FEEDING RECOMMENDATIONS:                           Assistance level:  Stand by assistance is needed during all oral intake                             Compensatory strategies recommended: Small bites/sips and Alternate solids and liquids           Education- Pt educated on results and POC. Pt trained on compensatory strategies for safe swallow with fair outcome. Questions answered. Will continue SP intervention as per previously established POC. Giuseppe Ambrosio M.Ed. CCC-SLP  SP [id number]     CPT code(s) 09642  dysphagia tx  Total minutes :  30 minutes

## 2020-08-20 NOTE — PROGRESS NOTES
Dr. Marie Carvajal updated norepinephrine and epinephrine results. Patient still okay to discharge and go to follow up appointment that is scheduled.

## 2020-08-21 LAB
BLOOD CULTURE, ROUTINE: NORMAL
CULTURE, BLOOD 2: NORMAL
SARS-COV-2: NOT DETECTED
SOURCE: NORMAL

## 2020-08-26 NOTE — PROGRESS NOTES
Physician Progress Note      PATIENT:               Terrial Homans  CSN #:                  [id number]  :                       1933  ADMIT DATE:       2020 2:58 PM  [address] DATE:        2020 7:57 PM  RESPONDING  PROVIDER #:        Wilson Maurice MD          QUERY TEXT:    Patient admitted with orthostatic hypotension and had elevated troponin. Per   Cardiology consult note, \". .. elevated troponin of unknown significance- No   chest pain, but known CAD. Suspect type II release from infection. .. \". Per IM   notes, \". Annabelle Messing Annabelle Messing During his stay patient did have elevated troponin, cardiology   states they feel secondary to demand ischemia echocardiogram was completed   which showed EF of 35%. .. \". If possible, please document in the progress   notes and discharge summary if you are evaluating and/or treating any of the   following: The medical record reflects the following:  Risk Factors: CAD; advanced age; sepsis  Clinical Indicators: Troponin <0.01->0.84->0.56;  ECHO=Ejection fraction is   visually estimated at 35% +/-5%%. Mild asymmetric septal hypertrophy. Annabelle Messing Annabelle Messing Annabelle Messing Stage I diastolic dysfunction. ..hypo to   akinetic mid to distal inferoseptal wallper;   Per Cardiology notes, \". ..CAD   remote CABG, ischemic cardiomyopathy with echo showing EF 35-40% and wall   motion abnormality . I think this in old as the distal septal wall in thinned   on echo. Annabelle Messing Annabelle Messing \";  per discharge summary, \". Annabelle Messing Annabelle Messing During his stay patient did have   elevated troponin, cardiology states they feel secondary to demand ischemia   echocardiogram was completed which showed EF of 35%. Ischemic work-up   completed.   Wall motion abnormality noted, cardiology  Treatment: stress test; ECHO; ASA; Lipitor; Toprol;  Options provided:  -- Type 2 MI  -- Demand Ischemia with MI  -- Demand Ischemia only, no MI  -- Other - I will add my own diagnosis  -- Disagree - Not applicable / Not valid  -- Disagree - Clinically unable to determine / Unknown  -- Refer to Clinical Documentation Reviewer    PROVIDER RESPONSE TEXT:    This patient has demand ischemia only, no MI.     Query created by: Tory Brewster on 8/26/2020 8:51 AM      Electronically signed by:  Nati Alcaraz MD 8/26/2020 5:23 PM

## 2020-08-29 ENCOUNTER — OFFICE VISIT (OUTPATIENT)
Dept: PRIMARY CARE CLINIC | Age: 85
End: 2020-08-29
Payer: MEDICARE

## 2020-08-29 PROCEDURE — 99214 OFFICE O/P EST MOD 30 MIN: CPT | Performed by: FAMILY MEDICINE

## 2020-08-29 NOTE — PROGRESS NOTES
TONY GRULLON---TALL   [address]    STEROIDS CUASE ANXIETY    RA DR Silver. [address]    EGD 12-13 DR CHEN--- ESO DILITATION    COLON DR CHEN 1-14----- TICS    PARKINSON DIC DX WITH DR JERNIGAN 2014-- THEN CHG TO DR RUVALCABA---then dr Aneesh Arevalo    ----Enmanuel Ward STUDY-WITH LIQUID ASPIRATION    R TOTAL KNEE 1-24-17 DR WEI    HEMATURIA 2-17 DR LAWRENCE Segura PAIN--DR DANIELS THEN  [facility]    SEVERE LUMBAR SPINAL STENOSIS 2-18 DR BARTON--L-4-5 WITH ADRENAL    Dr. Daryn Escobar, [facility] left temple    Dr. Daryn Escobar, [facility] GLAND---REFERRED TO NEURO SURG DR VINSON---THEN TO DR Sun Cameron--    eval DR Deborah Selby 5-18 AND SAID SHOT AND AND PT REFUSED DUE TO CONCERN WITH    PREDNISONE-----TO SEE HIM BACK    Right knee total done 7-20  Dr Miguel Chambers and admits two weeks later with dehydration and orthostatic hypotension      Past Medical History:   Diagnosis Date    Anxiety     Ascending aortic aneurysm (HCC)     BPH (benign prostatic hyperplasia)     CAD (coronary artery disease)     Cancer (HCC)     skin    Coronary arteriosclerosis     Depression     DJD (degenerative joint disease)     Hyperlipidemia     Impacted cerumen     Lumbar spinal stenosis     severe    Macular degeneration     Osteoarthritis     Parkinson disease ([address]     RA (rheumatoid arthritis) ([address]      Family History   Problem Relation Age of Onset    Lung Cancer Father     No Known Problems Mother       Past Surgical History:   Procedure Laterality Date    APPENDECTOMY      CATARACT REMOVAL WITH IMPLANT Left 04/19/2020    CHOLECYSTECTOMY      COLONOSCOPY      CORONARY ARTERY BYPASS GRAFT  2002    HERNIA REPAIR Bilateral     inguinal    KNEE SURGERY Left 06/2011    TOTAL KNEE ARTHROPLASTY Right 01/24/2017    tiffanie    UPPER GASTROINTESTINAL ENDOSCOPY      peptic ulcer      Vitals:    08/29/20 1034   Pulse: 96   Temp: 97.7 °F (36.5 °C)   SpO2: 95%   Weight: 180 lb (81.6 kg)       Objective:    Physical Exam    There are no diagnoses linked to this encounter. Comments: ***    Follow Up: No follow-ups on file.      Seen by:  Yared Vaca DO

## 2020-08-30 VITALS
HEART RATE: 96 BPM | BODY MASS INDEX: 23.11 KG/M2 | OXYGEN SATURATION: 95 % | SYSTOLIC BLOOD PRESSURE: 98 MMHG | WEIGHT: 180 LBS | DIASTOLIC BLOOD PRESSURE: 52 MMHG | TEMPERATURE: 97.7 F

## 2020-08-30 PROBLEM — I95.1 ORTHOSTATIC HYPOTENSION: Chronic | Status: ACTIVE | Noted: 2020-08-13

## 2020-08-30 PROBLEM — T17.908A ASPIRATION INTO AIRWAY: Status: ACTIVE | Noted: 2020-08-30

## 2020-08-30 PROBLEM — F41.9 ANXIETY: Status: ACTIVE | Noted: 2020-08-30

## 2020-08-30 RX ORDER — FOLIC ACID 1 MG/1
1 TABLET ORAL DAILY
Qty: 90 TABLET | Refills: 3 | Status: SHIPPED
Start: 2020-08-30 | End: 2021-07-21 | Stop reason: SDUPTHER

## 2020-08-30 RX ORDER — LEVOTHYROXINE SODIUM 0.05 MG/1
25 TABLET ORAL DAILY
Qty: 90 TABLET | Refills: 12 | Status: SHIPPED
Start: 2020-08-30 | End: 2021-07-21 | Stop reason: SDUPTHER

## 2020-08-30 RX ORDER — LOVASTATIN 20 MG/1
20 TABLET ORAL NIGHTLY
Qty: 90 TABLET | Refills: 12 | Status: SHIPPED
Start: 2020-08-30 | End: 2021-07-21 | Stop reason: SDUPTHER

## 2020-08-30 RX ORDER — DULOXETIN HYDROCHLORIDE 30 MG/1
30 CAPSULE, DELAYED RELEASE ORAL DAILY
Qty: 90 CAPSULE | Refills: 5 | Status: SHIPPED
Start: 2020-08-30 | End: 2020-09-15

## 2020-08-30 RX ORDER — OMEPRAZOLE 20 MG/1
20 CAPSULE, DELAYED RELEASE ORAL DAILY
Qty: 90 CAPSULE | Refills: 3 | Status: SHIPPED
Start: 2020-08-30 | End: 2021-07-21 | Stop reason: SDUPTHER

## 2020-08-30 RX ORDER — MIDODRINE HYDROCHLORIDE 2.5 MG/1
2.5 TABLET ORAL 2 TIMES DAILY WITH MEALS
Qty: 90 TABLET | Refills: 3 | Status: SHIPPED
Start: 2020-08-30 | End: 2020-09-12 | Stop reason: SDUPTHER

## 2020-08-30 RX ORDER — METOPROLOL SUCCINATE 25 MG/1
TABLET, EXTENDED RELEASE ORAL
Qty: 45 TABLET | Refills: 12 | Status: SHIPPED
Start: 2020-08-30 | End: 2021-07-21 | Stop reason: SDUPTHER

## 2020-08-30 ASSESSMENT — PATIENT HEALTH QUESTIONNAIRE - PHQ9
SUM OF ALL RESPONSES TO PHQ9 QUESTIONS 1 & 2: 0
SUM OF ALL RESPONSES TO PHQ QUESTIONS 1-9: 0
2. FEELING DOWN, DEPRESSED OR HOPELESS: 0
SUM OF ALL RESPONSES TO PHQ QUESTIONS 1-9: 0
1. LITTLE INTEREST OR PLEASURE IN DOING THINGS: 0

## 2020-08-30 NOTE — PROGRESS NOTES
20  Name: Michell Domínguez    : 1933    Sex: male    Age: 80 y.o. Subjective:  Chief Complaint: Patient is here for hospital follow-up note. Patient presents for follow-up from the hospital.  Accompanied by her daughter. Is gone through a great deal of issues lately. Went to retire hospital admission with the patient's daughter. After conversation I did call the other daughter who is a nurse in [place] and went over it again with her. He has had knee surgery done shortly after went into the hospital with dehydration UTI drop in blood pressure. He was seen by urology infectious disease endocrinology due to adrenal mass cardiology due to elevated troponins. He was treated for UTI infections he is also currently had aspiration. He was supposed be thickening his liquids for the last year but he is refused to do so. He had a speech evaluation was done in the hospital and is again to use thickened liquids. His blood pressure is low due to debilitation. The Hytrin was discontinued in the hospital.    The daughter brought all her medications and I went over them completely and the Hytrin brother was there the patient states he took it last night. Today's blood pressure is 98/52 with minimal drop on standing. He did take a Middaugh drain this morning. I made sure that the daughter and the other daughter further knew that the meds need to be monitored closely I took the hydration away and make sure the daughter that relieve it. Is to follow-up with all specialist.  And again strongly encouraged thickening liquids. He is reluctant to do so. He is done down 10 pounds at the hospital admission. Review of Systems   Constitutional: Negative. HENT: Negative. Eyes: Negative. Respiratory: Negative. Cardiovascular: Negative. Gastrointestinal: Negative. Endocrine: Negative. Genitourinary: Negative. Musculoskeletal: Positive for arthralgias. Skin: Negative. Allergic/Immunologic: Negative. Neurological: Positive for weakness. Hematological: Negative. Psychiatric/Behavioral: Negative. Current Outpatient Medications:     metoprolol succinate (TOPROL XL) 25 MG extended release tablet, Takes 1/2 tablet daily, Disp: 45 tablet, Rfl: 12    folic acid (FOLVITE) 1 MG tablet, Take 1 tablet by mouth daily, Disp: 90 tablet, Rfl: 3    omeprazole (PRILOSEC) 20 MG delayed release capsule, Take 1 capsule by mouth daily, Disp: 90 capsule, Rfl: 3    lovastatin (MEVACOR) 20 MG tablet, Take 1 tablet by mouth nightly, Disp: 90 tablet, Rfl: 12    levothyroxine (SYNTHROID) 50 MCG tablet, Take 0.5 tablets by mouth Daily, Disp: 90 tablet, Rfl: 12    midodrine (PROAMATINE) 2.5 MG tablet, Take 1 tablet by mouth 2 times daily (with meals), Disp: 90 tablet, Rfl: 3    DULoxetine (CYMBALTA) 30 MG extended release capsule, Take 1 capsule by mouth daily, Disp: 90 capsule, Rfl: 5    acetaminophen (TYLENOL) 500 MG tablet, Take 500 mg by mouth every 6 hours as needed for Pain, Disp: , Rfl:     PARoxetine (PAXIL) 20 MG tablet, Take 1 tablet by mouth every morning, Disp: 90 tablet, Rfl: 3    diclofenac sodium (VOLTAREN) 1 % GEL, Apply 2 g topically 2 times daily, Disp: , Rfl:     carbidopa-levodopa (SINEMET CR)  MG per extended release tablet, Take 1 tablet by mouth every 4 hours (while awake) No more than 6 per day, Disp: 540 tablet, Rfl: 3    ipratropium (ATROVENT) 0.06 % nasal spray, One squirt qid prn runny nose, Disp: 1 Bottle, Rfl: 12    aspirin 81 MG tablet, Take 650 mg by mouth daily , Disp: , Rfl:     Multiple Vitamins-Minerals (OCUVITE) TABS oral tablet, Take 1 tablet by mouth daily, Disp: , Rfl:     vitamin D (CHOLECALCIFEROL) 1000 UNIT TABS tablet, Take 1,000 Units by mouth daily, Disp: , Rfl:     methotrexate (RHEUMATREX) 2.5 MG chemo tablet, Take 2.5 mg by mouth once a week 4 tablets weekly. , Disp: , Rfl:   Allergies   Allergen Reactions    Pcn [Penicillins]     Prednisone      Social History     Socioeconomic History    Marital status:      Spouse name: Not on file    Number of children: Not on file    Years of education: Not on file    Highest education level: Not on file   Occupational History    Not on file   Social Needs    Financial resource strain: Not on file    Food insecurity     Worry: Not on file     Inability: Not on file    Transportation needs     Medical: Not on file     Non-medical: Not on file   Tobacco Use    Smoking status: Former Smoker     Years: 10.00     Types: Pipe, Cigars    Smokeless tobacco: Never Used   Substance and Sexual Activity    Alcohol use:  Yes     Alcohol/week: 0.0 standard drinks     Comment: social    Drug use: No    Sexual activity: Not Currently   Lifestyle    Physical activity     Days per week: Not on file     Minutes per session: Not on file    Stress: Not on file   Relationships    Social connections     Talks on phone: Not on file     Gets together: Not on file     Attends Oriental orthodox service: Not on file     Active member of club or organization: Not on file     Attends meetings of clubs or organizations: Not on file     Relationship status: Not on file    Intimate partner violence     Fear of current or ex partner: Not on file     Emotionally abused: Not on file     Physically abused: Not on file     Forced sexual activity: Not on file   Other Topics Concern    Not on file   Social History Narrative        Colonoscopy - (10/29/2008)    Colonoscopy - (1/10/2014)    Zoster/Shingles Vaccine - given at [facility]    CAD WITH CABG    EARLY HYPOTHYROID----HYPOTHRYOID 3-18    ANXIETY    DEPRESSION    FATIGUE    BPH    MACULAR DEGENERATION    SKIN CA    APPY    BILAT ING HERNIA---ONE DONE THINKS WAS R    L ING HERNIA    COLON 6-99 AND 6-02----5T O 10 YRS DR POWERS---PT STATES WAS TOLD THAT HE SHOULD NTO    HAVE AGAIN DUE TO AGE    L KNEE OR X 2 DR WEBSTER AND [address] 98/52   Pulse: 96   Temp: 97.7 °F (36.5 °C)   SpO2: 95%   Weight: 180 lb (81.6 kg)       Objective:    Physical Exam  Vitals signs reviewed. Constitutional:       Appearance: He is well-developed. HENT:      Head: Normocephalic. Eyes:      Pupils: Pupils are equal, round, and reactive to light. Neck:      Musculoskeletal: Normal range of motion. Cardiovascular:      Rate and Rhythm: Normal rate and regular rhythm. Pulmonary:      Effort: Pulmonary effort is normal.      Breath sounds: Normal breath sounds. Abdominal:      Palpations: Abdomen is soft. Musculoskeletal:      Comments: Decreased range of motion both knees. Skin:     General: Skin is warm. Neurological:      Mental Status: He is alert and oriented to person, place, and time. Psychiatric:         Behavior: Behavior normal.         Emil Ambrosio was seen today for follow-up from hospital and [facility]. Diagnoses and all orders for this visit:    Orthostatic hypotension  -     midodrine (PROAMATINE) 2.5 MG tablet; Take 1 tablet by mouth 2 times daily (with meals)    Aspiration into airway, subsequent encounter    Parkinson's disease (HCC)    Anxiety  -     DULoxetine (CYMBALTA) 30 MG extended release capsule; Take 1 capsule by mouth daily    Essential hypertension  -     metoprolol succinate (TOPROL XL) 25 MG extended release tablet; Takes 1/2 tablet daily    Gastroesophageal reflux disease without esophagitis  -     omeprazole (PRILOSEC) 20 MG delayed release capsule; Take 1 capsule by mouth daily    Mixed hyperlipidemia  -     lovastatin (MEVACOR) 20 MG tablet; Take 1 tablet by mouth nightly    Acquired hypothyroidism  -     levothyroxine (SYNTHROID) 50 MCG tablet; Take 0.5 tablets by mouth Daily    Other orders  -     folic acid (FOLVITE) 1 MG tablet; Take 1 tablet by mouth daily        Comments: Great deal of time spent reviewing the entire hospital admissions with the daughter and again with the other daughter on the phone in [place]. And highly encouraged the patient to thicken his liquids. Follow-up with all specialist.  Leandrew Echavarria. Increase fluids. Increase diet. Ensure or boost twice a day but must be thickened. He just discharged yesterday from rehab. We will see back in 2 weeks and bring all meds and again. His anxiety is better since he got home from the hospital.    . .A great deal of time spent reviewing medications, diet, exercise, social issues. Also reviewing the chart before entering the room with patient and finishing charting after leaving patient's room. More than half of that time was spent face to face with the patient in counseling and coordinating care. Follow Up: Return in about 2 weeks (around 9/12/2020).      Seen by:  Yared Vaca DO

## 2020-08-31 ENCOUNTER — CARE COORDINATION (OUTPATIENT)
Dept: CASE MANAGEMENT | Age: 85
End: 2020-08-31

## 2020-08-31 NOTE — CARE COORDINATION
[facility] Transitions Initial Follow Up Call    Call within 2 business days of discharge: Yes    Patient: Geoffrey Finney Patient : 1933   MRN: [id number]  Reason for Admission: Orthostatic Hypotension  Discharge Date: 20 RARS: Readmission Risk Score: 14      Last Discharge [facility]       Complaint Diagnosis Description Type Department Provider    20 Fatigue Orthostatic hypotension . .. ED to Hosp-Admission (Discharged) (ADMITTED) [facility]IR[facility] 4W Charles Godinez DO; Emely Koch. .. Patient states he doing as well as to be expected. No complaints. States nurse with [facility] was out to the home and stated BP is wnl. Spouse states patient needs to eat more. Patient denies any concerns. Declined med review. PCP appointment scheduled 20. Will check patient progress next week.  RAMILA Baltazar RN  Care Transition Nurse [phone]         Spoke with:  [address]: [place]    Non-face-to-face services provided:      Care Transitions 24 Hour Call    Do you have any ongoing symptoms?:  No  Do you have a copy of your discharge instructions?:  Yes  Do you have all of your prescriptions and are they filled?:  Yes  Have you been contacted by a [id number] Pharmacist?:  No  Have you scheduled your follow up appointment?:  Yes (Comment: PCP 20)  Were you discharged with any Home Care or Post Acute Services:  Yes  Post Acute Services:  Home Health (Comment: [facility])  Do you feel like you have everything you need to keep you well at home?:  Yes  Care Transitions Interventions         Follow Up  Future Appointments   Date Time Provider Santosh Wong   2020 10:45 AM DO SHIRA Goldstein [facility]   2020 10:40 AM Ace Mix MD BD ENDO [facility]   2020  1:40 PM Kelly Suazo MD [facility] NEURO [facility]   2020  1:00 PM FABIANO Weinberg - CNP [facility]   2020  1:15 PM DO SHIRA [facility]       Patricia Harris RN

## 2020-09-12 ENCOUNTER — OFFICE VISIT (OUTPATIENT)
Dept: PRIMARY CARE CLINIC | Age: 85
End: 2020-09-12
Payer: MEDICARE

## 2020-09-12 VITALS
HEART RATE: 73 BPM | TEMPERATURE: 98 F | HEIGHT: 74 IN | DIASTOLIC BLOOD PRESSURE: 62 MMHG | WEIGHT: 179 LBS | RESPIRATION RATE: 12 BRPM | BODY MASS INDEX: 22.97 KG/M2 | SYSTOLIC BLOOD PRESSURE: 108 MMHG | OXYGEN SATURATION: 92 %

## 2020-09-12 PROCEDURE — 99214 OFFICE O/P EST MOD 30 MIN: CPT | Performed by: FAMILY MEDICINE

## 2020-09-12 RX ORDER — MIDODRINE HYDROCHLORIDE 2.5 MG/1
2.5 TABLET ORAL 2 TIMES DAILY WITH MEALS
Qty: 90 TABLET | Refills: 3 | Status: SHIPPED
Start: 2020-09-12 | End: 2021-03-05 | Stop reason: SDUPTHER

## 2020-09-12 RX ORDER — HYDROXYZINE HYDROCHLORIDE 25 MG/1
25 TABLET, FILM COATED ORAL 2 TIMES DAILY PRN
Qty: 30 TABLET | Refills: 3 | Status: SHIPPED
Start: 2020-09-12 | End: 2020-12-29 | Stop reason: SDUPTHER

## 2020-09-12 ASSESSMENT — ENCOUNTER SYMPTOMS
GASTROINTESTINAL NEGATIVE: 1
ALLERGIC/IMMUNOLOGIC NEGATIVE: 1
EYES NEGATIVE: 1
RESPIRATORY NEGATIVE: 1

## 2020-09-12 ASSESSMENT — LIFESTYLE VARIABLES
HAVE YOU OR SOMEONE ELSE BEEN INJURED AS A RESULT OF YOUR DRINKING: 0
HOW OFTEN DURING THE LAST YEAR HAVE YOU HAD A FEELING OF GUILT OR REMORSE AFTER DRINKING: 0
HOW OFTEN DO YOU HAVE A DRINK CONTAINING ALCOHOL: 1
AUDIT TOTAL SCORE: 1
HOW OFTEN DURING THE LAST YEAR HAVE YOU FOUND THAT YOU WERE NOT ABLE TO STOP DRINKING ONCE YOU HAD STARTED: 0
HOW MANY STANDARD DRINKS CONTAINING ALCOHOL DO YOU HAVE ON A TYPICAL DAY: 0
HOW OFTEN DURING THE LAST YEAR HAVE YOU BEEN UNABLE TO REMEMBER WHAT HAPPENED THE NIGHT BEFORE BECAUSE YOU HAD BEEN DRINKING: 0
AUDIT-C TOTAL SCORE: 1
HOW OFTEN DURING THE LAST YEAR HAVE YOU FAILED TO DO WHAT WAS NORMALLY EXPECTED FROM YOU BECAUSE OF DRINKING: 0
HOW OFTEN DURING THE LAST YEAR HAVE YOU NEEDED AN ALCOHOLIC DRINK FIRST THING IN THE MORNING TO GET YOURSELF GOING AFTER A NIGHT OF HEAVY DRINKING: 0
HOW OFTEN DO YOU HAVE SIX OR MORE DRINKS ON ONE OCCASION: 0
HAS A RELATIVE, FRIEND, DOCTOR, OR ANOTHER HEALTH PROFESSIONAL EXPRESSED CONCERN ABOUT YOUR DRINKING OR SUGGESTED YOU CUT DOWN: 0

## 2020-09-12 NOTE — PROGRESS NOTES
(while awake) No more than 6 per day, Disp: 540 tablet, Rfl: 3    methotrexate (RHEUMATREX) 2.5 MG chemo tablet, Take 2.5 mg by mouth once a week 4 tablets weekly. , Disp: , Rfl:     acetaminophen (TYLENOL) 500 MG tablet, Take 500 mg by mouth every 6 hours as needed for Pain, Disp: , Rfl:     diclofenac sodium (VOLTAREN) 1 % GEL, Apply 2 g topically 2 times daily, Disp: , Rfl:     ipratropium (ATROVENT) 0.06 % nasal spray, One squirt qid prn runny nose, Disp: 1 Bottle, Rfl: 12    aspirin 81 MG tablet, Take 650 mg by mouth daily , Disp: , Rfl:     Multiple Vitamins-Minerals (OCUVITE) TABS oral tablet, Take 1 tablet by mouth daily, Disp: , Rfl:     vitamin D (CHOLECALCIFEROL) 1000 UNIT TABS tablet, Take 1,000 Units by mouth daily, Disp: , Rfl:   Allergies   Allergen Reactions    Pcn [Penicillins]     Prednisone      Social History     Socioeconomic History    Marital status:      Spouse name: Not on file    Number of children: Not on file    Years of education: Not on file    Highest education level: Not on file   Occupational History    Not on file   Social Needs    Financial resource strain: Not on file    Food insecurity     Worry: Not on file     Inability: Not on file    Transportation needs     Medical: Not on file     Non-medical: Not on file   Tobacco Use    Smoking status: Former Smoker     Years: 10.00     Types: Pipe, Cigars    Smokeless tobacco: Never Used   Substance and Sexual Activity    Alcohol use:  Yes     Alcohol/week: 0.0 standard drinks     Comment: social    Drug use: No    Sexual activity: Not Currently   Lifestyle    Physical activity     Days per week: Not on file     Minutes per session: Not on file    Stress: Not on file   Relationships    Social connections     Talks on phone: Not on file     Gets together: Not on file     Attends Jew service: Not on file     Active member of club or organization: Not on file     Attends meetings of clubs or organizations: Not on file     Relationship status: Not on file    Intimate partner violence     Fear of current or ex partner: Not on file     Emotionally abused: Not on file     Physically abused: Not on file     Forced sexual activity: Not on file   Other Topics Concern    Not on file   Social History Narrative        Colonoscopy - (10/29/2008)    Colonoscopy - (1/10/2014)    Zoster/Shingles Vaccine - given at [facility]    CAD WITH CABG    EARLY HYPOTHYROID----HYPOTHRYOID 3-18    ANXIETY    DEPRESSION    FATIGUE    BPH    MACULAR DEGENERATION    SKIN CA    APPY    BILAT ING HERNIA---ONE DONE THINKS WAS R    L ING HERNIA    COLON 6-99 AND 6-02----5T O 10 YRS DR POWERS---PT STATES WAS TOLD THAT HE SHOULD NTO    HAVE AGAIN DUE TO AGE    L KNEE OR X 2 DR Radha Reyes AND CHRISSY    ANXIETY FROM PREDNISONE 2009    COLON 4-11------NEG---YRUICH    EGD 4-11 PEPTIC [facility]    L KNEE OR 6-11    GRANDSON CHINO GARCIAO---TALL  AT [facility]    DAUGHTER DATES JAMES BARCENAS    STEROIDS CUASE ANXIETY    RA DR RUSSO    EGD 12-13 DR CHEN--- ESO DILITATION    COLON DR CHEN 1-14----- TICS    PARKINSON DIC DX WITH DR JERNIGAN 2014-- THEN CHG TO DR RUVALCABA---then dr Long Fontenot    ----Annika Dakotah STUDY-WITH LIQUID ASPIRATION    R TOTAL KNEE 1-24-17 DR WEI    HEMATURIA 2-17 DR LAWRENCE Alonzo Pun PAIN--DR DANIELS THEN  [facility]    SEVERE LUMBAR SPINAL STENOSIS 2-18 DR BARTON--L-4-5 WITH ADRENAL    Dr. Christina Berger, [facility] left [place]    Dr. Christina Berger, [facility]---REFERRED TO NEURO SURG DR VINSON---THEN TO DR Bianca Gomez--    eval DR Alejandro Warner 5-18 AND SAID SHOT AND AND PT REFUSED DUE TO CONCERN WITH    PREDNISONE-----TO SEE HIM BACK    Right knee total done 7-20  Dr Summer Khanna and admits two weeks later with dehydration and orthostatic hypotension      Past Medical History:   Diagnosis Date    Anxiety     Ascending aortic aneurysm (HCC)     BPH (benign prostatic hyperplasia)     CAD (coronary artery disease)     Cancer ([facility] [address]     skin    Coronary arteriosclerosis     Depression     DJD (degenerative joint disease)     Hyperlipidemia     Impacted cerumen     Lumbar spinal stenosis     severe    Macular degeneration     Osteoarthritis     Parkinson disease (HCC)     RA (rheumatoid arthritis) ([address]      Family History   Problem Relation Age of Onset    Lung Cancer Father     No Known Problems Mother       Past Surgical History:   Procedure Laterality Date    APPENDECTOMY      CATARACT REMOVAL WITH IMPLANT Left 04/19/2020    CHOLECYSTECTOMY      COLONOSCOPY      CORONARY ARTERY BYPASS GRAFT  2002    HERNIA REPAIR Bilateral     inguinal    KNEE SURGERY Left 06/2011    TOTAL KNEE ARTHROPLASTY Right 01/24/2017    tiffanie    UPPER GASTROINTESTINAL ENDOSCOPY      peptic ulcer      Vitals:    09/12/20 1049   BP: 108/62   Pulse: 73   Resp: 12   Temp: 98 °F (36.7 °C)   TempSrc: Temporal   SpO2: 92%   Weight: 179 lb (81.2 kg)   Height: 6' 2\" (1.88 m)       Objective:    Physical Exam  Vitals signs reviewed. Constitutional:       Appearance: He is well-developed. HENT:      Head: Normocephalic. Eyes:      Pupils: Pupils are equal, round, and reactive to light. Neck:      Musculoskeletal: Normal range of motion. Cardiovascular:      Rate and Rhythm: Normal rate and regular rhythm. Pulmonary:      Effort: Pulmonary effort is normal.      Breath sounds: Normal breath sounds. Abdominal:      Palpations: Abdomen is soft. Musculoskeletal:      Comments: Right leg brace   Skin:     General: Skin is warm. Neurological:      Mental Status: He is alert and oriented to person, place, and time. Psychiatric:         Behavior: Behavior normal.         Conchis Pham was seen today for medicare awv. Diagnoses and all orders for this visit:    Essential hypertension    Mixed hyperlipidemia    Orthostatic hypotension  -     midodrine (PROAMATINE) 2.5 MG tablet;  Take 1 tablet by mouth 2 times daily (with meals)    Parkinson's disease ([address]    Other orders  -     hydrOXYzine (ATARAX) 25 MG tablet; Take 1 tablet by mouth 2 times daily as needed for Anxiety        Comments: diet  exer hm isues   rf meds    bonilla to ck if on cymbaltta    Fu with all spec   A great deal of time spent reviewing medications, diet, exercise, social issues. Also reviewing the chart before entering the room with patient and finishing charting after leaving patient's room. More than half of that time was spent face to face with the patient in counseling and coordinating care. Ck bp home    Follow Up: Return in about 2 months (around 11/12/2020).      Seen by:  Estephanie Marroquin DO

## 2020-09-14 ENCOUNTER — TELEPHONE (OUTPATIENT)
Dept: PRIMARY CARE CLINIC | Age: 85
End: 2020-09-14

## 2020-09-14 NOTE — TELEPHONE ENCOUNTER
Daughter calling stating pt was in Saturday and you wanted her to call in to let you know if pt is taking Duloxetine.  Per daughter he is not taking it

## 2020-09-15 NOTE — TELEPHONE ENCOUNTER
That is fine.   He does not need to start taking it              I wanted to update my medication list

## 2020-09-16 ENCOUNTER — TELEPHONE (OUTPATIENT)
Dept: ENDOCRINOLOGY | Age: 85
End: 2020-09-16

## 2020-09-16 ENCOUNTER — OFFICE VISIT (OUTPATIENT)
Dept: ENDOCRINOLOGY | Age: 85
End: 2020-09-16
Payer: MEDICARE

## 2020-09-16 VITALS
RESPIRATION RATE: 16 BRPM | OXYGEN SATURATION: 97 % | HEART RATE: 66 BPM | WEIGHT: 179.8 LBS | SYSTOLIC BLOOD PRESSURE: 136 MMHG | DIASTOLIC BLOOD PRESSURE: 74 MMHG | HEIGHT: 74 IN | TEMPERATURE: 97 F | BODY MASS INDEX: 23.08 KG/M2

## 2020-09-16 PROCEDURE — 99214 OFFICE O/P EST MOD 30 MIN: CPT | Performed by: INTERNAL MEDICINE

## 2020-09-16 RX ORDER — DEXAMETHASONE 1 MG
1 TABLET ORAL ONCE
Qty: 1 TABLET | Refills: 0 | Status: SHIPPED | OUTPATIENT
Start: 2020-09-16 | End: 2020-09-16

## 2020-09-16 NOTE — PROGRESS NOTES
[address] [facility] Department of Endocrinology Diabetes and Metabolism   [address] [facility] [address]   Phone: [phone]  Fax: [phone]  Date of Service: 9/16/2020     Primary Care Physician: Shruthi Vargas DO  Provider: Janessa Gonzalez MD     Reason for the visit:   Lt side adrenal incidentaloma     History of Present Illness: The history is provided by the patient. No  was used. Accuracy of the patient data is excellent. Gabo Wright is very pleasant 80 y.o. y.o. male with PMH of Parkinsons disease, HLD and other listed below seen today for follow up visit    Pt was recently admitted to [facility] because of worsening fatigue, decrease po intake and hypotension concerning for sepsis. CT abdomen pelvis without contrast 8/13/2020 shows nonspecific 2.4 cm left adrenal mass, nonspecific perinephric fat stranding. Pt has HTN controlled with beta blocker Metoprolol. The patient denied h/o intermittent headache, sweating, or palpitation.  Denied history of diabetes, Wt change, easy bruising, or hyperpigmentation     Component 8/16/2020   Normetanephrine, Free 2.54 (H)   Metanephrine, Free 1.23 (H)   Cortisol 32.31 (H)    Aldosterone 14.9   Renin Activity 0.7     PAST MEDICAL HISTORY   Past Medical History:   Diagnosis Date    Anxiety     Ascending aortic aneurysm (HCC)     BPH (benign prostatic hyperplasia)     CAD (coronary artery disease)     Cancer (HCC)     skin    Coronary arteriosclerosis     Depression     DJD (degenerative joint disease)     Hyperlipidemia     Impacted cerumen     Lumbar spinal stenosis     severe    Macular degeneration     Osteoarthritis     Parkinson disease ([address]     RA (rheumatoid arthritis) ([address]         PAST SURGICAL HISTORY   Past Surgical History:   Procedure Laterality Date    APPENDECTOMY      CATARACT REMOVAL WITH IMPLANT Left 04/19/2020    CHOLECYSTECTOMY      COLONOSCOPY      CORONARY ARTERY BYPASS GRAFT  2002    HERNIA REPAIR Bilateral     inguinal    KNEE SURGERY Left 06/2011    TOTAL KNEE ARTHROPLASTY Right 01/24/2017    tiffanie    UPPER GASTROINTESTINAL ENDOSCOPY      peptic ulcer        SOCIAL HISTORY   Tobacco:   reports that he has quit smoking. His smoking use included pipe and cigars. He quit after 10.00 years of use. He has never used smokeless tobacco.  Alcohol:   reports current alcohol use. Drugs:   reports no history of drug use.     FAMILY HISTORY   Family History   Problem Relation Age of Onset    Lung Cancer Father     No Known Problems Mother         ALLERGIES AND DRUG REACTIONS   Allergies   Allergen Reactions    Pcn [Penicillins]     Prednisone         CURRENT MEDICATIONS   Current Outpatient Medications   Medication Sig Dispense Refill    dexamethasone (DECADRON) 1 MG tablet Take 1 tablet by mouth once for 1 dose Take 1 tablet of Dexamethasone 1 mg at 11 pm and go to the Lab next morning at 8am 1 tablet 0    midodrine (PROAMATINE) 2.5 MG tablet Take 1 tablet by mouth 2 times daily (with meals) 90 tablet 3    hydrOXYzine (ATARAX) 25 MG tablet Take 1 tablet by mouth 2 times daily as needed for Anxiety 30 tablet 3    metoprolol succinate (TOPROL XL) 25 MG extended release tablet Takes 1/2 tablet daily 45 tablet 12    folic acid (FOLVITE) 1 MG tablet Take 1 tablet by mouth daily 90 tablet 3    omeprazole (PRILOSEC) 20 MG delayed release capsule Take 1 capsule by mouth daily 90 capsule 3    lovastatin (MEVACOR) 20 MG tablet Take 1 tablet by mouth nightly 90 tablet 12    levothyroxine (SYNTHROID) 50 MCG tablet Take 0.5 tablets by mouth Daily 90 tablet 12    acetaminophen (TYLENOL) 500 MG tablet Take 500 mg by mouth every 6 hours as needed for Pain      PARoxetine (PAXIL) 20 MG tablet Take 1 tablet by mouth every morning 90 tablet 3    diclofenac sodium (VOLTAREN) 1 % GEL Apply 2 g topically 2 times daily      carbidopa-levodopa (SINEMET CR)  MG per extended release tablet Take 1 tablet by mouth every 4 hours (while awake) No more than 6 per day 540 tablet 3    ipratropium (ATROVENT) 0.06 % nasal spray One squirt qid prn runny nose 1 Bottle 12    aspirin 81 MG tablet Take 650 mg by mouth daily       Multiple Vitamins-Minerals (OCUVITE) TABS oral tablet Take 1 tablet by mouth daily      vitamin D (CHOLECALCIFEROL) 1000 UNIT TABS tablet Take 1,000 Units by mouth daily      methotrexate (RHEUMATREX) 2.5 MG chemo tablet Take 2.5 mg by mouth once a week 4 tablets weekly. No current facility-administered medications for this visit. Review of Systems   Constitutional: No fever, no chills, no diaphoresis, no generalized weakness. HEENT: No blurred vision, No sore throat, no ear pain, no hair loss   Neck: denied any neck swelling, difficulty swallowing,   Cadrdiopulomary: No CP, SOB or palpitation, No orthopnea or PND. No cough or wheezing. GI: No N/V/D, no constipation, No abdominal pain, no melena or hematochezia   : Denied any dysuria, hematuria, flank pain, discharge, or incontinence. Skin: denied any rash, striae ulcer, Hirsute, or hyperpigmentation. MSK: denied any joint deformity, joint pain/swelling, muscle pain, or back pain. Neuro: no numbess, no tingling, no weakness,     OBJECTIVE   /74   Pulse 66   Temp 97 °F (36.1 °C)   Resp 16   Ht 6' 2\" (1.88 m)   Wt 179 lb 12.8 oz (81.6 kg)   SpO2 97%   BMI 23.08 kg/m²    BP Readings from Last 4 Encounters:   09/16/20 136/74   09/12/20 108/62   08/29/20 (!) 98/52   08/20/20 (!) 149/84      Wt Readings from Last 6 Encounters:   09/16/20 179 lb 12.8 oz (81.6 kg)   09/12/20 179 lb (81.2 kg)   08/29/20 180 lb (81.6 kg)   08/20/20 191 lb 8 oz (86.9 kg)   06/24/20 190 lb (86.2 kg)   05/19/20 190 lb (86.2 kg)        Physical examination:   General: awake alert, oriented x3, no abnormal position or movements.    HEENT: normocephalic non traumatic, no exophthalmos   Neck: supple, no LN enlargement, no thyromegaly, no thyroid tenderness, no JVD. Pulm: Clear equal air entry no added sounds, no wheezing or rhonchi   CVS: S1 + S2, no murmur, no heave. Dorsalis pedis pulse palpable   Abd: soft lax, no tenderness, no organomegaly, audible bowel sounds. Skin: warm, no lesions, no rash.  No striae, no bruising, no tags  Musculoskeletal: No back tenderness, no kyphosis/scoliosis   Neuro: CN intact, sensation normal, muscle power normal. No tremors   Psych: normal mood, and affect     Review of Laboratory Data:   I personally reviewed the following labs:    Lab Results   Component Value Date/Time    WBC 11.8 (H) 08/20/2020 03:25 AM    RBC 3.15 (L) 08/20/2020 03:25 AM    HGB 10.7 (L) 08/20/2020 03:25 AM    HCT 31.7 (L) 08/20/2020 03:25 AM    .6 (H) 08/20/2020 03:25 AM    MCH 34.0 08/20/2020 03:25 AM    MCHC 33.8 08/20/2020 03:25 AM    RDW 13.1 08/20/2020 03:25 AM     08/20/2020 03:25 AM    MPV 10.7 08/20/2020 03:25 AM      Lab Results   Component Value Date/Time     08/20/2020 03:25 AM    K 3.7 08/20/2020 03:25 AM    K 4.2 08/14/2020 05:45 AM    CO2 22 08/20/2020 03:25 AM    BUN 17 08/20/2020 03:25 AM    CREATININE 1.0 08/20/2020 03:25 AM    CALCIUM 8.7 08/20/2020 03:25 AM    LABGLOM >60 08/20/2020 03:25 AM    GFRAA >60 08/20/2020 03:25 AM      Lab Results   Component Value Date/Time    TSH 1.320 08/13/2020 03:30 PM    H3NPOGA 6.4 06/17/2020 09:29 AM     Lab Results   Component Value Date    LABA1C 6.2 06/17/2020    GLUCOSE 101 08/20/2020    GLUCOSE 103 06/19/2011     Lab Results   Component Value Date    TRIG 85 06/17/2020    HDL 42 06/17/2020    LDLCALC 55 06/17/2020    CHOL 114 06/17/2020      No results found for: ALDODIRENCAL   No results found for: ALPRRATIO   Lab Results   Component Value Date    TRIG 85 06/17/2020    HDL 42 06/17/2020    LDLCALC 55 06/17/2020    CHOL 114 06/17/2020     No results found for: PTH   Lab Results   Component Value Date/Time    VITD25 33 05/07/2019 08:51 AM Medical Records/Labs/Images review:   I personally reviewed and summarized previous records   All labs and imaging studies were independently reviewed     Wanda Krueger, a 80 y.o.-old male seen in for the following issues     Left side Adrenal incidentaloma   · I have personally reviewed CT images myself and compare with MRI study that was done in 2/2018. Radiological characteristics of the mass (homogeneous, size <4 cm, smooth borders) in favor of benign lipid poor adenoma. This lesion is stable as of the MRI from 2/2018  · Will check overnight 1-mg Dexamethasone suppression test, plasma metanephrines, 24 hrs urine total/free catecholamines. Hypothyroidism   · Continue levothyroxine 50 mcg 1/2 tab daily     vitd deficiency   · Continue vitD supplement    Return in about 1 year (around 9/16/2021) for adrenal mass . The above issues were reviewed with the patient who understood and agreed with the plan. Thank you for allowing us to participate in the care of this patient. Please do not hesitate to contact us with any additional questions. Diagnosis Orders   1. Adrenal incidentaloma (HCC)  dexamethasone (DECADRON) 1 MG tablet    ALDOSTERONE & RENIN, DIRECT WITH RATIO    Cortisol Total    Metanephrines Plasma Free    Cortisol Total    Cortisol, Urine, Free   2. Hypothyroidism, unspecified type     3. Vitamin D deficiency       Betito Alvarez MD   Endocrinologist, [facility] Diabetes Care and Endocrinology   [address] [facility] [address]   Phone: [phone]   Fax: [phone]   --------------------------  An electronic signature was used to authenticate this note.  Rachele Sandifer, MD on 9/18/2020 at 8:50 PM

## 2020-09-16 NOTE — TELEPHONE ENCOUNTER
Reaction in hospital wasn't from Dexamethasone     If they are hesitant to take dexa, will order 24 hrs urine free cortisol

## 2020-09-16 NOTE — TELEPHONE ENCOUNTER
The pt's daughter,Marline, called. She is apprehensive to give him the Dexamethasone for his labs. He had a bad reaction in the hospital to prednisone. Please advise.

## 2020-09-17 NOTE — TELEPHONE ENCOUNTER
Spoke with pt's daughter, explained Dr Bia Carmen message. Luis Carlos Azael stated she will talk with pt and call the office back with a decision.

## 2020-09-21 ENCOUNTER — OFFICE VISIT (OUTPATIENT)
Dept: NEUROLOGY | Age: 85
End: 2020-09-21
Payer: MEDICARE

## 2020-09-21 VITALS
SYSTOLIC BLOOD PRESSURE: 123 MMHG | DIASTOLIC BLOOD PRESSURE: 64 MMHG | RESPIRATION RATE: 18 BRPM | TEMPERATURE: 97.3 F | OXYGEN SATURATION: 97 % | HEART RATE: 72 BPM

## 2020-09-21 PROCEDURE — 99215 OFFICE O/P EST HI 40 MIN: CPT | Performed by: PSYCHIATRY & NEUROLOGY

## 2020-09-21 RX ORDER — LANOLIN ALCOHOL/MO/W.PET/CERES
1000 CREAM (GRAM) TOPICAL DAILY
COMMUNITY

## 2020-09-21 NOTE — PROGRESS NOTES
This 59-year-old right-handed man has been followed for Parkinson's disease. The patient and his daughter remain excellent historians. His medications are now Sinemet 50/200 tablets, paroxetine, omeprazole, lovastatin, methotrexate, lovastatin, ProAmatine, folic acid, hydroxyzine and multivitamins. He was no longer on Hytrin, metoprolol, diclofenac gel or Prilosec. He continues taking his Sinemet every 4 hours while awake on a regular basis. With the above regimen, he continues responding moderately well. He and his daughter again deny additional tremors or stiffness. There remain no freezing episodes or falling. His daughter reports no cognitive issues. He is not slowing down or fallen. He is drooling less often. He is still not exercising---citing arthritic complaints. There are other neurological problems. His tingling sensations in both feet have continued. EDX studies revealed diffuse lumbosacral radicular disease. MRIs of the lumbosacral spine found moderate to marked abnormalities as well. He is not a surgical candidate. Pains in both knees when standing and walking also continue. Unfortunately, the patient underwent right knee replacement several months ago. Unfortunately, he developed a bladder infection as a marked urosepsis subsequently. He was hospitalized for weeks. Afterwards, he spent several weeks in rehabilitation to regain his strength. He only recently returned home. He lost 20 pounds. He is now recovering slowly at home, eating and sleeping moderately well. Medically he reports no additional issues. There has been no return of his melanomas. He denies other medical issues. He is not driving at this time. Review of systems is otherwise unremarkable. Physical examination displayed stable vital signs. He was an elderly man in no acute distress who was alert, cooperative and oriented. He remained pleasant.   His skin again revealed multiple senile hyperkeratotic plaques, macules and facial and left ear scars from recent cancer removals. His neck was supple without thyromegaly; there were +1 carotid upstrokes without bruits; there was full range of motion of his neck in all direction, without rigidity or cogwheeling. His lungs were clear to auscultation. Cardiac testing was unrevealing---he displayed well-healed midline thoracotomy scars. Peripheral pulses were +1 without bruits. His limbs revealed arthritic knees with a well-healed right knee surgical scar. He again displayed flat feet when walking. Neurological examination produced an intact mental status. His voice was softer, but without tremors. Cranial nerve testing was unremarkable. Extraocular movements continued intact. I found normal tone and bulk with 5/5 strength throughout. There was again no resting tremor or bradykinesia. Reflexes were barely elicited in the arms, +1 at the knees and absent at the ankles. There were no pathological reflexes. Sensory exam was intact to pinprick. Vibration was moderately reduced in both ankles. .  Coordination was unrevealing. He walked with a slight limp. There was no festination or freezing. His neurological examination was unchanged. Laboratory data included a recent CMP with a GFR greater than 60 and a glucose of 101. CBC with differential revealed an MCV of 105. 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 He continues responding moderately well to Sinemet----- 50/200 tablets every 4 hours while awake. We will maintain that regimen. I again find no evidence of dementia. 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 He also suffers from plantar fasciitis. He must wear his shoe inserts. Unfortunately, he suffered severe urosepsis. He is now recuperating from that insult.     Medically he is stable despite his rheumatoid arthritis, degenerative joint disease and coronary artery disease. 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 There remained no recurrence    He will return in 4 months. He will continue his current regimen. He will exercise on his bike at home. He will call at any time if problems arise. I spent 40 minutes with the patient with over 50 % spent in counseling and disease management. All patient issues were addressed and all questions were answered.

## 2020-10-05 ENCOUNTER — TELEPHONE (OUTPATIENT)
Dept: PRIMARY CARE CLINIC | Age: 85
End: 2020-10-05

## 2020-10-05 ENCOUNTER — HOSPITAL ENCOUNTER (OUTPATIENT)
Age: 85
Discharge: HOME | End: 2020-10-07
Payer: MEDICARE

## 2020-10-05 ENCOUNTER — OFFICE VISIT (OUTPATIENT)
Dept: FAMILY MEDICINE CLINIC | Age: 85
End: 2020-10-05
Payer: MEDICARE

## 2020-10-05 VITALS
TEMPERATURE: 97 F | WEIGHT: 174 LBS | SYSTOLIC BLOOD PRESSURE: 126 MMHG | OXYGEN SATURATION: 98 % | DIASTOLIC BLOOD PRESSURE: 72 MMHG | HEART RATE: 88 BPM | BODY MASS INDEX: 22.34 KG/M2

## 2020-10-05 LAB
APPEARANCE FLUID: NORMAL
BILIRUBIN, POC: NORMAL
BLOOD URINE, POC: NORMAL
CLARITY, POC: NORMAL
COLOR, POC: NORMAL
GLUCOSE URINE, POC: NORMAL
KETONES, POC: NORMAL
LEUKOCYTE EST, POC: NORMAL
NITRITE, POC: NORMAL
PH, POC: 6
PROTEIN, POC: >=300
SPECIFIC GRAVITY, POC: 1.02
UROBILINOGEN, POC: NORMAL

## 2020-10-05 PROCEDURE — 87088 URINE BACTERIA CULTURE: CPT

## 2020-10-05 PROCEDURE — 99213 OFFICE O/P EST LOW 20 MIN: CPT | Performed by: FAMILY MEDICINE

## 2020-10-05 PROCEDURE — 81002 URINALYSIS NONAUTO W/O SCOPE: CPT | Performed by: FAMILY MEDICINE

## 2020-10-05 RX ORDER — NITROFURANTOIN 25; 75 MG/1; MG/1
100 CAPSULE ORAL 2 TIMES DAILY
Qty: 14 CAPSULE | Refills: 0 | Status: SHIPPED
Start: 2020-10-05 | End: 2020-12-29

## 2020-10-05 ASSESSMENT — ENCOUNTER SYMPTOMS: GASTROINTESTINAL NEGATIVE: 1

## 2020-10-05 NOTE — PROGRESS NOTES
mg by mouth daily , Disp: , Rfl:     Multiple Vitamins-Minerals (OCUVITE) TABS oral tablet, Take 1 tablet by mouth daily, Disp: , Rfl:     vitamin D (CHOLECALCIFEROL) 1000 UNIT TABS tablet, Take 1,000 Units by mouth daily, Disp: , Rfl:     methotrexate (RHEUMATREX) 2.5 MG chemo tablet, Take 2.5 mg by mouth once a week 4 tablets weekly. , Disp: , Rfl:   Allergies   Allergen Reactions    Pcn [Penicillins]     Prednisone      Social History     Socioeconomic History    Marital status:      Spouse name: Not on file    Number of children: Not on file    Years of education: Not on file    Highest education level: Not on file   Occupational History    Not on file   Social Needs    Financial resource strain: Not on file    Food insecurity     Worry: Not on file     Inability: Not on file    Transportation needs     Medical: Not on file     Non-medical: Not on file   Tobacco Use    Smoking status: Former Smoker     Years: 10.00     Types: Pipe, Cigars    Smokeless tobacco: Never Used   Substance and Sexual Activity    Alcohol use:  Yes     Alcohol/week: 0.0 standard drinks     Comment: social    Drug use: No    Sexual activity: Not Currently   Lifestyle    Physical activity     Days per week: Not on file     Minutes per session: Not on file    Stress: Not on file   Relationships    Social connections     Talks on phone: Not on file     Gets together: Not on file     Attends Christian service: Not on file     Active member of club or organization: Not on file     Attends meetings of clubs or organizations: Not on file     Relationship status: Not on file    Intimate partner violence     Fear of current or ex partner: Not on file     Emotionally abused: Not on file     Physically abused: Not on file     Forced sexual activity: Not on file   Other Topics Concern    Not on file   Social History Narrative        Colonoscopy - (10/29/2008)    Colonoscopy - (1/10/2014)    Zoster/Shingles Vaccine - given at [facility]    CAD WITH CABG    EARLY HYPOTHYROID----HYPOTHRYOID 3-18    ANXIETY    DEPRESSION    FATIGUE    BPH    MACULAR DEGENERATION    SKIN CA    APPY    BILAT ING HERNIA---ONE DONE THINKS WAS R    L ING HERNIA    COLON 6-99 AND 6-02----5T O 10 YRS DR POWERS---PT STATES WAS TOLD THAT HE SHOULD NTO    HAVE AGAIN DUE TO AGE    L KNEE OR X 2 DR Maeve Shaikh AND CHRISSY    ANXIETY FROM PREDNISONE 2009    COLON 4-11------NEG---YRUICH    EGD 4-11 PEPTIC ULCER    L KNEE OR 6-11    [facility] GRULLON---TALL  AT [facility]    DAUGHTER DATES JAMES BARCENAS    STEROIDS CUASE ANXIETY    RA DR RUSSO    EGD 12-13 DR CHEN--- ESO DILITATION    COLON DR CHEN 1-14----- TICS    PARKINSON DIC DX WITH DR JERNIGAN 2014-- THEN CHG TO DR RUVALCABA---then dr Gregorio Li    ----[facility] STUDY-WITH LIQUID ASPIRATION    R TOTAL KNEE 1-24-17 DR WEI    HEMATURIA 2-17 DR DAN Jackie Blizzard PAIN--DR DANIELS THEN  [facility]ILDREN[facility]S [facility]    SEVERE LUMBAR SPINAL STENOSIS 2-18 DR BARTON--L-4-5 WITH ADRENAL    Dr. Saman Mendez, [facility]    Dr. Saman Mendez, [facility]---REFERRED TO NEURO SURG DR VINSON---THEN TO DR Davy Lim--    eval DR Jeronimo Brambila 5-18 AND SAID SHOT AND AND PT REFUSED DUE TO CONCERN WITH    PREDNISONE-----TO SEE HIM BACK    Right knee total done 7-20  Dr Michelet Blue and admits two weeks later with dehydration and orthostatic hypotension      Past Medical History:   Diagnosis Date    Anxiety     Ascending aortic aneurysm ([address]     BPH (benign prostatic hyperplasia)     CAD (coronary artery disease)     Cancer ([address]     skin    Coronary arteriosclerosis     Depression     DJD (degenerative joint disease)     Hyperlipidemia     Impacted cerumen     Lumbar spinal stenosis     severe    Macular degeneration     Osteoarthritis     Parkinson disease ([address]     RA (rheumatoid arthritis) ([address]      Family History   Problem Relation Age of Onset    Lung Cancer Father     No Known Problems Mother       Past Surgical History: Procedure Laterality Date    APPENDECTOMY      CATARACT REMOVAL WITH IMPLANT Left 04/19/2020    CHOLECYSTECTOMY      COLONOSCOPY      CORONARY ARTERY BYPASS GRAFT  2002    HERNIA REPAIR Bilateral     inguinal    KNEE SURGERY Left 06/2011    TOTAL KNEE ARTHROPLASTY Right 01/24/2017    tiffanie    UPPER GASTROINTESTINAL ENDOSCOPY      peptic ulcer      Vitals:    10/05/20 1145   BP: 126/72   Pulse: 88   Temp: 97 °F (36.1 °C)   SpO2: 98%   Weight: 174 lb (78.9 kg)       Objective:    Physical Exam  Vitals signs reviewed. Constitutional:       Appearance: He is well-developed. HENT:      Head: Normocephalic. Eyes:      Pupils: Pupils are equal, round, and reactive to light. Neck:      Musculoskeletal: Normal range of motion. Cardiovascular:      Rate and Rhythm: Normal rate and regular rhythm. Pulmonary:      Effort: Pulmonary effort is normal.      Breath sounds: Normal breath sounds. Abdominal:      Palpations: Abdomen is soft. Musculoskeletal: Normal range of motion. Skin:     General: Skin is warm. Neurological:      Mental Status: He is alert and oriented to person, place, and time. Psychiatric:         Behavior: Behavior normal.         Daron Mo was seen today for urinary tract infection. Diagnoses and all orders for this visit:    Urinary frequency  -     POCT Urinalysis no Micro    Acute cystitis without hematuria  -     nitrofurantoin, macrocrystal-monohydrate, (MACROBID) 100 MG capsule; Take 1 capsule by mouth 2 times daily        Comments: ab  Worse toe r  A great deal of time spent reviewing medications, diet, exercise, social issues. Also reviewing the chart before entering the room with patient and finishing charting after leaving patient's room. More than half of that time was spent face to face with the patient in counseling and coordinating care. Follow Up: Return if symptoms worsen or fail to improve, for Reg Appt.      Seen by:  Arin Schreiber DO

## 2020-10-05 NOTE — TELEPHONE ENCOUNTER
Pt's daughter calling to let you know that he has been having trouble urinating and also chills. He will be coming though express care for evalustion.

## 2020-10-08 LAB — URINE CULTURE, ROUTINE: NORMAL

## 2020-11-03 PROBLEM — E03.9 ACQUIRED HYPOTHYROIDISM: Status: RESOLVED | Noted: 2019-05-13 | Resolved: 2020-11-03

## 2020-11-11 ENCOUNTER — TELEPHONE (OUTPATIENT)
Dept: PRIMARY CARE CLINIC | Age: 85
End: 2020-11-11

## 2020-11-11 NOTE — TELEPHONE ENCOUNTER
Wife was not aware he had appt tomorrow does he have to come for  blood work before is it fasting?   [phone]

## 2020-11-21 ENCOUNTER — OFFICE VISIT (OUTPATIENT)
Dept: FAMILY MEDICINE CLINIC | Age: 85
End: 2020-11-21
Payer: MEDICARE

## 2020-11-21 VITALS
WEIGHT: 176 LBS | RESPIRATION RATE: 12 BRPM | BODY MASS INDEX: 22.6 KG/M2 | DIASTOLIC BLOOD PRESSURE: 78 MMHG | TEMPERATURE: 98.8 F | SYSTOLIC BLOOD PRESSURE: 128 MMHG

## 2020-11-21 PROCEDURE — 99214 OFFICE O/P EST MOD 30 MIN: CPT | Performed by: FAMILY MEDICINE

## 2020-11-21 ASSESSMENT — PATIENT HEALTH QUESTIONNAIRE - PHQ9
SUM OF ALL RESPONSES TO PHQ QUESTIONS 1-9: 0
SUM OF ALL RESPONSES TO PHQ9 QUESTIONS 1 & 2: 0
1. LITTLE INTEREST OR PLEASURE IN DOING THINGS: 0
2. FEELING DOWN, DEPRESSED OR HOPELESS: 0
SUM OF ALL RESPONSES TO PHQ QUESTIONS 1-9: 0
SUM OF ALL RESPONSES TO PHQ QUESTIONS 1-9: 0

## 2020-11-21 ASSESSMENT — ENCOUNTER SYMPTOMS
RESPIRATORY NEGATIVE: 1
ALLERGIC/IMMUNOLOGIC NEGATIVE: 1
EYES NEGATIVE: 1
GASTROINTESTINAL NEGATIVE: 1

## 2020-11-21 NOTE — PROGRESS NOTES
20  Name: Karen Rocha    : 1933    Sex: male    Age: 80 y.o. Subjective:  Chief Complaint: Patient is here for  fall     [place]est [place] park  Tripped over [employer] in  With  bonilla  Hit both knees and left wrist and left head  He drove home  No loc          Review of Systems   Constitutional: Negative. HENT: Negative. Eyes: Negative. Respiratory: Negative. Cardiovascular: Negative. Gastrointestinal: Negative. Endocrine: Negative. Genitourinary: Negative. Musculoskeletal: Negative. See  hpi   Skin: Negative. Allergic/Immunologic: Negative. Neurological: Negative. Hematological: Negative. Psychiatric/Behavioral: Negative.           Current Outpatient Medications:     nitrofurantoin, macrocrystal-monohydrate, (MACROBID) 100 MG capsule, Take 1 capsule by mouth 2 times daily, Disp: 14 capsule, Rfl: 0    vitamin B-12 (CYANOCOBALAMIN) 1000 MCG tablet, Take 1,000 mcg by mouth daily, Disp: , Rfl:     midodrine (PROAMATINE) 2.5 MG tablet, Take 1 tablet by mouth 2 times daily (with meals), Disp: 90 tablet, Rfl: 3    hydrOXYzine (ATARAX) 25 MG tablet, Take 1 tablet by mouth 2 times daily as needed for Anxiety, Disp: 30 tablet, Rfl: 3    metoprolol succinate (TOPROL XL) 25 MG extended release tablet, Takes 1/2 tablet daily, Disp: 45 tablet, Rfl: 12    folic acid (FOLVITE) 1 MG tablet, Take 1 tablet by mouth daily, Disp: 90 tablet, Rfl: 3    omeprazole (PRILOSEC) 20 MG delayed release capsule, Take 1 capsule by mouth daily, Disp: 90 capsule, Rfl: 3    lovastatin (MEVACOR) 20 MG tablet, Take 1 tablet by mouth nightly, Disp: 90 tablet, Rfl: 12    levothyroxine (SYNTHROID) 50 MCG tablet, Take 0.5 tablets by mouth Daily, Disp: 90 tablet, Rfl: 12    acetaminophen (TYLENOL) 500 MG tablet, Take 500 mg by mouth every 6 hours as needed for Pain, Disp: , Rfl:     PARoxetine (PAXIL) 20 MG tablet, Take 1 tablet by mouth every morning, Disp: 90 tablet, Rfl: 3    carbidopa-levodopa (SINEMET CR)  MG per extended release tablet, Take 1 tablet by mouth every 4 hours (while awake) No more than 6 per day, Disp: 540 tablet, Rfl: 3    ipratropium (ATROVENT) 0.06 % nasal spray, One squirt qid prn runny nose (Patient not taking: Reported on 9/21/2020), Disp: 1 Bottle, Rfl: 12    aspirin 81 MG tablet, Take 650 mg by mouth daily , Disp: , Rfl:     Multiple Vitamins-Minerals (OCUVITE) TABS oral tablet, Take 1 tablet by mouth daily, Disp: , Rfl:     vitamin D (CHOLECALCIFEROL) 1000 UNIT TABS tablet, Take 1,000 Units by mouth daily, Disp: , Rfl:     methotrexate (RHEUMATREX) 2.5 MG chemo tablet, Take 2.5 mg by mouth once a week 4 tablets weekly. , Disp: , Rfl:   Allergies   Allergen Reactions    Pcn [Penicillins]     Prednisone      Social History     Socioeconomic History    Marital status:      Spouse name: Not on file    Number of children: Not on file    Years of education: Not on file    Highest education level: Not on file   Occupational History    Not on file   Social Needs    Financial resource strain: Not on file    Food insecurity     Worry: Not on file     Inability: Not on file    Transportation needs     Medical: Not on file     Non-medical: Not on file   Tobacco Use    Smoking status: Former Smoker     Years: 10.00     Types: Pipe, Cigars    Smokeless tobacco: Never Used   Substance and Sexual Activity    Alcohol use:  Yes     Alcohol/week: 0.0 standard drinks     Comment: social    Drug use: No    Sexual activity: Not Currently   Lifestyle    Physical activity     Days per week: Not on file     Minutes per session: Not on file    Stress: Not on file   Relationships    Social connections     Talks on phone: Not on file     Gets together: Not on file     Attends Yazidism service: Not on file     Active member of club or organization: Not on file     Attends meetings of clubs or organizations: Not on file     Relationship status: Not on (degenerative joint disease)     Hyperlipidemia     Impacted cerumen     Lumbar spinal stenosis     severe    Macular degeneration     Osteoarthritis     Parkinson disease (HCC)     RA (rheumatoid arthritis) ([address]      Family History   Problem Relation Age of Onset    Lung Cancer Father     No Known Problems Mother       Past Surgical History:   Procedure Laterality Date    APPENDECTOMY      CATARACT REMOVAL WITH IMPLANT Left 04/19/2020    CHOLECYSTECTOMY      COLONOSCOPY      CORONARY ARTERY BYPASS GRAFT  2002    HERNIA REPAIR Bilateral     inguinal    KNEE SURGERY Left 06/2011    TOTAL KNEE ARTHROPLASTY Right 01/24/2017    tiffanei    UPPER GASTROINTESTINAL ENDOSCOPY      peptic ulcer      Vitals:    11/21/20 0945   BP: 128/78   Resp: 12   Temp: 98.8 °F (37.1 °C)   TempSrc: Temporal   Weight: 176 lb (79.8 kg)       Objective:    Physical Exam  Vitals signs reviewed. Constitutional:       Appearance: He is well-developed. HENT:      Head: Normocephalic. Eyes:      Extraocular Movements: Extraocular movements intact. Conjunctiva/sclera: Conjunctivae normal.      Pupils: Pupils are equal, round, and reactive to light. Neck:      Musculoskeletal: Normal range of motion. Cardiovascular:      Rate and Rhythm: Normal rate and regular rhythm. Pulmonary:      Effort: Pulmonary effort is normal.      Breath sounds: Normal breath sounds. Abdominal:      Palpations: Abdomen is soft. Musculoskeletal: Normal range of motion. Comments: Full range of motion both knees. No tenderness with palpation over the left facial bones. Left wrist with decreased flexion extension 75% with mild swelling and faint tenderness but no ecchymosis   Skin:     Comments: Superficial abrasions over both knees, left lateral eyebrow. Only faint ecchymosis below the left eye   Neurological:      Mental Status: He is alert and oriented to person, place, and time.    Psychiatric:         Behavior: Behavior

## 2020-11-23 ENCOUNTER — TELEPHONE (OUTPATIENT)
Dept: PRIMARY CARE CLINIC | Age: 85
End: 2020-11-23

## 2020-12-07 LAB
ALBUMIN SERPL-MCNC: 4.2 G/DL (ref 3.5–5.2)
ALP BLD-CCNC: 101 U/L (ref 40–129)
ALT SERPL-CCNC: <5 U/L (ref 0–40)
ANION GAP SERPL CALCULATED.3IONS-SCNC: 5 MMOL/L (ref 7–16)
AST SERPL-CCNC: 28 U/L (ref 0–39)
BASOPHILS ABSOLUTE: 0.05 E9/L (ref 0–0.2)
BASOPHILS RELATIVE PERCENT: 0.8 % (ref 0–2)
BILIRUB SERPL-MCNC: 0.5 MG/DL (ref 0–1.2)
BUN BLDV-MCNC: 25 MG/DL (ref 8–23)
CALCIUM SERPL-MCNC: 9.4 MG/DL (ref 8.6–10.2)
CHLORIDE BLD-SCNC: 106 MMOL/L (ref 98–107)
CO2: 30 MMOL/L (ref 22–29)
CREAT SERPL-MCNC: 1.1 MG/DL (ref 0.7–1.2)
EOSINOPHILS ABSOLUTE: 0.26 E9/L (ref 0.05–0.5)
EOSINOPHILS RELATIVE PERCENT: 4.2 % (ref 0–6)
GFR AFRICAN AMERICAN: >60
GFR NON-AFRICAN AMERICAN: >60 ML/MIN/1.73
GLUCOSE BLD-MCNC: 101 MG/DL (ref 74–99)
HCT VFR BLD CALC: 37.4 % (ref 37–54)
HEMOGLOBIN: 12.2 G/DL (ref 12.5–16.5)
IMMATURE GRANULOCYTES #: 0.02 E9/L
IMMATURE GRANULOCYTES %: 0.3 % (ref 0–5)
LYMPHOCYTES ABSOLUTE: 1.36 E9/L (ref 1.5–4)
LYMPHOCYTES RELATIVE PERCENT: 22.1 % (ref 20–42)
MCH RBC QN AUTO: 33.9 PG (ref 26–35)
MCHC RBC AUTO-ENTMCNC: 32.6 % (ref 32–34.5)
MCV RBC AUTO: 103.9 FL (ref 80–99.9)
MONOCYTES ABSOLUTE: 0.72 E9/L (ref 0.1–0.95)
MONOCYTES RELATIVE PERCENT: 11.7 % (ref 2–12)
NEUTROPHILS ABSOLUTE: 3.73 E9/L (ref 1.8–7.3)
NEUTROPHILS RELATIVE PERCENT: 60.9 % (ref 43–80)
PDW BLD-RTO: 13.1 FL (ref 11.5–15)
PLATELET # BLD: 261 E9/L (ref 130–450)
PMV BLD AUTO: 10.4 FL (ref 7–12)
POTASSIUM SERPL-SCNC: 4.5 MMOL/L (ref 3.5–5)
RBC # BLD: 3.6 E12/L (ref 3.8–5.8)
SODIUM BLD-SCNC: 141 MMOL/L (ref 132–146)
TOTAL PROTEIN: 6.6 G/DL (ref 6.4–8.3)
WBC # BLD: 6.1 E9/L (ref 4.5–11.5)

## 2020-12-18 DIAGNOSIS — I10 ESSENTIAL HYPERTENSION: Chronic | ICD-10-CM

## 2020-12-18 DIAGNOSIS — D50.8 OTHER IRON DEFICIENCY ANEMIA: ICD-10-CM

## 2020-12-18 DIAGNOSIS — E78.2 MIXED HYPERLIPIDEMIA: Chronic | ICD-10-CM

## 2020-12-18 DIAGNOSIS — E03.9 ACQUIRED HYPOTHYROIDISM: Chronic | ICD-10-CM

## 2020-12-18 LAB
ALBUMIN SERPL-MCNC: 4.3 G/DL (ref 3.5–5.2)
ALP BLD-CCNC: 108 U/L (ref 40–129)
ALT SERPL-CCNC: 6 U/L (ref 0–40)
ANION GAP SERPL CALCULATED.3IONS-SCNC: 14 MMOL/L (ref 7–16)
AST SERPL-CCNC: 19 U/L (ref 0–39)
BASOPHILS ABSOLUTE: 0.04 E9/L (ref 0–0.2)
BASOPHILS RELATIVE PERCENT: 0.6 % (ref 0–2)
BILIRUB SERPL-MCNC: 0.5 MG/DL (ref 0–1.2)
BILIRUBIN URINE: NEGATIVE
BLOOD, URINE: NEGATIVE
BUN BLDV-MCNC: 30 MG/DL (ref 8–23)
CALCIUM SERPL-MCNC: 9.8 MG/DL (ref 8.6–10.2)
CHLORIDE BLD-SCNC: 103 MMOL/L (ref 98–107)
CHOLESTEROL, TOTAL: 132 MG/DL (ref 0–199)
CLARITY: CLEAR
CO2: 23 MMOL/L (ref 22–29)
COLOR: YELLOW
CREAT SERPL-MCNC: 1.3 MG/DL (ref 0.7–1.2)
EOSINOPHILS ABSOLUTE: 0.23 E9/L (ref 0.05–0.5)
EOSINOPHILS RELATIVE PERCENT: 3.3 % (ref 0–6)
GFR AFRICAN AMERICAN: >60
GFR NON-AFRICAN AMERICAN: 52 ML/MIN/1.73
GLUCOSE BLD-MCNC: 112 MG/DL (ref 74–99)
GLUCOSE URINE: NEGATIVE MG/DL
HCT VFR BLD CALC: 39.7 % (ref 37–54)
HDLC SERPL-MCNC: 52 MG/DL
HEMOGLOBIN: 12.7 G/DL (ref 12.5–16.5)
IMMATURE GRANULOCYTES #: 0.03 E9/L
IMMATURE GRANULOCYTES %: 0.4 % (ref 0–5)
IRON: 112 MCG/DL (ref 59–158)
KETONES, URINE: NEGATIVE MG/DL
LDL CHOLESTEROL CALCULATED: 64 MG/DL (ref 0–99)
LEUKOCYTE ESTERASE, URINE: NEGATIVE
LYMPHOCYTES ABSOLUTE: 2 E9/L (ref 1.5–4)
LYMPHOCYTES RELATIVE PERCENT: 28.3 % (ref 20–42)
MCH RBC QN AUTO: 33.8 PG (ref 26–35)
MCHC RBC AUTO-ENTMCNC: 32 % (ref 32–34.5)
MCV RBC AUTO: 105.6 FL (ref 80–99.9)
MONOCYTES ABSOLUTE: 0.76 E9/L (ref 0.1–0.95)
MONOCYTES RELATIVE PERCENT: 10.7 % (ref 2–12)
NEUTROPHILS ABSOLUTE: 4.01 E9/L (ref 1.8–7.3)
NEUTROPHILS RELATIVE PERCENT: 56.7 % (ref 43–80)
NITRITE, URINE: NEGATIVE
PDW BLD-RTO: 13.2 FL (ref 11.5–15)
PH UA: 6 (ref 5–9)
PLATELET # BLD: 230 E9/L (ref 130–450)
PMV BLD AUTO: 10.1 FL (ref 7–12)
POTASSIUM SERPL-SCNC: 4.2 MMOL/L (ref 3.5–5)
PROTEIN UA: NEGATIVE MG/DL
RBC # BLD: 3.76 E12/L (ref 3.8–5.8)
SODIUM BLD-SCNC: 140 MMOL/L (ref 132–146)
SPECIFIC GRAVITY UA: 1.02 (ref 1–1.03)
T4 TOTAL: 6.5 MCG/DL (ref 4.5–11.7)
TOTAL PROTEIN: 6.7 G/DL (ref 6.4–8.3)
TRIGL SERPL-MCNC: 78 MG/DL (ref 0–149)
TSH SERPL DL<=0.05 MIU/L-ACNC: 3.7 UIU/ML (ref 0.27–4.2)
UROBILINOGEN, URINE: 0.2 E.U./DL
VLDLC SERPL CALC-MCNC: 16 MG/DL
WBC # BLD: 7.1 E9/L (ref 4.5–11.5)

## 2020-12-28 ENCOUNTER — OFFICE VISIT (OUTPATIENT)
Dept: PRIMARY CARE CLINIC | Age: 85
End: 2020-12-28
Payer: MEDICARE

## 2020-12-28 PROCEDURE — G0439 PPPS, SUBSEQ VISIT: HCPCS | Performed by: FAMILY MEDICINE

## 2020-12-28 ASSESSMENT — LIFESTYLE VARIABLES
HOW OFTEN DO YOU HAVE SIX OR MORE DRINKS ON ONE OCCASION: NEVER
HOW MANY STANDARD DRINKS CONTAINING ALCOHOL DO YOU HAVE ON A TYPICAL DAY: 0
AUDIT-C TOTAL SCORE: 0
HOW OFTEN DURING THE LAST YEAR HAVE YOU HAD A FEELING OF GUILT OR REMORSE AFTER DRINKING: 0
HOW OFTEN DO YOU HAVE A DRINK CONTAINING ALCOHOL: MONTHLY OR LESS
HOW OFTEN DURING THE LAST YEAR HAVE YOU NEEDED AN ALCOHOLIC DRINK FIRST THING IN THE MORNING TO GET YOURSELF GOING AFTER A NIGHT OF HEAVY DRINKING: NEVER
HAS A RELATIVE, FRIEND, DOCTOR, OR ANOTHER HEALTH PROFESSIONAL EXPRESSED CONCERN ABOUT YOUR DRINKING OR SUGGESTED YOU CUT DOWN: NO
HOW OFTEN DURING THE LAST YEAR HAVE YOU NEEDED AN ALCOHOLIC DRINK FIRST THING IN THE MORNING TO GET YOURSELF GOING AFTER A NIGHT OF HEAVY DRINKING: 0
AUDIT-C TOTAL SCORE: 1
HOW OFTEN DURING THE LAST YEAR HAVE YOU HAD A FEELING OF GUILT OR REMORSE AFTER DRINKING: NEVER
HOW OFTEN DURING THE LAST YEAR HAVE YOU FAILED TO DO WHAT WAS NORMALLY EXPECTED FROM YOU BECAUSE OF DRINKING: NEVER
HOW OFTEN DURING THE LAST YEAR HAVE YOU BEEN UNABLE TO REMEMBER WHAT HAPPENED THE NIGHT BEFORE BECAUSE YOU HAD BEEN DRINKING: NEVER
HAVE YOU OR SOMEONE ELSE BEEN INJURED AS A RESULT OF YOUR DRINKING: 0
AUDIT TOTAL SCORE: 0
AUDIT TOTAL SCORE: 1
HOW OFTEN DURING THE LAST YEAR HAVE YOU FOUND THAT YOU WERE NOT ABLE TO STOP DRINKING ONCE YOU HAD STARTED: NEVER
HOW OFTEN DURING THE LAST YEAR HAVE YOU FAILED TO DO WHAT WAS NORMALLY EXPECTED FROM YOU BECAUSE OF DRINKING: 0
HOW OFTEN DO YOU HAVE A DRINK CONTAINING ALCOHOL: 1
HOW OFTEN DURING THE LAST YEAR HAVE YOU BEEN UNABLE TO REMEMBER WHAT HAPPENED THE NIGHT BEFORE BECAUSE YOU HAD BEEN DRINKING: 0
HOW OFTEN DURING THE LAST YEAR HAVE YOU FOUND THAT YOU WERE NOT ABLE TO STOP DRINKING ONCE YOU HAD STARTED: 0
HAVE YOU OR SOMEONE ELSE BEEN INJURED AS A RESULT OF YOUR DRINKING: NO
HOW OFTEN DO YOU HAVE SIX OR MORE DRINKS ON ONE OCCASION: 0
HOW MANY STANDARD DRINKS CONTAINING ALCOHOL DO YOU HAVE ON A TYPICAL DAY: ONE OR TWO
HAS A RELATIVE, FRIEND, DOCTOR, OR ANOTHER HEALTH PROFESSIONAL EXPRESSED CONCERN ABOUT YOUR DRINKING OR SUGGESTED YOU CUT DOWN: 0

## 2020-12-28 ASSESSMENT — PATIENT HEALTH QUESTIONNAIRE - PHQ9
SUM OF ALL RESPONSES TO PHQ QUESTIONS 1-9: 0
2. FEELING DOWN, DEPRESSED OR HOPELESS: 0
SUM OF ALL RESPONSES TO PHQ QUESTIONS 1-9: 0
SUM OF ALL RESPONSES TO PHQ QUESTIONS 1-9: 0
SUM OF ALL RESPONSES TO PHQ9 QUESTIONS 1 & 2: 0
1. LITTLE INTEREST OR PLEASURE IN DOING THINGS: 0

## 2020-12-28 NOTE — PROGRESS NOTES
Take 500 mg by mouth every 6 hours as needed for Pain  Historical Provider, MD   PARoxetine (PAXIL) 20 MG tablet Take 1 tablet by mouth every morning  Jasvir Pete DO   carbidopa-levodopa (SINEMET CR)  MG per extended release tablet Take 1 tablet by mouth every 4 hours (while awake) No more than 6 per day  Jasvir Pete DO   ipratropium (ATROVENT) 0.06 % nasal spray One squirt qid prn runny nose  Patient not taking: Reported on 9/21/2020  Jasvir Pete DO   aspirin 81 MG tablet Take 650 mg by mouth daily   Historical Provider, MD   Multiple Vitamins-Minerals (OCUVITE) TABS oral tablet Take 1 tablet by mouth daily  Historical Provider, MD   vitamin D (CHOLECALCIFEROL) 1000 UNIT TABS tablet Take 1,000 Units by mouth daily  Historical Provider, MD   methotrexate (RHEUMATREX) 2.5 MG chemo tablet Take 2.5 mg by mouth once a week 4 tablets weekly.   Historical Provider, MD         Past Medical History:   Diagnosis Date    Anxiety     Ascending aortic aneurysm (HCC)     BPH (benign prostatic hyperplasia)     CAD (coronary artery disease)     Cancer (HCC)     skin    Coronary arteriosclerosis     Depression     DJD (degenerative joint disease)     Hyperlipidemia     Impacted cerumen     Lumbar spinal stenosis     severe    Macular degeneration     Osteoarthritis     Parkinson disease ([facility]     RA (rheumatoid arthritis) ([facility] [facility]        Past Surgical History:   Procedure Laterality Date    APPENDECTOMY      CATARACT REMOVAL WITH IMPLANT Left 04/19/2020    CHOLECYSTECTOMY      COLONOSCOPY      CORONARY ARTERY BYPASS GRAFT  2002    HERNIA REPAIR Bilateral     inguinal    KNEE SURGERY Left 06/2011    TOTAL KNEE ARTHROPLASTY Right 01/24/2017    tiffanie    UPPER GASTROINTESTINAL ENDOSCOPY      peptic ulcer         Family History   Problem Relation Age of Onset    Lung Cancer Father     No Known Problems Mother        CareTeam (Including outside providers/suppliers regularly involved in providing care):   Patient Care Team:  Brenda Head DO as PCP - [facility] Patricio Tanner Azul Weiner DO as PCP - [facility]  Nereida Sanchez MD (Cardiology)  Kandace Merrill MD (Orthopedic Surgery)  Abad Singleton MD (Orthopedic Surgery)  Gerald Deras MD (Gastroenterology)  Luis Alberto Cárdenas DO (Dermatology)    Wt Readings from Last 3 Encounters:   12/28/20 180 lb (81.6 kg)   11/21/20 176 lb (79.8 kg)   10/05/20 174 lb (78.9 kg)     Vitals:    12/28/20 1314   BP: 132/78   Resp: 14   Temp: 98.4 °F (36.9 °C)   TempSrc: Temporal   Weight: 180 lb (81.6 kg)   Height: 6' 2\" (1.88 m)     Body mass index is 23.11 kg/m². Based upon direct observation of the patient, evaluation of cognition reveals recent and remote memory intact.     General Appearance: alert and oriented to person, place and time, well developed and well- nourished, in no acute distress  Skin: warm and dry, no rash or erythema  Head: normocephalic and atraumatic  Eyes: pupils equal, round, and reactive to light, extraocular eye movements intact, conjunctivae normal  ENT: tympanic membrane, external ear and ear canal normal bilaterally, nose without deformity, nasal mucosa and turbinates normal without polyps  Neck: supple and non-tender without mass, no thyromegaly or thyroid nodules, no cervical lymphadenopathy  Pulmonary/Chest: clear to auscultation bilaterally- no wheezes, rales or rhonchi, normal air movement, no respiratory distress  Cardiovascular: normal rate, regular rhythm, normal S1 and S2, no murmurs, rubs, clicks, or gallops, distal pulses intact, no carotid bruits  Abdomen: soft, non-tender, non-distended, normal bowel sounds, no masses or organomegaly  Extremities: no cyanosis, clubbing or edema  Musculoskeletal: dec rom lumba spine 50 %  Neurologic: reflexes normal and symmetric, no cranial nerve deficit, gait, coordination and speech normal    Patient's complete Health Risk Assessment and screening values have been reviewed and are found in [facility]. The following problems were reviewed today and where indicated follow up appointments were made and/or referrals ordered. Positive Risk Factor Screenings with Interventions:            General Health and ACP:  General  In general, how would you say your health is?: Fair  In the past 7 days, have you experienced any of the following?  New or Increased Pain, New or Increased Fatigue, Loneliness, Social Isolation, Stress or Anger?: None of These  Do you get the social and emotional support that you need?: Yes  Do you have a Living Will?: Yes  Advance Directives     Power of  Living Will ACP-Advance Directive ACP-Power of     Not on File Not on File Not on File Not on File      General Health Risk Interventions:  · none    Health Habits/Nutrition:  Health Habits/Nutrition  Do you exercise for at least 20 minutes 2-3 times per week?: (!) No  Have you lost any weight without trying in the past 3 months?: No  Do you eat fewer than 2 meals per day?: No  Have you seen a dentist within the past year?: Yes  Body mass index: 23.11  Health Habits/Nutrition Interventions:  · Inadequate physical activity:  patient agrees to exercise for at least 150 minutes/week    Hearing/Vision:  No exam data present  Hearing/Vision  Do you or your family notice any trouble with your hearing?: (!) Yes  Do you have difficulty driving, watching TV, or doing any of your daily activities because of your eyesight?: (!) Yes  Have you had an eye exam within the past year?: Yes  Hearing/Vision Interventions:  · none      Personalized Preventive Plan   Current Health Maintenance Status  Immunization History   Administered Date(s) Administered    Tetanus 11/05/2008    Tetanus Toxoid, absorbed 11/05/2008        Health Maintenance   Topic Date Due    DTaP/Tdap/Td vaccine (1 - Tdap) 11/[birth year]/1952    Shingles Vaccine (1 of 2) 11/09/1983    Pneumococcal 65+ years Vaccine (1 of 1 - PPSV23) 11/09/1998    Annual Wellness Visit (AWV)  05/29/2019    Flu vaccine (1) 09/01/2020    Lipid screen  12/18/2021    TSH testing  12/18/2021    Potassium monitoring  12/18/2021    Creatinine monitoring  12/18/2021    Hepatitis A vaccine  Aged Out    Hib vaccine  Aged Out    Meningococcal (ACWY) vaccine  Aged Out     Recommendations for cWyze Due: see orders and patient instructions/AVS.  . Recommended screening schedule for the next 5-10 years is provided to the patient in written form: see Patient Instructions/AVS.    Corey Christian was seen today for medicare awv and health maintenance. Diagnoses and all orders for this visit:    Routine general medical examination at a health care facility    Essential hypertension    Parkinson's disease ([facility] [address]    Mixed hyperlipidemia    Anxiety             Low-fat low sugar diet. Refill Vistaril for anxiety. Follow-up with all specialist including neurology. Range of motion exercises taught for low back pain. Check blood pressure weekly at home. A great deal of time spent reviewing medications, diet, exercise, social issues. Also reviewing the chart before entering the room with patient and finishing charting after leaving patient's room. More than half of that time was spent face to face with the patient in counseling and coordinating care.

## 2020-12-29 VITALS
BODY MASS INDEX: 23.1 KG/M2 | DIASTOLIC BLOOD PRESSURE: 78 MMHG | SYSTOLIC BLOOD PRESSURE: 132 MMHG | RESPIRATION RATE: 14 BRPM | TEMPERATURE: 98.4 F | WEIGHT: 180 LBS | HEIGHT: 74 IN

## 2020-12-29 RX ORDER — HYDROXYZINE HYDROCHLORIDE 25 MG/1
25 TABLET, FILM COATED ORAL 2 TIMES DAILY PRN
Qty: 60 TABLET | Refills: 12 | Status: SHIPPED
Start: 2020-12-29 | End: 2021-07-21 | Stop reason: SDUPTHER

## 2021-01-26 ENCOUNTER — OFFICE VISIT (OUTPATIENT)
Dept: NEUROLOGY | Age: 86
End: 2021-01-26
Payer: MEDICARE

## 2021-01-26 VITALS
TEMPERATURE: 97.9 F | OXYGEN SATURATION: 99 % | SYSTOLIC BLOOD PRESSURE: 109 MMHG | HEART RATE: 66 BPM | DIASTOLIC BLOOD PRESSURE: 68 MMHG | RESPIRATION RATE: 18 BRPM

## 2021-01-26 DIAGNOSIS — G20 PARKINSON'S DISEASE (HCC): Primary | ICD-10-CM

## 2021-01-26 PROCEDURE — 99215 OFFICE O/P EST HI 40 MIN: CPT | Performed by: PSYCHIATRY & NEUROLOGY

## 2021-01-26 NOTE — PROGRESS NOTES
This 26-year-old right-handed man has been followed for Parkinson's disease. The patient and his daughter remain excellent historians. His medications are now Sinemet 50/200 tablets, paroxetine, omeprazole, lovastatin, methotrexate, lovastatin, ProAmatine, folic acid, hydroxyzine and multivitamins. He was no longer on Hytrin, metoprolol, diclofenac gel or Prilosec. He is now taking his Sinemet every 5 hours apart. His last dose was given before bedtime, instead of 8 PM.    With the above regimen, he continues responding moderately well. He and his daughter now note slowing movements but no freezing. He fell only one occasion, from a misstep. There were no serious sequelae. There remained only minimal intermittent, resting tremors. His daughter reports no cognitive issues. He is drooling more. He is still not exercising---citing arthritic complaints and the pandemic lockdown. There are other neurological issues. His tingling sensations in both feet have continued. EDX studies revealed diffuse lumbosacral radicular disease. MRIs of the lumbosacral spine displayed moderate to marked abnormalities as well. He is not a surgical candidate. Pains in both knees when standing and walking continue to limit his physical activity. The patient underwent right knee replacement months ago. He developed a bladder infection and marked urosepsis afterwards. He was hospitalized for weeks. Then spent several weeks in rehabilitation to regain his strength. He was better at home. He had lost 20 pounds. He is recovering slowly at home, eating and sleeping moderately well. Medically he reports no additional issues. There has been no return of his melanomas. He denies other medical issues. He is not driving at this time. Review of systems is otherwise unremarkable. Physical examination displayed stable vital signs. His blood pressure was only 109/68.   He was an elderly man in no acute distress who was alert, cooperative and oriented. He remained pleasant. His skin again revealed multiple senile hyperkeratotic plaques, macules and facial and left ear scars from recent cancer removals. His neck was supple without thyromegaly; there were +1 carotid upstrokes without bruits; there was full range of motion of his neck in all direction, without rigidity or cogwheeling. His lungs were clear to auscultation. Cardiac testing was unrevealing. Peripheral pulses were +1 without bruits. His limbs revealed arthritic knees with a well-healed right knee surgical scar. He again displayed flat feet when standing. Neurological examination produced an intact mental status. His voice was softer, but without quivering. Cranial nerve testing was unremarkable. Extraocular movements remained intact. I found normal tone and bulk with 5/5 strength throughout. There was no resting tremor but minimal bradykinesia. Reflexes were barely elicited in the arms, +1 at the knees and absent at the ankles. I now question bilateral grasp reflexes. Sensory exam was intact to pinprick. Vibration was moderately reduced in both ankles. .  Coordination was unrevealing. He walked more slowly with a slight limp. There was no festination or freezing. His neurological examination displayed increasing bradykinesia and possible frontal lobe release signs. Laboratory data included a recent CMP with a GFR greater than 60 and a glucose of 101. CBC with differential revealed an MCV of 105. 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 He continues responding moderately well to Sinemet----- 50/200 tablets every 4 hours while awake. His current difficulties may be related to his increasing the interval between taking medications. Therefore, he will take his medicines at 8:30 AM, 12:30 PM, 4:30 PM and 8:30 PM.  If necessary, the Sinemet dosing will be increased.   Hopefully, he will also respond to increasing exercises and proper sleeping. I now question early dementia. 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 Is wearing the shoe inserts. Unfortunately, he suffered severe urosepsis. He has recuperated from that insult. Medically he is stable despite his rheumatoid arthritis, degenerative joint disease and coronary artery disease. 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 There remained no recurrence    He will return in 4 months. He will continue his current regimen, with his Sinemet every 4 hours. He will exercise more at home. He will call at any time if problems arise. I spent 40 minutes with the patient with over 50 % spent in counseling and disease management. All patient issues were addressed and all questions were answered.

## 2021-01-27 ENCOUNTER — IMMUNIZATION (OUTPATIENT)
Dept: PRIMARY CARE CLINIC | Age: 86
End: 2021-01-27
Payer: MEDICARE

## 2021-01-27 PROCEDURE — 0001A COVID-19, PFIZER VACCINE 30MCG/0.3ML DOSE: CPT | Performed by: PHYSICIAN ASSISTANT

## 2021-01-27 PROCEDURE — 91300 COVID-19, PFIZER VACCINE 30MCG/0.3ML DOSE: CPT | Performed by: PHYSICIAN ASSISTANT

## 2021-01-28 RX ORDER — CARBIDOPA AND LEVODOPA 50; 200 MG/1; MG/1
1 TABLET, EXTENDED RELEASE ORAL
Qty: 540 TABLET | Refills: 3 | Status: SHIPPED
Start: 2021-01-28 | End: 2022-05-04

## 2021-02-17 ENCOUNTER — IMMUNIZATION (OUTPATIENT)
Dept: PRIMARY CARE CLINIC | Age: 86
End: 2021-02-17
Payer: MEDICARE

## 2021-02-17 PROCEDURE — 91300 COVID-19, PFIZER VACCINE 30MCG/0.3ML DOSE: CPT | Performed by: NURSE PRACTITIONER

## 2021-02-17 PROCEDURE — 0002A COVID-19, PFIZER VACCINE 30MCG/0.3ML DOSE: CPT | Performed by: NURSE PRACTITIONER

## 2021-03-05 DIAGNOSIS — I95.1 ORTHOSTATIC HYPOTENSION: ICD-10-CM

## 2021-03-05 RX ORDER — MIDODRINE HYDROCHLORIDE 2.5 MG/1
2.5 TABLET ORAL 2 TIMES DAILY WITH MEALS
Qty: 180 TABLET | Refills: 3 | Status: SHIPPED
Start: 2021-03-05 | End: 2021-07-21 | Stop reason: SDUPTHER

## 2021-03-15 LAB
BASOPHILS ABSOLUTE: 0.04 E9/L (ref 0–0.2)
BASOPHILS RELATIVE PERCENT: 0.6 % (ref 0–2)
EOSINOPHILS ABSOLUTE: 0.18 E9/L (ref 0.05–0.5)
EOSINOPHILS RELATIVE PERCENT: 2.7 % (ref 0–6)
HCT VFR BLD CALC: 35.5 % (ref 37–54)
HEMOGLOBIN: 11.5 G/DL (ref 12.5–16.5)
IMMATURE GRANULOCYTES #: 0.03 E9/L
IMMATURE GRANULOCYTES %: 0.4 % (ref 0–5)
LYMPHOCYTES ABSOLUTE: 1.6 E9/L (ref 1.5–4)
LYMPHOCYTES RELATIVE PERCENT: 23.6 % (ref 20–42)
MCH RBC QN AUTO: 34.1 PG (ref 26–35)
MCHC RBC AUTO-ENTMCNC: 32.4 % (ref 32–34.5)
MCV RBC AUTO: 105.3 FL (ref 80–99.9)
MONOCYTES ABSOLUTE: 0.69 E9/L (ref 0.1–0.95)
MONOCYTES RELATIVE PERCENT: 10.2 % (ref 2–12)
NEUTROPHILS ABSOLUTE: 4.24 E9/L (ref 1.8–7.3)
NEUTROPHILS RELATIVE PERCENT: 62.5 % (ref 43–80)
PDW BLD-RTO: 12.4 FL (ref 11.5–15)
PLATELET # BLD: 226 E9/L (ref 130–450)
PMV BLD AUTO: 10.2 FL (ref 7–12)
RBC # BLD: 3.37 E12/L (ref 3.8–5.8)
WBC # BLD: 6.8 E9/L (ref 4.5–11.5)

## 2021-03-16 LAB
ALBUMIN SERPL-MCNC: 4.1 G/DL (ref 3.5–5.2)
ALP BLD-CCNC: 107 U/L (ref 40–129)
ALT SERPL-CCNC: <5 U/L (ref 0–40)
ANION GAP SERPL CALCULATED.3IONS-SCNC: 10 MMOL/L (ref 7–16)
AST SERPL-CCNC: 20 U/L (ref 0–39)
BILIRUB SERPL-MCNC: 0.4 MG/DL (ref 0–1.2)
BUN BLDV-MCNC: 28 MG/DL (ref 8–23)
C-REACTIVE PROTEIN: 0.3 MG/DL (ref 0–0.4)
CALCIUM SERPL-MCNC: 9.4 MG/DL (ref 8.6–10.2)
CHLORIDE BLD-SCNC: 102 MMOL/L (ref 98–107)
CO2: 27 MMOL/L (ref 22–29)
CREAT SERPL-MCNC: 1.1 MG/DL (ref 0.7–1.2)
GFR AFRICAN AMERICAN: >60
GFR NON-AFRICAN AMERICAN: >60 ML/MIN/1.73
GLUCOSE BLD-MCNC: 102 MG/DL (ref 74–99)
POTASSIUM SERPL-SCNC: 4.6 MMOL/L (ref 3.5–5)
SODIUM BLD-SCNC: 139 MMOL/L (ref 132–146)
TOTAL PROTEIN: 6.3 G/DL (ref 6.4–8.3)

## 2021-05-03 ENCOUNTER — TELEPHONE (OUTPATIENT)
Dept: PRIMARY CARE CLINIC | Age: 86
End: 2021-05-03

## 2021-05-04 ENCOUNTER — OFFICE VISIT (OUTPATIENT)
Dept: NEUROLOGY | Age: 86
End: 2021-05-04
Payer: MEDICARE

## 2021-05-04 VITALS
HEART RATE: 68 BPM | SYSTOLIC BLOOD PRESSURE: 112 MMHG | RESPIRATION RATE: 18 BRPM | TEMPERATURE: 97.3 F | OXYGEN SATURATION: 98 % | DIASTOLIC BLOOD PRESSURE: 51 MMHG | HEIGHT: 74 IN | WEIGHT: 171 LBS | BODY MASS INDEX: 21.94 KG/M2

## 2021-05-04 DIAGNOSIS — G20 PARKINSON'S DISEASE (HCC): Primary | ICD-10-CM

## 2021-05-04 DIAGNOSIS — M54.17 LUMBOSACRAL RADICULOPATHY: ICD-10-CM

## 2021-05-04 DIAGNOSIS — M72.2 PLANTAR FASCIITIS: ICD-10-CM

## 2021-05-04 PROCEDURE — 99215 OFFICE O/P EST HI 40 MIN: CPT | Performed by: PSYCHIATRY & NEUROLOGY

## 2021-05-04 NOTE — PROGRESS NOTES
This 26-year-old right-handed man was followed for Parkinson's disease. The patient and his daughter remained excellent historians. His medications were now carbidopa/levodopa 50/200 tablets, paroxetine, omeprazole, lovastatin, methotrexate, lovastatin, midodrine, folic acid, hydroxyzine and multivitamins. He continued taking his carbidopa/levodopa every 5 hours apart. His last dose was given before bedtime, instead of 8 PM.    With the above regimen, he continued responding moderately well. He and his daughter denied motor deficits since his last visit. He had not fallen. He was now engaged in aggressive physical therapy program, designed specifically for Parkinson's patients. He enjoyed that program, attending 3 times per week. Hopefully, he can attend even more. There remained only minimal intermittent, resting tremors. His daughter again denied cognitive issues. He was no longer drooling. There were other neurological issues. His tingling sensations in both feet continued. EDX studies revealed diffuse lumbosacral radicular disease. MRIs of the lumbosacral spine displayed moderate to marked abnormalities as well. He was not a surgical candidate. Pains in both knees when standing and walking were not as severe as previously. The patient underwent right knee replacement over 1 year ago. He developed a bladder infection and marked urosepsis afterwards. He had recovered from that insult. He denied any medical issues. There was no return of his melanomas. He was not driving at this time. He had received his COVID-19 vaccinations. He continued practicing proper pandemic precautions. Review of systems was otherwise unremarkable. Physical examination displayed stable vital signs. His blood pressure was 112/51. He was an elderly man in no acute distress who was alert, cooperative and oriented. He remained pleasant. He was in excellent spirits.   His skin displayed multiple senile hyperkeratotic plaques, macules and facial and left ear scars from recent cancer removals. His neck was supple without thyromegaly; there were +1 carotid upstrokes without bruits; there was full range of motion of his neck in all direction, without rigidity or cogwheeling. His lungs were clear to auscultation. Cardiac testing was unrevealing. Peripheral pulses were +1 without bruits. His limbs again revealed arthritic knees with a well-healed right knee surgical scar. His pes planus deformities were not worse. Neurological examination produced an intact mental status. His voice was softer, but without quivering. Cranial nerve testing was unremarkable. Extraocular movements continued intact. I found normal tone and bulk with 5/5 strength throughout. There was no resting tremor but minimal bradykinesia. Reflexes were barely elicited in the arms, +1 at the knees and absent at the ankles. I again found bilateral grasp reflexes. Sensory testing was intact to pinprick. Vibration was moderately reduced in both ankles. .  Coordination was unrevealing. He walked more briskly but with a slight limp. There was no festination or freezing. His neurological examination displayed improving movements and's. .    Laboratory data included a recent CMP with a GFR greater than 60 and a glucose of 102. CBC with differential again found an MCV of 105. 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 He continues moderately well with carbidopa/levodopa 50/200 tablets every 4 hours while awake---5 times daily. He is improving with more aggressive exercise. I recommended he attend classes 5 times a week. 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 He continues wearing the shoe inserts. Unfortunately, he suffered severe urosepsis.   He recuperated from that insult. Medically he is stable despite his rheumatoid arthritis, degenerative joint disease and coronary artery disease. 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 There remained no recurrence    He will return in 4 months. He will continue his current regimen, with his carbidopa/levodopa every 4 hours. He will continue his aggressive exercise schedule. He will call at any time if problems arise. I spent 40 minutes with the patient with over 50 % spent in counseling and disease management. All patient issues were addressed and all questions were answered.

## 2021-06-17 LAB
ALBUMIN SERPL-MCNC: 4.3 G/DL (ref 3.5–5.2)
ALP BLD-CCNC: 97 U/L (ref 40–129)
ALT SERPL-CCNC: <5 U/L (ref 0–40)
ANION GAP SERPL CALCULATED.3IONS-SCNC: 9 MMOL/L (ref 7–16)
AST SERPL-CCNC: 24 U/L (ref 0–39)
BASOPHILS ABSOLUTE: 0.04 E9/L (ref 0–0.2)
BASOPHILS RELATIVE PERCENT: 0.6 % (ref 0–2)
BILIRUB SERPL-MCNC: 0.5 MG/DL (ref 0–1.2)
BUN BLDV-MCNC: 27 MG/DL (ref 6–23)
C-REACTIVE PROTEIN: 0.3 MG/DL (ref 0–0.4)
CALCIUM SERPL-MCNC: 9.7 MG/DL (ref 8.6–10.2)
CHLORIDE BLD-SCNC: 105 MMOL/L (ref 98–107)
CO2: 26 MMOL/L (ref 22–29)
CREAT SERPL-MCNC: 1.3 MG/DL (ref 0.7–1.2)
EOSINOPHILS ABSOLUTE: 0.18 E9/L (ref 0.05–0.5)
EOSINOPHILS RELATIVE PERCENT: 2.6 % (ref 0–6)
GFR AFRICAN AMERICAN: >60
GFR NON-AFRICAN AMERICAN: 52 ML/MIN/1.73
GLUCOSE BLD-MCNC: 81 MG/DL (ref 74–99)
HCT VFR BLD CALC: 36.8 % (ref 37–54)
HEMOGLOBIN: 11.7 G/DL (ref 12.5–16.5)
IMMATURE GRANULOCYTES #: 0.02 E9/L
IMMATURE GRANULOCYTES %: 0.3 % (ref 0–5)
LYMPHOCYTES ABSOLUTE: 1.48 E9/L (ref 1.5–4)
LYMPHOCYTES RELATIVE PERCENT: 21.1 % (ref 20–42)
MCH RBC QN AUTO: 33.8 PG (ref 26–35)
MCHC RBC AUTO-ENTMCNC: 31.8 % (ref 32–34.5)
MCV RBC AUTO: 106.4 FL (ref 80–99.9)
MONOCYTES ABSOLUTE: 0.89 E9/L (ref 0.1–0.95)
MONOCYTES RELATIVE PERCENT: 12.7 % (ref 2–12)
NEUTROPHILS ABSOLUTE: 4.41 E9/L (ref 1.8–7.3)
NEUTROPHILS RELATIVE PERCENT: 62.7 % (ref 43–80)
PDW BLD-RTO: 12.9 FL (ref 11.5–15)
PLATELET # BLD: 251 E9/L (ref 130–450)
PMV BLD AUTO: 9.9 FL (ref 7–12)
POTASSIUM SERPL-SCNC: 5.1 MMOL/L (ref 3.5–5)
RBC # BLD: 3.46 E12/L (ref 3.8–5.8)
SODIUM BLD-SCNC: 140 MMOL/L (ref 132–146)
TOTAL PROTEIN: 6.7 G/DL (ref 6.4–8.3)
WBC # BLD: 7 E9/L (ref 4.5–11.5)

## 2021-06-21 DIAGNOSIS — M81.0 AGE-RELATED OSTEOPOROSIS WITHOUT CURRENT PATHOLOGICAL FRACTURE: ICD-10-CM

## 2021-06-21 DIAGNOSIS — R73.03 PRE-DIABETES: ICD-10-CM

## 2021-06-21 DIAGNOSIS — Z12.5 PROSTATE CANCER SCREENING: ICD-10-CM

## 2021-06-21 DIAGNOSIS — I10 ESSENTIAL HYPERTENSION: Chronic | ICD-10-CM

## 2021-06-21 DIAGNOSIS — E03.9 ACQUIRED HYPOTHYROIDISM: ICD-10-CM

## 2021-06-21 DIAGNOSIS — E78.2 MIXED HYPERLIPIDEMIA: Chronic | ICD-10-CM

## 2021-06-21 LAB
ALBUMIN SERPL-MCNC: 4 G/DL (ref 3.5–5.2)
ALP BLD-CCNC: 93 U/L (ref 40–129)
ALT SERPL-CCNC: 5 U/L (ref 0–40)
ANION GAP SERPL CALCULATED.3IONS-SCNC: 13 MMOL/L (ref 7–16)
AST SERPL-CCNC: 17 U/L (ref 0–39)
BASOPHILS ABSOLUTE: 0.05 E9/L (ref 0–0.2)
BASOPHILS RELATIVE PERCENT: 0.9 % (ref 0–2)
BILIRUB SERPL-MCNC: 0.8 MG/DL (ref 0–1.2)
BUN BLDV-MCNC: 26 MG/DL (ref 6–23)
CALCIUM SERPL-MCNC: 9.3 MG/DL (ref 8.6–10.2)
CHLORIDE BLD-SCNC: 104 MMOL/L (ref 98–107)
CHOLESTEROL, TOTAL: 124 MG/DL (ref 0–199)
CO2: 25 MMOL/L (ref 22–29)
CREAT SERPL-MCNC: 1.3 MG/DL (ref 0.7–1.2)
EOSINOPHILS ABSOLUTE: 0.19 E9/L (ref 0.05–0.5)
EOSINOPHILS RELATIVE PERCENT: 3.6 % (ref 0–6)
GFR AFRICAN AMERICAN: >60
GFR NON-AFRICAN AMERICAN: 52 ML/MIN/1.73
GLUCOSE BLD-MCNC: 109 MG/DL (ref 74–99)
HBA1C MFR BLD: 5.6 % (ref 4–5.6)
HCT VFR BLD CALC: 35.8 % (ref 37–54)
HDLC SERPL-MCNC: 49 MG/DL
HEMOGLOBIN: 11.5 G/DL (ref 12.5–16.5)
IMMATURE GRANULOCYTES #: 0.01 E9/L
IMMATURE GRANULOCYTES %: 0.2 % (ref 0–5)
LDL CHOLESTEROL CALCULATED: 60 MG/DL (ref 0–99)
LYMPHOCYTES ABSOLUTE: 1.37 E9/L (ref 1.5–4)
LYMPHOCYTES RELATIVE PERCENT: 25.9 % (ref 20–42)
MCH RBC QN AUTO: 33.6 PG (ref 26–35)
MCHC RBC AUTO-ENTMCNC: 32.1 % (ref 32–34.5)
MCV RBC AUTO: 104.7 FL (ref 80–99.9)
MONOCYTES ABSOLUTE: 0.59 E9/L (ref 0.1–0.95)
MONOCYTES RELATIVE PERCENT: 11.2 % (ref 2–12)
NEUTROPHILS ABSOLUTE: 3.07 E9/L (ref 1.8–7.3)
NEUTROPHILS RELATIVE PERCENT: 58.2 % (ref 43–80)
PDW BLD-RTO: 12.5 FL (ref 11.5–15)
PLATELET # BLD: 233 E9/L (ref 130–450)
PMV BLD AUTO: 10.2 FL (ref 7–12)
POTASSIUM SERPL-SCNC: 4.5 MMOL/L (ref 3.5–5)
PROSTATE SPECIFIC ANTIGEN: 0.63 NG/ML (ref 0–4)
RBC # BLD: 3.42 E12/L (ref 3.8–5.8)
SODIUM BLD-SCNC: 142 MMOL/L (ref 132–146)
T4 TOTAL: 6.4 MCG/DL (ref 4.5–11.7)
TOTAL PROTEIN: 6.4 G/DL (ref 6.4–8.3)
TRIGL SERPL-MCNC: 77 MG/DL (ref 0–149)
TSH SERPL DL<=0.05 MIU/L-ACNC: 3.54 UIU/ML (ref 0.27–4.2)
VITAMIN D 25-HYDROXY: 30 NG/ML (ref 30–100)
VLDLC SERPL CALC-MCNC: 15 MG/DL
WBC # BLD: 5.3 E9/L (ref 4.5–11.5)

## 2021-06-28 ENCOUNTER — OFFICE VISIT (OUTPATIENT)
Dept: PRIMARY CARE CLINIC | Age: 86
End: 2021-06-28
Payer: MEDICARE

## 2021-06-28 DIAGNOSIS — I10 ESSENTIAL HYPERTENSION: Primary | Chronic | ICD-10-CM

## 2021-06-28 DIAGNOSIS — M81.0 AGE-RELATED OSTEOPOROSIS WITHOUT CURRENT PATHOLOGICAL FRACTURE: ICD-10-CM

## 2021-06-28 DIAGNOSIS — E78.2 MIXED HYPERLIPIDEMIA: Chronic | ICD-10-CM

## 2021-06-28 DIAGNOSIS — R73.03 PRE-DIABETES: ICD-10-CM

## 2021-06-28 DIAGNOSIS — F41.9 ANXIETY: ICD-10-CM

## 2021-06-28 DIAGNOSIS — G20 PARKINSON'S DISEASE (HCC): Chronic | ICD-10-CM

## 2021-06-28 PROCEDURE — 99214 OFFICE O/P EST MOD 30 MIN: CPT | Performed by: FAMILY MEDICINE
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
## 2021-06-29 ENCOUNTER — TELEPHONE (OUTPATIENT)
Dept: PRIMARY CARE CLINIC | Age: 86
End: 2021-06-29

## 2021-06-29 VITALS
WEIGHT: 179 LBS | SYSTOLIC BLOOD PRESSURE: 128 MMHG | DIASTOLIC BLOOD PRESSURE: 78 MMHG | HEIGHT: 74 IN | BODY MASS INDEX: 22.97 KG/M2 | TEMPERATURE: 98 F

## 2021-06-29 ASSESSMENT — PATIENT HEALTH QUESTIONNAIRE - PHQ9
SUM OF ALL RESPONSES TO PHQ QUESTIONS 1-9: 0
1. LITTLE INTEREST OR PLEASURE IN DOING THINGS: 0
SUM OF ALL RESPONSES TO PHQ QUESTIONS 1-9: 0
SUM OF ALL RESPONSES TO PHQ9 QUESTIONS 1 & 2: 0
2. FEELING DOWN, DEPRESSED OR HOPELESS: 0
SUM OF ALL RESPONSES TO PHQ QUESTIONS 1-9: 0

## 2021-06-29 ASSESSMENT — ENCOUNTER SYMPTOMS
ALLERGIC/IMMUNOLOGIC NEGATIVE: 1
EYES NEGATIVE: 1
RESPIRATORY NEGATIVE: 1
GASTROINTESTINAL NEGATIVE: 1
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
## 2021-07-01 ENCOUNTER — OFFICE VISIT (OUTPATIENT)
Dept: FAMILY MEDICINE CLINIC | Age: 86
End: 2021-07-01
Payer: MEDICARE

## 2021-07-01 VITALS
SYSTOLIC BLOOD PRESSURE: 122 MMHG | OXYGEN SATURATION: 100 % | BODY MASS INDEX: 22.73 KG/M2 | DIASTOLIC BLOOD PRESSURE: 64 MMHG | HEART RATE: 94 BPM | WEIGHT: 177 LBS | TEMPERATURE: 96.8 F

## 2021-07-01 DIAGNOSIS — N39.0 URINARY TRACT INFECTION WITH HEMATURIA, SITE UNSPECIFIED: ICD-10-CM

## 2021-07-01 DIAGNOSIS — R35.0 URINARY FREQUENCY: Primary | ICD-10-CM

## 2021-07-01 DIAGNOSIS — R30.0 DYSURIA: ICD-10-CM

## 2021-07-01 DIAGNOSIS — R31.9 URINARY TRACT INFECTION WITH HEMATURIA, SITE UNSPECIFIED: ICD-10-CM

## 2021-07-01 DIAGNOSIS — R31.9 HEMATURIA, UNSPECIFIED TYPE: ICD-10-CM

## 2021-07-01 DIAGNOSIS — R35.0 URINARY FREQUENCY: ICD-10-CM

## 2021-07-01 LAB
BILIRUBIN, POC: NORMAL
BLOOD URINE, POC: NORMAL
CLARITY, POC: CLEAR
COLOR, POC: YELLOW
GLUCOSE URINE, POC: NORMAL
KETONES, POC: NORMAL
LEUKOCYTE EST, POC: NORMAL
NITRITE, POC: NORMAL
PH, POC: 5.5
PROTEIN, POC: NORMAL
SPECIFIC GRAVITY, POC: >=1.03
UROBILINOGEN, POC: NORMAL

## 2021-07-01 PROCEDURE — 99213 OFFICE O/P EST LOW 20 MIN: CPT | Performed by: PHYSICIAN ASSISTANT

## 2021-07-01 PROCEDURE — 81002 URINALYSIS NONAUTO W/O SCOPE: CPT | Performed by: PHYSICIAN ASSISTANT

## 2021-07-01 RX ORDER — CEFDINIR 300 MG/1
300 CAPSULE ORAL 2 TIMES DAILY
Qty: 20 CAPSULE | Refills: 0 | Status: SHIPPED | OUTPATIENT
Start: 2021-07-01 | End: 2021-07-11

## 2021-07-01 NOTE — PROGRESS NOTES
21  Jannie Delgadillo : 1933 Sex: male  Age 80 y.o. Subjective:  Chief Complaint   Patient presents with    Urinary Tract Infection     x3d          HPI:   Jannie Delgadillo , 80 y.o. male presents to express care for evaluation of possible urinary tract infection. The patient has had this increased frequency, urgency. The patient is having burning with urination. The patient had one episode of diarrhea. The patient really has not had any fevers but has had some chills with urination. The patient believes that he had a bad infection back in 2020. The patient was hospitalized at that time. The patient really does not have any sciatic pain, back pain, flank pain. Here with his son. ROS:   Unless otherwise stated in this report the patient's positive and negative responses for review of systems for constitutional, eyes, ENT, cardiovascular, respiratory, gastrointestinal, neurological, , musculoskeletal, and integument systems and related systems to the presenting problem are either stated in the history of present illness or were not pertinent or were negative for the symptoms and/or complaints related to the presenting medical problem. Positives and pertinent negatives as per HPI. All others reviewed and are negative.       PMH:     Past Medical History:   Diagnosis Date    Anxiety     Ascending aortic aneurysm (HCC)     BPH (benign prostatic hyperplasia)     CAD (coronary artery disease)     Cancer (HCC)     skin    Coronary arteriosclerosis     Depression     DJD (degenerative joint disease)     Hyperlipidemia     Impacted cerumen     Lumbar spinal stenosis     severe    Macular degeneration     Osteoarthritis     Parkinson disease ([address]     RA (rheumatoid arthritis) ([address]        Past Surgical History:   Procedure Laterality Date    APPENDECTOMY      CATARACT REMOVAL WITH IMPLANT Left 2020    CHOLECYSTECTOMY      COLONOSCOPY      CORONARY ARTERY BYPASS GRAFT  2002    HERNIA REPAIR Bilateral     inguinal    KNEE SURGERY Left 06/2011    TOTAL KNEE ARTHROPLASTY Right 01/24/2017    tiffanie    UPPER GASTROINTESTINAL ENDOSCOPY      peptic ulcer       Family History   Problem Relation Age of Onset    Lung Cancer Father     No Known Problems Mother        Medications:     Current Outpatient Medications:     Handicap Placard MISC, by Does not apply route Dx   Parkinson dis    5 yrs, Disp: 1 each, Rfl: 0    midodrine (PROAMATINE) 2.5 MG tablet, Take 1 tablet by mouth 2 times daily (with meals), Disp: 180 tablet, Rfl: 3    carbidopa-levodopa (SINEMET CR)  MG per extended release tablet, Take 1 tablet by mouth every 4 hours (while awake) No more than 6 per day, Disp: 540 tablet, Rfl: 3    hydrOXYzine (ATARAX) 25 MG tablet, Take 1 tablet by mouth 2 times daily as needed for Anxiety, Disp: 60 tablet, Rfl: 12    vitamin B-12 (CYANOCOBALAMIN) 1000 MCG tablet, Take 1,000 mcg by mouth daily, Disp: , Rfl:     metoprolol succinate (TOPROL XL) 25 MG extended release tablet, Takes 1/2 tablet daily, Disp: 45 tablet, Rfl: 12    folic acid (FOLVITE) 1 MG tablet, Take 1 tablet by mouth daily, Disp: 90 tablet, Rfl: 3    omeprazole (PRILOSEC) 20 MG delayed release capsule, Take 1 capsule by mouth daily, Disp: 90 capsule, Rfl: 3    lovastatin (MEVACOR) 20 MG tablet, Take 1 tablet by mouth nightly, Disp: 90 tablet, Rfl: 12    levothyroxine (SYNTHROID) 50 MCG tablet, Take 0.5 tablets by mouth Daily, Disp: 90 tablet, Rfl: 12    acetaminophen (TYLENOL) 500 MG tablet, Take 500 mg by mouth every 6 hours as needed for Pain, Disp: , Rfl:     PARoxetine (PAXIL) 20 MG tablet, Take 1 tablet by mouth every morning, Disp: 90 tablet, Rfl: 3    aspirin 81 MG tablet, Take 650 mg by mouth daily , Disp: , Rfl:     Multiple Vitamins-Minerals (OCUVITE) TABS oral tablet, Take 1 tablet by mouth daily, Disp: , Rfl:     vitamin D (CHOLECALCIFEROL) 1000 UNIT TABS tablet, Take 1,000 Units by mouth daily, Disp: , Rfl:     methotrexate (RHEUMATREX) 2.5 MG chemo tablet, Take 2.5 mg by mouth once a week 4 tablets weekly. , Disp: , Rfl:     Allergies: Allergies   Allergen Reactions    Pcn [Penicillins]     Prednisone        Social History:     Social History     Tobacco Use    Smoking status: Former Smoker     Years: 10.00     Types: Pipe, Cigars    Smokeless tobacco: Never Used   Vaping Use    Vaping Use: Never used   Substance Use Topics    Alcohol use: Yes     Alcohol/week: 0.0 standard drinks     Comment: social    Drug use: No       Patient lives at home. Physical Exam:     Vitals:    07/01/21 1310   BP: 122/64   Pulse: 94   Temp: 96.8 °F (36 °C)   SpO2: 100%   Weight: 177 lb (80.3 kg)       Exam:  Physical Exam  Nurses note and vital signs reviewed and patient is not hypoxic. General: The patient appears well and in no apparent distress. Patient is resting comfortably on cart. Skin: Warm, dry, no pallor noted. There is no rash noted. Head: Normocephalic, atraumatic. Eye: Normal conjunctiva  Ears, Nose, Mouth, and Throat: Oral mucosa is moist  Cardiovascular: Regular Rate and Rhythm  Respiratory: Patient is in no distress, no accessory muscle use, lungs are clear to auscultation, no wheezing, crackles or rhonchi  Back: Non-tender, no CVA tenderness bilaterally to percussion. GI: Normal bowel sounds, no tenderness to palpation, no masses appreciated. No rebound, guarding, or rigidity noted.   Musculoskeletal: The patient has no evidence of calf tenderness, no pitting edema, symmetrical pulses noted bilaterally  Neurological: A&O x4, normal speech        Testing:     Results for orders placed or performed in visit on 07/01/21   POCT Urinalysis no Micro   Result Value Ref Range    Color, UA yellow     Clarity, UA clear     Glucose, UA POC neg     Bilirubin, UA neg     Ketones, UA trace     Spec Grav, UA >=1.030     Blood, UA POC small     pH, UA 5.5     Protein, UA POC trace     Urobilinogen, UA 0.2eu     Leukocytes, UA moderate     Nitrite, UA neg            Medical Decision Making:     Vital signs reviewed    Past medical history reviewed. Allergies reviewed. Medications reviewed. Patient on arrival does not appear to be in any apparent distress or discomfort. The patient has been seen and evaluated. The patient does not appear to be toxic or lethargic. The patient does have evidence of a urinary tract infection. The patient will be started on cefdinir. The patient is to push fluids and get plenty of rest.  While the patient follow-up with PCP. The patient is to call with any questions or concerns. Urine culture is pending at this time. If any of the signs or symptoms change or worsen the patient is to go directly to the ED. He is not tachycardic, febrile. No evidence of tachypnea. The patient is to return to express care or go directly to the emergency department should any of the signs or symptoms worsen. The patient is to followup with primary care physician in 2-3 days for repeat evaluation. The patient has no other questions or concerns at this time the patient will be discharged home. Clinical Impression:   Zainab Parkinson was seen today for urinary tract infection. Diagnoses and all orders for this visit:    Urinary frequency  -     POCT Urinalysis no Micro  -     Culture, Urine; Future    Dysuria    Urinary tract infection with hematuria, site unspecified    Hematuria, unspecified type    Other orders  -     cefdinir (OMNICEF) 300 MG capsule; Take 1 capsule by mouth 2 times daily for 10 days        The patient is to call for any concerns or return if any of the signs or symptoms worsen. The patient is to follow-up with PCP in the next 2-3 days for repeat evaluation repeat assessment or go directly to the emergency department.      SIGNATURE: Chuyita Zavala III, PA-C

## 2021-07-03 LAB
ORGANISM: ABNORMAL
URINE CULTURE, ROUTINE: ABNORMAL

## 2021-07-21 ENCOUNTER — OFFICE VISIT (OUTPATIENT)
Dept: PRIMARY CARE CLINIC | Age: 86
End: 2021-07-21
Payer: MEDICARE

## 2021-07-21 VITALS
WEIGHT: 177 LBS | TEMPERATURE: 97.7 F | BODY MASS INDEX: 22.73 KG/M2 | SYSTOLIC BLOOD PRESSURE: 122 MMHG | DIASTOLIC BLOOD PRESSURE: 68 MMHG

## 2021-07-21 DIAGNOSIS — R35.0 URINARY FREQUENCY: ICD-10-CM

## 2021-07-21 DIAGNOSIS — F41.9 ANXIETY: ICD-10-CM

## 2021-07-21 DIAGNOSIS — I10 ESSENTIAL HYPERTENSION: Primary | Chronic | ICD-10-CM

## 2021-07-21 DIAGNOSIS — K21.9 GASTROESOPHAGEAL REFLUX DISEASE WITHOUT ESOPHAGITIS: ICD-10-CM

## 2021-07-21 DIAGNOSIS — E78.2 MIXED HYPERLIPIDEMIA: Chronic | ICD-10-CM

## 2021-07-21 DIAGNOSIS — I95.1 ORTHOSTATIC HYPOTENSION: ICD-10-CM

## 2021-07-21 DIAGNOSIS — E03.9 ACQUIRED HYPOTHYROIDISM: Chronic | ICD-10-CM

## 2021-07-21 LAB
BILIRUBIN, POC: NORMAL
BLOOD URINE, POC: NORMAL
CLARITY, POC: CLEAR
COLOR, POC: YELLOW
GLUCOSE URINE, POC: NORMAL
KETONES, POC: NORMAL
LEUKOCYTE EST, POC: NORMAL
NITRITE, POC: NORMAL
PH, POC: 5.5
PROTEIN, POC: NORMAL
SPECIFIC GRAVITY, POC: 1.02
UROBILINOGEN, POC: 0.2

## 2021-07-21 PROCEDURE — 99214 OFFICE O/P EST MOD 30 MIN: CPT | Performed by: FAMILY MEDICINE

## 2021-07-21 PROCEDURE — 81002 URINALYSIS NONAUTO W/O SCOPE: CPT | Performed by: FAMILY MEDICINE

## 2021-07-21 RX ORDER — FOLIC ACID 1 MG/1
TABLET ORAL
Qty: 35 TABLET | Refills: 3
Start: 2021-07-21 | End: 2022-03-15

## 2021-07-21 RX ORDER — OMEPRAZOLE 20 MG/1
20 CAPSULE, DELAYED RELEASE ORAL DAILY
Qty: 90 CAPSULE | Refills: 3 | Status: SHIPPED
Start: 2021-07-21 | End: 2022-07-25 | Stop reason: SDUPTHER

## 2021-07-21 RX ORDER — METOPROLOL SUCCINATE 25 MG/1
TABLET, EXTENDED RELEASE ORAL
Qty: 45 TABLET | Refills: 12 | Status: SHIPPED
Start: 2021-07-21 | End: 2022-07-25 | Stop reason: SDUPTHER

## 2021-07-21 RX ORDER — MIDODRINE HYDROCHLORIDE 2.5 MG/1
2.5 TABLET ORAL 2 TIMES DAILY WITH MEALS
Qty: 180 TABLET | Refills: 3 | Status: SHIPPED
Start: 2021-07-21 | End: 2022-07-25 | Stop reason: SDUPTHER

## 2021-07-21 RX ORDER — HYDROXYZINE HYDROCHLORIDE 25 MG/1
25 TABLET, FILM COATED ORAL 2 TIMES DAILY PRN
Qty: 60 TABLET | Refills: 12 | Status: SHIPPED | OUTPATIENT
Start: 2021-07-21

## 2021-07-21 RX ORDER — PAROXETINE HYDROCHLORIDE 20 MG/1
20 TABLET, FILM COATED ORAL EVERY MORNING
Qty: 90 TABLET | Refills: 3 | Status: SHIPPED
Start: 2021-07-21 | End: 2022-07-25 | Stop reason: SDUPTHER

## 2021-07-21 RX ORDER — LOVASTATIN 20 MG/1
20 TABLET ORAL NIGHTLY
Qty: 90 TABLET | Refills: 12 | Status: SHIPPED
Start: 2021-07-21 | End: 2022-07-25 | Stop reason: SDUPTHER

## 2021-07-21 RX ORDER — LEVOTHYROXINE SODIUM 0.05 MG/1
25 TABLET ORAL DAILY
Qty: 90 TABLET | Refills: 12 | Status: SHIPPED
Start: 2021-07-21 | End: 2021-09-15 | Stop reason: SDUPTHER

## 2021-07-21 ASSESSMENT — ENCOUNTER SYMPTOMS
ALLERGIC/IMMUNOLOGIC NEGATIVE: 1
RESPIRATORY NEGATIVE: 1
GASTROINTESTINAL NEGATIVE: 1
EYES NEGATIVE: 1

## 2021-07-21 NOTE — PROGRESS NOTES
21  Name: Consuelo Sanchez    : 1933    Sex: male    Age: 80 y.o. Subjective:  Chief Complaint: Patient is here for fu with exprses     Here with son in law   Fu  With   exprires visit        ua    Sx gone today ua clear  righ tnee pain he called  Ortho and told not much they can         Review of Systems   Constitutional: Negative. HENT: Negative. Eyes: Negative. Respiratory: Negative. Cardiovascular: Negative. Gastrointestinal: Negative. Endocrine: Negative. Genitourinary: Negative. Musculoskeletal: Positive for arthralgias. Skin: Negative. Allergic/Immunologic: Negative. Neurological: Negative. Hematological: Negative. Psychiatric/Behavioral: Negative.           Current Outpatient Medications:     folic acid (FOLVITE) 1 MG tablet, Two  Tabs once  A  week, Disp: 35 tablet, Rfl: 3    metoprolol succinate (TOPROL XL) 25 MG extended release tablet, Takes 1/2 tablet daily, Disp: 45 tablet, Rfl: 12    levothyroxine (SYNTHROID) 50 MCG tablet, Take 0.5 tablets by mouth Daily, Disp: 90 tablet, Rfl: 12    lovastatin (MEVACOR) 20 MG tablet, Take 1 tablet by mouth nightly, Disp: 90 tablet, Rfl: 12    PARoxetine (PAXIL) 20 MG tablet, Take 1 tablet by mouth every morning, Disp: 90 tablet, Rfl: 3    omeprazole (PRILOSEC) 20 MG delayed release capsule, Take 1 capsule by mouth daily, Disp: 90 capsule, Rfl: 3    hydrOXYzine (ATARAX) 25 MG tablet, Take 1 tablet by mouth 2 times daily as needed for Anxiety, Disp: 60 tablet, Rfl: 12    midodrine (PROAMATINE) 2.5 MG tablet, Take 1 tablet by mouth 2 times daily (with meals), Disp: 180 tablet, Rfl: 3    Handicap Placard MISC, by Does not apply route Dx   Parkinson dis    5 yrs, Disp: 1 each, Rfl: 0    carbidopa-levodopa (SINEMET CR)  MG per extended release tablet, Take 1 tablet by mouth every 4 hours (while awake) No more than 6 per day, Disp: 540 tablet, Rfl: 3    vitamin B-12 (CYANOCOBALAMIN) 1000 MCG tablet, Take 1,000 mcg by mouth daily, Disp: , Rfl:     acetaminophen (TYLENOL) 500 MG tablet, Take 500 mg by mouth every 6 hours as needed for Pain, Disp: , Rfl:     aspirin 81 MG tablet, Take 650 mg by mouth daily , Disp: , Rfl:     Multiple Vitamins-Minerals (OCUVITE) TABS oral tablet, Take 1 tablet by mouth daily, Disp: , Rfl:     vitamin D (CHOLECALCIFEROL) 1000 UNIT TABS tablet, Take 1,000 Units by mouth daily, Disp: , Rfl:     methotrexate (RHEUMATREX) 2.5 MG chemo tablet, Take 2.5 mg by mouth once a week 4 tablets weekly. , Disp: , Rfl:   Allergies   Allergen Reactions    Pcn [Penicillins]     Prednisone      Social History     Socioeconomic History    Marital status:      Spouse name: Not on file    Number of children: Not on file    Years of education: Not on file    Highest education level: Not on file   Occupational History    Not on file   Tobacco Use    Smoking status: Former Smoker     Years: 10.00     Types: Pipe, Cigars    Smokeless tobacco: Never Used   Vaping Use    Vaping Use: Never used   Substance and Sexual Activity    Alcohol use:  Yes     Alcohol/week: 0.0 standard drinks     Comment: social    Drug use: No    Sexual activity: Not Currently   Other Topics Concern    Not on file   Social History Narrative        Colonoscopy - (10/29/2008)    Colonoscopy - (1/10/2014)    Zoster/Shingles Vaccine - [facility]    CAD WITH CABG    EARLY HYPOTHYROID----HYPOTHRYOID 3-18    ANXIETY    DEPRESSION    FATIGUE    BPH    MACULAR DEGENERATION    SKIN CA    APPY    BILAT ING HERNIA---ONE DONE THINKS WAS R    L ING HERNIA    COLON 6-99 AND 6-02----5T O 10 YRS DR POWERS---PT STATES WAS TOLD THAT HE SHOULD NTO    HAVE AGAIN DUE TO AGE    L KNEE OR X 2 DR Yaya Castellanos AND CHRISSY    ANXIETY FROM PREDNISONE 2009    COLON 4-11------NEG---YRUICH    EGD 4-11 PEPTIC ULCER    L KNEE OR 6-11    GRANDSON CHINO GRULLON---TALL  AT [facility]    DAUGHTER DATES JAMES BARCENAS    STEROIDS CUASE ANXIETY    RA DR RUSSO    EGD 12-13 DR CHEN--- ESO DILITATION    COLON DR CHEN 1-14----- TICS    PARKINSON DIC DX WITH DR JERNIGAN 2014-- THEN CHG TO DR RUVALCABA---then dr Anthony Eldridge    ----Court Rhymes STUDY-WITH LIQUID ASPIRATION    R TOTAL KNEE 1-24-17 DR WEI    HEMATURIA 2-17 DR LAWRENCE PAYAN    L ANKLE PAIN--DR DANIELS THEN  [facility]    SEVERE LUMBAR SPINAL STENOSIS 2-18 DR BARTON--L-4-5 WITH ADRENAL    Dr. Richelle Barthel, [facility] left temple    Dr. Richelle Barthel, [facility]---REFERRED TO NEURO SURG DR VINSON---THEN TO DR Hari Randall--    eval DR Lalo Pereira 5-18 AND SAID SHOT AND AND PT REFUSED DUE TO CONCERN WITH    PREDNISONE-----TO SEE HIM BACK    Right knee total done 7-20  Dr Dayanna Brownlee and admits two weeks later with dehydration and orthostatic hypotension     Social Determinants of Health     Financial Resource Strain:     Difficulty of Paying Living Expenses:    Food Insecurity:     Worried About Running Out of Food in the Last Year:     Ran Out of Food in the Last Year:    Transportation Needs:     Lack of Transportation (Medical):      Lack of Transportation (Non-Medical):    Physical Activity:     Days of Exercise per Week:     Minutes of Exercise per Session:    Stress:     Feeling of Stress :    Social Connections:     Frequency of Communication with Friends and Family:     Frequency of Social Gatherings with Friends and Family:     Attends [employer] Services:     Active Member of Clubs or Organizations:     Attends Club or Organization Meetings:     Marital Status:    Intimate Partner Violence:     Fear of Current or Ex-Partner:     Emotionally Abused:     Physically Abused:     Sexually Abused:       Past Medical History:   Diagnosis Date    Anxiety     Ascending aortic aneurysm ([facility] [address]     BPH (benign prostatic hyperplasia)     CAD (coronary artery disease)     Cancer ([facility] [address]     skin    Coronary arteriosclerosis     Depression     DJD (degenerative joint disease)     Hyperlipidemia     Impacted cerumen     Lumbar spinal stenosis     severe    Macular degeneration     Osteoarthritis     Parkinson disease (HCC)     RA (rheumatoid arthritis) ([facility] [address]      Family History   Problem Relation Age of Onset    Lung Cancer Father     No Known Problems Mother       Past Surgical History:   Procedure Laterality Date    APPENDECTOMY      CATARACT REMOVAL WITH IMPLANT Left 04/19/2020    CHOLECYSTECTOMY      COLONOSCOPY      CORONARY ARTERY BYPASS GRAFT  2002    HERNIA REPAIR Bilateral     inguinal    KNEE SURGERY Left 06/2011    TOTAL [facility]THROPLASTY [facility]24/2017    [facility]    UPPER GASTROINTESTINAL ENDOSCOPY      peptic ulcer      Vitals:    07/21/21 1246   BP: 122/68   Temp: 97.7 °F (36.5 °C)   TempSrc: Oral   Weight: 177 lb (80.3 kg)       Objective:    Physical Exam  Vitals reviewed. Constitutional:       Appearance: He is well-developed. HENT:      Head: Normocephalic. Eyes:      Pupils: Pupils are equal, round, and reactive to light. Cardiovascular:      Rate and Rhythm: Normal rate and regular rhythm. Pulmonary:      Effort: Pulmonary effort is normal.      Breath sounds: Normal breath sounds. Abdominal:      Palpations: Abdomen is soft. Musculoskeletal:      Cervical back: Normal range of motion. Comments: Dec rom right knee   Skin:     General: Skin is warm. Neurological:      Mental Status: He is alert and oriented to person, place, and time. Psychiatric:         Behavior: Behavior normal.         Jhoana De Anda was seen today for urinary tract infection and medication refill. Diagnoses and all orders for this visit:    Essential hypertension  -     metoprolol succinate (TOPROL XL) 25 MG extended release tablet; Takes 1/2 tablet daily    Acquired hypothyroidism  -     levothyroxine (SYNTHROID) 50 MCG tablet; Take 0.5 tablets by mouth Daily    Mixed hyperlipidemia  -     lovastatin (MEVACOR) 20 MG tablet;  Take 1 tablet by mouth nightly    Anxiety  -     PARoxetine (PAXIL) 20 MG tablet; Take 1 tablet by mouth every morning    Gastroesophageal reflux disease without esophagitis  -     omeprazole (PRILOSEC) 20 MG delayed release capsule; Take 1 capsule by mouth daily    Orthostatic hypotension  -     midodrine (PROAMATINE) 2.5 MG tablet; Take 1 tablet by mouth 2 times daily (with meals)    Urinary frequency  -     POCT Urinalysis no Micro    Other orders  -     folic acid (FOLVITE) 1 MG tablet; Two  Tabs once  A  week  -     hydrOXYzine (ATARAX) 25 MG tablet; Take 1 tablet by mouth 2 times daily as needed for Anxiety        Comments: meds reviwd  And    Renewed   fuw ith r regule  Diet exr and paneuro     --today ua is  Fine  López if  sx    Aggressive low-fat diet. Avoid red meats, greasy fried foods, dairy products. Avoid processed foods. Take cholesterol medications without food. A great deal of time spent reviewing medications, diet, exercise, social issues. Also reviewing the chart before entering the room with patient and finishing charting after leaving patient's room. More than half of that time was spent face to face with the patient in counseling and coordinating care. Follow Up: Return for Reg Appt.      Seen by:  Carmelita Le DO

## 2021-09-07 ENCOUNTER — OFFICE VISIT (OUTPATIENT)
Dept: FAMILY MEDICINE CLINIC | Age: 86
End: 2021-09-07
Payer: MEDICARE

## 2021-09-07 VITALS
HEIGHT: 74 IN | OXYGEN SATURATION: 99 % | RESPIRATION RATE: 16 BRPM | TEMPERATURE: 97.9 F | SYSTOLIC BLOOD PRESSURE: 116 MMHG | HEART RATE: 77 BPM | DIASTOLIC BLOOD PRESSURE: 74 MMHG | BODY MASS INDEX: 22.84 KG/M2 | WEIGHT: 178 LBS

## 2021-09-07 DIAGNOSIS — S49.92XA INJURY OF LEFT SHOULDER, INITIAL ENCOUNTER: Primary | ICD-10-CM

## 2021-09-07 DIAGNOSIS — S51.012A SKIN TEAR OF LEFT ELBOW WITHOUT COMPLICATION, INITIAL ENCOUNTER: ICD-10-CM

## 2021-09-07 DIAGNOSIS — Z23 NEED FOR TETANUS BOOSTER: ICD-10-CM

## 2021-09-07 PROCEDURE — 90471 IMMUNIZATION ADMIN: CPT | Performed by: PHYSICIAN ASSISTANT

## 2021-09-07 PROCEDURE — 99214 OFFICE O/P EST MOD 30 MIN: CPT | Performed by: PHYSICIAN ASSISTANT

## 2021-09-07 PROCEDURE — 90715 TDAP VACCINE 7 YRS/> IM: CPT | Performed by: PHYSICIAN ASSISTANT

## 2021-09-07 NOTE — PROGRESS NOTES
21  Maik Burrell : 1933 Sex: male  Age 80 y.o. Subjective:  Chief Complaint   Patient presents with    Shoulder Pain     left shoulder pain from fall on friday          HPI:   Maik Burrell , 80 y.o. male presents to express care for evaluation of left shoulder pain, skin tear    HPI  68-year-old male presents to express care for evaluation of a skin tear noted to the left elbow and a left shoulder injury status post fall on Friday. The patient states that he landed against the wall and injured his left shoulder and his left elbow. Really did not injure anything else. No head or head injury. This was a mechanical fall. The patient states that he was going down the stairs and tripped. The patient did not hit or hurt anything else. The patient actually has decent range of motion of the left upper extremity. Here with his son. ROS:   Unless otherwise stated in this report the patient's positive and negative responses for review of systems for constitutional, eyes, ENT, cardiovascular, respiratory, gastrointestinal, neurological, , musculoskeletal, and integument systems and related systems to the presenting problem are either stated in the history of present illness or were not pertinent or were negative for the symptoms and/or complaints related to the presenting medical problem. Positives and pertinent negatives as per HPI. All others reviewed and are negative.       PMH:     Past Medical History:   Diagnosis Date    Anxiety     Ascending aortic aneurysm (HCC)     BPH (benign prostatic hyperplasia)     CAD (coronary artery disease)     Cancer (HCC)     skin    Coronary arteriosclerosis     Depression     DJD (degenerative joint disease)     Hyperlipidemia     Impacted cerumen     Lumbar spinal stenosis     severe    Macular degeneration     Osteoarthritis     Parkinson disease ([address]     RA (rheumatoid arthritis) ([facility] [address]        Past Surgical History: Vitamins-Minerals (OCUVITE) TABS oral tablet, Take 1 tablet by mouth daily, Disp: , Rfl:     vitamin D (CHOLECALCIFEROL) 1000 UNIT TABS tablet, Take 1,000 Units by mouth daily, Disp: , Rfl:     methotrexate (RHEUMATREX) 2.5 MG chemo tablet, Take 2.5 mg by mouth once a week 4 tablets weekly. , Disp: , Rfl:     Allergies: Allergies   Allergen Reactions    Pcn [Penicillins]     Prednisone        Social History:     Social History     Tobacco Use    Smoking status: Former Smoker     Years: 10.00     Types: Pipe, Cigars    Smokeless tobacco: Never Used   Vaping Use    Vaping Use: Never used   Substance Use Topics    Alcohol use: Yes     Alcohol/week: 0.0 standard drinks     Comment: social    Drug use: No       Patient lives at home. Physical Exam:     Vitals:    09/07/21 1109   BP: 116/74   Pulse: 77   Resp: 16   Temp: 97.9 °F (36.6 °C)   SpO2: 99%   Weight: 178 lb (80.7 kg)   Height: 6' 2\" (1.88 m)       Exam:  Physical Exam  Vital signs reviewed and nurse's notes. The patient is not hypoxic. General: Alert, no acute distress, patient resting comfortably   Skin: warm, intact, no pallor noted   Head: Normocephalic, atraumatic   Eye: Normal conjunctiva   Respiratory: No acute distress   Musculoskeletal: No obvious deformity noted to the left upper extremity or to the left shoulder. The patient no step-offs or crepitus noted. The patient does have some diffuse tenderness throughout the left shoulder. He did have some pain and difficulty abducting at the left shoulder. 4/5 strength with abduction because of pain. No significant pain or discomfort or weakness with abduction, 5/5. Pulses intact at radius 2+. The patient has a 2.5 cm skin tear noted to the lateral aspect of the left elbow. The patient had no tenderness noted to the left elbow and had full range of motion with flexion, extension, pronation and supination. Pulses intact at radius 2+.   Neurological: alert and orient x4, normal sensory and motor observed. Psychiatric: Cooperative      Testing:     XR SHOULDER LEFT (MIN 2 VIEWS)    Result Date: 9/7/2021  EXAMINATION: THREE XRAY VIEWS OF THE LEFT SHOULDER 9/7/2021 11:51 am COMPARISON: None. HISTORY: ORDERING SYSTEM PROVIDED HISTORY: Injury of left shoulder, initial encounter TECHNOLOGIST PROVIDED HISTORY: Reason for exam:->left shoulder pain s/p fall on friday FINDINGS: Radiographs of the left shoulder reveal no evidence of fracture or joint dislocation. No bony destructive lesion is seen. There is moderate loss of joint space in the glenohumeral joint. Calcifications along the superolateral margin of the left humeral head likely represent calcific tendinosis. 1. No fracture or joint dislocation is seen. 2. Degenerative changes, as described. Medical Decision Making:     Vital signs reviewed    Past medical history reviewed. Allergies reviewed. Medications reviewed. Patient on arrival does not appear to be in any apparent distress or discomfort. The patient has been seen and evaluated. The patient does not appear to be toxic or lethargic. The patient was sent for x-rays of the left shoulder. His tetanus was updated    The patient additionally had the wound cleaned, irrigated and topical antibiotic ointment applied. The patient had x-rays obtained that did not show any evidence of acute fracture, dislocation. There is some degenerative changes noted. The patient was placed in a sling. We will have the patient follow-up with PCP. Did discuss the potential of occult fracture. They are to call with any questions or concerns. The patient is to return to express care or go directly to the emergency department should any of the signs or symptoms worsen. The patient is to followup with primary care physician in 2-3 days for repeat evaluation. The patient has no other questions or concerns at this time the patient will be discharged home.         Clinical Impression:   Kely Teague was seen today for shoulder pain. Diagnoses and all orders for this visit:    Injury of left shoulder, initial encounter  -     XR SHOULDER LEFT (MIN 2 VIEWS); Future    Skin tear of left elbow without complication, initial encounter    Need for tetanus booster    Other orders  -     Tdap (age 6y and older) IM ([address])  -     Wound care        The patient is to call for any concerns or return if any of the signs or symptoms worsen. The patient is to follow-up with PCP in the next 2-3 days for repeat evaluation repeat assessment or go directly to the emergency department.      SIGNATURE: Fredrick Zamorano III, PA-C

## 2021-09-15 ENCOUNTER — OFFICE VISIT (OUTPATIENT)
Dept: ENDOCRINOLOGY | Age: 86
End: 2021-09-15
Payer: MEDICARE

## 2021-09-15 VITALS
DIASTOLIC BLOOD PRESSURE: 75 MMHG | BODY MASS INDEX: 23.72 KG/M2 | SYSTOLIC BLOOD PRESSURE: 127 MMHG | OXYGEN SATURATION: 93 % | RESPIRATION RATE: 18 BRPM | HEART RATE: 65 BPM | HEIGHT: 73 IN | WEIGHT: 179 LBS

## 2021-09-15 DIAGNOSIS — E27.8 ADRENAL INCIDENTALOMA (HCC): Primary | ICD-10-CM

## 2021-09-15 DIAGNOSIS — E03.9 HYPOTHYROIDISM, UNSPECIFIED TYPE: ICD-10-CM

## 2021-09-15 DIAGNOSIS — E55.9 VITAMIN D DEFICIENCY: ICD-10-CM

## 2021-09-15 DIAGNOSIS — E03.9 ACQUIRED HYPOTHYROIDISM: Chronic | ICD-10-CM

## 2021-09-15 PROCEDURE — 99214 OFFICE O/P EST MOD 30 MIN: CPT | Performed by: INTERNAL MEDICINE

## 2021-09-15 RX ORDER — LEVOTHYROXINE SODIUM 0.05 MG/1
25 TABLET ORAL DAILY
Qty: 45 TABLET | Refills: 3 | Status: SHIPPED
Start: 2021-09-15 | End: 2022-05-13

## 2021-09-15 RX ORDER — DEXAMETHASONE 1 MG
1 TABLET ORAL ONCE
Qty: 1 TABLET | Refills: 0 | Status: SHIPPED | OUTPATIENT
Start: 2021-09-15 | End: 2021-09-15 | Stop reason: SDUPTHER

## 2021-09-15 RX ORDER — DEXAMETHASONE 1 MG
1 TABLET ORAL ONCE
Qty: 1 TABLET | Refills: 0 | Status: SHIPPED | OUTPATIENT
Start: 2021-09-15 | End: 2021-09-15

## 2021-09-15 NOTE — PROGRESS NOTES
[address] [facility] Department of Endocrinology Diabetes and Metabolism   [address] [facility] [address]   Phone: [phone]  Fax: [phone]  Date of Service: 9/15/2021   Primary Care Physician: Brenda Head DO  Provider: Armaan Pittman MD     Reason for the visit:   Lt side adrenal incidentaloma     History of Present Illness: The history is provided by the patient. No  was used. Accuracy of the patient data is excellent. Particnarinder Levy is very pleasant 80 y.o. y.o. male with PMH of Parkinsons disease, HLD and other listed below seen today for follow up visit    Pt was recently admitted to [facility] because of worsening fatigue, decrease po intake and hypotension concerning for sepsis. CT abdomen pelvis without contrast 8/13/2020 shows nonspecific 2.4 cm left adrenal mass, nonspecific perinephric fat stranding. Pt has HTN controlled with beta blocker Metoprolol. The patient denied h/o intermittent headache, sweating, or palpitation.  Denied history of diabetes, Wt change, easy bruising, or hyperpigmentation     Component 8/16/2020   Normetanephrine, Free 2.54 (H)   Metanephrine, Free 1.23 (H)   Cortisol 32.31 (H)    Aldosterone 14.9   Renin Activity 0.7     PAST MEDICAL HISTORY   Past Medical History:   Diagnosis Date    Anxiety     Ascending aortic aneurysm (HCC)     BPH (benign prostatic hyperplasia)     CAD (coronary artery disease)     Cancer (HCC)     skin    Coronary arteriosclerosis     Depression     DJD (degenerative joint disease)     Hyperlipidemia     Impacted cerumen     Lumbar spinal stenosis     severe    Macular degeneration     Osteoarthritis     Parkinson disease ([address]     RA (rheumatoid arthritis) ([address]         PAST SURGICAL HISTORY   Past Surgical History:   Procedure Laterality Date    APPENDECTOMY      CATARACT REMOVAL WITH IMPLANT Left 04/19/2020    CHOLECYSTECTOMY      COLONOSCOPY      CORONARY ARTERY BYPASS GRAFT  2002    HERNIA REPAIR Bilateral     inguinal    KNEE SURGERY Left 06/2011    TOTAL KNEE ARTHROPLASTY Right 01/24/2017    tiffanie    UPPER GASTROINTESTINAL ENDOSCOPY      peptic ulcer        SOCIAL HISTORY   Tobacco:   reports that he has quit smoking. His smoking use included pipe and cigars. He quit after 10.00 years of use. He has never used smokeless tobacco.  Alcohol:   reports current alcohol use. Drugs:   reports no history of drug use.     FAMILY HISTORY   Family History   Problem Relation Age of Onset    Lung Cancer Father     No Known Problems Mother         ALLERGIES AND DRUG REACTIONS   Allergies   Allergen Reactions    Pcn [Penicillins]     Prednisone         CURRENT MEDICATIONS   Current Outpatient Medications   Medication Sig Dispense Refill    folic acid (FOLVITE) 1 MG tablet Two  Tabs once  A  week 35 tablet 3    metoprolol succinate (TOPROL XL) 25 MG extended release tablet Takes 1/2 tablet daily 45 tablet 12    levothyroxine (SYNTHROID) 50 MCG tablet Take 0.5 tablets by mouth Daily 90 tablet 12    lovastatin (MEVACOR) 20 MG tablet Take 1 tablet by mouth nightly 90 tablet 12    PARoxetine (PAXIL) 20 MG tablet Take 1 tablet by mouth every morning 90 tablet 3    omeprazole (PRILOSEC) 20 MG delayed release capsule Take 1 capsule by mouth daily 90 capsule 3    hydrOXYzine (ATARAX) 25 MG tablet Take 1 tablet by mouth 2 times daily as needed for Anxiety 60 tablet 12    midodrine (PROAMATINE) 2.5 MG tablet Take 1 tablet by mouth 2 times daily (with meals) 180 tablet 3    Handicap Placard MISC by Does not apply route Dx   Parkinson dis    5 yrs 1 each 0    carbidopa-levodopa (SINEMET CR)  MG per extended release tablet Take 1 tablet by mouth every 4 hours (while awake) No more than 6 per day 540 tablet 3    vitamin B-12 (CYANOCOBALAMIN) 1000 MCG tablet Take 1,000 mcg by mouth daily      acetaminophen (TYLENOL) 500 MG tablet Take 500 mg by mouth every 6 hours as needed for Pain      aspirin 81 MG tablet Take 650 mg by mouth daily       Multiple Vitamins-Minerals (OCUVITE) TABS oral tablet Take 1 tablet by mouth daily      vitamin D (CHOLECALCIFEROL) 1000 UNIT TABS tablet Take 1,000 Units by mouth daily      methotrexate (RHEUMATREX) 2.5 MG chemo tablet Take 2.5 mg by mouth once a week 4 tablets weekly. No current facility-administered medications for this visit. Review of Systems   Constitutional: No fever, no chills, no diaphoresis, no generalized weakness. HEENT: No blurred vision, No sore throat, no ear pain, no hair loss   Neck: denied any neck swelling, difficulty swallowing,   Cadrdiopulomary: No CP, SOB or palpitation, No orthopnea or PND. No cough or wheezing. GI: No N/V/D, no constipation, No abdominal pain, no melena or hematochezia   : Denied any dysuria, hematuria, flank pain, discharge, or incontinence. Skin: denied any rash, striae ulcer, Hirsute, or hyperpigmentation. MSK: denied any joint deformity, joint pain/swelling, muscle pain, or back pain. Neuro: no numbess, no tingling, no weakness,     OBJECTIVE   /75   Pulse 65   Resp 18   Ht 6' 1\" (1.854 m)   Wt 179 lb (81.2 kg)   SpO2 93%   BMI 23.62 kg/m²    BP Readings from Last 4 Encounters:   09/15/21 127/75   09/07/21 116/74   07/21/21 122/68   07/01/21 122/64      Wt Readings from Last 6 Encounters:   09/15/21 179 lb (81.2 kg)   09/07/21 178 lb (80.7 kg)   07/21/21 177 lb (80.3 kg)   07/01/21 177 lb (80.3 kg)   06/28/21 179 lb (81.2 kg)   05/04/21 171 lb (77.6 kg)        Physical examination:   General: awake alert, oriented x3, no abnormal position or movements. HEENT: normocephalic non traumatic, no exophthalmos   Neck: supple, no LN enlargement, no thyromegaly, no thyroid tenderness, no JVD. Pulm: Clear equal air entry no added sounds, no wheezing or rhonchi   CVS: S1 + S2, no murmur, no heave.  Dorsalis pedis pulse palpable   Abd: soft lax, no tenderness, no organomegaly, audible bowel sounds. Skin: warm, no lesions, no rash.  No striae, no bruising, no tags  Musculoskeletal: No back tenderness, no kyphosis/scoliosis   Neuro: CN intact, sensation normal, muscle power normal. No tremors   Psych: normal mood, and affect     Review of Laboratory Data:   I personally reviewed the following labs:    Lab Results   Component Value Date/Time    WBC 5.3 06/21/2021 09:23 AM    RBC 3.42 (L) 06/21/2021 09:23 AM    HGB 11.5 (L) 06/21/2021 09:23 AM    HCT 35.8 (L) 06/21/2021 09:23 AM    .7 (H) 06/21/2021 09:23 AM    MCH 33.6 06/21/2021 09:23 AM    MCHC 32.1 06/21/2021 09:23 AM    RDW 12.5 06/21/2021 09:23 AM     06/21/2021 09:23 AM    MPV 10.2 06/21/2021 09:23 AM      Lab Results   Component Value Date/Time     06/21/2021 09:23 AM    K 4.5 06/21/2021 09:23 AM    K 4.2 08/14/2020 05:45 AM    CO2 25 06/21/2021 09:23 AM    BUN 26 (H) 06/21/2021 09:23 AM    CREATININE 1.3 (H) 06/21/2021 09:23 AM    CALCIUM 9.3 06/21/2021 09:23 AM    LABGLOM 52 06/21/2021 09:23 AM    GFRAA >60 06/21/2021 09:23 AM      Lab Results   Component Value Date/Time    TSH 3.540 06/21/2021 09:23 AM    C7BBGWD 6.4 06/21/2021 09:23 AM     Lab Results   Component Value Date    LABA1C 5.6 06/21/2021    GLUCOSE 109 06/21/2021    GLUCOSE 103 06/19/2011     Lab Results   Component Value Date    TRIG 77 06/21/2021    HDL 49 06/21/2021    LDLCALC 60 06/21/2021    CHOL 124 06/21/2021      No results found for: ALDODIRENCAL   No results found for: ALPRRATIO   Lab Results   Component Value Date    TRIG 77 06/21/2021    HDL 49 06/21/2021    LDLCALC 60 06/21/2021    CHOL 124 06/21/2021     No results found for: PTH   Lab Results   Component Value Date/Time    VITD25 30 06/21/2021 09:23 AM      ASSESSMENT & RECOMMENDATIONS   Lio Khalil, a 80 y.o.-old male seen in for the following issues     Left side Adrenal incidentaloma   · I have personally reviewed CT images myself and compare with MRI study 72 year old male with PMH of hydrocele, peripheral neuropathy, inguinal hernia, ichthyosis, presents c/o urinary retention for 1 week. On Ct scan pt has found to have left distal ureteral obstructing calculus measuring 8 x 7 x 12 mm producing the mild left hydroureteronephrosis and enlarged prostate. BUN/Cr 64/4.9, WBC 11.6.     Plan:  - c/w yoo  - Flomax  - IVF hydration  - pain management  - nephrology consult  - f/u labs      Will d/w Dr. Leon that was done in 2/2018. Radiological characteristics of the mass (homogeneous, size <4 cm, smooth borders) in favor of benign lipid poor adenoma. This lesion is stable as of the MRI from 2/2018  · Previous work up showed normal overnight 1-mg Dexamethasone suppression test, plasma metanephrines, 24 hrs urine total/free catecholamines. Hypothyroidism   · Continue levothyroxine 50 mcg 1/2 tab daily     vitd deficiency   · Continue vitD supplement    I personally reviewed external notes from PCP and other patient's care team providers, and personally interpreted labs associated with the above diagnosis. I also ordered labs to further assess and manage the above addressed medical conditions    Return in about 1 year (around 9/15/2022) for hypothyroidism, adrenal incidentaloma . The above issues were reviewed with the patient who understood and agreed with the plan. Thank you for allowing us to participate in the care of this patient. Please do not hesitate to contact us with any additional questions. Diagnosis Orders   1. Adrenal incidentaloma (HCC)  Cortisol Total    Metanephrines Plasma Free    Renin Activity and Aldosterone   2. Hypothyroidism, unspecified type     3. Vitamin D deficiency     4. Acquired hypothyroidism  levothyroxine (SYNTHROID) 50 MCG tablet     Kurtis Bell MD   Endocrinologist, [facility] Diabetes Care and Endocrinology   [address] [facility] [place] [address]   Phone: [phone]   Fax: [phone]   --------------------------  An electronic signature was used to authenticate this note.  Eric Encinas MD on 9/15/2021 at 2:12 PM 72 year old male with PMH of hydrocele, peripheral neuropathy, inguinal hernia, ichthyosis, presents c/o urinary retention for 1 week. On Ct scan pt has found to have bilateral mild hydroureteronephrosis, left distal ureteral obstructing calculus measuring 8 x 7 x 12 mm producing the mild left hydroureteronephrosis and enlarged prostate. BUN/Cr 64/4.9, WBC 11.6. Pt afebrile.    Plan:  - c/w yoo  - Flomax  - recommend nephrology consult  - monitor labs for improvement  - further management by medical team  - will follow      Case discussed with Dr. Leon

## 2021-09-26 ENCOUNTER — TELEPHONE (OUTPATIENT)
Dept: ENDOCRINOLOGY | Age: 86
End: 2021-09-26

## 2021-11-23 ENCOUNTER — OFFICE VISIT (OUTPATIENT)
Dept: NEUROLOGY | Age: 86
End: 2021-11-23
Payer: MEDICARE

## 2021-11-23 VITALS
HEART RATE: 71 BPM | WEIGHT: 177 LBS | SYSTOLIC BLOOD PRESSURE: 119 MMHG | BODY MASS INDEX: 22.01 KG/M2 | RESPIRATION RATE: 18 BRPM | HEIGHT: 75 IN | OXYGEN SATURATION: 95 % | TEMPERATURE: 97.7 F | DIASTOLIC BLOOD PRESSURE: 71 MMHG

## 2021-11-23 DIAGNOSIS — M54.17 LUMBOSACRAL RADICULOPATHY: ICD-10-CM

## 2021-11-23 DIAGNOSIS — G20 PARKINSON'S DISEASE (HCC): Primary | ICD-10-CM

## 2021-11-23 PROCEDURE — 99215 OFFICE O/P EST HI 40 MIN: CPT | Performed by: PSYCHIATRY & NEUROLOGY

## 2021-11-23 NOTE — PROGRESS NOTES
This 51-year-old right-handed man was followed for Parkinson's disease. The patient was now a fair historian. His daughter remained an excellent one. His medications continued as carbidopa/levodopa 50/200 tablets, paroxetine, omeprazole, lovastatin, methotrexate, lovastatin, midodrine, folic acid, hydroxyzine and multivitamins. He was still taking his carbidopa/levodopa every 5 hours apart. His last dose was before bedtime. With the above regimen, he continued responding moderately well. He and his daughter denied motor deficits since his last visit. He had not fallen nor frozen. He was still engaged in physical therapy program, especially boxing, designed for Parkinson's patients. He enjoyed that program, but now wished to attend only twice weekly. There remained only minimal intermittent, resting tremors. His daughter reported minimal cognitive deficits. He was also drooling more than before. There were no other neurological issues. His tingling sensations in both feet had not worsened. Electrodiagnostic studies revealed diffuse lumbosacral radicular disease. MRIs of the lumbosacral spine displayed moderate to marked abnormalities as well. He was not a surgical candidate. Pains in both knees when standing and walking were not as severe, but still limited his walking    He denied any medical issues. There was no return of his melanomas. He was only driving during the daytime, due to his macular degeneration. He received his COVID-19 vaccinations and was awaiting his booster. He continued practicing proper pandemic precautions. Review of systems was otherwise unremarkable. Physical examination displayed stable vital signs. His blood pressure was 119/71. He was an elderly man in no acute distress who was alert, cooperative and oriented. He remained pleasant. He continued in good spirits.   His skin displayed multiple senile hyperkeratotic plaques, macules and facial and left ear scars from cancer removals. His neck was supple without thyromegaly; there were +1 carotid upstrokes without bruits; there was full range of motion of his neck in all directions, without rigidity or cogwheeling. His lungs were clear to auscultation. Cardiac testing was unrevealing. Peripheral pulses were +1 without bruits. His limbs revealed arthritic knees with a well-healed right knee surgical scar. His pes planus deformities had not worsened. Neurological examination produced an intact mental status. His voice was softer, without quivering, with slower responses. Cranial nerve testing was unremarkable. Extraocular movements remained intact. I found normal tone and bulk with 5/5 strength throughout. There was no resting tremor but minimal bradykinesia. Reflexes were barely elicited in the arms, +1 at the knees and absent at the ankles. I again found bilateral grasp reflexes. Sensory testing was intact to pinprick. Vibration was moderately reduced above both ankles. Coordination was unrevealing. He walked more slowly, with a slight limp. There was no festination or freezing. His neurological examination displayed decreasing movements and early cognitive signs. Laboratory data included a recent CMP with a GFR of 52 and a glucose of 109. CBC with differential again produced an MCV of 105. 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 He continues moderately well with carbidopa/levodopa 50/200 tablets every 4 hours while awake, 5 times daily. He now displays minimal decline. I will consider switching to the 25/250 tablets next visit, if he worsens. Hopefully, he will exercise five times daily, instead of three times daily. 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     Medically he is stable despite his rheumatoid arthritis, degenerative joint disease and coronary artery disease. 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 There remained no recurrence    He will return in 4 months. He will continue his current regimen, with his carbidopa/levodopa every 4 hours. He will, hopefully, increase his exercise schedule. His daughter will report back in 1 month. She will call at any time if problems arise. I spent 40 minutes with the patient with over 50 % spent in counseling and disease management. All patient issues were addressed and all questions were answered.

## 2021-12-09 ENCOUNTER — OFFICE VISIT (OUTPATIENT)
Dept: PRIMARY CARE CLINIC | Age: 86
End: 2021-12-09
Payer: MEDICARE

## 2021-12-09 VITALS
SYSTOLIC BLOOD PRESSURE: 126 MMHG | BODY MASS INDEX: 21.76 KG/M2 | WEIGHT: 175 LBS | TEMPERATURE: 98 F | HEIGHT: 75 IN | DIASTOLIC BLOOD PRESSURE: 76 MMHG

## 2021-12-09 DIAGNOSIS — Z20.822 EXPOSURE TO COVID-19 VIRUS: ICD-10-CM

## 2021-12-09 DIAGNOSIS — N30.00 ACUTE CYSTITIS WITHOUT HEMATURIA: ICD-10-CM

## 2021-12-09 DIAGNOSIS — K52.9 GASTROENTERITIS: ICD-10-CM

## 2021-12-09 DIAGNOSIS — N30.00 ACUTE CYSTITIS WITHOUT HEMATURIA: Primary | ICD-10-CM

## 2021-12-09 LAB
BASOPHILS ABSOLUTE: 0.05 E9/L (ref 0–0.2)
BASOPHILS RELATIVE PERCENT: 0.8 % (ref 0–2)
BILIRUBIN, POC: NORMAL
BLOOD URINE, POC: NORMAL
CLARITY, POC: CLEAR
COLOR, POC: YELLOW
EOSINOPHILS ABSOLUTE: 0.02 E9/L (ref 0.05–0.5)
EOSINOPHILS RELATIVE PERCENT: 0.3 % (ref 0–6)
GLUCOSE URINE, POC: NORMAL
HCT VFR BLD CALC: 35.1 % (ref 37–54)
HEMOGLOBIN: 11.5 G/DL (ref 12.5–16.5)
IMMATURE GRANULOCYTES #: 0.02 E9/L
IMMATURE GRANULOCYTES %: 0.3 % (ref 0–5)
KETONES, POC: NORMAL
LEUKOCYTE EST, POC: NORMAL
LYMPHOCYTES ABSOLUTE: 1.08 E9/L (ref 1.5–4)
LYMPHOCYTES RELATIVE PERCENT: 17.9 % (ref 20–42)
MCH RBC QN AUTO: 35.2 PG (ref 26–35)
MCHC RBC AUTO-ENTMCNC: 32.8 % (ref 32–34.5)
MCV RBC AUTO: 107.3 FL (ref 80–99.9)
MONOCYTES ABSOLUTE: 1.3 E9/L (ref 0.1–0.95)
MONOCYTES RELATIVE PERCENT: 21.5 % (ref 2–12)
NEUTROPHILS ABSOLUTE: 3.57 E9/L (ref 1.8–7.3)
NEUTROPHILS RELATIVE PERCENT: 59.2 % (ref 43–80)
NITRITE, POC: NORMAL
PDW BLD-RTO: 12.8 FL (ref 11.5–15)
PH, POC: 5.5
PLATELET # BLD: 188 E9/L (ref 130–450)
PMV BLD AUTO: 10.4 FL (ref 7–12)
PROTEIN, POC: NORMAL
RBC # BLD: 3.27 E12/L (ref 3.8–5.8)
SPECIFIC GRAVITY, POC: 1.02
UROBILINOGEN, POC: 0.2
WBC # BLD: 6 E9/L (ref 4.5–11.5)

## 2021-12-09 PROCEDURE — 81002 URINALYSIS NONAUTO W/O SCOPE: CPT | Performed by: FAMILY MEDICINE

## 2021-12-09 PROCEDURE — 99213 OFFICE O/P EST LOW 20 MIN: CPT | Performed by: FAMILY MEDICINE

## 2021-12-09 ASSESSMENT — PATIENT HEALTH QUESTIONNAIRE - PHQ9
2. FEELING DOWN, DEPRESSED OR HOPELESS: 0
1. LITTLE INTEREST OR PLEASURE IN DOING THINGS: 0
SUM OF ALL RESPONSES TO PHQ QUESTIONS 1-9: 0
SUM OF ALL RESPONSES TO PHQ QUESTIONS 1-9: 0
SUM OF ALL RESPONSES TO PHQ9 QUESTIONS 1 & 2: 0
SUM OF ALL RESPONSES TO PHQ QUESTIONS 1-9: 0

## 2021-12-09 ASSESSMENT — ENCOUNTER SYMPTOMS
RESPIRATORY NEGATIVE: 1
ALLERGIC/IMMUNOLOGIC NEGATIVE: 1
DIARRHEA: 1
EYES NEGATIVE: 1

## 2021-12-09 NOTE — PROGRESS NOTES
21  Name: Lisa Bliss    : 1933    Sex: male    Age: 80 y.o. Subjective:  Chief Complaint: Patient is here for  Urine freq  Diarrhea  chill     Onset yest  Of  sluggihs     Chill yest      urien freq and diarrhea  Daughter in room  Urine   todyom wtu bill culture  Feels much better last  8 hrs      Review of Systems   Constitutional: Negative. HENT: Negative. Eyes: Negative. Respiratory: Negative. Cardiovascular: Negative. Gastrointestinal: Positive for diarrhea. Endocrine: Negative. Genitourinary: Positive for frequency. Musculoskeletal: Negative. Skin: Negative. Allergic/Immunologic: Negative. Neurological: Negative. Hematological: Negative. Psychiatric/Behavioral: Negative.           Current Outpatient Medications:     ZINC PO, Take by mouth daily, Disp: , Rfl:     levothyroxine (SYNTHROID) 50 MCG tablet, Take 0.5 tablets by mouth Daily, Disp: 45 tablet, Rfl: 3    folic acid (FOLVITE) 1 MG tablet, Two  Tabs once  A  week, Disp: 35 tablet, Rfl: 3    metoprolol succinate (TOPROL XL) 25 MG extended release tablet, Takes 1/2 tablet daily, Disp: 45 tablet, Rfl: 12    lovastatin (MEVACOR) 20 MG tablet, Take 1 tablet by mouth nightly, Disp: 90 tablet, Rfl: 12    PARoxetine (PAXIL) 20 MG tablet, Take 1 tablet by mouth every morning, Disp: 90 tablet, Rfl: 3    omeprazole (PRILOSEC) 20 MG delayed release capsule, Take 1 capsule by mouth daily, Disp: 90 capsule, Rfl: 3    hydrOXYzine (ATARAX) 25 MG tablet, Take 1 tablet by mouth 2 times daily as needed for Anxiety (Patient not taking: Reported on 2021), Disp: 60 tablet, Rfl: 12    midodrine (PROAMATINE) 2.5 MG tablet, Take 1 tablet by mouth 2 times daily (with meals), Disp: 180 tablet, Rfl: 3    Handicap Placard MISC, by Does not apply route Dx   Parkinson dis    5 yrs, Disp: 1 each, Rfl: 0    carbidopa-levodopa (SINEMET CR)  MG per extended release tablet, Take 1 tablet by mouth every 4 hours (while awake) No more than 6 per day, Disp: 540 tablet, Rfl: 3    vitamin B-12 (CYANOCOBALAMIN) 1000 MCG tablet, Take 1,000 mcg by mouth daily, Disp: , Rfl:     acetaminophen (TYLENOL) 500 MG tablet, Take 500 mg by mouth every 6 hours as needed for Pain, Disp: , Rfl:     aspirin 81 MG tablet, Take 650 mg by mouth daily , Disp: , Rfl:     Multiple Vitamins-Minerals (OCUVITE) TABS oral tablet, Take 1 tablet by mouth daily, Disp: , Rfl:     vitamin D (CHOLECALCIFEROL) 1000 UNIT TABS tablet, Take 1,000 Units by mouth daily, Disp: , Rfl:     methotrexate (RHEUMATREX) 2.5 MG chemo tablet, Take 2.5 mg by mouth once a week 4 tablets weekly. , Disp: , Rfl:   Allergies   Allergen Reactions    Pcn [Penicillins]     Prednisone      Social History     Socioeconomic History    Marital status:      Spouse name: Not on file    Number of children: Not on file    Years of education: Not on file    Highest education level: Not on file   Occupational History    Not on file   Tobacco Use    Smoking status: Former Smoker     Years: 10.00     Types: Pipe, Cigars    Smokeless tobacco: Never Used   Vaping Use    Vaping Use: Never used   Substance and Sexual Activity    Alcohol use:  Yes     Alcohol/week: 0.0 standard drinks     Comment: social    Drug use: No    Sexual activity: Not Currently   Other Topics Concern    Not on file   Social History Narrative        Colonoscopy - (10/29/2008)    Colonoscopy - (1/10/2014)    Zoster/Shingles Vaccine - given at [facility]    CAD WITH CABG    EARLY HYPOTHYROID----HYPOTHRYOID 3-18    ANXIETY    DEPRESSION    FATIGUE    BPH    MACULAR DEGENERATION    SKIN CA    APPY    BILAT ING HERNIA---ONE DONE THINKS WAS R    L ING HERNIA    COLON 6-99 AND 6-02----5T O 10 YRS DR POWERS---PT STATES WAS TOLD THAT HE SHOULD NTO    HAVE AGAIN DUE TO AGE    L KNEE OR X 2 DR WEBSTER AND CHRISSY    ANXIETY FROM PREDNISONE 2009    COLON 4-11------NEG---YRUICH    EGD 4-11 PEPTIC ULCER L KNEE OR 6-11    GRANDSON CHINO GRULLON---TALL  AT [facility]    DAUGHTER EVY BARCENAS    STEROIDS CUASE ANXIETY    RA DR Silver. Sree [address]    EGD 12-13 DR CHEN--- ESO DILITATION    COLON DR CHEN 1-14----- TICS    PARKINSON DIC DX WITH DR JERNIGAN 2014-- THEN CHG TO DR RUVALCABA---then dr Mic Mosher    ----Nyla Villaseñor STUDY-WITH LIQUID ASPIRATION    R TOTAL KNEE 1-24-17 DR WEI    HEMATURIA 2-17 DR LAWRENCE PAYAN    L ANKLE PAIN--DR DANIELS THEN  [facility]    SEVERE LUMBAR SPINAL STENOSIS 2-18 DR BARTON--L-4-5 WITH ADRENAL    Dr. Zi Andres, [facility] left [place]    Dr. Zi Andres, [facility]---REFERRED TO NEURO SURG DR VINSON---THEN TO DR Radha Branch--    eval DR iVviane Jack 5-18 AND SAID SHOT AND AND PT REFUSED DUE TO CONCERN WITH    PREDNISONE-----TO SEE HIM BACK    Right knee total done 7-20  Dr Marylen Glatter and admits two weeks later with dehydration and orthostatic hypotension     Social Determinants of Health     Financial Resource Strain:     Difficulty of Paying Living Expenses: Not on file   Food Insecurity:     Worried About Running Out of Food in the Last Year: Not on file    Diana of Food in the Last Year: Not on file   Transportation Needs:     Lack of Transportation (Medical): Not on file    Lack of Transportation (Non-Medical):  Not on file   Physical Activity:     Days of Exercise per Week: Not on file    Minutes of Exercise per Session: Not on file   Stress:     Feeling of Stress : Not on file   Social Connections:     Frequency of Communication with Friends and Family: Not on file    Frequency of Social Gatherings with Friends and Family: Not on file    Attends [employer] Services: Not on file    Active Member of Clubs or Organizations: Not on file    Attends Club or Organization Meetings: Not on file    Marital Status: Not on file   Intimate Partner Violence:     Fear of Current or Ex-Partner: Not on file    Emotionally Abused: Not on file    Physically Abused: Not on file    Sexually Abused: Not on file Housing Stability:     Unable to Pay for Housing in the Last Year: Not on file    Number of Places Lived in the Last Year: Not on file    Unstable Housing in the Last Year: Not on file      Past Medical History:   Diagnosis Date    Anxiety     Ascending aortic aneurysm ([facility]     BPH (benign prostatic hyperplasia)     CAD (coronary artery disease)     Cancer (HCC)     skin    Coronary arteriosclerosis     Depression     DJD (degenerative joint disease)     Hyperlipidemia     Impacted cerumen     Lumbar spinal stenosis     severe    Macular degeneration     Osteoarthritis     Parkinson disease ([facility]     RA (rheumatoid arthritis) ([facility]      Family History   Problem Relation Age of Onset    Lung Cancer Father     No Known Problems Mother       Past Surgical History:   Procedure Laterality Date    APPENDECTOMY      CATARACT REMOVAL WITH IMPLANT Left 04/19/2020    CHOLECYSTECTOMY      COLONOSCOPY      CORONARY ARTERY BYPASS GRAFT  2002    HERNIA REPAIR Bilateral     inguinal    KNEE SURGERY Left 06/2011    TOTAL KNEE ARTHROPLASTY Right 01/24/2017    tiffanie    UPPER GASTROINTESTINAL ENDOSCOPY      peptic ulcer      Vitals:    12/09/21 1627   BP: 126/76   Temp: 98 °F (36.7 °C)   TempSrc: Oral   Weight: 175 lb (79.4 kg)   Height: 6' 3\" (1.905 m)       Objective:    Physical Exam  Vitals reviewed. Constitutional:       Appearance: He is well-developed. HENT:      Head: Normocephalic. Eyes:      Pupils: Pupils are equal, round, and reactive to light. Cardiovascular:      Rate and Rhythm: Normal rate and regular rhythm. Pulmonary:      Effort: Pulmonary effort is normal.      Breath sounds: Normal breath sounds. Abdominal:      Palpations: Abdomen is soft. Musculoskeletal:         General: Normal range of motion. Cervical back: Normal range of motion. Skin:     General: Skin is warm. Neurological:      Mental Status: He is alert and oriented to person, place, and time. Psychiatric:         Behavior: Behavior normal.         Kiran Lincoln was seen today for urinary tract infection. Diagnoses and all orders for this visit:    Acute cystitis without hematuria  -     POCT Urinalysis no Micro  -     Culture, Urine; Future    Gastroenteritis        Comments: ua   cx urine  Cl if  Sx   Recur   Lab today          A great deal of time spent reviewing medications, diet, exercise, social issues. Also reviewing the chart before entering the room with patient and finishing charting after leaving patient's room. More than half of that time was spent face to face with the patient in counseling and coordinating care. I informed patient about the risk associated with noncompliance of medication and taking medications incorrectly. Appropriate follow-up with myself and all specialist.  Encourage family members to take active role in assisting with medications and medical care. If any confusion should develop to notify my office immediately to avoid risk of worsening medical condition      Follow Up: Return for Reg Appt.      Seen by:  Ev Howard DO

## 2021-12-10 ENCOUNTER — TELEPHONE (OUTPATIENT)
Dept: PRIMARY CARE CLINIC | Age: 86
End: 2021-12-10

## 2021-12-10 LAB
ALBUMIN SERPL-MCNC: 4.2 G/DL (ref 3.5–5.2)
ALP BLD-CCNC: 119 U/L (ref 40–129)
ALT SERPL-CCNC: 13 U/L (ref 0–40)
ANION GAP SERPL CALCULATED.3IONS-SCNC: 11 MMOL/L (ref 7–16)
AST SERPL-CCNC: 25 U/L (ref 0–39)
BILIRUB SERPL-MCNC: 0.4 MG/DL (ref 0–1.2)
BUN BLDV-MCNC: 25 MG/DL (ref 6–23)
CALCIUM SERPL-MCNC: 9.5 MG/DL (ref 8.6–10.2)
CHLORIDE BLD-SCNC: 103 MMOL/L (ref 98–107)
CO2: 25 MMOL/L (ref 22–29)
CREAT SERPL-MCNC: 1.4 MG/DL (ref 0.7–1.2)
GFR AFRICAN AMERICAN: 58
GFR NON-AFRICAN AMERICAN: 48 ML/MIN/1.73
GLUCOSE BLD-MCNC: 97 MG/DL (ref 74–99)
POTASSIUM SERPL-SCNC: 4.7 MMOL/L (ref 3.5–5)
SODIUM BLD-SCNC: 139 MMOL/L (ref 132–146)
TOTAL PROTEIN: 6.7 G/DL (ref 6.4–8.3)

## 2021-12-10 NOTE — TELEPHONE ENCOUNTER
Notify daughter his lab work is okay. Does not show any signs of infection on that. .  Urine culture will take a few days.   Call if any change in symptoms

## 2021-12-11 LAB
SARS-COV-2: DETECTED
SOURCE: ABNORMAL

## 2021-12-12 LAB
ORGANISM: ABNORMAL
URINE CULTURE, ROUTINE: ABNORMAL

## 2021-12-13 ENCOUNTER — TELEPHONE (OUTPATIENT)
Dept: PRIMARY CARE CLINIC | Age: 86
End: 2021-12-13

## 2021-12-13 ENCOUNTER — VIRTUAL VISIT (OUTPATIENT)
Dept: PRIMARY CARE CLINIC | Age: 86
End: 2021-12-13
Payer: MEDICARE

## 2021-12-13 DIAGNOSIS — U07.1 COVID-19: Primary | ICD-10-CM

## 2021-12-13 PROCEDURE — 99213 OFFICE O/P EST LOW 20 MIN: CPT | Performed by: FAMILY MEDICINE

## 2021-12-13 RX ORDER — CEFDINIR 300 MG/1
300 CAPSULE ORAL 2 TIMES DAILY
Qty: 20 CAPSULE | Refills: 0 | Status: SHIPPED | OUTPATIENT
Start: 2021-12-13 | End: 2021-12-23

## 2021-12-13 RX ORDER — CEFDINIR 300 MG/1
300 CAPSULE ORAL 2 TIMES DAILY
Qty: 20 CAPSULE | Refills: 0 | Status: SHIPPED
Start: 2021-12-13 | End: 2021-12-13 | Stop reason: SDUPTHER

## 2021-12-13 ASSESSMENT — ENCOUNTER SYMPTOMS
GASTROINTESTINAL NEGATIVE: 1
STRIDOR: 0
SHORTNESS OF BREATH: 0
EYES NEGATIVE: 1
WHEEZING: 0
APNEA: 0
SORE THROAT: 0
ALLERGIC/IMMUNOLOGIC NEGATIVE: 1
RESPIRATORY NEGATIVE: 1
TROUBLE SWALLOWING: 0
CHEST TIGHTNESS: 0

## 2021-12-13 NOTE — PROGRESS NOTES
sore throat and trouble swallowing. Congestion: nasal congestion. Eyes: Negative. Respiratory: Negative. Negative for apnea, chest tightness, shortness of breath, wheezing and stridor. Cough: productive with yellow mucus. No temperature or sweats or chills   Cardiovascular: Negative. Gastrointestinal: Negative. Endocrine: Negative. Genitourinary: Negative. Musculoskeletal: Negative. Skin: Negative. Allergic/Immunologic: Negative. Neurological: Negative. Hematological: Negative. Psychiatric/Behavioral: Negative.           Current Outpatient Medications:     cefdinir (OMNICEF) 300 MG capsule, Take 1 capsule by mouth 2 times daily for 10 days Ok with pcn allergy, Disp: 20 capsule, Rfl: 0    ZINC PO, Take by mouth daily, Disp: , Rfl:     levothyroxine (SYNTHROID) 50 MCG tablet, Take 0.5 tablets by mouth Daily, Disp: 45 tablet, Rfl: 3    folic acid (FOLVITE) 1 MG tablet, Two  Tabs once  A  week, Disp: 35 tablet, Rfl: 3    metoprolol succinate (TOPROL XL) 25 MG extended release tablet, Takes 1/2 tablet daily, Disp: 45 tablet, Rfl: 12    lovastatin (MEVACOR) 20 MG tablet, Take 1 tablet by mouth nightly, Disp: 90 tablet, Rfl: 12    PARoxetine (PAXIL) 20 MG tablet, Take 1 tablet by mouth every morning, Disp: 90 tablet, Rfl: 3    omeprazole (PRILOSEC) 20 MG delayed release capsule, Take 1 capsule by mouth daily, Disp: 90 capsule, Rfl: 3    hydrOXYzine (ATARAX) 25 MG tablet, Take 1 tablet by mouth 2 times daily as needed for Anxiety (Patient not taking: Reported on 11/23/2021), Disp: 60 tablet, Rfl: 12    midodrine (PROAMATINE) 2.5 MG tablet, Take 1 tablet by mouth 2 times daily (with meals), Disp: 180 tablet, Rfl: 3    Handicap Placard MISC, by Does not apply route Dx   Parkinson dis    5 yrs, Disp: 1 each, Rfl: 0    carbidopa-levodopa (SINEMET CR)  MG per extended release tablet, Take 1 tablet by mouth every 4 hours (while awake) No more than 6 per day, Disp: 540 tablet, Rfl: 3    vitamin B-12 (CYANOCOBALAMIN) 1000 MCG tablet, Take 1,000 mcg by mouth daily, Disp: , Rfl:     acetaminophen (TYLENOL) 500 MG tablet, Take 500 mg by mouth every 6 hours as needed for Pain, Disp: , Rfl:     aspirin 81 MG tablet, Take 650 mg by mouth daily , Disp: , Rfl:     Multiple Vitamins-Minerals (OCUVITE) TABS oral tablet, Take 1 tablet by mouth daily, Disp: , Rfl:     vitamin D (CHOLECALCIFEROL) 1000 UNIT TABS tablet, Take 1,000 Units by mouth daily, Disp: , Rfl:     methotrexate (RHEUMATREX) 2.5 MG chemo tablet, Take 2.5 mg by mouth once a week 4 tablets weekly. , Disp: , Rfl:   Allergies   Allergen Reactions    Pcn [Penicillins]     Prednisone        Social History     Socioeconomic History    Marital status:      Spouse name: Not on file    Number of children: Not on file    Years of education: Not on file    Highest education level: Not on file   Occupational History    Not on file   Tobacco Use    Smoking status: Former Smoker     Years: 10.00     Types: Pipe, Cigars    Smokeless tobacco: Never Used   Vaping Use    Vaping Use: Never used   Substance and Sexual Activity    Alcohol use:  Yes     Alcohol/week: 0.0 standard drinks     Comment: social    Drug use: No    Sexual activity: Not Currently   Other Topics Concern    Not on file   Social History Narrative        Colonoscopy - (10/29/2008)    Colonoscopy - (1/10/2014)    Zoster/Shingles Vaccine - given at [facility]    CAD WITH CABG    EARLY HYPOTHYROID----HYPOTHRYOID 3-18    ANXIETY    DEPRESSION    FATIGUE    BPH    MACULAR DEGENERATION    SKIN CA    APPY    BILAT ING HERNIA---ONE DONE THINKS WAS R    L ING HERNIA    COLON 6-99 AND 6-02----5T O 10 YRS DR POWERS---PT STATES WAS TOLD THAT HE SHOULD NTO    HAVE AGAIN DUE TO AGE    L KNEE OR X 2 DR WEBSTER AND CHRISSY    ANXIETY FROM PREDNISONE 2009    COLON 4-11------NEG---YRUICH    EGD 4-11 PEPTIC [facility]LCER    L KNEE OR 6-11    GRANDSON CHINO GRULLON---TALL  AT [facility]    DAUGHTER DATES JAMES BARCENAS    STEROIDS CUASE ANXIETY    RA DR Meron Ayala    EGD 12-13 DR CHEN--- ESO DILITATION    COLON DR CHEN 1-14----- TICS    PARKINSON DIC DX WITH DR JERNIGAN 2014-- THEN CHG TO DR RUVALCABA---then dr Phillips Pump    ----[facility] STUDY-WITH LIQUID ASPIRATION    R TOTAL KNEE 1-24-17 DR WEI    HEMATURIA 2-17 DR LAWRENCE PAYAN    L ANKLE PAIN--DR DANIELS THEN  [facility]    SEVERE LUMBAR SPINAL STENOSIS 2-18 DR BARTON--L-4-5 WITH ADRENAL    Dr. Vinny Rowley, [facility] left temple    Dr. Vinny Rowley, [facility]---REFERRED TO NEURO SURG DR VINSON---THEN TO DR Abigail Adam--    eval DR Tita Ramirez 5-18 AND SAID SHOT AND AND PT REFUSED DUE TO CONCERN WITH    PREDNISONE-----TO SEE HIM BACK    Right knee total done 7-20  Dr Sydney Williamson and admits two weeks later with dehydration and orthostatic hypotension     Social Determinants of Health     Financial Resource Strain:     Difficulty of Paying Living Expenses: Not on file   Food Insecurity:     Worried About Running Out of Food in the Last Year: Not on file    Diana of Food in the Last Year: Not on file   Transportation Needs:     Lack of Transportation (Medical): Not on file    Lack of Transportation (Non-Medical):  Not on file   Physical Activity:     Days of Exercise per Week: Not on file    Minutes of Exercise per Session: Not on file   Stress:     Feeling of Stress : Not on file   Social Connections:     Frequency of Communication with Friends and Family: Not on file    Frequency of Social Gatherings with Friends and Family: Not on file    Attends [employer] Services: Not on file    Active Member of Clubs or Organizations: Not on file    Attends Club or Organization Meetings: Not on file    Marital Status: Not on file   Intimate Partner Violence:     Fear of Current or Ex-Partner: Not on file    Emotionally Abused: Not on file    Physically Abused: Not on file    Sexually Abused: Not on file   Housing Stability:     Unable to Pay for Housing in the Last Year: Not on file    Number of Places Lived in the Last Year: Not on file    Unstable Housing in the Last Year: Not on file      Past Medical History:   Diagnosis Date    Anxiety     Ascending aortic aneurysm (HCC)     BPH (benign prostatic hyperplasia)     CAD (coronary artery disease)     Cancer (HCC)     skin    Coronary arteriosclerosis     Depression     DJD (degenerative joint disease)     Hyperlipidemia     Impacted cerumen     Lumbar spinal stenosis     severe    Macular degeneration     Osteoarthritis     Parkinson disease ([address]     RA (rheumatoid arthritis) ([address]      Past Surgical History:   Procedure Laterality Date    APPENDECTOMY      CATARACT REMOVAL WITH IMPLANT Left 04/19/2020    CHOLECYSTECTOMY      COLONOSCOPY      CORONARY ARTERY BYPASS GRAFT  2002    HERNIA REPAIR Bilateral     inguinal    KNEE SURGERY Left 06/2011    TOTAL KNEE ARTHROPLASTY Right 01/24/2017    stefancin    UPPER GASTROINTESTINAL ENDOSCOPY      peptic ulcer     Family History   Problem Relation Age of Onset    Lung Cancer Father     No Known Problems Mother       Past Surgical History:   Procedure Laterality Date    APPENDECTOMY      CATARACT REMOVAL WITH IMPLANT Left 04/19/2020    CHOLECYSTECTOMY      COLONOSCOPY      CORONARY ARTERY BYPASS GRAFT  2002    HERNIA REPAIR Bilateral     inguinal    KNEE SURGERY Left 06/2011    TOTAL KNEE ARTHROPLASTY Right 01/24/2017    stefancin    UPPER GASTROINTESTINAL ENDOSCOPY      peptic ulcer      Immunization History   Administered Date(s) Administered    COVID-19, Cortez Peter, PF, 30mcg/0.3mL 01/27/2021, 02/17/2021    Tdap (Boostrix, Adacel) 09/07/2021    Tetanus 11/05/2008    Tetanus Toxoid, absorbed 11/05/2008     Health Maintenance   Topic Date Due    Shingles Vaccine (1 of 2) Never done    Pneumococcal 65+ years Vaccine (1 of 1 - PPSV23) Never done    COVID-19 Vaccine (3 - Booster for Pfizer series) 08/17/2021    Flu vaccine (1) Never done   [employer] Visit (AWV)  12/29/2021    Lipid screen  06/21/2022    TSH testing  06/21/2022    Potassium monitoring  12/09/2022    Creatinine monitoring  12/09/2022    DTaP/Tdap/Td vaccine (2 - Td or Tdap) 09/07/2031    Hepatitis A vaccine  Aged Out    Hepatitis B vaccine  Aged Out    Hib vaccine  Aged Out    Meningococcal (ACWY) vaccine  Aged Out         There were no vitals filed for this visit. Objective:    Physical Exam  Constitutional:       General: He is not in acute distress. Appearance: Normal appearance. He is well-developed. He is not toxic-appearing. HENT:      Head: Normocephalic and atraumatic. Right Ear: External ear normal.      Left Ear: External ear normal.   Eyes:      General:         Right eye: No discharge. Left eye: No discharge. Extraocular Movements: Extraocular movements intact. Pulmonary:      Effort: Pulmonary effort is normal. No respiratory distress. Musculoskeletal:         General: Normal range of motion. Cervical back: No rigidity. Skin:     Findings: No bruising or rash. Neurological:      Mental Status: He is alert and oriented to person, place, and time. Psychiatric:         Mood and Affect: Mood and affect normal.         Speech: Speech normal.         Behavior: Behavior normal. Behavior is cooperative. Thought Content: Thought content normal.         Judgment: Judgment normal.         Boris Forbes was seen today for positive for covid-19. Diagnoses and all orders for this visit:    COVID-19  -     Discontinue: cefdinir (OMNICEF) 300 MG capsule; Take 1 capsule by mouth 2 times daily for 10 days Ok with pcn allergy  -     cefdinir (OMNICEF) 300 MG capsule; Take 1 capsule by mouth 2 times daily for 10 days Ok with pcn allergy        Comments:   Oxygen meter to be purchased call or go to ER if less than 90% increase fluids protein shakes antibiotics sent.   Functional level drops or temperature stays over 102 after 1 hour go to emergency room. See us in 2 days      Ab  prescribed. Over-the-counter zinc and vitamin C. Purchase an oximeter and call if oxygen saturation less than 90%. If any temperature over 102 degree, shortness of breath,  chest pain go to the emergency room. Complete isolation until results are back from the Covid testing. Do not work until results are back. A great deal of time spent reviewing medications, diet, exercise, social issues. Also reviewing the chart before entering the room with patient and finishing charting after leaving patient's room. More than half of that time was spent face to face with the patient in counseling and coordinating care. The patient is being evaluated by a Virtual Visit (video visit) encounter to address concerns as mentioned above. A caregiver was present when appropriate. Due to this being a TeleHealth encounter (During WWQYA-32 public health emergency), evaluation of the following organ systems was limited: Vitals/Constitutional/EENT/Resp/CV/GI//MS/Neuro/Skin/Heme-Lymph-Imm. Pursuant to the emergency declaration under the [address] [place] [address], [address] [place] [address] authority and the [employer] Act, this Virtual Visit was conducted with patient's (and/or legal guardian's) consent, to reduce the patient's risk of exposure to COVID-19 and provide necessary medical care. The patient (and/or legal guardian) has also been advised to contact this office for worsening conditions or problems, and seek emergency medical treatment and/or call 911 if deemed necessary. Patient identification was verified at the start of the visit: Yes    Total time spent on this encounter: Not billed by time    Services were provided through a video synchronous discussion virtually to substitute for in-person clinic visit. Patient and provider were located at their individual homes.     Follow Up: Return in about 2 days (around 12/15/2021), or if symptoms worsen or fail to improve.      Seen by:  Sherree Schaumann, DO

## 2021-12-15 ENCOUNTER — APPOINTMENT (OUTPATIENT)
Dept: GENERAL RADIOLOGY | Age: 86
End: 2021-12-15
Payer: MEDICARE

## 2021-12-15 ENCOUNTER — HOSPITAL ENCOUNTER (EMERGENCY)
Age: 86
Discharge: HOME | End: 2021-12-15
Attending: STUDENT IN AN ORGANIZED HEALTH CARE EDUCATION/TRAINING PROGRAM
Payer: MEDICARE

## 2021-12-15 ENCOUNTER — APPOINTMENT (OUTPATIENT)
Dept: CT IMAGING | Age: 86
End: 2021-12-15
Payer: MEDICARE

## 2021-12-15 ENCOUNTER — TELEPHONE (OUTPATIENT)
Dept: PRIMARY CARE CLINIC | Age: 86
End: 2021-12-15

## 2021-12-15 VITALS
SYSTOLIC BLOOD PRESSURE: 124 MMHG | DIASTOLIC BLOOD PRESSURE: 82 MMHG | TEMPERATURE: 98.2 F | RESPIRATION RATE: 16 BRPM | HEART RATE: 69 BPM | OXYGEN SATURATION: 100 %

## 2021-12-15 DIAGNOSIS — R31.9 HEMATURIA, UNSPECIFIED TYPE: Primary | ICD-10-CM

## 2021-12-15 DIAGNOSIS — U07.1 COVID: ICD-10-CM

## 2021-12-15 DIAGNOSIS — E27.8 ADRENAL NODULE (HCC): ICD-10-CM

## 2021-12-15 LAB
ALBUMIN SERPL-MCNC: 3.7 G/DL (ref 3.5–5.2)
ALP BLD-CCNC: 99 U/L (ref 40–129)
ALT SERPL-CCNC: 14 U/L (ref 0–40)
ANION GAP SERPL CALCULATED.3IONS-SCNC: 12 MMOL/L (ref 7–16)
APTT: 28.2 SEC (ref 24.5–35.1)
AST SERPL-CCNC: 29 U/L (ref 0–39)
ATYPICAL LYMPHOCYTE RELATIVE PERCENT: 0.8 % (ref 0–4)
BACTERIA: ABNORMAL /HPF
BASOPHILS ABSOLUTE: 0 E9/L (ref 0–0.2)
BASOPHILS RELATIVE PERCENT: 0 % (ref 0–2)
BILIRUB SERPL-MCNC: 0.3 MG/DL (ref 0–1.2)
BILIRUBIN URINE: NEGATIVE
BLOOD, URINE: ABNORMAL
BUN BLDV-MCNC: 26 MG/DL (ref 6–23)
BURR CELLS: ABNORMAL
CALCIUM SERPL-MCNC: 8.7 MG/DL (ref 8.6–10.2)
CHLORIDE BLD-SCNC: 97 MMOL/L (ref 98–107)
CLARITY: CLEAR
CO2: 24 MMOL/L (ref 22–29)
COLOR: ABNORMAL
CREAT SERPL-MCNC: 1.2 MG/DL (ref 0.7–1.2)
EKG ATRIAL RATE: 63 BPM
EKG P AXIS: 33 DEGREES
EKG P-R INTERVAL: 176 MS
EKG Q-T INTERVAL: 460 MS
EKG QRS DURATION: 158 MS
EKG QTC CALCULATION (BAZETT): 470 MS
EKG R AXIS: -33 DEGREES
EKG T AXIS: 134 DEGREES
EKG VENTRICULAR RATE: 63 BPM
EOSINOPHILS ABSOLUTE: 0 E9/L (ref 0.05–0.5)
EOSINOPHILS RELATIVE PERCENT: 0 % (ref 0–6)
GFR AFRICAN AMERICAN: >60
GFR NON-AFRICAN AMERICAN: 57 ML/MIN/1.73
GLUCOSE BLD-MCNC: 106 MG/DL (ref 74–99)
GLUCOSE URINE: NEGATIVE MG/DL
HCT VFR BLD CALC: 34.2 % (ref 37–54)
HEMOGLOBIN: 11.1 G/DL (ref 12.5–16.5)
INR BLD: 1.2
KETONES, URINE: NEGATIVE MG/DL
LEUKOCYTE ESTERASE, URINE: NEGATIVE
LYMPHOCYTES ABSOLUTE: 0.63 E9/L (ref 1.5–4)
LYMPHOCYTES RELATIVE PERCENT: 20 % (ref 20–42)
MCH RBC QN AUTO: 33.7 PG (ref 26–35)
MCHC RBC AUTO-ENTMCNC: 32.5 % (ref 32–34.5)
MCV RBC AUTO: 104 FL (ref 80–99.9)
MONOCYTES ABSOLUTE: 0.45 E9/L (ref 0.1–0.95)
MONOCYTES RELATIVE PERCENT: 14.8 % (ref 2–12)
MYELOCYTE PERCENT: 0.9 % (ref 0–0)
NEUTROPHILS ABSOLUTE: 1.92 E9/L (ref 1.8–7.3)
NEUTROPHILS RELATIVE PERCENT: 63.5 % (ref 43–80)
NITRITE, URINE: NEGATIVE
NUCLEATED RED BLOOD CELLS: 0 /100 WBC
PDW BLD-RTO: 12.4 FL (ref 11.5–15)
PH UA: 6 (ref 5–9)
PLATELET # BLD: 143 E9/L (ref 130–450)
PMV BLD AUTO: 9.8 FL (ref 7–12)
POIKILOCYTES: ABNORMAL
POTASSIUM REFLEX MAGNESIUM: 4.3 MMOL/L (ref 3.5–5)
PROTEIN UA: NEGATIVE MG/DL
PROTHROMBIN TIME: 12.7 SEC (ref 9.3–12.4)
RBC # BLD: 3.29 E12/L (ref 3.8–5.8)
RBC UA: >20 /HPF (ref 0–2)
SODIUM BLD-SCNC: 133 MMOL/L (ref 132–146)
SPECIFIC GRAVITY UA: 1.02 (ref 1–1.03)
TOTAL PROTEIN: 6.1 G/DL (ref 6.4–8.3)
TROPONIN, HIGH SENSITIVITY: 18 NG/L (ref 0–11)
TROPONIN, HIGH SENSITIVITY: 19 NG/L (ref 0–11)
UROBILINOGEN, URINE: 0.2 E.U./DL
WBC # BLD: 3 E9/L (ref 4.5–11.5)
WBC UA: ABNORMAL /HPF (ref 0–5)

## 2021-12-15 PROCEDURE — 85025 COMPLETE CBC W/AUTO DIFF WBC: CPT

## 2021-12-15 PROCEDURE — 99284 EMERGENCY DEPT VISIT MOD MDM: CPT

## 2021-12-15 PROCEDURE — 85610 PROTHROMBIN TIME: CPT

## 2021-12-15 PROCEDURE — 80053 COMPREHEN METABOLIC PANEL: CPT

## 2021-12-15 PROCEDURE — 6370000000 HC RX 637 (ALT 250 FOR IP): Performed by: STUDENT IN AN ORGANIZED HEALTH CARE EDUCATION/TRAINING PROGRAM

## 2021-12-15 PROCEDURE — 85730 THROMBOPLASTIN TIME PARTIAL: CPT

## 2021-12-15 PROCEDURE — 93010 ELECTROCARDIOGRAM REPORT: CPT | Performed by: INTERNAL MEDICINE

## 2021-12-15 PROCEDURE — 93005 ELECTROCARDIOGRAM TRACING: CPT | Performed by: STUDENT IN AN ORGANIZED HEALTH CARE EDUCATION/TRAINING PROGRAM

## 2021-12-15 PROCEDURE — 81001 URINALYSIS AUTO W/SCOPE: CPT

## 2021-12-15 PROCEDURE — 71045 X-RAY EXAM CHEST 1 VIEW: CPT

## 2021-12-15 PROCEDURE — 84484 ASSAY OF TROPONIN QUANT: CPT

## 2021-12-15 PROCEDURE — 71275 CT ANGIOGRAPHY CHEST: CPT

## 2021-12-15 PROCEDURE — 74178 CT ABD&PLV WO CNTR FLWD CNTR: CPT

## 2021-12-15 PROCEDURE — 6360000004 HC RX CONTRAST MEDICATION: Performed by: RADIOLOGY

## 2021-12-15 RX ORDER — SODIUM CHLORIDE 0.9 % (FLUSH) 0.9 %
10 SYRINGE (ML) INJECTION PRN
Status: DISCONTINUED | OUTPATIENT
Start: 2021-12-15 | End: 2021-12-15 | Stop reason: HOSPADM

## 2021-12-15 RX ORDER — CARBIDOPA AND LEVODOPA 25; 100 MG/1; MG/1
2 TABLET, EXTENDED RELEASE ORAL ONCE
Status: COMPLETED | OUTPATIENT
Start: 2021-12-15 | End: 2021-12-15

## 2021-12-15 RX ADMIN — IOPAMIDOL 75 ML: 755 INJECTION, SOLUTION INTRAVENOUS at 14:00

## 2021-12-15 RX ADMIN — CARBIDOPA AND LEVODOPA 2 TABLET: 25; 100 TABLET, EXTENDED RELEASE ORAL at 18:09

## 2021-12-15 NOTE — ED NOTES
Notified Dr. Melissa Humphries pt was unsteady while standing to use urinal, pt oxygen stayed above 93% on RA     Robert Curtis RN  12/15/21 [id number]

## 2021-12-15 NOTE — ED NOTES
Bed: 13  Expected date:   Expected time:   Means of arrival:   Comments:  EMS/ESTRELLA Walsh RN  12/15/21 1038

## 2021-12-15 NOTE — ED PROVIDER NOTES
Department of Emergency Medicine   ED  Provider Note  Admit Date/RoomTime: 12/15/2021 10:38 AM  ED Room: 13/13          History of Present Illness:  12/15/21, Time: 11:48 AM EST  Chief Complaint   Patient presents with    Other     penile bleeding, started this morning subsided now    Positive For Covid-19       Viry Madrigal is a 80 y.o. male presenting to the ED for hematuria. The patient states that he woke up this morning and was using the restroom at 9:30 AM when he started having blood in his urine. He noted a large amount of bleeding. Prior to presenting to the emergency department it did stop. He is unable to quantify the bleeding. Nothing seemed to worsen or improve it. This is not happened to him in the past.  He denies any trauma. He did wake up in the night without difficulty urinating or hematuria. Denies any dysuria. The patient is not on blood thinners other than aspirin. The patient did have some right groin pain. It was a soreness that is intermittent. He is also had constant low back pain. This is achy. Certain movements seem to worsen this. Denies any incontinence, numbness, tingling or weakness. Patient was diagnosed with Covid 4 to 5 days ago with a home test.  He has not been experiencing any chest pain or shortness of breath. He has been feeling diffusely fatigued. He has had a cough that is productive of white sputum. Denies any fevers. His wife also has similar upper respiratory symptoms. Review of Systems:   Constitutional symptoms: Denies fever  Eyes: Denies eye pain  Ears, Nose, Mouth, Throat: Denies ear pain  Cardiovascular: Denies chest pain  Respiratory: Denies shortness of breath.   Positive for cough  Gastrointestinal: Denies blood per rectum  Genitourinary: Positive for hematuria and right groin pain  Skin: Denies rashes  Neurological: Denies headache  Musculoskeletal: Positive for low back pain    --------------------------------------------- PAST HISTORY ---------------------------------------------  Past Medical History:  has a past medical history of Anxiety, Ascending aortic aneurysm ([facility] BPH (benign prostatic hyperplasia), CAD (coronary artery disease), Cancer ([facility] Coronary arteriosclerosis, Depression, DJD (degenerative joint disease), Hyperlipidemia, Impacted cerumen, Lumbar spinal stenosis, Macular degeneration, Osteoarthritis, Parkinson disease ([facility] and RA (rheumatoid arthritis) ([facility] Past Surgical History:  has a past surgical history that includes Coronary artery bypass graft (2002); Cholecystectomy; Appendectomy; Total knee arthroplasty (Right, 01/24/2017); Colonoscopy; Upper gastrointestinal endoscopy; hernia repair (Bilateral); knee surgery (Left, 06/2011); and Cataract removal with implant (Left, 04/19/2020). Social History:  reports that he has quit smoking. His smoking use included pipe and cigars. He quit after 10.00 years of use. He has never used smokeless tobacco. He reports current alcohol use. He reports that he does not use drugs. Family History: family history includes Lung Cancer in his father; No Known Problems in his mother. . Unless otherwise noted, family history is non contributory    The patients home medications have been reviewed. Allergies: Pcn [penicillins] and Prednisone    I have reviewed the past medical history, past surgical history, social history, and family history    ---------------------------------------------------PHYSICAL EXAM--------------------------------------    General: The patient is conversational and lying comfortably in bed. Head: Normocephalic and atraumatic. Eyes: Sclera non-icteric and no conjunctival injection  ENT: Nasal and oral membranes moist  Neck: Trachea midline. No JVD  Respiratory: Lungs clear to auscultation bilaterally. Patient speaking in full sentences. Cardiovascular: Regular rate. No pedal edema. Gastrointestinal:  Abdomen is soft and nondistended.  Bowel Gap 12 7 - 16 mmol/L    Glucose 106 (H) 74 - 99 mg/dL    BUN 26 (H) 6 - 23 mg/dL    CREATININE 1.2 0.7 - 1.2 mg/dL    GFR Non-African American 57 >=60 mL/min/1.73    GFR African American >60     Calcium 8.7 8.6 - 10.2 mg/dL    Total Protein 6.1 (L) 6.4 - 8.3 g/dL    Albumin 3.7 3.5 - 5.2 g/dL    Total Bilirubin 0.3 0.0 - 1.2 mg/dL    Alkaline Phosphatase 99 40 - 129 U/L    ALT 14 0 - 40 U/L    AST 29 0 - 39 U/L   CBC Auto Differential   Result Value Ref Range    WBC 3.0 (L) 4.5 - 11.5 E9/L    RBC 3.29 (L) 3.80 - 5.80 E12/L    Hemoglobin 11.1 (L) 12.5 - 16.5 g/dL    Hematocrit 34.2 (L) 37.0 - 54.0 %    .0 (H) 80.0 - 99.9 fL    MCH 33.7 26.0 - 35.0 pg    MCHC 32.5 32.0 - 34.5 %    RDW 12.4 11.5 - 15.0 fL    Platelets 453 535 - 845 E9/L    MPV 9.8 7.0 - 12.0 fL    Neutrophils % 63.5 43.0 - 80.0 %    Lymphocytes % 20.0 20.0 - 42.0 %    Monocytes % 14.8 (H) 2.0 - 12.0 %    Eosinophils % 0.0 0.0 - 6.0 %    Basophils % 0.0 0.0 - 2.0 %    Neutrophils Absolute 1.92 1.80 - 7.30 E9/L    Lymphocytes Absolute 0.63 (L) 1.50 - 4.00 E9/L    Monocytes Absolute 0.45 0.10 - 0.95 E9/L    Eosinophils Absolute 0.00 (L) 0.05 - 0.50 E9/L    Basophils Absolute 0.00 0.00 - 0.20 E9/L    Atypical Lymphocytes Relative 0.8 0.0 - 4.0 %    Myelocyte Percent 0.9 0 - 0 %    nRBC 0.0 /100 WBC    Poikilocytes 1+     Reading Cells 1+    APTT   Result Value Ref Range    aPTT 28.2 24.5 - 35.1 sec   Protime-INR   Result Value Ref Range    Protime 12.7 (H) 9.3 - 12.4 sec    INR 1.2    Troponin   Result Value Ref Range    Troponin, High Sensitivity 18 (H) 0 - 11 ng/L   Troponin   Result Value Ref Range    Troponin, High Sensitivity 19 (H) 0 - 11 ng/L   EKG 12 Lead   Result Value Ref Range    Ventricular Rate 63 BPM    Atrial Rate 63 BPM    P-R Interval 176 ms    QRS Duration 158 ms    Q-T Interval 460 ms    QTc Calculation (Bazett) 470 ms    P Axis 33 degrees    R Axis -33 degrees    T Axis 134 degrees   ,     RADIOLOGY:  Interpreted by Radiologist unless otherwise specified  CT ABDOMEN PELVIS W WO CONTRAST Additional Contrast? None   Final Result   There is mild strandiness of the urinary bladder wall. Correlate for   possible cystitis. There are stable nonspecific bilateral perinephric strandy changes. No   hydroureteronephrosis or urolithiasis. No focal renal lesion is seen   bilaterally. Stable left adrenal nodule. Colonic diverticulosis. No bowel obstruction or acute bowel inflammation is   seen. Pulmonary infiltrates are present. Small hiatal hernia. RECOMMENDATIONS:   Unavailable         CTA PULMONARY W CONTRAST   Final Result   No evidence of pulmonary emboli. Mild multifocal ground-glass pulmonary airspace opacities bilaterally can be   seen with COVID-19 pneumonia. Correlate clinically. RECOMMENDATIONS:   Unavailable         XR CHEST PORTABLE   Final Result   No airspace infiltrates are identified             ------------------------- NURSING NOTES AND VITALS REVIEWED ---------------------------   The nursing notes within the ED encounter and vital signs as below have been reviewed by myself  /82   Pulse 69   Temp 98.2 °F (36.8 °C) (Oral)   Resp 16   SpO2 100%     Oxygen Saturation Interpretation: Normal    The patients available past medical records and past encounters were reviewed. ------------------------------ ED COURSE/MEDICAL DECISION MAKING----------------------  Medications   iopamidol (ISOVUE-370) 76 % injection 75 mL (75 mLs IntraVENous Given 12/15/21 1400)   carbidopa-levodopa (SINEMET CR)  MG per extended release tablet 2 tablet (2 tablets Oral Given 12/15/21 1809)       Medical Decision Making: This is a 80 y.o. male presenting to the ED for penile bleeding. On initial presentation, the patient's vital signs are interpreted as hypertensive, afebrile and mildly hypoxic.   At the bedside the patient is saturating 91 to 93% on room air while at rest. Based on history and physical exam, we have a broad differential.  The patient's penile bleeding may be secondary to urinary tract infection, nephrolithiasis, bladder mass or further cancer. The patient has no evidence of epididymitis. The patient denies history of trauma. As the patient also has known Covid, I feel this is likely contributing to his hypoxia and cough. I cannot exclude ACS, arrhythmia, PE or superimposed pneumonia. At this time, we will obtain chest x-ray, CT PE and CT abdomen pelvis with laboratory studies and an EKG. A 12-lead EKG was performed and interpreted by myself. The rate is 63 with sinus rhythm with PVC noted and left axis deviation. The patient has a left bundle branch block. The ND interval is 176, QRS interval is 158, and QTC interval is 470. The no acute ST elevation or depression. T wave inversion in lead I and lead aVL which is unchanged from August 2020. This is sinus rhythm with PVC, left axis deviation and left bundle branch block with nonspecific abnormality. Review of EMR shows this is near recent. Electrolytes otherwise within normal limits. Troponin is 18. This will be repeated. LFTs show low protein but otherwise within normal limits. Leukopenia and mild anemia with hemoglobin of 11.1. Again review of EMR shows this anemia is chronic. Urinalysis shows hematuria but no evidence of urinary tract infection. CT PE shows no pulmonary emboli. Multifocal groundglass opacities which could be Covid pneumonia. CT abdomen pelvis shows stranding this of the urinary bladder which could be cystitis. Stable nonspecific bilateral perinephric strands. No hydronephrosis or urolithiasis. No focal renal lesion. Stable left adrenal nodule. Colonic diverticulosis. No bowel obstruction or inflammation. Pulmonary infiltrates. Small hiatal hernia. Chest x-ray shows no airspace infiltrates. This is interpreted by radiology. The patient has known Covid pneumonia.   On reevaluation, he is resting comfortably. He expressed desire to be discharged. No bleeding has restarted. The patient is urinating without difficulty. We will perform ambulatory pulse ox. The patient is due for his carbidopa levodopa. This will also be administered. Repeat troponin is 19. His delta is less than 5. He remains resting comfortably. He is able to ambulate without desaturation. I have had an extensive conversation with both him and his daughter, Susi Hartmann. The patient should continue to monitor his saturations at home. If he has saturations below 90% he should return to the emergency department. He is instructed to follow with urology and a primary care physician. He is given strict return precautions about this as well. He feels back to baseline is currently saturating 100%. The patient verbalized understanding and is agreeable to the plan. Laboratory studies show hypochloremia. Patient's BUN mildly elevated at 26. This patient's ED course included: a personal history and physicial examination and re-evaluation prior to disposition    This patient has remained hemodynamically stable during their ED course. Consultations:  None    The cardiac monitor revealed sinus rhythm with a heart rate in the 70s as interpreted by me. The cardiac monitor was ordered secondary to the patient's mildly decreased saturation and to monitor the patient for dysrhythmia. CPT [id number]    Oxygen Saturation Interpretation: 94 % on room air. Abnormal    Counseling:   I have spoken with the patient and discussed todays results, in addition to providing specific details for the plan of care and counseling regarding the diagnosis and prognosis. Questions are answered at this time and they are agreeable with the plan.     --------------------------------- IMPRESSION AND DISPOSITION ---------------------------------    IMPRESSION  1. Hematuria, unspecified type    2. COVID    3.  Adrenal nodule ([facility] DISPOSITION  Disposition: Discharge to home  Patient condition is fair    I, Dr. Behzad Nair, am the primary provider of record    NOTE: This report was transcribed using voice recognition software.  Every effort was made to ensure accuracy; however, inadvertent computerized transcription errors may be present        Behzad Nair MD  12/16/21 5801

## 2021-12-16 ENCOUNTER — CARE COORDINATION (OUTPATIENT)
Dept: CARE COORDINATION | Age: 86
End: 2021-12-16

## 2021-12-16 NOTE — CARE COORDINATION
Date/Time:  12/16/2021 9:30 AM  Attempted to reach patient by telephone. Call within 2 business days of discharge: Yes Left HIPPA compliant message requesting a return call. Will attempt to reach patient again.

## 2022-01-06 ENCOUNTER — OFFICE VISIT (OUTPATIENT)
Dept: PODIATRY | Age: 87
End: 2022-01-06
Payer: COMMERCIAL

## 2022-01-06 VITALS
WEIGHT: 175 LBS | DIASTOLIC BLOOD PRESSURE: 70 MMHG | SYSTOLIC BLOOD PRESSURE: 118 MMHG | BODY MASS INDEX: 21.87 KG/M2 | TEMPERATURE: 98.2 F

## 2022-01-06 DIAGNOSIS — I73.9 PERIPHERAL VASCULAR DISEASE, UNSPECIFIED (HCC): ICD-10-CM

## 2022-01-06 DIAGNOSIS — B35.1 TINEA UNGUIUM: Primary | ICD-10-CM

## 2022-01-06 DIAGNOSIS — M79.675 PAIN IN LEFT TOE(S): ICD-10-CM

## 2022-01-06 DIAGNOSIS — R26.2 DIFFICULTY WALKING: ICD-10-CM

## 2022-01-06 DIAGNOSIS — M79.674 PAIN IN TOE OF RIGHT FOOT: ICD-10-CM

## 2022-01-06 PROCEDURE — 99202 OFFICE O/P NEW SF 15 MIN: CPT | Performed by: PODIATRIST

## 2022-01-06 PROCEDURE — 11721 DEBRIDE NAIL 6 OR MORE: CPT | Performed by: PODIATRIST

## 2022-01-06 NOTE — PROGRESS NOTES
Tiara Shay  Patient    Chief Complaint   Patient presents with   [facility] Shay Doctor     saw pcp Dr. Helen Rodriguez 12/13/2021    Ingrown Toenail    Toe Pain     pt stated that he saw Dr. Sarah Kinney over five years ago        Subjective: This Laquita Seek comes to office for foot and nail care. Pt currently has complaint of thickened, painful, elongated nails that he/she cannot manage by themselves. Pt. Relates pain to nails with shoe gear. Pt's primary care physician is DO Christine.   Past Medical History:   Diagnosis Date    Anxiety     Ascending aortic aneurysm (HCC)     BPH (benign prostatic hyperplasia)     CAD (coronary artery disease)     Cancer (HCC)     skin    Coronary arteriosclerosis     Depression     DJD (degenerative joint disease)     Hyperlipidemia     Impacted cerumen     Lumbar spinal stenosis     severe    Macular degeneration     Osteoarthritis     Parkinson disease (HCC)     RA (rheumatoid arthritis) ([facility])        Allergies   Allergen Reactions    Pcn [Penicillins]     Prednisone      Current Outpatient Medications on File Prior to Visit   Medication Sig Dispense Refill    ZINC PO Take by mouth daily      levothyroxine (SYNTHROID) 50 MCG tablet Take 0.5 tablets by mouth Daily 45 tablet 3    folic acid (FOLVITE) 1 MG tablet Two  Tabs once  A  week 35 tablet 3    metoprolol succinate (TOPROL XL) 25 MG extended release tablet Takes 1/2 tablet daily 45 tablet 12    lovastatin (MEVACOR) 20 MG tablet Take 1 tablet by mouth nightly 90 tablet 12    PARoxetine (PAXIL) 20 MG tablet Take 1 tablet by mouth every morning 90 tablet 3    omeprazole (PRILOSEC) 20 MG delayed release capsule Take 1 capsule by mouth daily 90 capsule 3    hydrOXYzine (ATARAX) 25 MG tablet Take 1 tablet by mouth 2 times daily as needed for Anxiety 60 tablet 12    midodrine (PROAMATINE) 2.5 MG tablet Take 1 tablet by mouth 2 times daily (with meals) 180 tablet 3    Handicap Placard MISC by Does not apply route Dx   Parkinson dis    5 yrs 1 each 0    carbidopa-levodopa (SINEMET CR)  MG per extended release tablet Take 1 tablet by mouth every 4 hours (while awake) No more than 6 per day 540 tablet 3    vitamin B-12 (CYANOCOBALAMIN) 1000 MCG tablet Take 1,000 mcg by mouth daily      acetaminophen (TYLENOL) 500 MG tablet Take 500 mg by mouth every 6 hours as needed for Pain      aspirin 81 MG tablet Take 650 mg by mouth daily       Multiple Vitamins-Minerals (OCUVITE) TABS oral tablet Take 1 tablet by mouth daily      vitamin D (CHOLECALCIFEROL) 1000 UNIT TABS tablet Take 1,000 Units by mouth daily      methotrexate (RHEUMATREX) 2.5 MG chemo tablet Take 2.5 mg by mouth once a week 4 tablets weekly. No current facility-administered medications on file prior to visit. Review of Systems  Objective:  General: AAO x 3 in NAD.     Derm  Toenail Description  Sites of Onychomycosis Involvement (Check affected area)  [x] [x] [x] [x] [x] [x] [x] [x] [x] [x]  5 4 3 2 1 1 2 3 4 5                          Right                                        Left    Thickness  [x] [x] [x] [x] [x] [x] [x] [x] [x] [x]  5 4 3 2 1 1 2 3 4 5                         Right                                        Left    Dystrophic Changes   [x] [x] [x] [x] [x] [x] [x] [x] [x] [x]  5 4 3 2 1 1 2 3 4 5                         Right                                        Left    Color  [x] [x] [x] [x] [x] [x] [x] [x] [x] [x]  5 4 3 2 1 1 2 3 4 5                          Right                                        Left    Incurvation/Ingrowin   [x] [x] [x] [x] [x] [x] [x] [x] [x] [x]  5 4 3 2 1 1 2 3 4 5                         Right                                        Left    Inflammation/Pain   [x] [x] [x] [x] [x] [x] [x] [x] [x] [x]  5 4 3  2 1 1 2 3 4 5                         Right                                        Left      Nails that are described above are all elongated thickened pitting mycotic yellowish incurvated causing pain with both shoe gear. Palpation nails greater then 3 mm thick painful       Dermatologic Exam:hair loss noted  lower extremity    Skin lesion/ulceration   Skin   Callus   Musculoskeletal:     1st MPJ ROM normal, Bilateral.  Muscle strength 5/5, Bilateral.  Pain present upon palpation of toenails 1-5  Bilateral., Bilateral.  Ankle ROM normal,Bilateral.    Dorsally contracted digits , Bilateral.     Vascular:  Pulses   bilateral DP present    PT absent    Neurological:  Sensation present to light touch to level of digits, Bilatera    Foot Exam    General  General Appearance: appears stated age and healthy   Orientation: alert and oriented to person, place, and time   Assistance: cane use       Right Foot/Ankle     Inspection and Palpation  Swelling: great toe interphalangeal joint   Claw Toes: first toe    Neurovascular  Dorsalis pedis: doppler  Posterior tibial: absent      Left Foot/Ankle      Inspection and Palpation  Swelling: great toe interphalangeal joint   Claw toes: first toe    Neurovascular  Dorsalis pedis: 1+  Posterior tibial: absent             Ortho Exam  Q7   []Yes    []No                Q8   [x]Yes    []No                     Q9   []Yes    []No  Assessment:  80 y.o. male with:   1. Tinea unguium    2. Pain in left toe(s)    3. Pain in toe of right foot    4. Peripheral vascular disease, unspecified ([address]    5. Difficulty walking     No orders of the defined types were placed in this encounter. Plan:   Pt was evaluated and examined. Patient was given personalized discharge instructions. Nails 1-10 were debrided in length and thickness sharply with a nail nipper and  without incident. Pt will follow up in 9 weeks or sooner if any problems arise. Diagnosis was discussed with the pt and all of their questions were answered in detail. Proper foot hygiene and care was discussed with the pt.  Patient to check feet daily and contact the office with any questions/problems/concerns. Other comorbidity noted and will be managed by PCP. Pain waiver discussed with patient and confirmed.    1/6/2022      Electronically signed by Viktoriya Muse DPM on 1/6/2022 at 2:34 PM  1/6/2022

## 2022-01-19 DIAGNOSIS — E78.2 MIXED HYPERLIPIDEMIA: Chronic | ICD-10-CM

## 2022-01-19 DIAGNOSIS — I10 ESSENTIAL HYPERTENSION: Chronic | ICD-10-CM

## 2022-01-19 DIAGNOSIS — R73.03 PRE-DIABETES: ICD-10-CM

## 2022-01-19 DIAGNOSIS — G20.A1 PARKINSON'S DISEASE: Chronic | ICD-10-CM

## 2022-01-19 DIAGNOSIS — M81.0 AGE-RELATED OSTEOPOROSIS WITHOUT CURRENT PATHOLOGICAL FRACTURE: ICD-10-CM

## 2022-01-19 LAB
ALBUMIN SERPL-MCNC: 4.3 G/DL (ref 3.5–5.2)
ALP BLD-CCNC: 123 U/L (ref 40–129)
ALT SERPL-CCNC: 7 U/L (ref 0–40)
ANION GAP SERPL CALCULATED.3IONS-SCNC: 15 MMOL/L (ref 7–16)
AST SERPL-CCNC: 21 U/L (ref 0–39)
BASOPHILS ABSOLUTE: 0.05 E9/L (ref 0–0.2)
BASOPHILS RELATIVE PERCENT: 0.9 % (ref 0–2)
BILIRUB SERPL-MCNC: 0.8 MG/DL (ref 0–1.2)
BILIRUBIN URINE: NEGATIVE
BLOOD, URINE: NEGATIVE
BUN BLDV-MCNC: 25 MG/DL (ref 6–23)
CALCIUM SERPL-MCNC: 10.2 MG/DL (ref 8.6–10.2)
CHLORIDE BLD-SCNC: 103 MMOL/L (ref 98–107)
CHOLESTEROL, TOTAL: 126 MG/DL (ref 0–199)
CLARITY: CLEAR
CO2: 24 MMOL/L (ref 22–29)
COLOR: YELLOW
CREAT SERPL-MCNC: 1.2 MG/DL (ref 0.7–1.2)
EOSINOPHILS ABSOLUTE: 0.3 E9/L (ref 0.05–0.5)
EOSINOPHILS RELATIVE PERCENT: 5.1 % (ref 0–6)
GFR AFRICAN AMERICAN: >60
GFR NON-AFRICAN AMERICAN: 57 ML/MIN/1.73
GLUCOSE BLD-MCNC: 120 MG/DL (ref 74–99)
GLUCOSE URINE: NEGATIVE MG/DL
HBA1C MFR BLD: 5.7 % (ref 4–5.6)
HCT VFR BLD CALC: 36.9 % (ref 37–54)
HDLC SERPL-MCNC: 51 MG/DL
HEMOGLOBIN: 11.8 G/DL (ref 12.5–16.5)
IMMATURE GRANULOCYTES #: 0.03 E9/L
IMMATURE GRANULOCYTES %: 0.5 % (ref 0–5)
KETONES, URINE: NEGATIVE MG/DL
LDL CHOLESTEROL CALCULATED: 62 MG/DL (ref 0–99)
LEUKOCYTE ESTERASE, URINE: NEGATIVE
LYMPHOCYTES ABSOLUTE: 1.32 E9/L (ref 1.5–4)
LYMPHOCYTES RELATIVE PERCENT: 22.6 % (ref 20–42)
MCH RBC QN AUTO: 34.3 PG (ref 26–35)
MCHC RBC AUTO-ENTMCNC: 32 % (ref 32–34.5)
MCV RBC AUTO: 107.3 FL (ref 80–99.9)
MONOCYTES ABSOLUTE: 0.73 E9/L (ref 0.1–0.95)
MONOCYTES RELATIVE PERCENT: 12.5 % (ref 2–12)
NEUTROPHILS ABSOLUTE: 3.42 E9/L (ref 1.8–7.3)
NEUTROPHILS RELATIVE PERCENT: 58.4 % (ref 43–80)
NITRITE, URINE: NEGATIVE
PDW BLD-RTO: 13.2 FL (ref 11.5–15)
PH UA: 5.5 (ref 5–9)
PLATELET # BLD: 212 E9/L (ref 130–450)
PMV BLD AUTO: 10.7 FL (ref 7–12)
POTASSIUM SERPL-SCNC: 4.9 MMOL/L (ref 3.5–5)
PROTEIN UA: NEGATIVE MG/DL
RBC # BLD: 3.44 E12/L (ref 3.8–5.8)
SODIUM BLD-SCNC: 142 MMOL/L (ref 132–146)
SPECIFIC GRAVITY UA: 1.02 (ref 1–1.03)
TOTAL PROTEIN: 6.9 G/DL (ref 6.4–8.3)
TRIGL SERPL-MCNC: 66 MG/DL (ref 0–149)
UROBILINOGEN, URINE: 0.2 E.U./DL
VITAMIN D 25-HYDROXY: 31 NG/ML (ref 30–100)
VLDLC SERPL CALC-MCNC: 13 MG/DL
WBC # BLD: 5.9 E9/L (ref 4.5–11.5)

## 2022-01-24 ENCOUNTER — OFFICE VISIT (OUTPATIENT)
Dept: PRIMARY CARE CLINIC | Age: 87
End: 2022-01-24
Payer: COMMERCIAL

## 2022-01-24 VITALS
DIASTOLIC BLOOD PRESSURE: 68 MMHG | BODY MASS INDEX: 22.12 KG/M2 | SYSTOLIC BLOOD PRESSURE: 128 MMHG | WEIGHT: 177 LBS | TEMPERATURE: 96.9 F

## 2022-01-24 DIAGNOSIS — M81.0 AGE-RELATED OSTEOPOROSIS WITHOUT CURRENT PATHOLOGICAL FRACTURE: ICD-10-CM

## 2022-01-24 DIAGNOSIS — G89.29 CHRONIC BILATERAL LOW BACK PAIN WITHOUT SCIATICA: ICD-10-CM

## 2022-01-24 DIAGNOSIS — D51.8 VITAMIN B12 DEFICIENCY (DIETARY) ANEMIA: ICD-10-CM

## 2022-01-24 DIAGNOSIS — E11.9 DIET-CONTROLLED DIABETES MELLITUS (HCC): ICD-10-CM

## 2022-01-24 DIAGNOSIS — E03.9 ACQUIRED HYPOTHYROIDISM: Chronic | ICD-10-CM

## 2022-01-24 DIAGNOSIS — I10 ESSENTIAL HYPERTENSION: Primary | Chronic | ICD-10-CM

## 2022-01-24 DIAGNOSIS — I95.1 ORTHOSTATIC HYPOTENSION: Chronic | ICD-10-CM

## 2022-01-24 DIAGNOSIS — D50.8 OTHER IRON DEFICIENCY ANEMIA: ICD-10-CM

## 2022-01-24 DIAGNOSIS — G20 PARKINSON'S DISEASE (HCC): Chronic | ICD-10-CM

## 2022-01-24 DIAGNOSIS — M54.50 CHRONIC BILATERAL LOW BACK PAIN WITHOUT SCIATICA: ICD-10-CM

## 2022-01-24 DIAGNOSIS — E78.2 MIXED HYPERLIPIDEMIA: Chronic | ICD-10-CM

## 2022-01-24 PROCEDURE — 1036F TOBACCO NON-USER: CPT | Performed by: FAMILY MEDICINE

## 2022-01-24 PROCEDURE — 1123F ACP DISCUSS/DSCN MKR DOCD: CPT | Performed by: FAMILY MEDICINE

## 2022-01-24 PROCEDURE — G8427 DOCREV CUR MEDS BY ELIG CLIN: HCPCS | Performed by: FAMILY MEDICINE

## 2022-01-24 PROCEDURE — G8420 CALC BMI NORM PARAMETERS: HCPCS | Performed by: FAMILY MEDICINE

## 2022-01-24 PROCEDURE — 4040F PNEUMOC VAC/ADMIN/RCVD: CPT | Performed by: FAMILY MEDICINE

## 2022-01-24 PROCEDURE — 99214 OFFICE O/P EST MOD 30 MIN: CPT | Performed by: FAMILY MEDICINE

## 2022-01-24 PROCEDURE — G8484 FLU IMMUNIZE NO ADMIN: HCPCS | Performed by: FAMILY MEDICINE

## 2022-01-24 RX ORDER — IPRATROPIUM BROMIDE 42 UG/1
2 SPRAY, METERED NASAL 4 TIMES DAILY
Qty: 15 ML | Refills: 12 | Status: SHIPPED
Start: 2022-01-24 | End: 2022-07-25 | Stop reason: SDUPTHER

## 2022-01-24 ASSESSMENT — PATIENT HEALTH QUESTIONNAIRE - PHQ9
1. LITTLE INTEREST OR PLEASURE IN DOING THINGS: 0
SUM OF ALL RESPONSES TO PHQ QUESTIONS 1-9: 0
2. FEELING DOWN, DEPRESSED OR HOPELESS: 0
SUM OF ALL RESPONSES TO PHQ QUESTIONS 1-9: 0
SUM OF ALL RESPONSES TO PHQ9 QUESTIONS 1 & 2: 0

## 2022-01-24 ASSESSMENT — ENCOUNTER SYMPTOMS
GASTROINTESTINAL NEGATIVE: 1
BACK PAIN: 1
RESPIRATORY NEGATIVE: 1
EYES NEGATIVE: 1
ALLERGIC/IMMUNOLOGIC NEGATIVE: 1

## 2022-01-24 NOTE — PROGRESS NOTES
22  Name: Ame Sutton    : 1933    Sex: male    Age: 80 y.o. Subjective:  Chief Complaint: Patient is here for 6 mock   Re   bp   Chol  Kidney function  Blood urine  thryoid      Feels fair  Here with daughter  And   Leann Fleischer ttoe r    12-15 with blodurine    Referred to  uro and  Did two ua there  And told to drop off antohe arian and threy not here yet---he  Says suppose to hear from them  No blood since  Sees  Dr Fadia Harrison soon  Back pain and want to see a back doc      Review of Systems   Constitutional: Negative. HENT: Negative. Eyes: Negative. Respiratory: Negative. Cardiovascular: Negative. Gastrointestinal: Negative. Endocrine: Negative. Genitourinary: Negative. Musculoskeletal: Positive for arthralgias and back pain. Skin: Negative. Allergic/Immunologic: Negative. Neurological: Negative. Hematological: Negative. Psychiatric/Behavioral: Negative.           Current Outpatient Medications:     ZINC PO, Take by mouth daily, Disp: , Rfl:     levothyroxine (SYNTHROID) 50 MCG tablet, Take 0.5 tablets by mouth Daily, Disp: 45 tablet, Rfl: 3    folic acid (FOLVITE) 1 MG tablet, Two  Tabs once  A  week, Disp: 35 tablet, Rfl: 3    metoprolol succinate (TOPROL XL) 25 MG extended release tablet, Takes 1/2 tablet daily, Disp: 45 tablet, Rfl: 12    lovastatin (MEVACOR) 20 MG tablet, Take 1 tablet by mouth nightly, Disp: 90 tablet, Rfl: 12    PARoxetine (PAXIL) 20 MG tablet, Take 1 tablet by mouth every morning, Disp: 90 tablet, Rfl: 3    omeprazole (PRILOSEC) 20 MG delayed release capsule, Take 1 capsule by mouth daily, Disp: 90 capsule, Rfl: 3    hydrOXYzine (ATARAX) 25 MG tablet, Take 1 tablet by mouth 2 times daily as needed for Anxiety, Disp: 60 tablet, Rfl: 12    midodrine (PROAMATINE) 2.5 MG tablet, Take 1 tablet by mouth 2 times daily (with meals), Disp: 180 tablet, Rfl: 3    carbidopa-levodopa (SINEMET CR)  MG per extended release tablet, Take 1 tablet by mouth every 4 hours (while awake) No more than 6 per day, Disp: 540 tablet, Rfl: 3    vitamin B-12 (CYANOCOBALAMIN) 1000 MCG tablet, Take 1,000 mcg by mouth daily, Disp: , Rfl:     acetaminophen (TYLENOL) 500 MG tablet, Take 500 mg by mouth every 6 hours as needed for Pain, Disp: , Rfl:     aspirin 81 MG tablet, Take 650 mg by mouth daily , Disp: , Rfl:     Multiple Vitamins-Minerals (OCUVITE) TABS oral tablet, Take 1 tablet by mouth daily, Disp: , Rfl:     vitamin D (CHOLECALCIFEROL) 1000 UNIT TABS tablet, Take 1,000 Units by mouth daily, Disp: , Rfl:     methotrexate (RHEUMATREX) 2.5 MG chemo tablet, Take 2.5 mg by mouth once a week 4 tablets weekly. , Disp: , Rfl:   Allergies   Allergen Reactions    Pcn [Penicillins]     Prednisone      Social History     Socioeconomic History    Marital status:      Spouse name: Not on file    Number of children: Not on file    Years of education: Not on file    Highest education level: Not on file   Occupational History    Not on file   Tobacco Use    Smoking status: Former Smoker     Years: 10.00     Types: Pipe, Cigars    Smokeless tobacco: Never Used   Vaping Use    Vaping Use: Never used   Substance and Sexual Activity    Alcohol use:  Yes     Alcohol/week: 0.0 standard drinks     Comment: social    Drug use: No    Sexual activity: Not Currently   Other Topics Concern    Not on file   Social History Narrative        Colonoscopy - (10/29/2008)    Colonoscopy - (1/10/2014)    Zoster/Shingles Vaccine - given at [facility]    CAD WITH CABG    EARLY HYPOTHYROID----HYPOTHRYOID 3-18    ANXIETY    DEPRESSION    FATIGUE    BPH    MACULAR DEGENERATION    SKIN CA    APPY    BILAT ING HERNIA---ONE DONE THINKS WAS R    L ING HERNIA    COLON 6-99 AND 6-02----5T O 10 YRS DR POWERS---PT STATES WAS TOLD THAT HE SHOULD NTO    HAVE AGAIN DUE TO AGE    L KNEE OR X 2 DR WEBSTER AND CHRISSY    ANXIETY FROM PREDNISONE 2009    COLON 4-11------NEG---KIRA EGD 4-11 PEPTIC ULCER    L KNEE OR 6-11    GRANDSON CHINO GRULLON---TALL  AT [facility]    DAUGHTER EVY BARCENAS    STEROIDS CUASE ANXIETY    RA DR RUSSO    EGD 12-13 DR CHEN--- ESO DILITATION    COLON DR CHEN 1-14----- TICS    PARKINSON DIC DX WITH DR JERNIGAN 2014-- THEN CHG TO DR RUVALCABA---then dr Alisa Watson    ----Isabela Davila STUDY-WITH LIQUID ASPIRATION    R TOTAL KNEE 1-24-17 DR WEI    HEMATURIA 2-17 DR LAWRENCE PAYAN    L ANKLE PAIN--DR DANIELS THEN  [facility]    SEVERE LUMBAR SPINAL STENOSIS 2-18 DR BARTON--L-4-5 WITH ADRENAL    [facility] Delfin Mancilla, [facility]---REFERRED TO NEURO SURG DR VINSON---THEN TO DR Claudia Perez--    eval DR Jacky Wise 5-18 AND SAID SHOT AND AND PT REFUSED DUE TO CONCERN WITH    PREDNISONE-----TO SEE HIM BACK    Right knee total done 7-20  Dr Giron Stage and admits two weeks later with dehydration and orthostatic hypotension    Covid  12-22     Social Determinants of Health     Financial Resource Strain:     Difficulty of Paying Living Expenses: Not on file   Food Insecurity:     Worried About Running Out of Food in the Last Year: Not on file    Diana of Food in the Last Year: Not on file   Transportation Needs:     Lack of Transportation (Medical): Not on file    Lack of Transportation (Non-Medical):  Not on file   Physical Activity:     Days of Exercise per Week: Not on file    Minutes of Exercise per Session: Not on file   Stress:     Feeling of Stress : Not on file   Social Connections:     Frequency of Communication with Friends and Family: Not on file    Frequency of Social Gatherings with Friends and Family: Not on file    Attends Congregational Services: Not on file    Active Member of Clubs or Organizations: Not on file    Attends Club or Organization Meetings: Not on file    Marital Status: Not on file   Intimate Partner Violence:     Fear of Current or Ex-Partner: Not on file    Emotionally Abused: Not on file    Physically Abused: Not on file    Sexually Abused: Not on file   Housing Stability:     Unable to Pay for Housing in the Last Year: Not on file    Number of Places Lived in the Last Year: Not on file    Unstable Housing in the Last Year: Not on file      Past Medical History:   Diagnosis Date    Anxiety     Ascending aortic aneurysm ([facility]     BPH (benign prostatic hyperplasia)     CAD (coronary artery disease)     Cancer ([facility] [facility]     skin    Coronary arteriosclerosis     Depression     DJD (degenerative joint disease)     Hyperlipidemia     Impacted cerumen     Lumbar spinal stenosis     severe    Macular degeneration     Osteoarthritis     Parkinson disease ([facility] [address] [facility]     RA (rheumatoid arthritis) ([facility] [address] [facility]      Family History   Problem Relation Age of Onset    Lung Cancer Father     No Known Problems Mother       Past Surgical History:   Procedure Laterality Date    APPENDECTOMY      CATARACT REMOVAL WITH IMPLANT Left 04/19/2020    CHOLECYSTECTOMY      COLONOSCOPY      CORONARY ARTERY BYPASS GRAFT  2002    HERNIA REPAIR Bilateral     inguinal    KNEE SURGERY Left 06/2011    T[facility]    UPPER GASTROINTESTINAL ENDOSCOPY      peptic ulcer      Vitals:    01/24/22 1342   BP: 128/68   Temp: 96.9 °F (36.1 °C)   TempSrc: Temporal   Weight: 177 lb (80.3 kg)       Objective:    Physical Exam  Vitals reviewed. Constitutional:       Appearance: He is well-developed. HENT:      Head: Normocephalic. Eyes:      Pupils: Pupils are equal, round, and reactive to light. Cardiovascular:      Rate and Rhythm: Normal rate and regular rhythm. Pulmonary:      Effort: Pulmonary effort is normal.      Breath sounds: Normal breath sounds. Abdominal:      Palpations: Abdomen is soft. Musculoskeletal:         General: Normal range of motion. Cervical back: Normal range of motion. Skin:     General: Skin is warm.    Neurological:      Mental Status: He is alert and oriented to person, place, and time. Psychiatric:         Behavior: Behavior normal.         Joanne Wilhelm was seen today for discuss labs. Diagnoses and all orders for this visit:    Essential hypertension    Acquired hypothyroidism    Orthostatic hypotension    Mixed hyperlipidemia    Parkinson's disease ([address]    Chronic bilateral low back pain without sciatica  -     External Referral To Pain Clinic        Comments: apt pain doc   Diet exer    I educated the patient about all medications. Make sure they were correct and educated  on the risk associated with missing meds or taking them incorrectly. A list of medications is being sent home with patient today. I informed patient about the risk associated with noncompliance of medication and taking medications incorrectly. Appropriate follow-up with myself and all specialist.  Encourage family members to take active role in assisting with medications and medical care. If any confusion should develop to notify my office immediately to avoid risk of worsening medical condition    Check blood pressure at home twice a day. Low-salt low caffeine diet. Call if systolic blood pressure is above 968 and diastolic blood pressures above 85. Only use a upper arm digital cuff. Aggressive low-fat diet. Avoid red meats, greasy fried foods, dairy products. Avoid processed foods. Take cholesterol medications without food. A great deal of time spent reviewing medications, diet, exercise, social issues. Also reviewing the chart before entering the room with patient and finishing charting after leaving patient's room. More than half of that time was spent face to face with the patient in counseling and coordinating care.       Follow Up: Return in about 6 months (around 7/24/2022) for Lab Before, See Referral.     Seen by:  Kalin Guerra DO

## 2022-03-07 RX ORDER — FOLIC ACID 1 MG/1
TABLET ORAL
Qty: 24 TABLET | Refills: 0 | OUTPATIENT
Start: 2022-03-07

## 2022-03-15 RX ORDER — FOLIC ACID 1 MG/1
TABLET ORAL
Qty: 24 TABLET | Refills: 0 | Status: SHIPPED | OUTPATIENT
Start: 2022-03-15

## 2022-03-17 ENCOUNTER — PROCEDURE VISIT (OUTPATIENT)
Dept: PODIATRY | Age: 87
End: 2022-03-17
Payer: COMMERCIAL

## 2022-03-17 VITALS
SYSTOLIC BLOOD PRESSURE: 118 MMHG | TEMPERATURE: 98.2 F | BODY MASS INDEX: 22.37 KG/M2 | DIASTOLIC BLOOD PRESSURE: 62 MMHG | WEIGHT: 179 LBS

## 2022-03-17 DIAGNOSIS — M79.674 PAIN IN TOE OF RIGHT FOOT: ICD-10-CM

## 2022-03-17 DIAGNOSIS — R26.2 DIFFICULTY WALKING: ICD-10-CM

## 2022-03-17 DIAGNOSIS — I73.9 PERIPHERAL VASCULAR DISEASE, UNSPECIFIED (HCC): ICD-10-CM

## 2022-03-17 DIAGNOSIS — B35.1 TINEA UNGUIUM: Primary | ICD-10-CM

## 2022-03-17 DIAGNOSIS — M79.675 PAIN IN LEFT TOE(S): ICD-10-CM

## 2022-03-17 PROCEDURE — 11721 DEBRIDE NAIL 6 OR MORE: CPT | Performed by: PODIATRIST

## 2022-03-17 NOTE — PROGRESS NOTES
[facility]  Return   Patient    Chief Complaint   Patient presents with    Toe Pain     saw pcp Dr. Corey Parisi 1/24/22       Subjective: This Gerson Ethan comes to office for foot and nail care. Pt currently has complaint of thickened, painful, elongated nails that he/she cannot manage by themselves. Pt. Relates pain to nails with shoe gear.   Pt's primary care physician is Bonilla Flynn DO.1-4-2022  Past Medical History:   Diagnosis Date    Anxiety     Ascending aortic aneurysm (HCC)     BPH (benign prostatic hyperplasia)     CAD (coronary artery disease)     Cancer (HCC)     skin    Coronary arteriosclerosis     Depression     DJD (degenerative joint disease)     Hyperlipidemia     Impacted cerumen     Lumbar spinal stenosis     severe    Macular degeneration     Osteoarthritis     Parkinson disease (HCC)     RA (rheumatoid arthritis) (HCC)        Allergies   Allergen Reactions    Pcn [Penicillins]     Prednisone      Current Outpatient Medications on File Prior to Visit   Medication Sig Dispense Refill    folic acid (FOLVITE) 1 MG tablet TAKE 2 TABLETS (2MG) BY MOUTH WEEKLY 24 tablet 0    ipratropium (ATROVENT) 0.06 % nasal spray 2 sprays by Each Nostril route 4 times daily 15 mL 12    ZINC PO Take by mouth daily      levothyroxine (SYNTHROID) 50 MCG tablet Take 0.5 tablets by mouth Daily 45 tablet 3    metoprolol succinate (TOPROL XL) 25 MG extended release tablet Takes 1/2 tablet daily 45 tablet 12    lovastatin (MEVACOR) 20 MG tablet Take 1 tablet by mouth nightly 90 tablet 12    PARoxetine (PAXIL) 20 MG tablet Take 1 tablet by mouth every morning 90 tablet 3    omeprazole (PRILOSEC) 20 MG delayed release capsule Take 1 capsule by mouth daily 90 capsule 3    hydrOXYzine (ATARAX) 25 MG tablet Take 1 tablet by mouth 2 times daily as needed for Anxiety 60 tablet 12    midodrine (PROAMATINE) 2.5 MG tablet Take 1 tablet by mouth 2 times daily (with meals) 180 tablet 3    carbidopa-levodopa (SINEMET CR)  MG per extended release tablet Take 1 tablet by mouth every 4 hours (while awake) No more than 6 per day 540 tablet 3    vitamin B-12 (CYANOCOBALAMIN) 1000 MCG tablet Take 1,000 mcg by mouth daily      acetaminophen (TYLENOL) 500 MG tablet Take 500 mg by mouth every 6 hours as needed for Pain      aspirin 81 MG tablet Take 650 mg by mouth daily       Multiple Vitamins-Minerals (OCUVITE) TABS oral tablet Take 1 tablet by mouth daily      vitamin D (CHOLECALCIFEROL) 1000 UNIT TABS tablet Take 1,000 Units by mouth daily      methotrexate (RHEUMATREX) 2.5 MG chemo tablet Take 2.5 mg by mouth once a week 4 tablets weekly. No current facility-administered medications on file prior to visit. Review of Systems  Objective:  General: AAO x 3 in NAD.     Derm  Toenail Description  Sites of Onychomycosis Involvement (Check affected area)  [x] [x] [x] [x] [x] [x] [x] [x] [x] [x]  5 4 3 2 1 1 2 3 4 5                          Right                                        Left    Thickness  [x] [x] [x] [x] [x] [x] [x] [x] [x] [x]  5 4 3 2 1 1 2 3 4 5                         Right                                        Left    Dystrophic Changes   [x] [x] [x] [x] [x] [x] [x] [x] [x] [x]  5 4 3 2 1 1 2 3 4 5                         Right                                        Left    Color  [x] [x] [x] [x] [x] [x] [x] [x] [x] [x]  5 4 3 2 1 1 2 3 4 5                          Right                                        Left    Incurvation/Ingrowin   [x] [x] [x] [x] [x] [x] [x] [x] [x] [x]  5 4 3 2 1 1 2 3 4 5                         Right                                        Left    Inflammation/Pain   [x] [x] [x] [x] [x] [x] [x] [x] [x] [x]  5 4 3  2 1 1 2 3 4 5                         Right                                        Left      Nails that are described above are all elongated thickened pitting mycotic yellowish incurvated causing pain with both shoe gear. Palpation nails greater then 3 mm thick painful       Dermatologic Exam:hair loss noted  lower extremity    Skin lesion/ulceration   Skin   Callus   Musculoskeletal:     1st MPJ ROM normal, Bilateral.  Muscle strength 5/5, Bilateral.  Pain present upon palpation of toenails 1-5  Bilateral., Bilateral.  Ankle ROM normal,Bilateral.    Dorsally contracted digits , Bilateral.     Vascular:  Pulses   bilateral DP present    PT absent    Neurological:  Sensation present to light touch to level of digits, Bilatera    Foot Exam    General  General Appearance: appears stated age and healthy   Orientation: alert and oriented to person, place, and time   Assistance: cane use       Right Foot/Ankle     Inspection and Palpation  Swelling: great toe interphalangeal joint   Claw Toes: first toe    Neurovascular  Dorsalis pedis: doppler  Posterior tibial: absent      Left Foot/Ankle      Inspection and Palpation  Swelling: great toe interphalangeal joint   Claw toes: first toe    Neurovascular  Dorsalis pedis: 1+  Posterior tibial: absent             Ortho Exam  Q7   []Yes    []No                Q8   [x]Yes    []No                     Q9   []Yes    []No  Assessment:  80 y.o. male with:   1. Tinea unguium    2. Pain in left toe(s)    3. Pain in toe of right foot    4. Peripheral vascular disease, unspecified ([address]    5. Difficulty walking     No orders of the defined types were placed in this encounter. Plan:   Pt was evaluated and examined. Patient was given personalized discharge instructions. Nails 1-10 were debrided in length and thickness sharply with a nail nipper and  without incident. Pt will follow up in 9 weeks or sooner if any problems arise. Diagnosis was discussed with the pt and all of their questions were answered in detail. Proper foot hygiene and care was discussed with the pt. Patient to check feet daily and contact the office with any questions/problems/concerns.   Other comorbidity noted and will be managed by PCP. Pain waiver discussed with patient and confirmed.    3/17/2022      Electronically signed by Abad Members, JAZMINE on 3/17/2022 at 11:49 AM  3/17/2022

## 2022-04-05 ENCOUNTER — OFFICE VISIT (OUTPATIENT)
Dept: NEUROLOGY | Age: 87
End: 2022-04-05
Payer: COMMERCIAL

## 2022-04-05 VITALS
RESPIRATION RATE: 18 BRPM | DIASTOLIC BLOOD PRESSURE: 68 MMHG | SYSTOLIC BLOOD PRESSURE: 122 MMHG | OXYGEN SATURATION: 98 % | HEART RATE: 69 BPM

## 2022-04-05 DIAGNOSIS — M54.17 LUMBOSACRAL RADICULOPATHY: ICD-10-CM

## 2022-04-05 DIAGNOSIS — G20 PARKINSON'S DISEASE (HCC): Primary | ICD-10-CM

## 2022-04-05 PROCEDURE — G8427 DOCREV CUR MEDS BY ELIG CLIN: HCPCS | Performed by: PSYCHIATRY & NEUROLOGY

## 2022-04-05 PROCEDURE — G8420 CALC BMI NORM PARAMETERS: HCPCS | Performed by: PSYCHIATRY & NEUROLOGY

## 2022-04-05 PROCEDURE — 1123F ACP DISCUSS/DSCN MKR DOCD: CPT | Performed by: PSYCHIATRY & NEUROLOGY

## 2022-04-05 PROCEDURE — 4040F PNEUMOC VAC/ADMIN/RCVD: CPT | Performed by: PSYCHIATRY & NEUROLOGY

## 2022-04-05 PROCEDURE — 99215 OFFICE O/P EST HI 40 MIN: CPT | Performed by: PSYCHIATRY & NEUROLOGY

## 2022-04-05 PROCEDURE — 1036F TOBACCO NON-USER: CPT | Performed by: PSYCHIATRY & NEUROLOGY

## 2022-04-05 NOTE — PROGRESS NOTES
This 80-year-old right-handed man was followed for Parkinson's disease. The patient was now a fair historian. His daughter remained an excellent one. His medications continued as carbidopa/levodopa 50/200 tablets, paroxetine, omeprazole, lovastatin, methotrexate, lovastatin, midodrine, folic acid, hydroxyzine and multivitamins. He still took carbidopa/levodopa 5 hours apart, with the last dose before bedtime. With the above regimen, he continued responding moderately well. He and his daughter denied additional motor deficits. He had not fallen nor frozen. He was still engaged in physical therapy program, but less so. There continued minimal intermittent, resting tremors. His daughter denied additional cognitive deficits. He was still drooling, but no more than before. There were no other neurological issues. His tingling sensations in both feet had not worsened. Electrodiagnostic studies revealed diffuse lumbosacral radicular disease. MRIs of the lumbosacral spine displayed moderate to marked abnormalities as well. He was not a surgical candidate. He was undergoing nerve blocks, with good relief of his discomforts. He reported one bout of hematuria, with unknown etiology. He was scheduled to see urology soon. He denied other medical issues. There was no return of the melanomas. He was only driving during the daytime, due to his macular degeneration. He received his COVID-19 vaccinations, but has now elected receive a booster. Review of systems was otherwise unremarkable. Physical examination displayed stable vital signs. His blood pressure was 122/68. He was an elderly man in no acute distress who was alert, cooperative and oriented. He remained pleasant. He continued in good spirits. His skin displayed multiple senile hyperkeratotic plaques, macules and facial and left ear scars from cancer removals.  His neck was supple without thyromegaly; there were +1 carotid upstrokes without bruits; there was full range of motion of his neck in all directions, without rigidity or cogwheeling. His lungs were clear to auscultation. Cardiac testing was unrevealing. Peripheral pulses were +1 without bruits. His limbs revealed arthritic knees, with a well-healed right knee surgical scar. His pes planus deformities was unchanged. Neurological examination produced an intact mental status. His voice was softer, without quivering, with slower responses. Cranial nerve testing was unremarkable. Extraocular movements remained intact. I found no Myerson's sign or masked face. I found normal tone and bulk with 5/5 strength throughout. There was now minimal resting tremor of the right hand and persistent slight bradykinesia. Reflexes were barely elicited in the arms, +1 at the knees and absent at the ankles. There remained bilateral grasp reflexes. Sensory testing was intact to pinprick. Vibration was moderately reduced above both ankles. Coordination was unrevealing. He walked more slowly, with a slight limp. There was no festination or freezing. His neurological examination displayed decreasing movements and early cognitive frontal lobe signs. Laboratory data included a recent CMP with a GFR of 57 and a glucose of 109. CBC with differential produced an MCV of 107. Cholecalciferol levels were 31. 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 He continues moderately well with carbidopa/levodopa 50/200 tablets every 4 hours while awake, 5 times daily. He again displays minimal decline. We will continue his current dosing for now. I recommended he no longer drive. I will consider carbidopa/levodopa to the 25/250 tablets next visit, if he worsens. 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   His pains have moderated with

## 2022-05-04 RX ORDER — CARBIDOPA AND LEVODOPA 50; 200 MG/1; MG/1
TABLET, EXTENDED RELEASE ORAL
Qty: 540 TABLET | Refills: 1 | Status: SHIPPED | OUTPATIENT
Start: 2022-05-04

## 2022-05-13 DIAGNOSIS — E03.9 ACQUIRED HYPOTHYROIDISM: Chronic | ICD-10-CM

## 2022-05-13 RX ORDER — LEVOTHYROXINE SODIUM 0.05 MG/1
TABLET ORAL
Qty: 45 TABLET | Refills: 3 | Status: SHIPPED
Start: 2022-05-13 | End: 2022-07-25 | Stop reason: SDUPTHER

## 2022-06-21 NOTE — PATIENT INSTRUCTIONS
see   note Calcipotriene Pregnancy And Lactation Text: This medication has not been proven safe during pregnancy. It is unknown if this medication is excreted in breast milk.

## 2022-07-18 DIAGNOSIS — I10 ESSENTIAL HYPERTENSION: Chronic | ICD-10-CM

## 2022-07-18 DIAGNOSIS — D50.8 OTHER IRON DEFICIENCY ANEMIA: ICD-10-CM

## 2022-07-18 DIAGNOSIS — E03.9 ACQUIRED HYPOTHYROIDISM: Chronic | ICD-10-CM

## 2022-07-18 DIAGNOSIS — E78.2 MIXED HYPERLIPIDEMIA: Chronic | ICD-10-CM

## 2022-07-18 DIAGNOSIS — E11.9 DIET-CONTROLLED DIABETES MELLITUS (HCC): ICD-10-CM

## 2022-07-18 DIAGNOSIS — D51.8 VITAMIN B12 DEFICIENCY (DIETARY) ANEMIA: ICD-10-CM

## 2022-07-18 DIAGNOSIS — M81.0 AGE-RELATED OSTEOPOROSIS WITHOUT CURRENT PATHOLOGICAL FRACTURE: ICD-10-CM

## 2022-07-18 LAB
ALBUMIN SERPL-MCNC: 4.3 G/DL (ref 3.5–5.2)
ALP BLD-CCNC: 120 U/L (ref 40–129)
ALT SERPL-CCNC: 6 U/L (ref 0–40)
ANION GAP SERPL CALCULATED.3IONS-SCNC: 16 MMOL/L (ref 7–16)
AST SERPL-CCNC: 17 U/L (ref 0–39)
BACTERIA: NORMAL /HPF
BASOPHILS ABSOLUTE: 0.05 E9/L (ref 0–0.2)
BASOPHILS RELATIVE PERCENT: 0.8 % (ref 0–2)
BILIRUB SERPL-MCNC: 0.7 MG/DL (ref 0–1.2)
BILIRUBIN URINE: NEGATIVE
BLOOD, URINE: NEGATIVE
BUN BLDV-MCNC: 31 MG/DL (ref 6–23)
CALCIUM SERPL-MCNC: 9.7 MG/DL (ref 8.6–10.2)
CHLORIDE BLD-SCNC: 106 MMOL/L (ref 98–107)
CHOLESTEROL, TOTAL: 131 MG/DL (ref 0–199)
CLARITY: CLEAR
CO2: 22 MMOL/L (ref 22–29)
COLOR: YELLOW
CREAT SERPL-MCNC: 1.2 MG/DL (ref 0.7–1.2)
EOSINOPHILS ABSOLUTE: 0.23 E9/L (ref 0.05–0.5)
EOSINOPHILS RELATIVE PERCENT: 3.8 % (ref 0–6)
EPITHELIAL CELLS, UA: NORMAL /HPF
GFR AFRICAN AMERICAN: >60
GFR NON-AFRICAN AMERICAN: 57 ML/MIN/1.73
GLUCOSE BLD-MCNC: 118 MG/DL (ref 74–99)
GLUCOSE URINE: NEGATIVE MG/DL
HBA1C MFR BLD: 5.6 % (ref 4–5.6)
HCT VFR BLD CALC: 35.6 % (ref 37–54)
HDLC SERPL-MCNC: 52 MG/DL
HEMOGLOBIN: 11.6 G/DL (ref 12.5–16.5)
IMMATURE GRANULOCYTES #: 0.03 E9/L
IMMATURE GRANULOCYTES %: 0.5 % (ref 0–5)
IRON: 127 MCG/DL (ref 59–158)
KETONES, URINE: NEGATIVE MG/DL
LDL CHOLESTEROL CALCULATED: 69 MG/DL (ref 0–99)
LEUKOCYTE ESTERASE, URINE: ABNORMAL
LYMPHOCYTES ABSOLUTE: 1.78 E9/L (ref 1.5–4)
LYMPHOCYTES RELATIVE PERCENT: 29.5 % (ref 20–42)
MCH RBC QN AUTO: 33.9 PG (ref 26–35)
MCHC RBC AUTO-ENTMCNC: 32.6 % (ref 32–34.5)
MCV RBC AUTO: 104.1 FL (ref 80–99.9)
MONOCYTES ABSOLUTE: 0.65 E9/L (ref 0.1–0.95)
MONOCYTES RELATIVE PERCENT: 10.8 % (ref 2–12)
NEUTROPHILS ABSOLUTE: 3.3 E9/L (ref 1.8–7.3)
NEUTROPHILS RELATIVE PERCENT: 54.6 % (ref 43–80)
NITRITE, URINE: NEGATIVE
PDW BLD-RTO: 12.4 FL (ref 11.5–15)
PH UA: 6 (ref 5–9)
PLATELET # BLD: 236 E9/L (ref 130–450)
PMV BLD AUTO: 10.3 FL (ref 7–12)
POTASSIUM SERPL-SCNC: 4.6 MMOL/L (ref 3.5–5)
PROTEIN UA: NEGATIVE MG/DL
RBC # BLD: 3.42 E12/L (ref 3.8–5.8)
RBC UA: NORMAL /HPF (ref 0–2)
SODIUM BLD-SCNC: 144 MMOL/L (ref 132–146)
SPECIFIC GRAVITY UA: 1.02 (ref 1–1.03)
T4 TOTAL: 5.8 MCG/DL (ref 4.5–11.7)
TOTAL PROTEIN: 7 G/DL (ref 6.4–8.3)
TRIGL SERPL-MCNC: 52 MG/DL (ref 0–149)
TSH SERPL DL<=0.05 MIU/L-ACNC: 4.65 UIU/ML (ref 0.27–4.2)
UROBILINOGEN, URINE: 1 E.U./DL
VITAMIN B-12: 536 PG/ML (ref 211–946)
VITAMIN D 25-HYDROXY: 41 NG/ML (ref 30–100)
VLDLC SERPL CALC-MCNC: 10 MG/DL
WBC # BLD: 6 E9/L (ref 4.5–11.5)
WBC UA: NORMAL /HPF (ref 0–5)

## 2022-07-25 ENCOUNTER — OFFICE VISIT (OUTPATIENT)
Dept: PRIMARY CARE CLINIC | Age: 87
End: 2022-07-25
Payer: COMMERCIAL

## 2022-07-25 VITALS
WEIGHT: 178.6 LBS | TEMPERATURE: 96.5 F | BODY MASS INDEX: 22.21 KG/M2 | DIASTOLIC BLOOD PRESSURE: 74 MMHG | SYSTOLIC BLOOD PRESSURE: 125 MMHG | HEIGHT: 75 IN

## 2022-07-25 DIAGNOSIS — K21.9 GASTROESOPHAGEAL REFLUX DISEASE WITHOUT ESOPHAGITIS: ICD-10-CM

## 2022-07-25 DIAGNOSIS — E03.9 ACQUIRED HYPOTHYROIDISM: Chronic | ICD-10-CM

## 2022-07-25 DIAGNOSIS — M81.0 AGE-RELATED OSTEOPOROSIS WITHOUT CURRENT PATHOLOGICAL FRACTURE: ICD-10-CM

## 2022-07-25 DIAGNOSIS — I95.1 ORTHOSTATIC HYPOTENSION: ICD-10-CM

## 2022-07-25 DIAGNOSIS — F41.9 ANXIETY: ICD-10-CM

## 2022-07-25 DIAGNOSIS — I10 ESSENTIAL HYPERTENSION: Chronic | ICD-10-CM

## 2022-07-25 DIAGNOSIS — M19.012 PRIMARY OSTEOARTHRITIS OF LEFT SHOULDER: Primary | ICD-10-CM

## 2022-07-25 DIAGNOSIS — E78.2 MIXED HYPERLIPIDEMIA: Chronic | ICD-10-CM

## 2022-07-25 PROCEDURE — 99214 OFFICE O/P EST MOD 30 MIN: CPT | Performed by: FAMILY MEDICINE

## 2022-07-25 PROCEDURE — 1123F ACP DISCUSS/DSCN MKR DOCD: CPT | Performed by: FAMILY MEDICINE

## 2022-07-25 PROCEDURE — G8428 CUR MEDS NOT DOCUMENT: HCPCS | Performed by: FAMILY MEDICINE

## 2022-07-25 PROCEDURE — 1036F TOBACCO NON-USER: CPT | Performed by: FAMILY MEDICINE

## 2022-07-25 PROCEDURE — G8420 CALC BMI NORM PARAMETERS: HCPCS | Performed by: FAMILY MEDICINE

## 2022-07-25 RX ORDER — LOVASTATIN 20 MG/1
20 TABLET ORAL NIGHTLY
Qty: 90 TABLET | Refills: 12 | Status: SHIPPED | OUTPATIENT
Start: 2022-07-25

## 2022-07-25 RX ORDER — OMEPRAZOLE 20 MG/1
20 CAPSULE, DELAYED RELEASE ORAL DAILY
Qty: 90 CAPSULE | Refills: 3 | Status: SHIPPED | OUTPATIENT
Start: 2022-07-25

## 2022-07-25 RX ORDER — MIDODRINE HYDROCHLORIDE 2.5 MG/1
2.5 TABLET ORAL 2 TIMES DAILY WITH MEALS
Qty: 180 TABLET | Refills: 3 | Status: SHIPPED | OUTPATIENT
Start: 2022-07-25

## 2022-07-25 RX ORDER — IPRATROPIUM BROMIDE 42 UG/1
2 SPRAY, METERED NASAL 4 TIMES DAILY
Qty: 15 ML | Refills: 12 | Status: SHIPPED | OUTPATIENT
Start: 2022-07-25

## 2022-07-25 RX ORDER — PAROXETINE HYDROCHLORIDE 20 MG/1
20 TABLET, FILM COATED ORAL EVERY MORNING
Qty: 90 TABLET | Refills: 3 | Status: SHIPPED | OUTPATIENT
Start: 2022-07-25

## 2022-07-25 RX ORDER — METOPROLOL SUCCINATE 25 MG/1
TABLET, EXTENDED RELEASE ORAL
Qty: 45 TABLET | Refills: 12 | Status: SHIPPED | OUTPATIENT
Start: 2022-07-25

## 2022-07-25 RX ORDER — LEVOTHYROXINE SODIUM 0.05 MG/1
50 TABLET ORAL DAILY
Qty: 45 TABLET | Refills: 3 | Status: SHIPPED | OUTPATIENT
Start: 2022-07-25

## 2022-07-25 ASSESSMENT — PATIENT HEALTH QUESTIONNAIRE - PHQ9
SUM OF ALL RESPONSES TO PHQ9 QUESTIONS 1 & 2: 0
2. FEELING DOWN, DEPRESSED OR HOPELESS: 0
SUM OF ALL RESPONSES TO PHQ QUESTIONS 1-9: 0
1. LITTLE INTEREST OR PLEASURE IN DOING THINGS: 0
SUM OF ALL RESPONSES TO PHQ QUESTIONS 1-9: 0

## 2022-07-25 ASSESSMENT — ENCOUNTER SYMPTOMS
GASTROINTESTINAL NEGATIVE: 1
RESPIRATORY NEGATIVE: 1
EYES NEGATIVE: 1
ALLERGIC/IMMUNOLOGIC NEGATIVE: 1

## 2022-07-25 NOTE — PROGRESS NOTES
22  Name: Vale Cardenas    : 1933    Sex: male    Age: 80 y.o. Subjective:  Chief Complaint: Patient is here for eczema checkup regarding blood pressure cholesterol renal function osteoarthritis microscopic hematuria Parkinson's disease. Sees Dr. Benji Alexander from neurology on a regular basis. Rheumatology and neurology. Complains of left shoulder pain since he fell last September. Lab work does show a slight elevation TSH but not very much we will continue to watch at the same dose. Otherwise lab is okay. No chest pains or shortness breath. Inactive. Daughter present in the room      Review of Systems   Constitutional: Negative. HENT: Negative. Eyes: Negative. Respiratory: Negative. Cardiovascular: Negative. Gastrointestinal: Negative. Endocrine: Negative. Genitourinary: Negative. Musculoskeletal:  Positive for arthralgias. Skin: Negative. Allergic/Immunologic: Negative. Neurological: Negative. Hematological: Negative. Psychiatric/Behavioral: Negative. Current Outpatient Medications:     PARoxetine (PAXIL) 20 MG tablet, Take 1 tablet by mouth every morning, Disp: 90 tablet, Rfl: 3    levothyroxine (SYNTHROID) 50 MCG tablet, Take 1 tablet by mouth in the morning., Disp: 45 tablet, Rfl: 3    lovastatin (MEVACOR) 20 MG tablet, Take 1 tablet by mouth nightly, Disp: 90 tablet, Rfl: 12    metoprolol succinate (TOPROL XL) 25 MG extended release tablet, Takes 1/2 tablet daily, Disp: 45 tablet, Rfl: 12    midodrine (PROAMATINE) 2.5 MG tablet, Take 1 tablet by mouth in the morning and 1 tablet in the evening. Take with meals. , Disp: 180 tablet, Rfl: 3    omeprazole (PRILOSEC) 20 MG delayed release capsule, Take 1 capsule by mouth in the morning., Disp: 90 capsule, Rfl: 3    ipratropium (ATROVENT) 0.06 % nasal spray, 2 sprays by Each Nostril route in the morning and 2 sprays at noon and 2 sprays in the evening and 2 sprays before bedtime. , Disp: 15 mL, Rfl: 12    carbidopa-levodopa (SINEMET CR)  MG per extended release tablet, TAKE ONE TABLET BY MOUTH EVERY 4 HOURS (while awake) no more than 6 per day, Disp: 540 tablet, Rfl: 1    folic acid (FOLVITE) 1 MG tablet, TAKE 2 TABLETS (2MG) BY MOUTH WEEKLY, Disp: 24 tablet, Rfl: 0    ZINC PO, Take by mouth daily, Disp: , Rfl:     hydrOXYzine (ATARAX) 25 MG tablet, Take 1 tablet by mouth 2 times daily as needed for Anxiety, Disp: 60 tablet, Rfl: 12    vitamin B-12 (CYANOCOBALAMIN) 1000 MCG tablet, Take 1,000 mcg by mouth daily, Disp: , Rfl:     acetaminophen (TYLENOL) 500 MG tablet, Take 500 mg by mouth every 6 hours as needed for Pain, Disp: , Rfl:     aspirin 81 MG tablet, Take 650 mg by mouth daily , Disp: , Rfl:     Multiple Vitamins-Minerals (OCUVITE) TABS oral tablet, Take 1 tablet by mouth daily, Disp: , Rfl:     vitamin D (CHOLECALCIFEROL) 1000 UNIT TABS tablet, Take 1,000 Units by mouth daily, Disp: , Rfl:     methotrexate (RHEUMATREX) 2.5 MG chemo tablet, Take 2.5 mg by mouth once a week 4 tablets weekly. , Disp: , Rfl:   Allergies   Allergen Reactions    Pcn [Penicillins]     Prednisone      Social History     Socioeconomic History    Marital status:      Spouse name: Not on file    Number of children: Not on file    Years of education: Not on file    Highest education level: Not on file   Occupational History    Not on file   Tobacco Use    Smoking status: Former     Types: Pipe, Cigars    Smokeless tobacco: Never   Vaping Use    Vaping Use: Never used   Substance and Sexual Activity    Alcohol use:  Yes     Alcohol/week: 0.0 standard drinks     Comment: social    Drug use: No    Sexual activity: Not Currently   Other Topics Concern    Not on file   Social History Narrative        Colonoscopy - (10/29/2008)    Colonoscopy - (1/10/2014)    Zoster/Shingles Vaccine - given at [facility]    CAD WITH CABG    EARLY HYPOTHYROID----HYPOTHRYOID 3-18    ANXIETY    DEPRESSION    FATIGUE BPH    MACULAR DEGENERATION    SKIN CA    APPY    BILAT ING HERNIA---ONE DONE THINKS WAS R    L ING HERNIA    COLON 6-99 AND 6-02----5T O 10 YRS DR POWERS---PT STATES WAS TOLD THAT HE SHOULD NTO    HAVE AGAIN DUE TO AGE    L KNEE OR X 2 DR Caleb Pike AND [address]    ANXIETY FROM PREDNISONE 2009    COLON 4-11------NEG---YRUICH    EGD 4-11 PEPTIC ULCER    L KNEE OR 6-11    [facility]ULLON---TALL   [address]    STEROIDS CUASE ANXIETY    RA DR Robby Coleman 124    EGD 12-13 DR CHEN--- ESO DILITATION    COLON DR CHEN 1-14----- TICS    PARKINSON DIC DX WITH DR JERNIGAN 2014-- THEN CHG TO DR RUVALCABA---then dr Viky Moore    ----[facility] STUDY-WITH LIQUID ASPIRATION    R TOTAL KNEE 1-24-17 DR WEI    HEMATURIA 2-17 DR LAWRENCE Bernabe PAIN--DR DANIELS THEN  [facility]    SEVERE LUMBAR SPINAL STENOSIS 2-18 DR BARTON--L-4-5 WITH ADRENAL    Dr. Fercho Moncada, SCC left temple    Dr. Fercho Moncada, SCC GLAND---REFERRED TO NEURO SURG DR VINSON---THEN TO DR Chiqui ePrdomo--    eval DR Kathie Madrigal 5-18 AND SAID SHOT AND AND PT REFUSED DUE TO CONCERN WITH    PREDNISONE-----TO SEE HIM BACK    Right knee total done 7-20  Dr Morales  and admits two weeks later with dehydration and orthostatic hypotension    Covid  12-22     Social Determinants of Health     Financial Resource Strain: Not on file   Food Insecurity: Not on file   Transportation Needs: Not on file   Physical Activity: Not on file   Stress: Not on file   Social Connections: Not on file   Intimate Partner Violence: Not on file   Housing Stability: Not on file      Past Medical History:   Diagnosis Date    Anxiety     Ascending aortic aneurysm (HCC)     BPH (benign prostatic hyperplasia)     CAD (coronary artery disease)     Cancer ([facility] [address]     skin    Coronary arteriosclerosis     Depression     DJD (degenerative joint disease)     Hyperlipidemia     Impacted cerumen     Lumbar spinal stenosis     severe    Macular degeneration     Osteoarthritis     Parkinson disease ([facility] [address]     RA (rheumatoid arthritis) ([facility] [address]      Family History   Problem Relation Age of Onset    Lung Cancer Father     No Known Problems Mother       Past Surgical History:   Procedure Laterality Date    APPENDECTOMY      CATARACT REMOVAL WITH IMPLANT Left 04/19/2020    CHOLECYSTECTOMY      COLONOSCOPY      CORONARY ARTERY BYPASS GRAFT  2002    HERNIA REPAIR Bilateral     inguinal    KNEE SURGERY Left 06/2011    TOTAL KNEE ARTHROPLASTY Right 01/24/2017    tiffanie    UPPER GASTROINTESTINAL ENDOSCOPY      peptic ulcer      Vitals:    07/25/22 1404   BP: 125/74   Temp: (!) 96.5 °F (35.8 °C)   Weight: 178 lb 9.6 oz (81 kg)   Height: 6' 3\" (1.905 m)       Objective:    Physical Exam  Vitals reviewed. Constitutional:       Appearance: Normal appearance. He is well-developed. HENT:      Head: Normocephalic. Right Ear: Tympanic membrane normal.      Left Ear: Tympanic membrane normal.      Nose: Nose normal.      Mouth/Throat:      Mouth: Mucous membranes are moist.   Eyes:      Pupils: Pupils are equal, round, and reactive to light. Cardiovascular:      Rate and Rhythm: Normal rate and regular rhythm. Pulmonary:      Effort: Pulmonary effort is normal.      Breath sounds: Normal breath sounds. Abdominal:      General: Bowel sounds are normal.      Palpations: Abdomen is soft. Musculoskeletal:      Cervical back: Normal range of motion. Comments:  decreased range of motion left shoulder   Skin:     General: Skin is warm. Neurological:      Mental Status: He is alert and oriented to person, place, and time. Psychiatric:         Behavior: Behavior normal.       Vladimir Wright was seen today for discuss labs. Diagnoses and all orders for this visit:    Primary osteoarthritis of left shoulder  -     External Referral To Orthopedic Surgery    Anxiety  -     PARoxetine (PAXIL) 20 MG tablet;  Take 1 tablet by mouth every morning    Acquired hypothyroidism  -     levothyroxine (SYNTHROID) 50 MCG tablet; Take 1 tablet by mouth in the morning. Mixed hyperlipidemia  -     lovastatin (MEVACOR) 20 MG tablet; Take 1 tablet by mouth nightly    Essential hypertension  -     metoprolol succinate (TOPROL XL) 25 MG extended release tablet; Takes 1/2 tablet daily    Orthostatic hypotension  -     midodrine (PROAMATINE) 2.5 MG tablet; Take 1 tablet by mouth in the morning and 1 tablet in the evening. Take with meals. Gastroesophageal reflux disease without esophagitis  -     omeprazole (PRILOSEC) 20 MG delayed release capsule; Take 1 capsule by mouth in the morning. Other orders  -     ipratropium (ATROVENT) 0.06 % nasal spray; 2 sprays by Each Nostril route in the morning and 2 sprays at noon and 2 sprays in the evening and 2 sprays before bedtime. Comments: Motion exercises taught for arthritis. Appointment orthopedics regarding left shoulder. Educated on proper walking with risk of falling with Parkinson's disease. Discussed lab. May need to increase thyroid meds next visit. In check blood pressure at home        I educated the patient about all medications. Make sure they were correct and educated  on the risk associated with missing meds or taking them incorrectly. A list of medications is being sent home with patient today. Check blood pressure at home twice a day. Low-salt low caffeine diet. Call if systolic blood pressure is above 239 and diastolic blood pressures above 85. Only use a upper arm digital cuff. Aggressive low-fat diet. Avoid red meats, greasy fried foods, dairy products. Avoid processed foods. Take cholesterol medications without food. I informed patient about the risk associated with noncompliance of medication and taking medications incorrectly. Appropriate follow-up with myself and all specialist.  Encourage family members to take active role in assisting with medications and medical care.   If any confusion should develop to notify my office immediately to avoid risk of worsening medical condition      A great deal of time spent reviewing medications, diet, exercise, social issues. Also reviewing the chart before entering the room with patient and finishing charting after leaving patient's room. More than half of that time was spent face to face with the patient in counseling and coordinating care.       Follow Up: Return in about 6 months (around 1/25/2023) for Lab Before, See Referral.     Seen by:  Azam Paul, DO

## 2022-09-14 ENCOUNTER — OFFICE VISIT (OUTPATIENT)
Dept: NEUROLOGY | Age: 87
End: 2022-09-14
Payer: MEDICARE

## 2022-09-14 VITALS
BODY MASS INDEX: 24.9 KG/M2 | WEIGHT: 194 LBS | HEART RATE: 57 BPM | DIASTOLIC BLOOD PRESSURE: 71 MMHG | SYSTOLIC BLOOD PRESSURE: 124 MMHG | TEMPERATURE: 97.3 F | HEIGHT: 74 IN | RESPIRATION RATE: 18 BRPM | OXYGEN SATURATION: 98 %

## 2022-09-14 DIAGNOSIS — G20 PARKINSON'S DISEASE (HCC): Primary | ICD-10-CM

## 2022-09-14 PROCEDURE — 1123F ACP DISCUSS/DSCN MKR DOCD: CPT | Performed by: PSYCHIATRY & NEUROLOGY

## 2022-09-14 PROCEDURE — 99215 OFFICE O/P EST HI 40 MIN: CPT | Performed by: PSYCHIATRY & NEUROLOGY

## 2022-09-14 NOTE — PROGRESS NOTES
This 55-year-old right-handed man was followed for Parkinson's disease. The patient was now a fair historian. His daughter remained an excellent one. His medications continued as carbidopa/levodopa 50/200 tablets, paroxetine, omeprazole, lovastatin, methotrexate, lovastatin, midodrine, folic acid, hydroxyzine and multivitamins. He still took carbidopa/levodopa 5 hours apart, the last dose before bedtime. With the above regimen, he continued responding moderately well. He and his daughter denied additional motor deficits. He had not fallen nor frozen. He was still engaged in physical therapy program, but much less so. There continued minimal intermittent, resting tremors. His daughter denied more during cognitive deficits. He was doing less. There were no other neurological issues. His tingling sensations in both feet had not worsened. Electrodiagnostic studies revealed diffuse lumbosacral radicular disease. MRIs of the lumbosacral spine with mild moderate to marked abnormalities as well. He was not a surgical candidate. He was undergoing nerve blocks, with good relief of his discomforts. He reported one bout of hematuria, with unknown etiology. He was following with urology. He denied other medical issues. There was no return of the melanomas. He was no longer driving. He received his COVID-19 vaccinations and boosters. Review of systems was otherwise unremarkable. Physical examination displayed stable vital signs. His blood pressure was 124/71. He was an elderly man in no acute distress who was alert, cooperative and oriented. He remained pleasant. He continued in good spirits. His skin displayed multiple senile hyperkeratotic plaques, macules and facial and left ear scars from cancer excisions. His neck was supple without thyromegaly; there were +1 carotid upstrokes without bruits; there was full range of motion of his neck, without rigidity or cogwheeling.  His lungs were clear to auscultation. Cardiac testing was unrevealing. Peripheral pulses were +1 without bruits. His limbs revealed arthritic knees, with a well-healed right knee surgical scar. His pes planus deformities was unchanged. Neurological examination produced an intact mental status. His voice was softer, without quivering, and with slower responses. Cranial nerve testing was unremarkable. Extraocular movements continued intact. I found no Myerson's sign or masked face. I found normal tone and bulk with 5/5 strength throughout. There continued minimal resting tremor of the right hand and persistent slight bradykinesia. Reflexes were barely elicited in the arms, +1 at the knees and absent at the ankles. There remained bilateral grasp reflexes. Sensory testing was intact to pinprick. Vibration was moderately reduced above both ankles. Coordination was unrevealing. He walked more slowly, with a slight limp. There was no festination or freezing. His neurological examination again displayed the normal bradykinesia and early cognitive frontal lobe signs. Laboratory data included a recent CMP with a GFR of 57 and a glucose of 109. CBC with differential produced an MCV of 107. Cholecalciferol levels were 31. 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 He continues moderately well with carbidopa/levodopa 50/200 tablets every 4 hours while awake, 5 times daily. He again displays minimal decline. We will continue the current dosing. I will consider carbidopa/levodopa to the 25/250 tablets next visit, if he worsens. 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 His pains have moderated with nerve blocks. Medically he is stable despite his rheumatoid arthritis, degenerative joint disease and coronary artery disease.   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 There remained no recurrence. He will return in 4 months, to see FILIPE Chino. He will continue his current regimen, with his carbidopa/levodopa every 4 hours. His daughter will call at any time if problems arise. I spent 40 minutes with the patient with over 50 % spent in counseling and disease management. All patient issues were addressed and all questions were answered.

## 2022-10-17 ENCOUNTER — OFFICE VISIT (OUTPATIENT)
Dept: FAMILY MEDICINE CLINIC | Age: 87
End: 2022-10-17
Payer: MEDICARE

## 2022-10-17 VITALS
HEIGHT: 74 IN | BODY MASS INDEX: 22.33 KG/M2 | WEIGHT: 174 LBS | TEMPERATURE: 97.9 F | DIASTOLIC BLOOD PRESSURE: 74 MMHG | SYSTOLIC BLOOD PRESSURE: 128 MMHG

## 2022-10-17 DIAGNOSIS — R35.0 INCREASED URINARY FREQUENCY: ICD-10-CM

## 2022-10-17 DIAGNOSIS — N30.00 ACUTE CYSTITIS WITHOUT HEMATURIA: Primary | ICD-10-CM

## 2022-10-17 LAB
BILIRUBIN, POC: NORMAL
BLOOD URINE, POC: NORMAL
CLARITY, POC: CLEAR
COLOR, POC: NORMAL
GLUCOSE URINE, POC: NORMAL
KETONES, POC: NORMAL
LEUKOCYTE EST, POC: NORMAL
NITRITE, POC: NORMAL
PH, POC: 6
PROTEIN, POC: NORMAL
SPECIFIC GRAVITY, POC: NORMAL
UROBILINOGEN, POC: 1

## 2022-10-17 PROCEDURE — 1123F ACP DISCUSS/DSCN MKR DOCD: CPT | Performed by: FAMILY MEDICINE

## 2022-10-17 PROCEDURE — 81002 URINALYSIS NONAUTO W/O SCOPE: CPT | Performed by: FAMILY MEDICINE

## 2022-10-17 PROCEDURE — 99213 OFFICE O/P EST LOW 20 MIN: CPT | Performed by: FAMILY MEDICINE

## 2022-10-17 RX ORDER — NITROFURANTOIN 25; 75 MG/1; MG/1
100 CAPSULE ORAL 2 TIMES DAILY
Qty: 14 CAPSULE | Refills: 0 | Status: SHIPPED | OUTPATIENT
Start: 2022-10-17

## 2022-10-17 ASSESSMENT — PATIENT HEALTH QUESTIONNAIRE - PHQ9
SUM OF ALL RESPONSES TO PHQ QUESTIONS 1-9: 0
2. FEELING DOWN, DEPRESSED OR HOPELESS: 0
SUM OF ALL RESPONSES TO PHQ9 QUESTIONS 1 & 2: 0
SUM OF ALL RESPONSES TO PHQ QUESTIONS 1-9: 0
1. LITTLE INTEREST OR PLEASURE IN DOING THINGS: 0

## 2022-10-17 ASSESSMENT — ENCOUNTER SYMPTOMS
RESPIRATORY NEGATIVE: 1
GASTROINTESTINAL NEGATIVE: 1
EYES NEGATIVE: 1
ALLERGIC/IMMUNOLOGIC NEGATIVE: 1

## 2022-10-17 NOTE — PROGRESS NOTES
10/17/22  Name: Ju Hernandez    : 1933    Sex: male    Age: 80 y.o. Subjective:  Chief Complaint: Patient is here for urine incontinence x2. Verlee Comas in without appointment with urine episode of incontinence x2 with no dysuria frequency chills temperature nausea vomiting abdominal pain. Weight looks decreased but I think her weight when she saw the neurologist was not accurate. He is only down 2 pounds from regular      Review of Systems   Constitutional: Negative. HENT: Negative. Eyes: Negative. Respiratory: Negative. Cardiovascular: Negative. Gastrointestinal: Negative. Endocrine: Negative. Genitourinary:         Hpi   Musculoskeletal: Negative. Skin: Negative. Allergic/Immunologic: Negative. Neurological: Negative. Hematological: Negative. Psychiatric/Behavioral: Negative. Current Outpatient Medications:     nitrofurantoin, macrocrystal-monohydrate, (MACROBID) 100 MG capsule, Take 1 capsule by mouth 2 times daily, Disp: 14 capsule, Rfl: 0    PARoxetine (PAXIL) 20 MG tablet, Take 1 tablet by mouth every morning, Disp: 90 tablet, Rfl: 3    levothyroxine (SYNTHROID) 50 MCG tablet, Take 1 tablet by mouth in the morning., Disp: 45 tablet, Rfl: 3    lovastatin (MEVACOR) 20 MG tablet, Take 1 tablet by mouth nightly, Disp: 90 tablet, Rfl: 12    metoprolol succinate (TOPROL XL) 25 MG extended release tablet, Takes 1/2 tablet daily, Disp: 45 tablet, Rfl: 12    midodrine (PROAMATINE) 2.5 MG tablet, Take 1 tablet by mouth in the morning and 1 tablet in the evening. Take with meals. , Disp: 180 tablet, Rfl: 3    omeprazole (PRILOSEC) 20 MG delayed release capsule, Take 1 capsule by mouth in the morning., Disp: 90 capsule, Rfl: 3    ipratropium (ATROVENT) 0.06 % nasal spray, 2 sprays by Each Nostril route in the morning and 2 sprays at noon and 2 sprays in the evening and 2 sprays before bedtime.  (Patient not taking: Reported on 2022), Disp: 15 mL, Rfl: 12    carbidopa-levodopa (SINEMET CR)  MG per extended release tablet, TAKE ONE TABLET BY MOUTH EVERY 4 HOURS (while awake) no more than 6 per day, Disp: 540 tablet, Rfl: 1    folic acid (FOLVITE) 1 MG tablet, TAKE 2 TABLETS (2MG) BY MOUTH WEEKLY, Disp: 24 tablet, Rfl: 0    ZINC PO, Take by mouth daily (Patient not taking: Reported on 9/14/2022), Disp: , Rfl:     hydrOXYzine (ATARAX) 25 MG tablet, Take 1 tablet by mouth 2 times daily as needed for Anxiety, Disp: 60 tablet, Rfl: 12    vitamin B-12 (CYANOCOBALAMIN) 1000 MCG tablet, Take 1,000 mcg by mouth daily, Disp: , Rfl:     acetaminophen (TYLENOL) 500 MG tablet, Take 500 mg by mouth every 6 hours as needed for Pain, Disp: , Rfl:     aspirin 81 MG tablet, Take 650 mg by mouth daily , Disp: , Rfl:     Multiple Vitamins-Minerals (OCUVITE) TABS oral tablet, Take 1 tablet by mouth daily, Disp: , Rfl:     vitamin D (CHOLECALCIFEROL) 1000 UNIT TABS tablet, Take 1,000 Units by mouth daily, Disp: , Rfl:     methotrexate (RHEUMATREX) 2.5 MG chemo tablet, Take 2.5 mg by mouth once a week 4 tablets weekly. , Disp: , Rfl:   Allergies   Allergen Reactions    Pcn [Penicillins]     Prednisone      Social History     Socioeconomic History    Marital status:      Spouse name: Not on file    Number of children: Not on file    Years of education: Not on file    Highest education level: Not on file   Occupational History    Not on file   Tobacco Use    Smoking status: Former     Types: Pipe, Cigars    Smokeless tobacco: Never   Vaping Use    Vaping Use: Never used   Substance and Sexual Activity    Alcohol use:  Yes     Alcohol/week: 0.0 standard drinks     Comment: social    Drug use: No    Sexual activity: Not Currently   Other Topics Concern    Not on file   Social History Narrative        Colonoscopy - (10/29/2008)    Colonoscopy - (1/10/2014)    Zoster/Shingles Vaccine - given at [facility]    CAD WITH CABG    EARLY HYPOTHYROID----HYPOTHRYOID 3-18 ANXIETY    DEPRESSION    FATIGUE    BPH    MACULAR DEGENERATION    SKIN CA    APPY    BILAT ING HERNIA---ONE DONE THINKS WAS R    L ING HERNIA    COLON 6-99 AND 6-02----5T O 10 YRS DR POWERS---PT STATES WAS TOLD THAT HE SHOULD NTO    HAVE AGAIN DUE TO AGE    L KNEE OR X 2 DR Sae Arredondo AND [address]    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 [address] [place] [address]    EGD 12-13 DR CHEN--- ESO DILITATION    COLON DR CHEN 1-14----- TICS    PARKINSON DIC DX WITH DR JERNIGAN 2014-- THEN CHG TO DR RUVALCABA---then dr Isabella Armstrong    ----[facility] STUDY-WITH LIQUID ASPIRATION    R TOTAL KNEE 1-24-17 DR WEI    HEMATURIA 2-17 DR LAWRENCE Ward Finger PAIN--DR DANIELS THEN  [facility]    SEVERE LUMBAR SPINAL STENOSIS 2-18 DR BARTON--L-4-5 WITH ADRENAL    Dr. César Webber, SCC left temple    Dr. César Webber, SCC GLAND---REFERRED TO NEURO SURG DR VINSON---THEN TO DR Brittany Oneil--    eval DR Rohit Mistry 5-18 AND SAID SHOT AND AND PT REFUSED DUE TO CONCERN WITH    PREDNISONE-----TO SEE HIM BACK    Right knee total done 7-20  Dr Violeta Levy and admits two weeks later with dehydration and orthostatic hypotension    Covid  12-22     Social Determinants of Health     Financial Resource Strain: Not on file   Food Insecurity: Not on file   Transportation Needs: Not on file   Physical Activity: Not on file   Stress: Not on file   Social Connections: Not on file   Intimate Partner Violence: Not on file   Housing Stability: Not on file      Past Medical History:   Diagnosis Date    Anxiety     Ascending aortic aneurysm     BPH (benign prostatic hyperplasia)     CAD (coronary artery disease)     Cancer (HCC)     skin    Coronary arteriosclerosis     Depression     DJD (degenerative joint disease)     Hyperlipidemia     Impacted cerumen     Lumbar spinal stenosis     severe    Macular degeneration Osteoarthritis     Parkinson disease ([facility] [address]     RA (rheumatoid arthritis) ([facility] [address]      Family History   Problem Relation Age of Onset    Lung Cancer Father     No Known Problems Mother       Past Surgical History:   Procedure Laterality Date    APPENDECTOMY      CATARACT REMOVAL WITH IMPLANT Left 04/19/2020    CHOLECYSTECTOMY      COLONOSCOPY      CORONARY ARTERY BYPASS GRAFT  2002    HERNIA REPAIR Bilateral     inguinal    KNEE SURGERY Left 06/2011    TOTAL KNEE ARTHROPLASTY Right 01/24/2017    tiffanie    UPPER GASTROINTESTINAL ENDOSCOPY      peptic ulcer      Vitals:    10/17/22 1148   BP: 128/74   Temp: 97.9 °F (36.6 °C)   TempSrc: Oral   Weight: 174 lb (78.9 kg)   Height: 6' 2\" (1.88 m)       Objective:    Physical Exam  Vitals reviewed. Constitutional:       Appearance: Normal appearance. He is well-developed. HENT:      Head: Normocephalic. Right Ear: Tympanic membrane normal.      Left Ear: Tympanic membrane normal.      Nose: Nose normal.      Mouth/Throat:      Mouth: Mucous membranes are moist.   Eyes:      Pupils: Pupils are equal, round, and reactive to light. Cardiovascular:      Rate and Rhythm: Normal rate and regular rhythm. Pulmonary:      Effort: Pulmonary effort is normal.      Breath sounds: Normal breath sounds. Abdominal:      General: Bowel sounds are normal.      Palpations: Abdomen is soft. Musculoskeletal:      Cervical back: Normal range of motion. Skin:     General: Skin is warm. Neurological:      Mental Status: He is alert and oriented to person, place, and time. Psychiatric:         Behavior: Behavior normal.       Aftab Real was seen today for urinary frequency. Diagnoses and all orders for this visit:    Acute cystitis without hematuria  -     Culture, Urine; Future  -     nitrofurantoin, macrocrystal-monohydrate, (MACROBID) 100 MG capsule;  Take 1 capsule by mouth 2 times daily    Increased urinary frequency  -     POCT Urinalysis no Micro  -     Culture, Urine; Future  -     nitrofurantoin, macrocrystal-monohydrate, (MACROBID) 100 MG capsule; Take 1 capsule by mouth 2 times daily      Comments:  urine Analysis noted. Culture urine. Antibiotic. See me in 2 days. Worse go to ER. May need urologic evaluation      I educated the patient about all medications. Make sure they were correct and educated  on the risk associated with missing meds or taking them incorrectly. A list of medications is being sent home with patient today. I informed patient about the risk associated with noncompliance of medication and taking medications incorrectly. Appropriate follow-up with myself and all specialist.  Encourage family members to take active role in assisting with medications and medical care. If any confusion should develop to notify my office immediately to avoid risk of worsening medical condition    A great deal of time spent reviewing medications, diet, exercise, social issues. Also reviewing the chart before entering the room with patient and finishing charting after leaving patient's room. More than half of that time was spent face to face with the patient in counseling and coordinating care. Follow Up: Return if symptoms worsen or fail to improve, for Reg Appt, Meds. If worse go to ER. Not better in 24 hr see.      Seen by:  Nirmal Mena, DO

## 2022-10-24 ENCOUNTER — TELEPHONE (OUTPATIENT)
Dept: PRIMARY CARE CLINIC | Age: 87
End: 2022-10-24

## 2022-10-24 NOTE — TELEPHONE ENCOUNTER
Pt wife calling and asking about results for urine culture. Was in on the 17th of October and was told it would be back in a few days.

## 2022-10-24 NOTE — TELEPHONE ENCOUNTER
Tell the wife I put the order for it but somehow the lab never got the urine to culture.   If he still having symptoms we can do another culture on him drop 1 off

## 2022-10-28 RX ORDER — FOLIC ACID 1 MG/1
TABLET ORAL
Qty: 24 TABLET | Refills: 0 | OUTPATIENT
Start: 2022-10-28

## 2022-10-28 RX ORDER — METHOTREXATE 2.5 MG/1
TABLET ORAL
Qty: 48 TABLET | Refills: 0 | OUTPATIENT
Start: 2022-10-28

## 2022-11-07 ENCOUNTER — APPOINTMENT (OUTPATIENT)
Dept: GENERAL RADIOLOGY | Age: 87
End: 2022-11-07
Payer: MEDICARE

## 2022-11-07 LAB
ALBUMIN SERPL-MCNC: 4.2 G/DL (ref 3.5–5.2)
ALP BLD-CCNC: 117 U/L (ref 40–129)
ALT SERPL-CCNC: <5 U/L (ref 0–40)
ANION GAP SERPL CALCULATED.3IONS-SCNC: 7 MMOL/L (ref 7–16)
AST SERPL-CCNC: 14 U/L (ref 0–39)
BASOPHILS ABSOLUTE: 0.03 E9/L (ref 0–0.2)
BASOPHILS RELATIVE PERCENT: 0.4 % (ref 0–2)
BILIRUB SERPL-MCNC: 0.4 MG/DL (ref 0–1.2)
BILIRUBIN URINE: NEGATIVE
BLOOD, URINE: NEGATIVE
BUN BLDV-MCNC: 26 MG/DL (ref 6–23)
CALCIUM SERPL-MCNC: 9.4 MG/DL (ref 8.6–10.2)
CHLORIDE BLD-SCNC: 101 MMOL/L (ref 98–107)
CLARITY: CLEAR
CO2: 27 MMOL/L (ref 22–29)
COLOR: YELLOW
CREAT SERPL-MCNC: 1 MG/DL (ref 0.7–1.2)
EOSINOPHILS ABSOLUTE: 0.08 E9/L (ref 0.05–0.5)
EOSINOPHILS RELATIVE PERCENT: 1 % (ref 0–6)
GFR SERPL CREATININE-BSD FRML MDRD: >60 ML/MIN/1.73
GLUCOSE BLD-MCNC: 111 MG/DL (ref 74–99)
GLUCOSE URINE: NEGATIVE MG/DL
HCT VFR BLD CALC: 36.6 % (ref 37–54)
HEMOGLOBIN: 11.9 G/DL (ref 12.5–16.5)
IMMATURE GRANULOCYTES #: 0.03 E9/L
IMMATURE GRANULOCYTES %: 0.4 % (ref 0–5)
KETONES, URINE: NEGATIVE MG/DL
LACTIC ACID: 0.8 MMOL/L (ref 0.5–2.2)
LEUKOCYTE ESTERASE, URINE: NEGATIVE
LIPASE: 22 U/L (ref 13–60)
LYMPHOCYTES ABSOLUTE: 0.67 E9/L (ref 1.5–4)
LYMPHOCYTES RELATIVE PERCENT: 8.5 % (ref 20–42)
MCH RBC QN AUTO: 34.6 PG (ref 26–35)
MCHC RBC AUTO-ENTMCNC: 32.5 % (ref 32–34.5)
MCV RBC AUTO: 106.4 FL (ref 80–99.9)
MONOCYTES ABSOLUTE: 0.9 E9/L (ref 0.1–0.95)
MONOCYTES RELATIVE PERCENT: 11.4 % (ref 2–12)
NEUTROPHILS ABSOLUTE: 6.19 E9/L (ref 1.8–7.3)
NEUTROPHILS RELATIVE PERCENT: 78.3 % (ref 43–80)
NITRITE, URINE: NEGATIVE
PDW BLD-RTO: 12.6 FL (ref 11.5–15)
PH UA: 5.5 (ref 5–9)
PLATELET # BLD: 212 E9/L (ref 130–450)
PMV BLD AUTO: 9.7 FL (ref 7–12)
POTASSIUM REFLEX MAGNESIUM: 4.1 MMOL/L (ref 3.5–5)
PROTEIN UA: NEGATIVE MG/DL
RBC # BLD: 3.44 E12/L (ref 3.8–5.8)
SODIUM BLD-SCNC: 135 MMOL/L (ref 132–146)
SPECIFIC GRAVITY UA: 1.02 (ref 1–1.03)
TOTAL PROTEIN: 6.3 G/DL (ref 6.4–8.3)
TROPONIN, HIGH SENSITIVITY: 17 NG/L (ref 0–11)
UROBILINOGEN, URINE: 0.2 E.U./DL
WBC # BLD: 7.9 E9/L (ref 4.5–11.5)

## 2022-11-07 PROCEDURE — 93005 ELECTROCARDIOGRAM TRACING: CPT | Performed by: PHYSICIAN ASSISTANT

## 2022-11-07 PROCEDURE — 71046 X-RAY EXAM CHEST 2 VIEWS: CPT

## 2022-11-07 PROCEDURE — 84484 ASSAY OF TROPONIN QUANT: CPT

## 2022-11-07 PROCEDURE — 83605 ASSAY OF LACTIC ACID: CPT

## 2022-11-07 PROCEDURE — 99285 EMERGENCY DEPT VISIT HI MDM: CPT

## 2022-11-07 PROCEDURE — 81003 URINALYSIS AUTO W/O SCOPE: CPT

## 2022-11-07 PROCEDURE — 83690 ASSAY OF LIPASE: CPT

## 2022-11-07 PROCEDURE — 80053 COMPREHEN METABOLIC PANEL: CPT

## 2022-11-07 PROCEDURE — 85025 COMPLETE CBC W/AUTO DIFF WBC: CPT

## 2022-11-07 NOTE — ED NOTES
Department of Emergency Medicine    FIRST PROVIDER TRIAGE NOTE             Independent MLP           11/7/22  5:30 PM EST    Date of Encounter: 11/7/22   MRN: [id number]    Vitals:    11/07/22 1717   BP: (!) 121/59   Pulse: 71   Resp: 16   Temp: 97.4 °F (36.3 °C)   SpO2: 99%   Weight: 178 lb (80.7 kg)   Height: 6' 3\" (1.905 m)      HPI: Maurita Runner is a 80 y.o. male who presents to the ED for Altered Mental Status (Weakness, \"not himself\" per family. Fatigue. Recent UTI)     Was started on Macrobid     ROS: Negative for cp, sob, or fever. Physical Exam:   Gen Appearance/Constitutional: alert  CV: regular rate     Initial Plan of Care: All treatment areas with department are currently occupied. Plan to order/Initiate the following while awaiting opening in ED: labs, EKG, and imaging studies.     Initial Plan of Care: Initiate Treatment-Testing, Proceed toTreatment Area When Bed Available for ED Attending/MLP to Continue Care    Electronically signed by Lizandro Byrne PA-C   DD: 11/7/22       Lizandro Byrne PA-C  11/07/22 3758

## 2022-11-08 ENCOUNTER — HOSPITAL ENCOUNTER (EMERGENCY)
Age: 87
Discharge: HOME | End: 2022-11-08
Attending: EMERGENCY MEDICINE
Payer: MEDICARE

## 2022-11-08 ENCOUNTER — APPOINTMENT (OUTPATIENT)
Dept: CT IMAGING | Age: 87
End: 2022-11-08
Payer: MEDICARE

## 2022-11-08 VITALS
RESPIRATION RATE: 14 BRPM | DIASTOLIC BLOOD PRESSURE: 74 MMHG | HEART RATE: 67 BPM | WEIGHT: 178 LBS | BODY MASS INDEX: 22.13 KG/M2 | OXYGEN SATURATION: 98 % | TEMPERATURE: 97.4 F | HEIGHT: 75 IN | SYSTOLIC BLOOD PRESSURE: 149 MMHG

## 2022-11-08 DIAGNOSIS — R53.1 GENERAL WEAKNESS: Primary | ICD-10-CM

## 2022-11-08 LAB
EKG ATRIAL RATE: 65 BPM
EKG P AXIS: 46 DEGREES
EKG P-R INTERVAL: 186 MS
EKG Q-T INTERVAL: 460 MS
EKG QRS DURATION: 168 MS
EKG QTC CALCULATION (BAZETT): 478 MS
EKG R AXIS: -35 DEGREES
EKG T AXIS: 123 DEGREES
EKG VENTRICULAR RATE: 65 BPM
TROPONIN, HIGH SENSITIVITY: 18 NG/L (ref 0–11)
TROPONIN, HIGH SENSITIVITY: 21 NG/L (ref 0–11)

## 2022-11-08 PROCEDURE — 93010 ELECTROCARDIOGRAM REPORT: CPT | Performed by: INTERNAL MEDICINE

## 2022-11-08 PROCEDURE — 2580000003 HC RX 258: Performed by: EMERGENCY MEDICINE

## 2022-11-08 PROCEDURE — 70450 CT HEAD/BRAIN W/O DYE: CPT

## 2022-11-08 PROCEDURE — 84484 ASSAY OF TROPONIN QUANT: CPT

## 2022-11-08 RX ORDER — 0.9 % SODIUM CHLORIDE 0.9 %
500 INTRAVENOUS SOLUTION INTRAVENOUS ONCE
Status: COMPLETED | OUTPATIENT
Start: 2022-11-08 | End: 2022-11-08

## 2022-11-08 RX ADMIN — SODIUM CHLORIDE 500 ML: 9 INJECTION, SOLUTION INTRAVENOUS at 09:49

## 2022-11-08 ASSESSMENT — ENCOUNTER SYMPTOMS
SHORTNESS OF BREATH: 0
DIARRHEA: 0
VOMITING: 0
CONSTIPATION: 0
COUGH: 0
ABDOMINAL PAIN: 0
NAUSEA: 0
EYE PAIN: 0
RHINORRHEA: 0

## 2022-11-08 NOTE — ED PROVIDER NOTES
Odalys Perez is a 80 y.o. male    Chief Complaint   Patient presents with    Altered Mental Status     Weakness, \"not himself\" per family. Fatigue. Recent UTI         HPI   Odalys Perez is a 80 y.o. male presenting to the ED for Altered Mental Status (Weakness, \"not himself\" per family. Fatigue. Recent UTI)    History comes primarily from the patient and Family. He is an 27-year-old female with a history of Parkinson's presenting for generalized weakness for 1 day. Severity is mild. Nothing makes his symptoms better or worse. Per his daughter, patient had a recent UTI and has finished a course of antibiotics. Yesterday he was noted to be weaker than usual and a little out of it. Family was worried that his UTI may have returned. Denies recent fall. Denies fever, chills, chest pain, shortness of breath, abdominal pain, nausea, vomiting. Patient states he may not be drinking enough fluids. He has been taking his medications as usual. Per daughter, the patient seems to be back to baseline today. Review of Systems   Constitutional:  Negative for chills and fever. HENT:  Negative for ear pain and rhinorrhea. Eyes:  Negative for pain. Respiratory:  Negative for cough and shortness of breath. Cardiovascular:  Negative for chest pain and palpitations. Gastrointestinal:  Negative for abdominal pain, constipation, diarrhea, nausea and vomiting. Genitourinary:  Negative for difficulty urinating and dysuria. Musculoskeletal:  Negative for arthralgias and myalgias. Skin:  Negative for rash. Neurological:  Positive for weakness. Negative for dizziness and headaches. All other systems reviewed and are negative. Physical Exam  Constitutional:       Appearance: Normal appearance. HENT:      Head: Normocephalic and atraumatic. Nose: Nose normal.   Eyes:      Extraocular Movements: Extraocular movements intact.       Pupils: Pupils are equal, round, and reactive to light. Cardiovascular:      Rate and Rhythm: Normal rate and regular rhythm. Pulmonary:      Effort: Pulmonary effort is normal.      Breath sounds: Normal breath sounds. Abdominal:      General: Abdomen is flat. Palpations: Abdomen is soft. Tenderness: There is no abdominal tenderness. Musculoskeletal:         General: Normal range of motion. Cervical back: Normal range of motion. Skin:     General: Skin is warm and dry. Neurological:      General: No focal deficit present. Mental Status: He is alert and oriented to person, place, and time. Mental status is at baseline. GCS: GCS eye subscore is 4. GCS verbal subscore is 5. GCS motor subscore is 6. Sensory: No sensory deficit. Motor: No weakness. Psychiatric:         Behavior: Behavior normal.        Procedures     MDM     Patient presented to the Emergency Department for Altered Mental Status (Weakness, \"not himself\" per family. Fatigue. Recent UTI)    He is an 59-year-old female with a history of Parkinson's presenting for generalized weakness for 1 day. Patient with normal neuro exam.  His daughter was worried about UTI recurrence however UA today showed no signs of infection. CBC and CMP were unremarkable. EKG unremarkable. Troponin 17, repeat troponin 21, third troponin 18. CT head shows no acute intracranial abnormality. Patient is feeling well and per daughter he is at baseline. Labs and imaging were discussed with the patient. The plan for discharge with follow up with their PCP was discussed with the patient and he agrees with the plan. Return precautions were given. EKG: This EKG is signed and interpreted by me. Rate: 65  Rhythm: NSR  Axis: left  Interpretation: no acute changes  Comparison: stable as compared to patient's most recent EKG 12/15/21      ED Course as of 11/08/22 1554   Tue Nov 08, 2022   1217 I discussed lab and imaging results with the patient and his daughter.   Patient continues to do well without any new complaints. Vitals remained stable. Likely discharge pending troponin [KM]   1411 I updated the patient's other daughter with lab and imaging findings. Currently still waiting for third troponin. Patient stable and doing well. Daughter states he was able to ambulate to the bathroom and into the owens just now. [KM]      ED Course User Index  [KM] Mingo Barroso MD       --------------------------------------------- PAST HISTORY ---------------------------------------------  Past Medical History:  has a past medical history of Anxiety, Ascending aortic aneurysm, BPH (benign prostatic hyperplasia), CAD (coronary artery disease), Cancer ([facility] [address] Coronary arteriosclerosis, Depression, DJD (degenerative joint disease), Hyperlipidemia, Impacted cerumen, Lumbar spinal stenosis, Macular degeneration, Osteoarthritis, Parkinson disease ([facility] [address] and RA (rheumatoid arthritis) ([facility] [address] Past Surgical History:  has a past surgical history that includes Coronary artery bypass graft (2002); Cholecystectomy; Appendectomy; Total knee arthroplasty (Right, 01/24/2017); Colonoscopy; Upper gastrointestinal endoscopy; hernia repair (Bilateral); knee surgery (Left, 06/2011); and Cataract removal with implant (Left, 04/19/2020). Social History:  reports that he has quit smoking. His smoking use included pipe and cigars. He has never used smokeless tobacco. He reports current alcohol use. He reports that he does not use drugs. Family History: family history includes Lung Cancer in his father; No Known Problems in his mother. The patients home medications have been reviewed.     Allergies: Pcn [penicillins] and Prednisone    -------------------------------------------------- RESULTS -------------------------------------------------  Labs:  Results for orders placed or performed during the hospital encounter of 11/08/22   CBC with Auto Differential   Result Value Ref Range    WBC 7.9 4.5 - 11.5 E9/L    RBC 3.44 (L) 3.80 - 5.80 E12/L    Hemoglobin 11.9 (L) 12.5 - 16.5 g/dL    Hematocrit 36.6 (L) 37.0 - 54.0 %    .4 (H) 80.0 - 99.9 fL    MCH 34.6 26.0 - 35.0 pg    MCHC 32.5 32.0 - 34.5 %    RDW 12.6 11.5 - 15.0 fL    Platelets 164 329 - 679 E9/L    MPV 9.7 7.0 - 12.0 fL    Neutrophils % 78.3 43.0 - 80.0 %    Immature Granulocytes % 0.4 0.0 - 5.0 %    Lymphocytes % 8.5 (L) 20.0 - 42.0 %    Monocytes % 11.4 2.0 - 12.0 %    Eosinophils % 1.0 0.0 - 6.0 %    Basophils % 0.4 0.0 - 2.0 %    Neutrophils Absolute 6.19 1.80 - 7.30 E9/L    Immature Granulocytes # 0.03 E9/L    Lymphocytes Absolute 0.67 (L) 1.50 - 4.00 E9/L    Monocytes Absolute 0.90 0.10 - 0.95 E9/L    Eosinophils Absolute 0.08 0.05 - 0.50 E9/L    Basophils Absolute 0.03 0.00 - 0.20 E9/L   Comprehensive Metabolic Panel w/ Reflex to MG   Result Value Ref Range    Sodium 135 132 - 146 mmol/L    Potassium reflex Magnesium 4.1 3.5 - 5.0 mmol/L    Chloride 101 98 - 107 mmol/L    CO2 27 22 - 29 mmol/L    Anion Gap 7 7 - 16 mmol/L    Glucose 111 (H) 74 - 99 mg/dL    BUN 26 (H) 6 - 23 mg/dL    Creatinine 1.0 0.7 - 1.2 mg/dL    Est, Glom Filt Rate >60 >=60 mL/min/1.73    Calcium 9.4 8.6 - 10.2 mg/dL    Total Protein 6.3 (L) 6.4 - 8.3 g/dL    Albumin 4.2 3.5 - 5.2 g/dL    Total Bilirubin 0.4 0.0 - 1.2 mg/dL    Alkaline Phosphatase 117 40 - 129 U/L    ALT <5 0 - 40 U/L    AST 14 0 - 39 U/L   Lipase   Result Value Ref Range    Lipase 22 13 - 60 U/L   Lactic Acid   Result Value Ref Range    Lactic Acid 0.8 0.5 - 2.2 mmol/L   Urinalysis   Result Value Ref Range    Color, UA Yellow Straw/Yellow    Clarity, UA Clear Clear    Glucose, Ur Negative Negative mg/dL    Bilirubin Urine Negative Negative    Ketones, Urine Negative Negative mg/dL    Specific Gravity, UA 1.020 1.005 - 1.030    Blood, Urine Negative Negative    pH, UA 5.5 5.0 - 9.0    Protein, UA Negative Negative mg/dL    Urobilinogen, Urine 0.2 <2.0 E.U./dL    Nitrite, Urine Negative Negative Leukocyte Esterase, Urine Negative Negative   Troponin   Result Value Ref Range    Troponin, High Sensitivity 17 (H) 0 - 11 ng/L   Troponin   Result Value Ref Range    Troponin, High Sensitivity 21 (H) 0 - 11 ng/L   Troponin   Result Value Ref Range    Troponin, High Sensitivity 18 (H) 0 - 11 ng/L   EKG 12 Lead   Result Value Ref Range    Ventricular Rate 65 BPM    Atrial Rate 65 BPM    P-R Interval 186 ms    QRS Duration 168 ms    Q-T Interval 460 ms    QTc Calculation (Bazett) 478 ms    P Axis 46 degrees    R Axis -35 degrees    T Axis 123 degrees       Radiology:  CT HEAD WO CONTRAST   Final Result   1. No acute intracranial abnormality. 2.  Signs of deep white matter small vessel disease. 3.  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 XR CHEST (2 VW)   Final Result   No acute process. Mild cardiomegaly, status post median sternotomy. ------------------------- NURSING NOTES AND VITALS REVIEWED ---------------------------  Date / Time Roomed:  11/8/2022  8:57 AM  ED Bed Assignment:  18/18    The nursing notes within the ED encounter and vital signs as below have been reviewed. BP (!) 149/74   Pulse 67   Temp 97.4 °F (36.3 °C)   Resp 14   Ht 6' 3\" (1.905 m)   Wt 178 lb (80.7 kg)   SpO2 98%   BMI 22.25 kg/m²   Oxygen Saturation Interpretation: Normal      ------------------------------------------ PROGRESS NOTES ------------------------------------------  3:54 PM EST  I have spoken with the patient and discussed todays results, in addition to providing specific details for the plan of care and counseling regarding the diagnosis and prognosis. Their questions are answered at this time and they are agreeable with the plan. I discussed at length with them reasons for immediate return here for re evaluation.  They will followup with their primary care physician by calling their office tomorrow. --------------------------------- ADDITIONAL PROVIDER NOTES ---------------------------------  At this time the patient is without objective evidence of an acute process requiring hospitalization or inpatient management. They have remained hemodynamically stable throughout their entire ED visit and are stable for discharge with outpatient follow-up. The plan has been discussed in detail and they are aware of the specific conditions for emergent return, as well as the importance of follow-up. Discharge Medication List as of 11/8/2022  2:33 PM          Diagnosis:  1. General weakness        Disposition:  Patient's disposition: Discharge to home  Patient's condition is stable. Strict return precautions were discussed including but not limited too new or worsening symtpoms. They verbalized understanding and were agreeable with the plan. All questions were answered and patient was discharged.        Mira Mckeon MD  Resident  11/08/22 4140

## 2022-11-12 ENCOUNTER — OFFICE VISIT (OUTPATIENT)
Dept: PRIMARY CARE CLINIC | Age: 87
End: 2022-11-12
Payer: MEDICARE

## 2022-11-12 DIAGNOSIS — G20 PARKINSON'S DISEASE (HCC): Primary | Chronic | ICD-10-CM

## 2022-11-12 DIAGNOSIS — R29.898 WEAKNESS OF BOTH LOWER EXTREMITIES: ICD-10-CM

## 2022-11-12 DIAGNOSIS — I10 ESSENTIAL HYPERTENSION: Chronic | ICD-10-CM

## 2022-11-12 DIAGNOSIS — E86.0 DEHYDRATION: ICD-10-CM

## 2022-11-12 PROCEDURE — 1123F ACP DISCUSS/DSCN MKR DOCD: CPT | Performed by: FAMILY MEDICINE

## 2022-11-12 PROCEDURE — 99214 OFFICE O/P EST MOD 30 MIN: CPT | Performed by: FAMILY MEDICINE

## 2022-11-12 NOTE — PROGRESS NOTES
22  Name: Maurita Runner    : 1933    Sex: male    Age: 80 y.o. Subjective:  Chief Complaint: Patient is here for emergency room follow-up regarding weakness Parkinson's disease cholesterol osteoarthritis dehydration    Presents accompanied by his daughter. He went to emergency room 2 days ago because of weakness he says it was hard to lift up his legs. Apparently they waited in the emergency room for 15 hours. Evaluation there demonstrated no acute pathology. Did have a CAT scan of the head with 7 days stated no acute pathology does have chronic sinus issues but he has no symptoms. Extreme weakness. He saw neurology in September most.  Follow-up with nurse practitioner for the neurologist who retired. But they did not make that appointment yet. I advised daughter to do so. He is a lab work. He said he was on the dry side. His appetite is poor he drinks very little fluids      Review of Systems   Constitutional: Negative. HENT: Negative. Eyes: Negative. Respiratory: Negative. Cardiovascular: Negative. Gastrointestinal: Negative. Endocrine: Negative. Genitourinary: Negative. Musculoskeletal: Negative. Skin: Negative. Allergic/Immunologic: Negative. Neurological:  Positive for tremors and weakness. Hematological: Negative. Psychiatric/Behavioral: Negative.          Current Outpatient Medications:     nitrofurantoin, macrocrystal-monohydrate, (MACROBID) 100 MG capsule, Take 1 capsule by mouth 2 times daily, Disp: 14 capsule, Rfl: 0    PARoxetine (PAXIL) 20 MG tablet, Take 1 tablet by mouth every morning, Disp: 90 tablet, Rfl: 3    levothyroxine (SYNTHROID) 50 MCG tablet, Take 1 tablet by mouth in the morning., Disp: 45 tablet, Rfl: 3    lovastatin (MEVACOR) 20 MG tablet, Take 1 tablet by mouth nightly, Disp: 90 tablet, Rfl: 12    metoprolol succinate (TOPROL XL) 25 MG extended release tablet, Takes 1/2 tablet daily, Disp: 45 tablet, Rfl: 12 midodrine (PROAMATINE) 2.5 MG tablet, Take 1 tablet by mouth in the morning and 1 tablet in the evening. Take with meals. , Disp: 180 tablet, Rfl: 3    omeprazole (PRILOSEC) 20 MG delayed release capsule, Take 1 capsule by mouth in the morning., Disp: 90 capsule, Rfl: 3    ipratropium (ATROVENT) 0.06 % nasal spray, 2 sprays by Each Nostril route in the morning and 2 sprays at noon and 2 sprays in the evening and 2 sprays before bedtime. (Patient not taking: Reported on 9/14/2022), Disp: 15 mL, Rfl: 12    carbidopa-levodopa (SINEMET CR)  MG per extended release tablet, TAKE ONE TABLET BY MOUTH EVERY 4 HOURS (while awake) no more than 6 per day, Disp: 540 tablet, Rfl: 1    folic acid (FOLVITE) 1 MG tablet, TAKE 2 TABLETS (2MG) BY MOUTH WEEKLY, Disp: 24 tablet, Rfl: 0    ZINC PO, Take by mouth daily (Patient not taking: Reported on 9/14/2022), Disp: , Rfl:     hydrOXYzine (ATARAX) 25 MG tablet, Take 1 tablet by mouth 2 times daily as needed for Anxiety, Disp: 60 tablet, Rfl: 12    vitamin B-12 (CYANOCOBALAMIN) 1000 MCG tablet, Take 1,000 mcg by mouth daily, Disp: , Rfl:     acetaminophen (TYLENOL) 500 MG tablet, Take 500 mg by mouth every 6 hours as needed for Pain, Disp: , Rfl:     aspirin 81 MG tablet, Take 650 mg by mouth daily , Disp: , Rfl:     Multiple Vitamins-Minerals (OCUVITE) TABS oral tablet, Take 1 tablet by mouth daily, Disp: , Rfl:     vitamin D (CHOLECALCIFEROL) 1000 UNIT TABS tablet, Take 1,000 Units by mouth daily, Disp: , Rfl:     methotrexate (RHEUMATREX) 2.5 MG chemo tablet, Take 2.5 mg by mouth once a week 4 tablets weekly. , Disp: , Rfl:   Allergies   Allergen Reactions    Pcn [Penicillins]     Prednisone      Social History     Socioeconomic History    Marital status:      Spouse name: Not on file    Number of children: Not on file    Years of education: Not on file    Highest education level: Not on file   Occupational History    Not on file   Tobacco Use    Smoking status: Former Types: Pipe, Cigars    Smokeless tobacco: Never   Vaping Use    Vaping Use: Never used   Substance and Sexual Activity    Alcohol use:  Yes     Alcohol/week: 0.0 standard drinks     Comment: social    Drug use: No    Sexual activity: Not Currently   Other Topics Concern    Not on file   Social History Narrative        Colonoscopy - (10/29/2008)    Colonoscopy - (1/10/2014)    Zoster/Shingles Vaccine - given at [facility]    CAD WITH CABG    EARLY HYPOTHYROID----HYPOTHRYOID 3-18    ANXIETY    DEPRESSION    FATIGUE    BPH    MACULAR DEGENERATION    SKIN CA    APPY    BILAT ING HERNIA---ONE DONE THINKS WAS R    L ING HERNIA    COLON 6-99 AND 6-02----5T O 10 YRS DR POWERS---PT STATES WAS TOLD THAT HE SHOULD NTO    HAVE AGAIN DUE TO AGE    L KNEE OR X 2 DR Shahbaz Braswell AND CHRISSY    ANXIETY FROM PREDNISONE 2009    COLON 4-11------NEG---YRUICH    EGD 4-11 PEPTIC ULCER    L KNEE OR 6-11    GRANDSON CHINO GRULLON---TALL  AT [facility]    DAUGHTER DATES JAMES BARCENAS    STEROIDS CUASE ANXIETY    RA DR RUSSO    EGD 12-13 DR CHEN--- ESO DILITATION    COLON DR CHEN 1-14----- TICS    PARKINSON DIC DX WITH DR JERNIGAN 2014-- THEN CHG TO DR RUVALCABA---then dr Sol Anderson    ----Nonnie Goodell STUDY-WITH LIQUID ASPIRATION    R TOTAL KNEE 1-24-17 DR WEI    HEMATURIA 2-17 DR LAWRENCE PAYAN    L ANKLE PAIN--DR DANIELS THEN  [facility]    SEVERE LUMBAR SPINAL STENOSIS 2-18 DR BARTON--L-4-5 WITH ADRENAL    Dr. Madelin Xiong, [facility] left [place]    Dr. Madelin Xiong, [facility]---REFERRED TO NEURO SURG DR VINSON---THEN TO DR Lexi Galaviz--    eval DR Carmita Plummer 5-18 AND SAID SHOT AND AND PT REFUSED DUE TO CONCERN WITH    PREDNISONE-----TO SEE HIM BACK    Right knee total done 7-20  Dr Breanna Chaidez and admits two weeks later with dehydration and orthostatic hypotension    Covid  12-22     Social Determinants of Health     Financial Resource Strain: Not on file   Food Insecurity: Not on file   Transportation Needs: Not on file   Physical Activity: Not on file   Stress: Not on file   Social Connections: Not on file   Intimate Partner Violence: Not on file   Housing Stability: Not on file      Past Medical History:   Diagnosis Date    Anxiety     Ascending aortic aneurysm     BPH (benign prostatic hyperplasia)     CAD (coronary artery disease)     Cancer (HCC)     skin    Coronary arteriosclerosis     Depression     DJD (degenerative joint disease)     Hyperlipidemia     Impacted cerumen     Lumbar spinal stenosis     severe    Macular degeneration     Osteoarthritis     Parkinson disease (HCC)     RA (rheumatoid arthritis) ([address]      Family History   Problem Relation Age of Onset    Lung Cancer Father     No Known Problems Mother       Past Surgical History:   Procedure Laterality Date    APPENDECTOMY      CATARACT REMOVAL WITH IMPLANT Left 04/19/2020    CHOLECYSTECTOMY      COLONOSCOPY      CORONARY ARTERY BYPASS GRAFT  2002    HERNIA REPAIR Bilateral     inguinal    KNEE SURGERY Left 06/2011    TOTAL KNEE ARTHROPLASTY Right 01/24/2017    [facility]    UPPER GASTROINTESTINAL ENDOSCOPY      peptic ulcer      Vitals:    11/12/22 1028   BP: 128/75   Temp: 97.8 °F (36.6 °C)   TempSrc: Oral   Weight: 178 lb (80.7 kg)   Height: 6' 3\" (1.905 m)       Objective:    Physical Exam  Vitals reviewed. Constitutional:       Appearance: Normal appearance. He is well-developed. HENT:      Head: Normocephalic. Right Ear: Tympanic membrane normal.      Left Ear: Tympanic membrane normal.      Nose: Nose normal.      Mouth/Throat:      Mouth: Mucous membranes are moist.   Eyes:      Pupils: Pupils are equal, round, and reactive to light. Cardiovascular:      Rate and Rhythm: Normal rate and regular rhythm. Pulmonary:      Effort: Pulmonary effort is normal.      Breath sounds: Normal breath sounds. Abdominal:      General: Bowel sounds are normal.      Palpations: Abdomen is soft. Musculoskeletal:      Cervical back: Normal range of motion.       Comments: Weakness lower extremities Skin:     General: Skin is warm. Neurological:      Mental Status: He is alert and oriented to person, place, and time. Psychiatric:         Behavior: Behavior normal.       Ashley Leonard was seen today for follow-up. Diagnoses and all orders for this visit:    Parkinson's disease ([address]    Essential hypertension    Dehydration    Weakness of both lower extremities      Comments: Fluids, diet, protein shakes twice a day. Appointment with neurology they will have to call to make that appointment with Elisabeth Luis. Offered PT. Declines. Check blood pressure at home twice daily sitting and standing. I educated the patient about all medications. Make sure they were correct and educated  on the risk associated with missing meds or taking them incorrectly. A list of medications is being sent home with patient today. Check blood pressure at home twice a day. Low-salt low caffeine diet. Call if systolic blood pressure is above 576 and diastolic blood pressures above 85. Only use a upper arm digital cuff. I informed patient about the risk associated with noncompliance of medication and taking medications incorrectly. Appropriate follow-up with myself and all specialist.  Encourage family members to take active role in assisting with medications and medical care. If any confusion should develop to notify my office immediately to avoid risk of worsening medical condition      A great deal of time spent reviewing medications, diet, exercise, social issues. Also reviewing the chart before entering the room with patient and finishing charting after leaving patient's room. More than half of that time was spent face to face with the patient in counseling and coordinating care. Follow Up: Return for Reg Appt.      Seen by:  Val Rojas DO

## 2022-11-13 VITALS
TEMPERATURE: 97.8 F | SYSTOLIC BLOOD PRESSURE: 128 MMHG | WEIGHT: 178 LBS | BODY MASS INDEX: 22.13 KG/M2 | HEIGHT: 75 IN | DIASTOLIC BLOOD PRESSURE: 75 MMHG

## 2022-11-13 PROBLEM — R29.898 WEAKNESS OF BOTH LOWER EXTREMITIES: Status: ACTIVE | Noted: 2022-11-13

## 2022-11-13 PROBLEM — E86.0 DEHYDRATION: Status: ACTIVE | Noted: 2022-11-13

## 2022-11-13 ASSESSMENT — ENCOUNTER SYMPTOMS
RESPIRATORY NEGATIVE: 1
EYES NEGATIVE: 1
GASTROINTESTINAL NEGATIVE: 1
ALLERGIC/IMMUNOLOGIC NEGATIVE: 1

## 2022-12-07 ENCOUNTER — OFFICE VISIT (OUTPATIENT)
Dept: NEUROLOGY | Age: 87
End: 2022-12-07
Payer: MEDICARE

## 2022-12-07 VITALS
RESPIRATION RATE: 16 BRPM | HEART RATE: 61 BPM | OXYGEN SATURATION: 94 % | TEMPERATURE: 96.3 F | DIASTOLIC BLOOD PRESSURE: 75 MMHG | BODY MASS INDEX: 21.92 KG/M2 | SYSTOLIC BLOOD PRESSURE: 134 MMHG | HEIGHT: 75 IN | WEIGHT: 176.3 LBS

## 2022-12-07 DIAGNOSIS — G20 PARKINSON'S DISEASE (HCC): Primary | ICD-10-CM

## 2022-12-07 DIAGNOSIS — M54.17 LUMBOSACRAL RADICULOPATHY: ICD-10-CM

## 2022-12-07 PROCEDURE — 1123F ACP DISCUSS/DSCN MKR DOCD: CPT | Performed by: PSYCHIATRY & NEUROLOGY

## 2022-12-07 PROCEDURE — 99215 OFFICE O/P EST HI 40 MIN: CPT | Performed by: PSYCHIATRY & NEUROLOGY

## 2022-12-07 RX ORDER — VITAMIN B COMPLEX
1 CAPSULE ORAL DAILY
COMMUNITY

## 2022-12-07 NOTE — PROGRESS NOTES
This 26-year-old right-handed man was followed for Parkinson's disease. The patient was now a fair historian. His daughter remained an excellent one. His medications continued as carbidopa/levodopa 50/200 tablets, paroxetine, omeprazole, lovastatin, methotrexate, lovastatin, midodrine, folic acid, hydroxyzine and multivitamins. He still took carbidopa/levodopa 5 hours apart, the last dose before bedtime. With the above regimen, he continued responding moderately well. He and his daughter denied additional motor deficits, except for walking more slowly. He had not fallen nor frozen. He was still engaged in a physical therapy program, but less so. There continued minimal intermittent, resting tremors. His daughter denied increasing cognitive deficits. He was doing less. There were no other neurological issues. His tingling sensations in both feet had not worsened. Electrodiagnostic studies r found diffuse lumbosacral radicular disease. MRIs of the lumbosacral spine with mild moderate to marked abnormalities as well. He was not a surgical candidate. He was undergoing nerve blocks, with moderate relief of his discomforts. He reported one bout of hematuria, with unknown etiology, but without return. He was following with urology. He denied other medical issues. There was no return of the melanomas. He was no longer driving. He received his COVID-19 vaccinations and boosters. Review of systems was otherwise unremarkable. Physical examination displayed stable vital signs. His blood pressure was 134/75. He was an elderly man in no acute distress who was alert, cooperative and oriented. He remained pleasant. He continued in good spirits. His skin displayed multiple senile hyperkeratotic plaques, macules and facial and left ear scars from cancer excisions.  His neck was supple without thyromegaly; there were +1 carotid upstrokes without bruits; there was full range of motion of his neck, with minimal cogwheeling. His lungs were clear to auscultation. Cardiac testing was unrevealing. Peripheral pulses were +1 without bruits. His limbs revealed arthritic knees, with a well-healed right knee surgical scar. His pes planus deformities was unchanged. Neurological examination produced an intact mental status. His voice remains soft, without quivering, and with slower responses. Cranial nerve testing was unremarkable. Extraocular movements continued intact. I found no Myerson's sign, but a minimal masked face. I found normal tone and bulk with 5/5 strength throughout. There were again minimal resting tremor of the right hand and slight bradykinesia. Reflexes were barely elicited in the arms, +1 at the knees and absent at the ankles. There remained bilateral grasp reflexes. Sensory testing was intact to pinprick. Vibration was moderately reduced above both ankles. Coordination was unrevealing. He walked more slowly, with a slight limp. There was no festination or freezing. He was not lurching from side to side. His neurological examination again displayed minimal resting tremor, bradykinesia and early cognitive frontal lobe signs. Laboratory data included a recent CMP with a GFR of 57 and a glucose of 109. CBC with differential produced an MCV of 107. Cholecalciferol levels were 31. 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 He continues moderately well with carbidopa/levodopa 50/200 tablets every 4 hours while awake, 5 times daily. He again displays minimal decline. We will continue the current dosing. I will consider carbidopa/levodopa to the 25/250 tablets, if he worsens. 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 His pains have moderated with nerve blocks.     Medically he is stable despite his rheumatoid arthritis, degenerative joint disease and coronary artery disease. 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 There was no recurrence. He will return in 4 months, to see FILIPE Hall. He will continue his current regimen, with carbidopa/levodopa every 4 hours. His daughter will call at any time if problems arise. I spent 40 minutes with the patient with over 50 % spent in counseling and disease management. All patient issues were addressed and all questions were answered.

## 2022-12-13 PROBLEM — E86.0 DEHYDRATION: Status: RESOLVED | Noted: 2022-11-13 | Resolved: 2022-12-13

## 2023-01-05 ENCOUNTER — OFFICE VISIT (OUTPATIENT)
Dept: PRIMARY CARE CLINIC | Age: 88
End: 2023-01-05
Payer: MEDICARE

## 2023-01-05 VITALS
BODY MASS INDEX: 22.16 KG/M2 | TEMPERATURE: 97.3 F | SYSTOLIC BLOOD PRESSURE: 132 MMHG | DIASTOLIC BLOOD PRESSURE: 75 MMHG | HEIGHT: 75 IN | WEIGHT: 178.2 LBS

## 2023-01-05 DIAGNOSIS — R39.12 BENIGN PROSTATIC HYPERPLASIA WITH WEAK URINARY STREAM: ICD-10-CM

## 2023-01-05 DIAGNOSIS — N30.00 ACUTE CYSTITIS WITHOUT HEMATURIA: Primary | ICD-10-CM

## 2023-01-05 DIAGNOSIS — N40.1 BENIGN PROSTATIC HYPERPLASIA WITH WEAK URINARY STREAM: ICD-10-CM

## 2023-01-05 PROCEDURE — 1123F ACP DISCUSS/DSCN MKR DOCD: CPT | Performed by: FAMILY MEDICINE

## 2023-01-05 PROCEDURE — 96372 THER/PROPH/DIAG INJ SC/IM: CPT | Performed by: FAMILY MEDICINE

## 2023-01-05 PROCEDURE — 99213 OFFICE O/P EST LOW 20 MIN: CPT | Performed by: FAMILY MEDICINE

## 2023-01-05 RX ORDER — NITROFURANTOIN 25; 75 MG/1; MG/1
100 CAPSULE ORAL 2 TIMES DAILY
Qty: 14 CAPSULE | Refills: 0 | Status: SHIPPED | OUTPATIENT
Start: 2023-01-05

## 2023-01-05 RX ORDER — CEFTRIAXONE 500 MG/1
500 INJECTION, POWDER, FOR SOLUTION INTRAMUSCULAR; INTRAVENOUS ONCE
Status: COMPLETED | OUTPATIENT
Start: 2023-01-05 | End: 2023-01-05

## 2023-01-05 RX ORDER — LIDOCAINE HYDROCHLORIDE 10 MG/ML
1 INJECTION, SOLUTION INFILTRATION; PERINEURAL ONCE
Status: COMPLETED | OUTPATIENT
Start: 2023-01-05 | End: 2023-01-05

## 2023-01-05 RX ADMIN — CEFTRIAXONE 500 MG: 500 INJECTION, POWDER, FOR SOLUTION INTRAMUSCULAR; INTRAVENOUS at 13:38

## 2023-01-05 RX ADMIN — LIDOCAINE HYDROCHLORIDE 1 ML: 10 INJECTION, SOLUTION INFILTRATION; PERINEURAL at 13:39

## 2023-01-05 ASSESSMENT — PATIENT HEALTH QUESTIONNAIRE - PHQ9
SUM OF ALL RESPONSES TO PHQ QUESTIONS 1-9: 0
SUM OF ALL RESPONSES TO PHQ QUESTIONS 1-9: 0
SUM OF ALL RESPONSES TO PHQ9 QUESTIONS 1 & 2: 0
SUM OF ALL RESPONSES TO PHQ QUESTIONS 1-9: 0
SUM OF ALL RESPONSES TO PHQ QUESTIONS 1-9: 0
2. FEELING DOWN, DEPRESSED OR HOPELESS: 0
1. LITTLE INTEREST OR PLEASURE IN DOING THINGS: 0

## 2023-01-05 NOTE — PROGRESS NOTES
23  Name: Guilherme Griffiths    : 1933    Sex: male    Age: 80 y.o. Subjective:  Chief Complaint: Patient is here for  urine freq       confsuion   unable to give urine   son in law here   weak     HPI    Review of Systems   Constitutional: Negative. HENT: Negative. Eyes: Negative. Respiratory: Negative. Cardiovascular: Negative. Gastrointestinal: Negative. Endocrine: Negative. Genitourinary:  Positive for dysuria and frequency. Musculoskeletal: Negative. Skin: Negative. Allergic/Immunologic: Negative. Neurological: Negative. Hematological: Negative. Psychiatric/Behavioral: Negative. Current Outpatient Medications:     nitrofurantoin, macrocrystal-monohydrate, (MACROBID) 100 MG capsule, Take 1 capsule by mouth 2 times daily, Disp: 14 capsule, Rfl: 0    b complex vitamins capsule, Take 1 capsule by mouth daily, Disp: , Rfl:     PARoxetine (PAXIL) 20 MG tablet, Take 1 tablet by mouth every morning, Disp: 90 tablet, Rfl: 3    levothyroxine (SYNTHROID) 50 MCG tablet, Take 1 tablet by mouth in the morning., Disp: 45 tablet, Rfl: 3    metoprolol succinate (TOPROL XL) 25 MG extended release tablet, Takes 1/2 tablet daily, Disp: 45 tablet, Rfl: 12    midodrine (PROAMATINE) 2.5 MG tablet, Take 1 tablet by mouth in the morning and 1 tablet in the evening. Take with meals. , Disp: 180 tablet, Rfl: 3    omeprazole (PRILOSEC) 20 MG delayed release capsule, Take 1 capsule by mouth in the morning., Disp: 90 capsule, Rfl: 3    ipratropium (ATROVENT) 0.06 % nasal spray, 2 sprays by Each Nostril route in the morning and 2 sprays at noon and 2 sprays in the evening and 2 sprays before bedtime.  (Patient taking differently: 2 sprays by Each Nostril route as needed), Disp: 15 mL, Rfl: 12    carbidopa-levodopa (SINEMET CR)  MG per extended release tablet, TAKE ONE TABLET BY MOUTH EVERY 4 HOURS (while awake) no more than 6 per day (Patient taking differently: Take 1 tablet by mouth 4 times daily), Disp: 540 tablet, Rfl: 1    folic acid (FOLVITE) 1 MG tablet, TAKE 2 TABLETS (2MG) BY MOUTH WEEKLY (Patient taking differently: Take 1 mg by mouth Twice a Week Monday AND THURS), Disp: 24 tablet, Rfl: 0    acetaminophen (TYLENOL) 500 MG tablet, Take 500 mg by mouth every 6 hours as needed for Pain, Disp: , Rfl:     aspirin 81 MG tablet, Take 81 mg by mouth daily, Disp: , Rfl:     Multiple Vitamins-Minerals (OCUVITE) TABS oral tablet, Take 1 tablet by mouth 2 times daily, Disp: , Rfl:     vitamin D (CHOLECALCIFEROL) 1000 UNIT TABS tablet, Take 1,000 Units by mouth daily, Disp: , Rfl:     methotrexate (RHEUMATREX) 2.5 MG chemo tablet, Take 2.5 mg by mouth once a week 4 tablets weekly. , Disp: , Rfl:   Allergies   Allergen Reactions    Pcn [Penicillins]      Cefdinir ok    Prednisone      Social History     Socioeconomic History    Marital status:      Spouse name: Not on file    Number of children: Not on file    Years of education: Not on file    Highest education level: Not on file   Occupational History    Not on file   Tobacco Use    Smoking status: Former     Types: Pipe, Cigars    Smokeless tobacco: Never   Vaping Use    Vaping Use: Never used   Substance and Sexual Activity    Alcohol use:  Yes     Alcohol/week: 0.0 standard drinks     Comment: social    Drug use: No    Sexual activity: Not Currently   Other Topics Concern    Not on file   Social History Narrative        Colonoscopy - (10/29/2008)    Colonoscopy - (1/10/2014)    Zoster/Shingles Vaccine - given at [facility]    CAD WITH CABG    EARLY HYPOTHYROID----HYPOTHRYOID 3-18    ANXIETY    DEPRESSION    FATIGUE    BPH    MACULAR DEGENERATION    SKIN CA    APPY    BILAT ING HERNIA---ONE DONE THINKS WAS R    L ING HERNIA    COLON 6-99 AND 6-02----5T O 10 YRS DR POWERS---PT STATES WAS TOLD THAT HE SHOULD NTO    HAVE AGAIN DUE TO AGE    L KNEE OR X 2 DR WEBSTER AND CHRISSY    ANXIETY FROM PREDNISONE 2009    COLON 4-11------NEG---YRUICH    EGD 4-11 PEPTIC ULCER    L KNEE OR 6-11    GRANDSON CHINO GRULLON---TALL   [address] CUASE ANXIETY    RA DR Robby Coleman 124    EGD 12-13 DR CHEN--- ESO DILITATION    COLON DR CHEN 1-14----- TICS    PARKINSON DIC DX WITH DR JERNIGAN 2014-- THEN CHG TO DR RUVALCABA---then dr Jim Rg    ----Radha Landa STUDY-WITH LIQUID ASPIRATION    R TOTAL KNEE 1-24-17 DR WEI    HEMATURIA 2-17 DR LAWRENCE [address] Pica PAIN--DR DANIELS THEN  [facility]    SEVERE LUMBAR SPINAL STENOSIS 2-18 DR BARTON--L-4-5 WITH ADRENAL    Dr. Bryan Blakely, SCC left temple    Dr. Bryan Blakely, SCC GLAND---REFERRED TO NEURO SURG DR VINSON---THEN TO DR Alberto Conde--    eval DR Vallejo Innocent 5-18 AND SAID SHOT AND AND PT REFUSED DUE TO CONCERN WITH    PREDNISONE-----TO SEE HIM BACK    Right knee total done 7-20  Dr Mayda Ford and admits two weeks later with dehydration and orthostatic hypotension    Covid  12-22     Social Determinants of Health     Financial Resource Strain: Not on file   Food Insecurity: Not on file   Transportation Needs: Not on file   Physical Activity: Not on file   Stress: Not on file   Social Connections: Not on file   Intimate Partner Violence: Not on file   Housing Stability: Not on file      Past Medical History:   Diagnosis Date    Anxiety     Ascending aortic aneurysm     BPH (benign prostatic hyperplasia)     CAD (coronary artery disease)     Cancer (HCC)     skin    Coronary arteriosclerosis     Depression     DJD (degenerative joint disease)     Hyperlipidemia     Impacted cerumen     Lumbar spinal stenosis     severe    Macular degeneration     Osteoarthritis     Parkinson disease (HCC)     RA (rheumatoid arthritis) ([facility] [address]      Family History   Problem Relation Age of Onset    Lung Cancer Father     No Known Problems Mother       Past Surgical History:   Procedure Laterality Date    APPENDECTOMY      CATARACT REMOVAL WITH IMPLANT Left 04/19/2020    CHOLECYSTECTOMY COLONOSCOPY      CORONARY ARTERY BYPASS GRAFT  2002    HERNIA REPAIR Bilateral     inguinal    KNEE SURGERY Left 06/2011    TOTAL KNEE ARTHROPLASTY Right 01/24/2017    [facility]    UPPER GASTROINTESTINAL ENDOSCOPY      peptic ulcer      Vitals:    01/05/23 1258   BP: 132/75   Temp: 97.3 °F (36.3 °C)   Weight: 178 lb 3.2 oz (80.8 kg)   Height: 6' 3\" (1.905 m)       Objective:    Physical Exam  Vitals reviewed. Constitutional:       Appearance: Normal appearance. He is well-developed. HENT:      Head: Normocephalic. Right Ear: Tympanic membrane normal.      Left Ear: Tympanic membrane normal.      Nose: Nose normal.      Mouth/Throat:      Mouth: Mucous membranes are moist.   Eyes:      Pupils: Pupils are equal, round, and reactive to light. Cardiovascular:      Rate and Rhythm: Normal rate and regular rhythm. Pulmonary:      Effort: Pulmonary effort is normal.      Breath sounds: Normal breath sounds. Abdominal:      General: Bowel sounds are normal.      Palpations: Abdomen is soft. Comments: No masses with palp     no enlarged bladder   Musculoskeletal:      Cervical back: Normal range of motion. Skin:     General: Skin is warm. Neurological:      Mental Status: He is alert and oriented to person, place, and time. Psychiatric:         Behavior: Behavior normal.       Jeniffer Posadas was seen today for urinary tract infection. Diagnoses and all orders for this visit:    Acute cystitis without hematuria  -     Urinalysis; Future  -     Culture, Urine; Future  -     lidocaine 1 % injection 1 mL  -     cefTRIAXone (ROCEPHIN) injection 500 mg  -     nitrofurantoin, macrocrystal-monohydrate, (MACROBID) 100 MG capsule; Take 1 capsule by mouth 2 times daily    Benign prostatic hyperplasia with weak urinary stream      Comments: er and pt rf  Inc fluids      drop off ua  after home if able   son inlaw in room  ab  meds  worse toe r    I educated the patient about all medications.   Make sure they were correct and educated  on the risk associated with missing meds or taking them incorrectly. A list of medications is being sent home with patient today. Check blood pressure at home twice a day. Low-salt low caffeine diet. Call if systolic blood pressure is above 712 and diastolic blood pressures above 85. Only use a upper arm digital cuff. .I informed patient about the risk associated with noncompliance of medication and taking medications incorrectly. Appropriate follow-up with myself and all specialist.  Encourage family members to take active role in assisting with medications and medical care. If any confusion should develop to notify my office immediately to avoid risk of worsening medical condition      A great deal of time spent reviewing medications, diet, exercise, social issues. Also reviewing the chart before entering the room with patient and finishing charting after leaving patient's room. More than half of that time was spent face to face with the patient in counseling and coordinating care. Follow Up: No follow-ups on file.      Seen by:  Hyacinth Ospina DO

## 2023-01-06 DIAGNOSIS — N30.00 ACUTE CYSTITIS WITHOUT HEMATURIA: ICD-10-CM

## 2023-01-06 LAB
BILIRUBIN URINE: NEGATIVE
BLOOD, URINE: NEGATIVE
CLARITY: CLEAR
COLOR: YELLOW
GLUCOSE URINE: NEGATIVE MG/DL
KETONES, URINE: NEGATIVE MG/DL
LEUKOCYTE ESTERASE, URINE: NEGATIVE
NITRITE, URINE: NEGATIVE
PH UA: 6 (ref 5–9)
PROTEIN UA: NEGATIVE MG/DL
SPECIFIC GRAVITY UA: 1.02 (ref 1–1.03)
UROBILINOGEN, URINE: 0.2 E.U./DL

## 2023-01-08 LAB — URINE CULTURE, ROUTINE: NORMAL

## 2023-01-13 ENCOUNTER — TELEPHONE (OUTPATIENT)
Dept: PRIMARY CARE CLINIC | Age: 88
End: 2023-01-13

## 2023-01-13 NOTE — TELEPHONE ENCOUNTER
Notify family urine culture was negative. If he is back to baseline fine.   Let me know still having trouble

## 2023-01-24 DIAGNOSIS — E03.9 ACQUIRED HYPOTHYROIDISM: Chronic | ICD-10-CM

## 2023-01-25 DIAGNOSIS — E78.2 MIXED HYPERLIPIDEMIA: Chronic | ICD-10-CM

## 2023-01-25 DIAGNOSIS — E03.9 ACQUIRED HYPOTHYROIDISM: Chronic | ICD-10-CM

## 2023-01-25 DIAGNOSIS — I10 ESSENTIAL HYPERTENSION: Chronic | ICD-10-CM

## 2023-01-25 DIAGNOSIS — M81.0 AGE-RELATED OSTEOPOROSIS WITHOUT CURRENT PATHOLOGICAL FRACTURE: ICD-10-CM

## 2023-01-25 LAB
ALBUMIN SERPL-MCNC: 4 G/DL (ref 3.5–5.2)
ALP BLD-CCNC: 121 U/L (ref 40–129)
ALT SERPL-CCNC: <5 U/L (ref 0–40)
ANION GAP SERPL CALCULATED.3IONS-SCNC: 10 MMOL/L (ref 7–16)
AST SERPL-CCNC: 15 U/L (ref 0–39)
BASOPHILS ABSOLUTE: 0.04 E9/L (ref 0–0.2)
BASOPHILS RELATIVE PERCENT: 0.7 % (ref 0–2)
BILIRUB SERPL-MCNC: 0.8 MG/DL (ref 0–1.2)
BILIRUBIN URINE: NEGATIVE
BLOOD, URINE: NEGATIVE
BUN BLDV-MCNC: 26 MG/DL (ref 6–23)
CALCIUM SERPL-MCNC: 9.4 MG/DL (ref 8.6–10.2)
CHLORIDE BLD-SCNC: 102 MMOL/L (ref 98–107)
CHOLESTEROL, TOTAL: 157 MG/DL (ref 0–199)
CLARITY: CLEAR
CO2: 27 MMOL/L (ref 22–29)
COLOR: YELLOW
CREAT SERPL-MCNC: 1.1 MG/DL (ref 0.7–1.2)
EOSINOPHILS ABSOLUTE: 0.21 E9/L (ref 0.05–0.5)
EOSINOPHILS RELATIVE PERCENT: 3.7 % (ref 0–6)
GFR SERPL CREATININE-BSD FRML MDRD: >60 ML/MIN/1.73
GLUCOSE BLD-MCNC: 110 MG/DL (ref 74–99)
GLUCOSE URINE: NEGATIVE MG/DL
HCT VFR BLD CALC: 34.3 % (ref 37–54)
HDLC SERPL-MCNC: 51 MG/DL
HEMOGLOBIN: 11.5 G/DL (ref 12.5–16.5)
IMMATURE GRANULOCYTES #: 0.02 E9/L
IMMATURE GRANULOCYTES %: 0.4 % (ref 0–5)
KETONES, URINE: NEGATIVE MG/DL
LDL CHOLESTEROL CALCULATED: 92 MG/DL (ref 0–99)
LEUKOCYTE ESTERASE, URINE: NEGATIVE
LYMPHOCYTES ABSOLUTE: 1.54 E9/L (ref 1.5–4)
LYMPHOCYTES RELATIVE PERCENT: 27.1 % (ref 20–42)
MCH RBC QN AUTO: 33.5 PG (ref 26–35)
MCHC RBC AUTO-ENTMCNC: 33.5 % (ref 32–34.5)
MCV RBC AUTO: 100 FL (ref 80–99.9)
MONOCYTES ABSOLUTE: 0.71 E9/L (ref 0.1–0.95)
MONOCYTES RELATIVE PERCENT: 12.5 % (ref 2–12)
NEUTROPHILS ABSOLUTE: 3.16 E9/L (ref 1.8–7.3)
NEUTROPHILS RELATIVE PERCENT: 55.6 % (ref 43–80)
NITRITE, URINE: NEGATIVE
PDW BLD-RTO: 12.8 FL (ref 11.5–15)
PH UA: 5.5 (ref 5–9)
PLATELET # BLD: 240 E9/L (ref 130–450)
PMV BLD AUTO: 10.2 FL (ref 7–12)
POTASSIUM SERPL-SCNC: 4.6 MMOL/L (ref 3.5–5)
PROTEIN UA: NEGATIVE MG/DL
RBC # BLD: 3.43 E12/L (ref 3.8–5.8)
SODIUM BLD-SCNC: 139 MMOL/L (ref 132–146)
SPECIFIC GRAVITY UA: 1.02 (ref 1–1.03)
T4 TOTAL: 7 MCG/DL (ref 4.5–11.7)
TOTAL PROTEIN: 6.5 G/DL (ref 6.4–8.3)
TRIGL SERPL-MCNC: 71 MG/DL (ref 0–149)
TSH SERPL DL<=0.05 MIU/L-ACNC: 3.18 UIU/ML (ref 0.27–4.2)
UROBILINOGEN, URINE: 0.2 E.U./DL
VITAMIN D 25-HYDROXY: 32 NG/ML (ref 30–100)
VLDLC SERPL CALC-MCNC: 14 MG/DL
WBC # BLD: 5.7 E9/L (ref 4.5–11.5)

## 2023-01-25 RX ORDER — LEVOTHYROXINE SODIUM 0.05 MG/1
50 TABLET ORAL DAILY
Qty: 90 TABLET | Refills: 3 | Status: SHIPPED | OUTPATIENT
Start: 2023-01-25

## 2023-02-02 ENCOUNTER — OFFICE VISIT (OUTPATIENT)
Dept: PRIMARY CARE CLINIC | Age: 88
End: 2023-02-02
Payer: MEDICARE

## 2023-02-02 VITALS — BODY MASS INDEX: 22.36 KG/M2 | WEIGHT: 179.8 LBS | HEIGHT: 75 IN | TEMPERATURE: 97.1 F

## 2023-02-02 DIAGNOSIS — F41.9 ANXIETY: ICD-10-CM

## 2023-02-02 DIAGNOSIS — I71.21 ANEURYSM OF ASCENDING AORTA WITHOUT RUPTURE: Chronic | ICD-10-CM

## 2023-02-02 DIAGNOSIS — E03.9 ACQUIRED HYPOTHYROIDISM: ICD-10-CM

## 2023-02-02 DIAGNOSIS — Z12.5 PROSTATE CANCER SCREENING: ICD-10-CM

## 2023-02-02 DIAGNOSIS — I95.1 ORTHOSTATIC HYPOTENSION: Chronic | ICD-10-CM

## 2023-02-02 DIAGNOSIS — E53.8 VITAMIN B 12 DEFICIENCY: ICD-10-CM

## 2023-02-02 DIAGNOSIS — M81.0 AGE-RELATED OSTEOPOROSIS WITHOUT CURRENT PATHOLOGICAL FRACTURE: ICD-10-CM

## 2023-02-02 DIAGNOSIS — G20 PARKINSON'S DISEASE (HCC): Chronic | ICD-10-CM

## 2023-02-02 DIAGNOSIS — I10 ESSENTIAL HYPERTENSION: Chronic | ICD-10-CM

## 2023-02-02 DIAGNOSIS — Z00.00 MEDICARE ANNUAL WELLNESS VISIT, SUBSEQUENT: Primary | ICD-10-CM

## 2023-02-02 PROCEDURE — 1123F ACP DISCUSS/DSCN MKR DOCD: CPT | Performed by: FAMILY MEDICINE

## 2023-02-02 PROCEDURE — G0439 PPPS, SUBSEQ VISIT: HCPCS | Performed by: FAMILY MEDICINE

## 2023-02-02 ASSESSMENT — LIFESTYLE VARIABLES
HOW MANY STANDARD DRINKS CONTAINING ALCOHOL DO YOU HAVE ON A TYPICAL DAY: PATIENT DOES NOT DRINK
HOW OFTEN DO YOU HAVE A DRINK CONTAINING ALCOHOL: NEVER

## 2023-02-02 NOTE — PATIENT INSTRUCTIONS
Advance Directives: Care Instructions  Overview  An advance directive is a legal way to state your wishes at the end of your life. It tells your family and your doctor what to do if you can't say what you want. There are two main types of advance directives. You can change them any time your wishes change. Living will. This form tells your family and your doctor your wishes about life support and other treatment. The form is also called a declaration. Medical power of . This form lets you name a person to make treatment decisions for you when you can't speak for yourself. This person is called a health care agent (health care proxy, health care surrogate). The form is also called a durable power of  for health care. If you do not have an advance directive, decisions about your medical care may be made by a family member, or by a doctor or a  who doesn't know you. It may help to think of an advance directive as a gift to the people who care for you. If you have one, they won't have to make tough decisions by themselves. For more information, including forms for your state, see the [address] website (www.caringinfo.org/planning/advance-directives/). Follow-up care is a key part of your treatment and safety. Be sure to make and go to all appointments, and call your doctor if you are having problems. It's also a good idea to know your test results and keep a list of the medicines you take. What should you include in an advance directive? Many states have a unique advance directive form. (It may ask you to address specific issues.) Or you might use a universal form that's approved by many states. If your form doesn't tell you what to address, it may be hard to know what to include in your advance directive. Use the questions below to help you get started. Who do you want to make decisions about your medical care if you are not able to?   What life-support measures do you want if you have a serious illness that gets worse over time or can't be cured? What are you most afraid of that might happen? (Maybe you're afraid of having pain, losing your independence, or being kept alive by machines.)  Where would you prefer to die? (Your home? A hospital? A nursing home?)  Do you want to donate your organs when you die? Do you want certain Orthodoxy practices performed before you die? When should you call for help? Be sure to contact your doctor if you have any questions. Where can you learn more? Go to http://www.an.com/ and enter R264 to learn more about \"Advance Directives: Care Instructions. \"  Current as of: June 16, 2022               Content Version: 13.5  © 2006-2022 [employer]. Care instructions adapted under license by [facility] ([facility]). If you have questions about a medical condition or this instruction, always ask your healthcare professional. [place] [address] any warranty or liability for your use of this information. A Healthy Heart: Care Instructions  Your Care Instructions     Coronary artery disease, also called heart disease, occurs when a substance called plaque builds up in the vessels that supply oxygen-rich blood to your heart muscle. This can narrow the blood vessels and reduce blood flow. A heart attack happens when blood flow is completely blocked. A high-fat diet, smoking, and other factors increase the risk of heart disease. Your doctor has found that you have a chance of having heart disease. You can do lots of things to keep your heart healthy. It may not be easy, but you can change your diet, exercise more, and quit smoking. These steps really work to lower your chance of heart disease. Follow-up care is a key part of your treatment and safety. Be sure to make and go to all appointments, and call your doctor if you are having problems.  It's also a good idea to know your test results and keep a list of the medicines you take. How can you care for yourself at home? Diet    Use less salt when you cook and eat. This helps lower your blood pressure. Taste food before salting. Add only a little salt when you think you need it. With time, your taste buds will adjust to less salt.     Eat fewer snack items, fast foods, canned soups, and other high-salt, high-fat, processed foods.     Read food labels and try to avoid saturated and trans fats. They increase your risk of heart disease by raising cholesterol levels.     Limit the amount of solid fat-butter, margarine, and shortening-you eat. Use olive, peanut, or canola oil when you cook. Bake, broil, and steam foods instead of frying them.     Eat a variety of fruit and vegetables every day. Dark green, deep orange, red, or yellow fruits and vegetables are especially good for you. Examples include spinach, carrots, peaches, and berries.     Foods high in fiber can reduce your cholesterol and provide important vitamins and minerals. High-fiber foods include whole-grain cereals and breads, oatmeal, beans, brown rice, citrus fruits, and apples.     Eat lean proteins. Heart-healthy proteins include seafood, lean meats and poultry, eggs, beans, peas, nuts, seeds, and soy products.     Limit drinks and foods with added sugar. These include candy, desserts, and soda pop. Lifestyle changes    If your doctor recommends it, get more exercise. Walking is a good choice. Bit by bit, increase the amount you walk every day. Try for at least 30 minutes on most days of the week. You also may want to swim, bike, or do other activities.     Do not smoke. If you need help quitting, talk to your doctor about stop-smoking programs and medicines. These can increase your chances of quitting for good. Quitting smoking may be the most important step you can take to protect your heart. It is never too late to quit.     Limit alcohol to 2 drinks a day for men and 1 drink a day for women.  Too much alcohol can cause health problems.     Manage other health problems such as diabetes, high blood pressure, and high cholesterol. If you think you may have a problem with alcohol or drug use, talk to your doctor. Medicines    Take your medicines exactly as prescribed. Call your doctor if you think you are having a problem with your medicine.     If your doctor recommends aspirin, take the amount directed each day. Make sure you take aspirin and not another kind of pain reliever, such as acetaminophen (Tylenol). When should you call for help? Call 911 if you have symptoms of a heart attack. These may include:    Chest pain or pressure, or a strange feeling in the chest.     Sweating.     Shortness of breath.     Pain, pressure, or a strange feeling in the back, neck, jaw, or upper belly or in one or both shoulders or arms.     Lightheadedness or sudden weakness.     A fast or irregular heartbeat. After you call 911, the  may tell you to chew 1 adult-strength or 2 to 4 low-dose aspirin. Wait for an ambulance. Do not try to drive yourself. Watch closely for changes in your health, and be sure to contact your doctor if you have any problems. Where can you learn more? Go to http://www.an.com/ and enter F075 to learn more about \"A Healthy Heart: Care Instructions. \"  Current as of: September 7, 2022               Content Version: 13.5  © 4757-5016 [employer]. Care instructions adapted under license by [facility] ([facility]). If you have questions about a medical condition or this instruction, always ask your healthcare professional. [place] [address] any warranty or liability for your use of this information. Personalized Preventive Plan for Ether Antonito - 2/2/2023  Medicare offers a range of preventive health benefits. Some of the tests and screenings are paid in full while other may be subject to a deductible, co-insurance, and/or copay.     Some of these benefits include a comprehensive review of your medical history including lifestyle, illnesses that may run in your family, and various assessments and screenings as appropriate. After reviewing your medical record and screening and assessments performed today your provider may have ordered immunizations, labs, imaging, and/or referrals for you. A list of these orders (if applicable) as well as your Preventive Care list are included within your After Visit Summary for your review. Other Preventive Recommendations:    A preventive eye exam performed by an eye specialist is recommended every 1-2 years to screen for glaucoma; cataracts, macular degeneration, and other eye disorders. A preventive dental visit is recommended every 6 months. Try to get at least 150 minutes of exercise per week or 10,000 steps per day on a pedometer . Order or download the FREE \"Exercise & Physical Activity: Your Everyday Guide\" from [employer]. Call 2-315.593.8856 or search [employer] online. You need 8802-8535 mg of calcium and 9890-8241 IU of vitamin D per day. It is possible to meet your calcium requirement with diet alone, but a vitamin D supplement is usually necessary to meet this goal.  When exposed to the sun, use a sunscreen that protects against both UVA and UVB radiation with an SPF of 30 or greater. Reapply every 2 to 3 hours or after sweating, drying off with a towel, or swimming. Always wear a seat belt when traveling in a car. Always wear a helmet when riding a bicycle or motorcycle.

## 2023-02-02 NOTE — PROGRESS NOTES
Medicare Annual Wellness Visit    Toi Grimaldo is here for Medicare AWV    Assessment & Plan   Medicare annual wellness visit, subsequent  Essential hypertension  Acquired hypothyroidism  Orthostatic hypotension  Parkinson's disease (HCC)  Aneurysm of ascending aorta without rupture  Anxiety    Recommendations for Preventive Services Due: see orders and patient instructions/AVS.  Recommended screening schedule for the next 5-10 years is provided to the patient in written form: see Patient Instructions/AVS.     Return in 6 months (on 8/2/2023) for Lab Before.     Subjective   The following acute and/or chronic problems were also addressed today:  6 months check regarding urinary tract infection blood pressure renal insufficiency osteoarthritis microscopic hematuria Parkinson's disease orthostatic hypotension as well.        Feels fairly well.  Multiple complaints of pain and soreness achiness unsteady gait.  He refuses to use a cane or walker.  I encouraged him to use a walker all the time.  His left shoulder pain is essentially unchanged.  His urinary tract symptoms are improved.  He is a due to get his second midodrine from the day and 1/2 hours blood pressure does drop when he stands up we checked it today and sitting it was 120/62 and standing 108/54.  Does complain of right knee pain has had surgery x2 he is asking about referral but I told him she will go back to  Do the surgery Dr. Dr monzon          Patient's complete Health Risk Assessment and screening values have been reviewed and are found in Flowsheets. The following problems were reviewed today and where indicated follow up appointments were made and/or referrals ordered.    Positive Risk Factor Screenings with Interventions:                 Weight and Activity:  Physical Activity: Inactive    Days of Exercise per Week: 0 days    Minutes of Exercise per Session: 0 min     On average, how many days per week do you engage in moderate to  strenuous exercise (like a brisk walk)?: 0 days  Have you lost any weight without trying in the past 3 months?: No  Body mass index: 22.47    Inactivity Interventions:  Patient declined any further interventions or treatment                           Objective   Vitals:    02/02/23 1457   Temp: 97.1 °F (36.2 °C)   Weight: 179 lb 12.8 oz (81.6 kg)   Height: 6' 3\" (1.905 m)      Body mass index is 22.47 kg/m². General Appearance: alert and oriented to person, place and time, well developed and well- nourished, in no acute distress  Skin: warm and dry, no rash or erythema  Head: normocephalic and atraumatic  Eyes: pupils equal, round, and reactive to light, extraocular eye movements intact, conjunctivae normal  ENT: tympanic membrane, external ear and ear canal normal bilaterally, nose without deformity, nasal mucosa and turbinates normal without polyps  Neck: supple and non-tender without mass, no thyromegaly or thyroid nodules, no cervical lymphadenopathy  Pulmonary/Chest: clear to auscultation bilaterally- no wheezes, rales or rhonchi, normal air movement, no respiratory distress  Cardiovascular: normal rate, regular rhythm, normal S1 and S2, no murmurs, rubs, clicks, or gallops, distal pulses intact, no carotid bruits  Abdomen: soft, non-tender, non-distended, normal bowel sounds, no masses or organomegaly  Extremities: no cyanosis, clubbing or edema  Musculoskeletal: Unsteady gait. Marked decreased range of motion both knees  Neurologic: reflexes normal and symmetric, no cranial nerve deficit, gait, coordination and speech normal       Allergies   Allergen Reactions    Pcn [Penicillins]      Cefdinir ok    Prednisone      Prior to Visit Medications    Medication Sig Taking?  Authorizing Provider   levothyroxine (SYNTHROID) 50 MCG tablet Take 1 tablet by mouth Daily  Jasvir Pete, DO   b complex vitamins capsule Take 1 capsule by mouth daily  Historical Provider, MD   PARoxetine (PAXIL) 20 MG tablet Take 1 tablet by mouth every morning  Jasvir Pete DO   metoprolol succinate (TOPROL XL) 25 MG extended release tablet Takes 1/2 tablet daily  Jasvir Pete DO   midodrine (PROAMATINE) 2.5 MG tablet Take 1 tablet by mouth in the morning and 1 tablet in the evening. Take with meals. Jasvir Pete DO   omeprazole (PRILOSEC) 20 MG delayed release capsule Take 1 capsule by mouth in the morning. Jasvir Pete DO   ipratropium (ATROVENT) 0.06 % nasal spray 2 sprays by Each Nostril route in the morning and 2 sprays at noon and 2 sprays in the evening and 2 sprays before bedtime. Patient taking differently: 2 sprays by Each Nostril route as needed  Jasvir Pete DO   carbidopa-levodopa (SINEMET CR)  MG per extended release tablet TAKE ONE TABLET BY MOUTH EVERY 4 HOURS (while awake) no more than 6 per day  Patient taking differently: Take 1 tablet by mouth 4 times daily  Manuelito Carrero MD   folic acid (FOLVITE) 1 MG tablet TAKE 2 TABLETS (2MG) BY MOUTH WEEKLY  Patient taking differently: Take 1 mg by mouth Twice a Week Monday AND THURS  Rain Aranda MD   acetaminophen (TYLENOL) 500 MG tablet Take 500 mg by mouth every 6 hours as needed for Pain  Historical Provider, MD   aspirin 81 MG tablet Take 81 mg by mouth daily  Historical Provider, MD   Multiple Vitamins-Minerals (OCUVITE) TABS oral tablet Take 1 tablet by mouth 2 times daily  Historical Provider, MD   vitamin D (CHOLECALCIFEROL) 1000 UNIT TABS tablet Take 1,000 Units by mouth daily  Historical Provider, MD   methotrexate (RHEUMATREX) 2.5 MG chemo tablet Take 2.5 mg by mouth once a week 4 tablets weekly.   Historical Provider, MD Velasquez (Including outside providers/suppliers regularly involved in providing care):   Patient Care Team:  Angel Cameron DO as PCP - Formerly named [facility] Patricio Weiner DO as PCP - Empaneled Provider  Kristian Benjamin MD (Cardiology)  Abiola Campa MD (Orthopedic Surgery)  Abimael Burgess MD (Orthopedic Surgery)  Trinidad Merida MD (Gastroenterology)  Wil Stacy DO (Dermatology)     Reviewed and updated this visit:  Tobacco  Allergies  Med Hx  Surg Hx  Soc Hx  Fam Hx        I advised follow-up with Dr. Ursula Ellis on. Regarding his right knee. Encourage a walker all the time. He did significantly drop his blood pressure. Daughter at and wife in the room advised the daughter check blood pressure twice a day sitting and standing these 1 day a week. Renew meds. His thyroid medication without food. Follow-up with neurology. Notify if worsening shaking. Aorta stable been followed by vascular. His anxiety is well controlled with the Paxil. He does get the anxious at times worry about his health and      I educated the patient about all medications. Make sure they were correct and educated  on the risk associated with missing meds or taking them incorrectly. A list of medications is being sent home with patient today. Check blood pressure at home twice a day. Low-salt low caffeine diet. Call if systolic blood pressure is above 786 and diastolic blood pressures above 85. Only use a upper arm digital cuff. Aggressive low-fat diet. Avoid red meats, greasy fried foods, dairy products. Avoid processed foods. Take cholesterol medications without food. I informed patient about the risk associated with noncompliance of medication and taking medications incorrectly. Appropriate follow-up with myself and all specialist.  Encourage family members to take active role in assisting with medications and medical care. If any confusion should develop to notify my office immediately to avoid risk of worsening medical condition    A great deal of time spent reviewing medications, diet, exercise, social issues. Also reviewing the chart before entering the room with patient and finishing charting after leaving patient's room.  More than half of that time was spent face to face with the patient in counseling and coordinating care.     -Advised patient to take thyroid med on an empty stomach at least 30 minutes before breakfast and without combination of other medications.  -Patient was advised if she were to take calcium or iron supplementation to wait at least 4 hours after Synthroid to take medication  -Counseled on symptoms of hypo-/hyperthyroidism  -Patient verbalized understanding          Neto Quintanilla 117, DO

## 2023-02-15 RX ORDER — CARBIDOPA AND LEVODOPA 50; 200 MG/1; MG/1
1 TABLET, EXTENDED RELEASE ORAL 4 TIMES DAILY
Qty: 120 TABLET | Refills: 2 | Status: SHIPPED | OUTPATIENT
Start: 2023-02-15

## 2023-03-27 ENCOUNTER — HOSPITAL ENCOUNTER (OUTPATIENT)
Dept: PHYSICAL THERAPY | Age: 88
Setting detail: THERAPIES SERIES
Discharge: HOME | End: 2023-03-27
Payer: MEDICARE

## 2023-03-27 PROCEDURE — 97161 PT EVAL LOW COMPLEX 20 MIN: CPT | Performed by: PHYSICAL THERAPIST

## 2023-03-27 ASSESSMENT — PAIN DESCRIPTION - LOCATION: LOCATION: KNEE

## 2023-03-27 ASSESSMENT — PAIN DESCRIPTION - ORIENTATION: ORIENTATION: RIGHT

## 2023-03-27 ASSESSMENT — PAIN DESCRIPTION - DESCRIPTORS: DESCRIPTORS: ACHING

## 2023-03-27 ASSESSMENT — PAIN DESCRIPTION - PAIN TYPE: TYPE: CHRONIC PAIN

## 2023-03-27 ASSESSMENT — PAIN SCALES - GENERAL: PAINLEVEL_OUTOF10: 3

## 2023-03-27 NOTE — PROGRESS NOTES
training, Neuromuscular re-education, Home exercise program, Safety education & training, Patient/Caregiver education & training     Frequency / Duration:  Patient to be seen 2 for 6 weeks      Eval Complexity:    Decision Making: Low Complexity    PT Treatment Completed:  N/A - Evaluation Only    Therapy Time  Individual Time In: 8561       Individual Time Out: 8178  Minutes: 40        Therapist Signature: George Fuller PT    Date: 2/62/3484     I certify that the above Therapy Services are being furnished while the patient is under my care. I agree with the treatment plan and certify that this therapy is necessary. Physician's Signature:  ___________________________   Date:_______                                                                   Basia Grimes MD        Physician Comments: _______________________________________________    Please sign and return to [facility] PHYSICAL THERAPY. Please fax to the location listed below.  Heath Arnold for this referral!    [address] [facility] PHYSICAL THERAPY  [facility] [address]  Dept: [phone]  Fax: [phone]       POC NOTE

## 2023-03-31 ENCOUNTER — HOSPITAL ENCOUNTER (OUTPATIENT)
Dept: PHYSICAL THERAPY | Age: 88
Setting detail: THERAPIES SERIES
Discharge: HOME | End: 2023-03-31
Payer: MEDICARE

## 2023-03-31 PROCEDURE — 97110 THERAPEUTIC EXERCISES: CPT | Performed by: PHYSICAL THERAPIST

## 2023-03-31 PROCEDURE — 97530 THERAPEUTIC ACTIVITIES: CPT | Performed by: PHYSICAL THERAPIST

## 2023-03-31 NOTE — PROGRESS NOTES
positioning however no falls.     Plan:   [x] Continue per plan of care [] Alter current plan (see comments)  [] Plan of care initiated [] Hold pending MD visit [] Discharge  Plan for Next Session:         Treatment Charges: Mins Units   Initial Evaluation     Re-Evaluation     Ther Exercise         TE 10 1   Manual Therapy     MT     Ther Activities        TA 35 2   Gait Training          GT     Neuro Re-education NR     Modalities     Non-Billable Service Time     Other     Total Time/Units 45 3     Electronically signed by:  Yoshi Choudhary PT

## 2023-04-03 ENCOUNTER — HOSPITAL ENCOUNTER (OUTPATIENT)
Dept: PHYSICAL THERAPY | Age: 88
Setting detail: THERAPIES SERIES
Discharge: HOME | End: 2023-04-03
Payer: MEDICARE

## 2023-04-03 PROCEDURE — 97110 THERAPEUTIC EXERCISES: CPT | Performed by: PHYSICAL THERAPIST

## 2023-04-03 PROCEDURE — 97530 THERAPEUTIC ACTIVITIES: CPT | Performed by: PHYSICAL THERAPIST

## 2023-04-03 NOTE — PROGRESS NOTES
Plan:   [x] Continue per plan of care [] Alter current plan (see comments)  [] Plan of care initiated [] Hold pending MD visit [] Discharge  Plan for Next Session:         Treatment Charges: Mins Units   Initial Evaluation     Re-Evaluation     Ther Exercise         TE 10 1   Manual Therapy     MT     Ther Activities        TA 30 1   Gait Training          GT     Neuro Re-education NR     Modalities     Non-Billable Service Time 10    Other     Total Time/Units 50 2     Electronically signed by:  Judie Benjamin PT

## 2023-04-04 ENCOUNTER — OFFICE VISIT (OUTPATIENT)
Dept: NEUROLOGY | Age: 88
End: 2023-04-04
Payer: MEDICARE

## 2023-04-04 VITALS
OXYGEN SATURATION: 98 % | BODY MASS INDEX: 22.37 KG/M2 | HEART RATE: 68 BPM | WEIGHT: 179 LBS | TEMPERATURE: 98.3 F | DIASTOLIC BLOOD PRESSURE: 69 MMHG | SYSTOLIC BLOOD PRESSURE: 119 MMHG

## 2023-04-04 DIAGNOSIS — G20 PARKINSON'S DISEASE (HCC): Primary | ICD-10-CM

## 2023-04-04 DIAGNOSIS — M54.17 LUMBOSACRAL RADICULOPATHY: ICD-10-CM

## 2023-04-04 PROCEDURE — 1123F ACP DISCUSS/DSCN MKR DOCD: CPT | Performed by: CLINICAL NURSE SPECIALIST

## 2023-04-04 PROCEDURE — 99215 OFFICE O/P EST HI 40 MIN: CPT | Performed by: CLINICAL NURSE SPECIALIST

## 2023-04-04 NOTE — PROGRESS NOTES
BILITOT 0.8 01/25/2023 11:04 AM    ALKPHOS 121 01/25/2023 11:04 AM    AST 15 01/25/2023 11:04 AM    ALT <5 01/25/2023 11:04 AM       CT Head: 11/2022  1. No acute intracranial abnormality. 2.  Signs of deep white matter small vessel disease. 3.  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 I personally reviewed the patient's lab and imaging studies at this time. Assessment:     Patient with Parkinson's disease moderately well controlled on 50/200 every 4 hours while awake.    We did discuss his need to be compliant with the times of his medications    Macular degeneration    LS radiculopathy    Plan:     Continue 50/200 every 4 hours while awake-urged to increase compliance with medication times    Continue physical therapy    RTO in the summer      FABIANO Horn - CNS  3:18 PM  4/4/2023

## 2023-04-06 ENCOUNTER — HOSPITAL ENCOUNTER (OUTPATIENT)
Dept: PHYSICAL THERAPY | Age: 88
Setting detail: THERAPIES SERIES
Discharge: HOME | End: 2023-04-06
Payer: MEDICARE

## 2023-04-06 PROCEDURE — 97530 THERAPEUTIC ACTIVITIES: CPT | Performed by: PHYSICAL THERAPIST

## 2023-04-06 PROCEDURE — 97110 THERAPEUTIC EXERCISES: CPT | Performed by: PHYSICAL THERAPIST

## 2023-04-06 NOTE — PROGRESS NOTES
with initiation of gait today.  No LOB with pt ambulating without AD     Plan:   [x] Continue per plan of care [] Alter current plan (see comments)  [] Plan of care initiated [] Hold pending MD visit [] Discharge  Plan for Next Session:         Treatment Charges: Mins Units   Initial Evaluation     Re-Evaluation     Ther Exercise         TE 10 1   Manual Therapy     MT     Ther Activities        TA 25 1   Gait Training          GT     Neuro Re-education NR     Modalities     Non-Billable Service Time 5    Other     Total Time/Units 40 2     Electronically signed by:  Judie Benjamin PT

## 2023-04-18 ENCOUNTER — HOSPITAL ENCOUNTER (OUTPATIENT)
Dept: PHYSICAL THERAPY | Age: 88
Setting detail: THERAPIES SERIES
Discharge: HOME | End: 2023-04-18
Payer: MEDICARE

## 2023-04-18 PROCEDURE — 97530 THERAPEUTIC ACTIVITIES: CPT | Performed by: PHYSICAL THERAPIST

## 2023-04-18 PROCEDURE — 97110 THERAPEUTIC EXERCISES: CPT | Performed by: PHYSICAL THERAPIST

## 2023-04-18 NOTE — PROGRESS NOTES
would revert within a few steps. Retrograde positioning with sit to stand. Improvement with repetitions.      Plan:   [x] Continue per plan of care [] Alter current plan (see comments)  [] Plan of care initiated [] Hold pending MD visit [] Discharge  Plan for Next Session:         Treatment Charges: Mins Units   Initial Evaluation     Re-Evaluation     Ther Exercise         TE 10 1   Manual Therapy     MT     Ther Activities        TA 25 1   Gait Training          GT     Neuro Re-education NR     Modalities     Non-Billable Service Time 5    Other     Total Time/Units 40 2     Electronically signed by:  Raul Choe PT

## 2023-04-20 ENCOUNTER — HOSPITAL ENCOUNTER (OUTPATIENT)
Dept: PHYSICAL THERAPY | Age: 88
Setting detail: THERAPIES SERIES
Discharge: HOME | End: 2023-04-20
Payer: MEDICARE

## 2023-04-20 PROCEDURE — 97530 THERAPEUTIC ACTIVITIES: CPT | Performed by: PHYSICAL THERAPIST

## 2023-04-20 PROCEDURE — 97110 THERAPEUTIC EXERCISES: CPT | Performed by: PHYSICAL THERAPIST

## 2023-04-20 NOTE — PROGRESS NOTES
[facility]  Phone: [phone] Fax: [phone]       Physical Therapy Daily Treatment Note  Date:  2023    Patient Name:  Missy Felton    :  1933  MRN: [id number]    Patient: Missy Felton (42 y.o. male)   Examination Date:   Plan of Care Certification Period: 3/27/2023 to  2023     :  1933 ;    Confirmed: Yes MRN: [id number]  CSN: [id number]   Insurance: Payor: Tracy Carry / Plan: [address] HMO / Product Type: Medicare /   Insurance ID: [id number] - (Medicare Managed) Secondary Insurance (if applicable):    Referring Physician: Mary Marmolejo MD     PCP: Alpa Schilling DO Visits to Date/Visits Approved:      No Show/Cancelled Appts:   /       Medical Diagnosis: Unilateral primary osteoarthritis, unspecified knee [M17.10] Parkinsons Disease/unsteady gait/Hx of B TKA  Treatment Diagnosis:       Time In:   8894  Time Out:    1520     Subjective:  Pt presents to PT for fifth treatment. Pt reports cont to feel fatigued    Exercises:  Exercise/Equipment Resistance/Repetitions Other comments   Bike    [id number]           Marching    3x10ea           Side ambulation    5 laps //bars    Fwd/bwd ambulation    5 laps //bars               Sit to stand    3x10 high block             Ambulation     [id number] no AD           Cart push    9x401mz 210lbs                                                 Other Therapeutic Activities:      Home Exercise Program:      Manual Treatments:      Modalities:      Timed Code Treatment Minutes: Total Treatment Minutes:      Treatment/Activity Tolerance:  [x] Patient tolerated treatment well [] Patient limited by fatigue  [] Patient limited by pain  [] Patient limited by other medical complications  [] Other: Pt performed functional exercises to improve LE strength and overall balance/gait. Improved gait mechanics with only occasional shuffled mechanics.  Added cart push focusing on

## 2023-04-25 ENCOUNTER — HOSPITAL ENCOUNTER (OUTPATIENT)
Dept: PHYSICAL THERAPY | Age: 88
Setting detail: THERAPIES SERIES
Discharge: HOME | End: 2023-04-25
Payer: MEDICARE

## 2023-04-25 PROCEDURE — 97110 THERAPEUTIC EXERCISES: CPT | Performed by: PHYSICAL THERAPIST

## 2023-04-25 PROCEDURE — 97530 THERAPEUTIC ACTIVITIES: CPT | Performed by: PHYSICAL THERAPIST

## 2023-04-25 NOTE — PROGRESS NOTES
reduce with VCs. Shuffled mechanics increased with turns. Generalized fatigue following.  No LOB with activities     Plan:   [x] Continue per plan of care [] Alter current plan (see comments)  [] Plan of care initiated [] Hold pending MD visit [] Discharge  Plan for Next Session:         Treatment Charges: Mins Units   Initial Evaluation     Re-Evaluation     Ther Exercise         TE 10 1   Manual Therapy     MT     Ther Activities        TA 30 1   Gait Training          GT     Neuro Re-education NR     Modalities     Non-Billable Service Time 5    Other     Total Time/Units 45 2     Electronically signed by:  Gayathri Newton PT

## 2023-04-27 ENCOUNTER — HOSPITAL ENCOUNTER (OUTPATIENT)
Dept: PHYSICAL THERAPY | Age: 88
Setting detail: THERAPIES SERIES
Discharge: HOME | End: 2023-04-27
Payer: MEDICARE

## 2023-04-27 PROCEDURE — 97530 THERAPEUTIC ACTIVITIES: CPT | Performed by: PHYSICAL THERAPIST

## 2023-04-27 PROCEDURE — 97110 THERAPEUTIC EXERCISES: CPT | Performed by: PHYSICAL THERAPIST

## 2023-04-27 NOTE — PROGRESS NOTES
[facility]  Phone: [phone] Fax: [phone]       Physical Therapy Daily Treatment Note  Date:  2023    Patient Name:  Pari Ellis    :  1933  MRN: [id number]    Patient: Pari Ellis (89 y.o. male)   Examination Date:   Plan of Care Certification Period: 3/27/2023 to  2023     :  1933 ;    Confirmed: Yes MRN: [id number]  CSN: [id number]   Insurance: Payor: Mei Cueto / Plan: [employer] / Product Type: Medicare /   Insurance ID: [id number] - (Medicare Managed) Secondary Insurance (if applicable):    Referring Physician: Davi Barber MD     PCP: Nirmal Mena DO Visits to Date/Visits Approved:      No Show/Cancelled Appts:   /       Medical Diagnosis: Unilateral primary osteoarthritis, unspecified knee [M17.10] Parkinsons Disease/unsteady gait/Hx of B TKA  Treatment Diagnosis:       Time In:   3930  Time Out:    1515     Subjective:  Pt presents to PT for sixth treatment. Pt reports no change in fatigue level     Exercises:  Exercise/Equipment Resistance/Repetitions Other comments   Bike    [id number]                                     Sit to stand    3x10 high block             Ambulation     [id number] no AD  X8mins    Throughout [employer]   Ascend/descend 24 steps           Cart push                                                   Other Therapeutic Activities:      Home Exercise Program:      Manual Treatments:      Modalities:      Timed Code Treatment Minutes: Total Treatment Minutes:      Treatment/Activity Tolerance:  [x] Patient tolerated treatment well [] Patient limited by fatigue  [] Patient limited by pain  [] Patient limited by other medical complications  [] Other: Pt performed functional exercises to improve LE strength and overall balance/gait. Gait remained unsteady with decreased step length/height with occasional shuffled step pattern.  VCs given to increase step height and to

## 2023-05-02 ENCOUNTER — HOSPITAL ENCOUNTER (OUTPATIENT)
Dept: PHYSICAL THERAPY | Age: 88
Setting detail: THERAPIES SERIES
Discharge: HOME | End: 2023-05-02
Payer: MEDICARE

## 2023-05-02 PROCEDURE — 97110 THERAPEUTIC EXERCISES: CPT | Performed by: PHYSICAL THERAPIST

## 2023-05-02 PROCEDURE — 97530 THERAPEUTIC ACTIVITIES: CPT | Performed by: PHYSICAL THERAPIST

## 2023-05-02 NOTE — PROGRESS NOTES
[facility]  Phone: [phone] Fax: [phone]       Physical Therapy Daily Treatment Note  Date:  2023    Patient Name:  Pierre Michele    :  1933  MRN: [id number]    Patient: Pierre Michele (69 y.o. male)   Examination Date:   Plan of Care Certification Period: 3/27/2023 to  2023     :  1933 ;    Confirmed: Yes MRN: [id number]  CSN: [id number]   Insurance: Payor: [facility] [employer] / Plan: [address] HMO / Product Type: Medicare /   Insurance ID: [id number] - (Medicare Managed) Secondary Insurance (if applicable):    Referring Physician: Areli Anderson MD     PCP: Madonna Rapp DO Visits to Date/Visits Approved:      No Show/Cancelled Appts:   /       Medical Diagnosis: Unilateral primary osteoarthritis, unspecified knee [M17.10] Parkinsons Disease/unsteady gait/Hx of B TKA  Treatment Diagnosis:       Time In:   1435  Time Out:    1520     Subjective:  Pt reports cont to have fatigue across knees . Patient wife states he has been walking with a side lean. Exercises:  Exercise/Equipment Resistance/Repetitions Other comments   Bike    [id number]           Marching    3x10ea                 Step ups    2x10ea 6in            Sit to stand    3x10 high block             Ambulation     X8mins    Throughout [employer]   Ascend/descend 24 steps           Cart push    [id number] 210lbs                                                 Other Therapeutic Activities:      Home Exercise Program:      Manual Treatments:      Modalities:      Timed Code Treatment Minutes: Total Treatment Minutes:      Treatment/Activity Tolerance:  [x] Patient tolerated treatment well [] Patient limited by fatigue  [] Patient limited by pain  [] Patient limited by other medical complications  [] Other: Pt performed functional exercises to improve LE strength and overall balance/gait.  Gait remained unsteady with decreased step length/height with

## 2023-05-04 ENCOUNTER — HOSPITAL ENCOUNTER (OUTPATIENT)
Dept: PHYSICAL THERAPY | Age: 88
Setting detail: THERAPIES SERIES
Discharge: HOME | End: 2023-05-04
Payer: MEDICARE

## 2023-05-04 PROCEDURE — 97110 THERAPEUTIC EXERCISES: CPT | Performed by: PHYSICAL THERAPIST

## 2023-05-04 PROCEDURE — 97530 THERAPEUTIC ACTIVITIES: CPT | Performed by: PHYSICAL THERAPIST

## 2023-05-04 NOTE — PROGRESS NOTES
[facility]  Phone: [phone] Fax: [phone]       Physical Therapy Daily Treatment Note  Date:  2023    Patient Name:  Eber Xiong    :  1933  MRN: [id number]    Patient: Eber Xiong (31 y.o. male)   Examination Date:   Plan of Care Certification Period: 3/27/2023 to  2023     :  1933 ;    Confirmed: Yes MRN: [id number]  CSN: [id number]   Insurance: Payor: Marielena Blow / Plan: [address] HMO / Product Type: Medicare /   Insurance ID: [id number] - (Medicare Managed) Secondary Insurance (if applicable):    Referring Physician: Ventura New MD     PCP: Arleth Cook DO Visits to Date/Visits Approved: 10/ 12     No Show/Cancelled Appts:   /       Medical Diagnosis: Unilateral primary osteoarthritis, unspecified knee [M17.10] Parkinsons Disease/unsteady gait/Hx of B TKA  Treatment Diagnosis:       Time In:   1435  Time Out:    1520     Subjective:  Pt reports cont to have pain and fatigue across B knees. Exercises:  Exercise/Equipment Resistance/Repetitions Other comments   Bike    [id number]           Marching    3x10ea                 Step ups    2x10ea 6in            Sit to stand    2x10 high block Held from third set due to fatigue             Ambulation     X8mins    Throughout [employer]   Ascend/descend 24 steps           Cart push    [id number] 210lbs                                                 Other Therapeutic Activities:      Home Exercise Program:      Manual Treatments:      Modalities:      Timed Code Treatment Minutes: Total Treatment Minutes:      Treatment/Activity Tolerance:  [x] Patient tolerated treatment well [] Patient limited by fatigue  [] Patient limited by pain  [] Patient limited by other medical complications  [] Other: Pt performed functional exercises to improve LE strength and overall balance/gait. Gait improved from previous session with more steady/uniformed gait.  No

## 2023-05-09 ENCOUNTER — HOSPITAL ENCOUNTER (OUTPATIENT)
Dept: PHYSICAL THERAPY | Age: 88
Setting detail: THERAPIES SERIES
Discharge: HOME | End: 2023-05-09
Payer: MEDICARE

## 2023-05-09 PROCEDURE — 97530 THERAPEUTIC ACTIVITIES: CPT | Performed by: PHYSICAL THERAPIST

## 2023-05-09 PROCEDURE — 97110 THERAPEUTIC EXERCISES: CPT | Performed by: PHYSICAL THERAPIST

## 2023-05-09 NOTE — PROGRESS NOTES
[facility]  Phone: [phone] Fax: [phone]       Physical Therapy Daily Treatment Note  Date:  2023    Patient Name:  Ewa Cross    :  1933  MRN: [id number]    Patient: Ewa Cross (69 y.o. male)   Examination Date:   Plan of Care Certification Period: 3/27/2023 to  2023     :  1933 ;    Confirmed: Yes MRN: [id number]  CSN: [id number]   Insurance: Payor: Katarzyna Horn / Plan: [address] HMO / Product Type: Medicare /   Insurance ID: [id number] - (Medicare Managed) Secondary Insurance (if applicable):    Referring Physician: Thomas Arrington MD     PCP: Bree Salmeron DO Visits to Date/Visits Approved:      No Show/Cancelled Appts:   /       Medical Diagnosis: Unilateral primary osteoarthritis, unspecified knee [M17.10] Parkinsons Disease/unsteady gait/Hx of B TKA  Treatment Diagnosis:       Time In:   1435  Time Out:    1525     Subjective:  Pt reports cont to have generalized fatigue. No change in symptoms     Exercises:  Exercise/Equipment Resistance/Repetitions Other comments   Bike    [id number]           Marching    3x10ea                 Step ups    2x10ea 6in            Sit to stand    2x10 high block Held from third set due to fatigue             Ambulation     X8mins    Throughout [employer]   Ascend/descend 24 steps           Cart push    [id number] 210lbs                                                 Other Therapeutic Activities:      Home Exercise Program:      Manual Treatments:      Modalities:      Timed Code Treatment Minutes: Total Treatment Minutes:      Treatment/Activity Tolerance:  [x] Patient tolerated treatment well [] Patient limited by fatigue  [] Patient limited by pain  [] Patient limited by other medical complications  [] Other: Pt performed functional exercises to improve LE strength and overall balance/gait. Gait limited again today with dragging of RLE.  Generalized fatigue noted

## 2023-05-11 ENCOUNTER — HOSPITAL ENCOUNTER (OUTPATIENT)
Dept: PHYSICAL THERAPY | Age: 88
Setting detail: THERAPIES SERIES
Discharge: HOME | End: 2023-05-11
Payer: MEDICARE

## 2023-05-11 PROCEDURE — 97530 THERAPEUTIC ACTIVITIES: CPT | Performed by: PHYSICAL THERAPIST

## 2023-05-11 PROCEDURE — 97110 THERAPEUTIC EXERCISES: CPT | Performed by: PHYSICAL THERAPIST

## 2023-05-11 NOTE — PROGRESS NOTES
[facility]  Phone: [phone] Fax: [phone]       Physical Therapy Daily Treatment Note  Date:  2023    Patient Name:  Sagrario Pierson    :  1933  MRN: [id number]    Patient: Sagrario Pierson (39 y.o. male)   Examination Date:   Plan of Care Certification Period: 3/27/2023 to  2023     :  1933 ;    Confirmed: Yes MRN: [id number]  CSN: [id number]   Insurance: Payor: Pricila Vasquez / Plan: [address] HMO / Product Type: Medicare /   Insurance ID: [id number] - (Medicare Managed) Secondary Insurance (if applicable):    Referring Physician: Herrera Monterroso MD     PCP: Kennedy Rees DO Visits to Date/Visits Approved:      No Show/Cancelled Appts:   /       Medical Diagnosis: Unilateral primary osteoarthritis, unspecified knee [M17.10] Parkinsons Disease/unsteady gait/Hx of B TKA  Treatment Diagnosis:       Time In:   1435  Time Out:    1530     Subjective:  Pt reports cont to have generalized fatigue. Also pt reports cont to have knee pain     Exercises:  Exercise/Equipment Resistance/Repetitions Other comments   Bike    [id number]           Marching                    Step ups    2x10ea 6in            Sit to stand    2x10 high block Held from third set due to fatigue             Ambulation     [id number] no AD  X8mins    Throughout [employer]   Ascend/descend 24 steps           Cart push    [id number] 210lbs                                                 Other Therapeutic Activities:      Home Exercise Program:      Manual Treatments:      Modalities:      Timed Code Treatment Minutes: Total Treatment Minutes:      Treatment/Activity Tolerance:  [x] Patient tolerated treatment well [] Patient limited by fatigue  [] Patient limited by pain  [] Patient limited by other medical complications  [] Other: Pt performed functional exercises to improve LE strength and overall balance/gait. Gait limited with dragging of RLE.  Generalized

## 2023-05-19 RX ORDER — CARBIDOPA AND LEVODOPA 50; 200 MG/1; MG/1
TABLET, EXTENDED RELEASE ORAL
Qty: 120 TABLET | Refills: 0 | Status: SHIPPED | OUTPATIENT
Start: 2023-05-19

## 2023-05-25 RX ORDER — CARBIDOPA AND LEVODOPA 50; 200 MG/1; MG/1
TABLET, EXTENDED RELEASE ORAL
Qty: 120 TABLET | Refills: 0 | OUTPATIENT
Start: 2023-05-25

## 2023-05-26 ENCOUNTER — OFFICE VISIT (OUTPATIENT)
Dept: FAMILY MEDICINE CLINIC | Age: 88
End: 2023-05-26

## 2023-05-26 ENCOUNTER — APPOINTMENT (OUTPATIENT)
Dept: CT IMAGING | Age: 88
End: 2023-05-26
Payer: MEDICARE

## 2023-05-26 ENCOUNTER — HOSPITAL ENCOUNTER (EMERGENCY)
Age: 88
Discharge: LEFT BEFORE DISCHARGE | End: 2023-05-26
Attending: EMERGENCY MEDICINE
Payer: MEDICARE

## 2023-05-26 VITALS
WEIGHT: 173 LBS | OXYGEN SATURATION: 99 % | RESPIRATION RATE: 15 BRPM | HEIGHT: 75 IN | SYSTOLIC BLOOD PRESSURE: 156 MMHG | TEMPERATURE: 97.7 F | DIASTOLIC BLOOD PRESSURE: 78 MMHG | HEART RATE: 63 BPM | BODY MASS INDEX: 21.51 KG/M2

## 2023-05-26 VITALS
BODY MASS INDEX: 21.51 KG/M2 | SYSTOLIC BLOOD PRESSURE: 130 MMHG | HEART RATE: 81 BPM | HEIGHT: 75 IN | TEMPERATURE: 98.6 F | WEIGHT: 173 LBS | OXYGEN SATURATION: 96 % | DIASTOLIC BLOOD PRESSURE: 70 MMHG

## 2023-05-26 DIAGNOSIS — R19.7 DIARRHEA, UNSPECIFIED TYPE: ICD-10-CM

## 2023-05-26 DIAGNOSIS — K86.9 PANCREATIC LESION: ICD-10-CM

## 2023-05-26 DIAGNOSIS — K52.9 COLITIS: Primary | ICD-10-CM

## 2023-05-26 DIAGNOSIS — R10.9 LEFT SIDED ABDOMINAL PAIN: Primary | ICD-10-CM

## 2023-05-26 DIAGNOSIS — R10.84 GENERALIZED ABDOMINAL PAIN: ICD-10-CM

## 2023-05-26 PROBLEM — M17.10 ARTHRITIS OF KNEE: Status: ACTIVE | Noted: 2023-05-26

## 2023-05-26 PROBLEM — S82.009A FRACTURE OF PATELLA: Status: ACTIVE | Noted: 2023-05-26

## 2023-05-26 PROBLEM — R73.01 IMPAIRED FASTING GLUCOSE: Status: ACTIVE | Noted: 2023-05-26

## 2023-05-26 PROBLEM — R26.2 DIFFICULTY WALKING: Status: ACTIVE | Noted: 2019-08-26

## 2023-05-26 PROBLEM — I25.10 ARTERIOSCLEROSIS OF CORONARY ARTERY: Status: ACTIVE | Noted: 2023-05-26

## 2023-05-26 PROBLEM — M06.9 RHEUMATOID ARTHRITIS INVOLVING MULTIPLE JOINTS (HCC): Status: ACTIVE | Noted: 2023-05-26

## 2023-05-26 LAB
ALBUMIN SERPL-MCNC: 4.4 G/DL (ref 3.5–5.2)
ALP SERPL-CCNC: 117 U/L (ref 40–129)
ALT SERPL-CCNC: <5 U/L (ref 0–40)
ANION GAP SERPL CALCULATED.3IONS-SCNC: 9 MMOL/L (ref 7–16)
AST SERPL-CCNC: 18 U/L (ref 0–39)
BACTERIA URNS QL MICRO: ABNORMAL /HPF
BASOPHILS # BLD: 0.03 E9/L (ref 0–0.2)
BASOPHILS NFR BLD: 0.4 % (ref 0–2)
BILIRUB SERPL-MCNC: 0.8 MG/DL (ref 0–1.2)
BILIRUB UR QL STRIP: NEGATIVE
BUN SERPL-MCNC: 29 MG/DL (ref 6–23)
CALCIUM SERPL-MCNC: 9.7 MG/DL (ref 8.6–10.2)
CHLORIDE SERPL-SCNC: 103 MMOL/L (ref 98–107)
CLARITY UR: CLEAR
CO2 SERPL-SCNC: 27 MMOL/L (ref 22–29)
COLOR UR: YELLOW
CREAT SERPL-MCNC: 1.1 MG/DL (ref 0.7–1.2)
EOSINOPHIL # BLD: 0.1 E9/L (ref 0.05–0.5)
EOSINOPHIL NFR BLD: 1.2 % (ref 0–6)
ERYTHROCYTE [DISTWIDTH] IN BLOOD BY AUTOMATED COUNT: 12.6 FL (ref 11.5–15)
GLUCOSE SERPL-MCNC: 108 MG/DL (ref 74–99)
GLUCOSE UR STRIP-MCNC: NEGATIVE MG/DL
HCT VFR BLD AUTO: 34.7 % (ref 37–54)
HGB BLD-MCNC: 11.4 G/DL (ref 12.5–16.5)
HGB UR QL STRIP: NEGATIVE
IMM GRANULOCYTES # BLD: 0.05 E9/L
IMM GRANULOCYTES NFR BLD: 0.6 % (ref 0–5)
KETONES UR STRIP-MCNC: ABNORMAL MG/DL
LACTATE BLDV-SCNC: 0.8 MMOL/L (ref 0.5–2.2)
LEUKOCYTE ESTERASE UR QL STRIP: NEGATIVE
LIPASE: 27 U/L (ref 13–60)
LYMPHOCYTES # BLD: 1.42 E9/L (ref 1.5–4)
LYMPHOCYTES NFR BLD: 17.2 % (ref 20–42)
MCH RBC QN AUTO: 34.3 PG (ref 26–35)
MCHC RBC AUTO-ENTMCNC: 32.9 % (ref 32–34.5)
MCV RBC AUTO: 104.5 FL (ref 80–99.9)
MONOCYTES # BLD: 0.81 E9/L (ref 0.1–0.95)
MONOCYTES NFR BLD: 9.8 % (ref 2–12)
MUCOUS THREADS URNS QL MICRO: PRESENT /LPF
NEUTROPHILS # BLD: 5.86 E9/L (ref 1.8–7.3)
NEUTS SEG NFR BLD: 70.8 % (ref 43–80)
NITRITE UR QL STRIP: NEGATIVE
PH UR STRIP: 5.5 [PH] (ref 5–9)
PLATELET # BLD AUTO: 225 E9/L (ref 130–450)
PMV BLD AUTO: 9.2 FL (ref 7–12)
POTASSIUM SERPL-SCNC: 4.2 MMOL/L (ref 3.5–5)
PROT SERPL-MCNC: 7 G/DL (ref 6.4–8.3)
PROT UR STRIP-MCNC: NEGATIVE MG/DL
RBC # BLD AUTO: 3.32 E12/L (ref 3.8–5.8)
RBC #/AREA URNS HPF: ABNORMAL /HPF (ref 0–2)
SODIUM SERPL-SCNC: 139 MMOL/L (ref 132–146)
SP GR UR STRIP: >=1.03 (ref 1–1.03)
UROBILINOGEN UR STRIP-ACNC: 0.2 E.U./DL
WBC # BLD: 8.3 E9/L (ref 4.5–11.5)
WBC #/AREA URNS HPF: ABNORMAL /HPF (ref 0–5)

## 2023-05-26 PROCEDURE — 81001 URINALYSIS AUTO W/SCOPE: CPT

## 2023-05-26 PROCEDURE — 74177 CT ABD & PELVIS W/CONTRAST: CPT

## 2023-05-26 PROCEDURE — 6370000000 HC RX 637 (ALT 250 FOR IP)

## 2023-05-26 PROCEDURE — 6360000004 HC RX CONTRAST MEDICATION: Performed by: RADIOLOGY

## 2023-05-26 PROCEDURE — 83690 ASSAY OF LIPASE: CPT

## 2023-05-26 PROCEDURE — 99285 EMERGENCY DEPT VISIT HI MDM: CPT

## 2023-05-26 PROCEDURE — 36415 COLL VENOUS BLD VENIPUNCTURE: CPT

## 2023-05-26 PROCEDURE — 80053 COMPREHEN METABOLIC PANEL: CPT

## 2023-05-26 PROCEDURE — 85025 COMPLETE CBC W/AUTO DIFF WBC: CPT

## 2023-05-26 PROCEDURE — 83605 ASSAY OF LACTIC ACID: CPT

## 2023-05-26 RX ORDER — CEFDINIR 300 MG/1
300 CAPSULE ORAL 2 TIMES DAILY
Qty: 20 CAPSULE | Refills: 0 | Status: SHIPPED | OUTPATIENT
Start: 2023-05-26 | End: 2023-06-05

## 2023-05-26 RX ORDER — CARBIDOPA AND LEVODOPA 25; 100 MG/1; MG/1
2 TABLET, EXTENDED RELEASE ORAL ONCE
Status: COMPLETED | OUTPATIENT
Start: 2023-05-26 | End: 2023-05-26

## 2023-05-26 RX ORDER — METRONIDAZOLE 500 MG/1
500 TABLET ORAL 3 TIMES DAILY
Qty: 30 TABLET | Refills: 0 | Status: SHIPPED | OUTPATIENT
Start: 2023-05-26 | End: 2023-06-05

## 2023-05-26 RX ADMIN — CARBIDOPA AND LEVODOPA 2 TABLET: 25; 100 TABLET, EXTENDED RELEASE ORAL at 20:28

## 2023-05-26 RX ADMIN — IOPAMIDOL 75 ML: 755 INJECTION, SOLUTION INTRAVENOUS at 17:46

## 2023-05-26 ASSESSMENT — PAIN - FUNCTIONAL ASSESSMENT: PAIN_FUNCTIONAL_ASSESSMENT: 0-10

## 2023-05-26 NOTE — PROGRESS NOTES
reviewed. Patient on arrival does not appear to be in any apparent distress or discomfort. The patient has been seen and evaluated. The patient does not appear to be toxic or lethargic. We discussed differential diagnosis with the patient and daughter. We will direct the patient to the emergency department for further work-up and assessment with imaging and labs      Clinical Impression:   Chase Wood was seen today for abdominal pain. Diagnoses and all orders for this visit:    Left sided abdominal pain    Diarrhea, unspecified type      go directly to the emergency department.      SIGNATURE: Stefania Whittaker III, PA-C

## 2023-05-26 NOTE — ED PROVIDER NOTES
[facility] ENCOUNTER        Pt Name: Marta Nunes  MRN: [id number]  [place] 11/9/1933  Date of evaluation: 5/26/2023  Provider: Gerardo Montero DO  PCP: Rosie Robles DO  Note Started: 4:11 PM EDT 5/26/23    CHIEF COMPLAINT       Chief Complaint   Patient presents with    Abdominal Pain     Was sent by PCP, abd pain for the last 4 days       HISTORY OF PRESENT ILLNESS: 1 or more Elements   History From: Patient and daughter        Marta Nunes is a 80 y.o. male who presents emergency room with concerns of abdominal pain that patient had for 4 days. Patient also had a month of dysuria with increased frequency. Patient has not had any fevers or chills associated with his pain. He has chills occasionally his urinary symptoms. Patient has no chest pain. Patient states that he has had liquid diarrhea as well. Patient history includes cholecystectomy as well as appendectomy as a child. Patient states he is still able to pass flatus as well as have liquid bowel movements. Review of Systems   Constitutional:  Negative for activity change, appetite change, chills, fatigue and fever. HENT:  Negative for congestion and sore throat. Eyes:  Negative for visual disturbance. Respiratory:  Negative for apnea, cough, chest tightness, shortness of breath and wheezing. Cardiovascular:  Negative for chest pain. Gastrointestinal:  Positive for abdominal pain and diarrhea. Negative for abdominal distention, constipation, nausea and vomiting. Endocrine: Negative for polyuria. Genitourinary:  Positive for dysuria. Negative for decreased urine volume and urgency. Musculoskeletal:  Negative for back pain. Skin:  Negative for rash. Neurological:  Negative for dizziness, weakness, light-headedness, numbness and headaches.        Nursing Notes were all reviewed and agreed with or any disagreements were addressed in the

## 2023-05-27 ASSESSMENT — ENCOUNTER SYMPTOMS
BACK PAIN: 0
SHORTNESS OF BREATH: 0
ABDOMINAL PAIN: 1
APNEA: 0
SORE THROAT: 0
NAUSEA: 0
VOMITING: 0
DIARRHEA: 1
CONSTIPATION: 0
CHEST TIGHTNESS: 0
WHEEZING: 0
COUGH: 0
ABDOMINAL DISTENTION: 0

## 2023-05-27 NOTE — DISCHARGE INSTRUCTIONS
Return to the emergency department at any time. Follow-up with the information provided. Please take your antibiotics as prescribed.

## 2023-05-30 ENCOUNTER — OFFICE VISIT (OUTPATIENT)
Dept: PRIMARY CARE CLINIC | Age: 88
End: 2023-05-30
Payer: MEDICARE

## 2023-05-30 ENCOUNTER — TELEPHONE (OUTPATIENT)
Dept: PRIMARY CARE CLINIC | Age: 88
End: 2023-05-30

## 2023-05-30 VITALS
TEMPERATURE: 98 F | BODY MASS INDEX: 22.12 KG/M2 | DIASTOLIC BLOOD PRESSURE: 82 MMHG | SYSTOLIC BLOOD PRESSURE: 138 MMHG | WEIGHT: 177 LBS

## 2023-05-30 DIAGNOSIS — K86.89 MASS OF PANCREAS: Primary | ICD-10-CM

## 2023-05-30 DIAGNOSIS — M06.9 RHEUMATOID ARTHRITIS INVOLVING MULTIPLE JOINTS (HCC): ICD-10-CM

## 2023-05-30 DIAGNOSIS — E11.9 DIET-CONTROLLED DIABETES MELLITUS (HCC): ICD-10-CM

## 2023-05-30 DIAGNOSIS — I50.22 CHRONIC SYSTOLIC (CONGESTIVE) HEART FAILURE (HCC): ICD-10-CM

## 2023-05-30 DIAGNOSIS — E27.8 ADRENAL NODULE (HCC): ICD-10-CM

## 2023-05-30 PROBLEM — E27.9 ADRENAL NODULE (HCC): Status: ACTIVE | Noted: 2023-05-30

## 2023-05-30 PROCEDURE — 1123F ACP DISCUSS/DSCN MKR DOCD: CPT | Performed by: FAMILY MEDICINE

## 2023-05-30 PROCEDURE — 99214 OFFICE O/P EST MOD 30 MIN: CPT | Performed by: FAMILY MEDICINE

## 2023-05-30 RX ORDER — IPRATROPIUM BROMIDE 42 UG/1
2 SPRAY, METERED NASAL 4 TIMES DAILY
Qty: 15 ML | Refills: 12 | Status: SHIPPED | OUTPATIENT
Start: 2023-05-30

## 2023-05-30 ASSESSMENT — PATIENT HEALTH QUESTIONNAIRE - PHQ9
2. FEELING DOWN, DEPRESSED OR HOPELESS: 0
SUM OF ALL RESPONSES TO PHQ QUESTIONS 1-9: 0
SUM OF ALL RESPONSES TO PHQ QUESTIONS 1-9: 0
SUM OF ALL RESPONSES TO PHQ9 QUESTIONS 1 & 2: 0
SUM OF ALL RESPONSES TO PHQ QUESTIONS 1-9: 0
SUM OF ALL RESPONSES TO PHQ QUESTIONS 1-9: 0
1. LITTLE INTEREST OR PLEASURE IN DOING THINGS: 0

## 2023-05-30 ASSESSMENT — ENCOUNTER SYMPTOMS
ALLERGIC/IMMUNOLOGIC NEGATIVE: 1
ABDOMINAL PAIN: 1
EYES NEGATIVE: 1
RESPIRATORY NEGATIVE: 1

## 2023-05-30 NOTE — PROGRESS NOTES
23  Name: Aria Leonard    : 1933    Sex: male    Age: 80 y.o. Subjective:  Chief Complaint: Patient is here for er folow up     Presents with bonilla from  [facility] for er follow up   bonilla  says then signed out  ama  due to not  want admitted    Daughter in rom is an RN      Review of Systems   Constitutional: Negative. HENT: Negative. Eyes: Negative. Respiratory: Negative. Cardiovascular: Negative. Gastrointestinal:  Positive for abdominal pain. Endocrine: Negative. Genitourinary: Negative. Musculoskeletal: Negative. Skin: Negative. Allergic/Immunologic: Negative. Neurological: Negative. Hematological: Negative. Psychiatric/Behavioral: Negative. Current Outpatient Medications:     ipratropium (ATROVENT) 0.06 % nasal spray, 2 sprays by Each Nostril route 4 times daily, Disp: 15 mL, Rfl: 12    metroNIDAZOLE (FLAGYL) 500 MG tablet, Take 1 tablet by mouth 3 times daily for 10 days, Disp: 30 tablet, Rfl: 0    cefdinir (OMNICEF) 300 MG capsule, Take 1 capsule by mouth 2 times daily for 10 days, Disp: 20 capsule, Rfl: 0    carbidopa-levodopa (SINEMET CR)  MG per extended release tablet, TAKE ONE TABLET BY MOUTH FOUR TIMES A DAY, Disp: 120 tablet, Rfl: 0    levothyroxine (SYNTHROID) 50 MCG tablet, Take 1 tablet by mouth Daily, Disp: 90 tablet, Rfl: 3    b complex vitamins capsule, Take 1 capsule by mouth daily, Disp: , Rfl:     PARoxetine (PAXIL) 20 MG tablet, Take 1 tablet by mouth every morning, Disp: 90 tablet, Rfl: 3    metoprolol succinate (TOPROL XL) 25 MG extended release tablet, Takes 1/2 tablet daily, Disp: 45 tablet, Rfl: 12    midodrine (PROAMATINE) 2.5 MG tablet, Take 1 tablet by mouth in the morning and 1 tablet in the evening. Take with meals. , Disp: 180 tablet, Rfl: 3    omeprazole (PRILOSEC) 20 MG delayed release capsule, Take 1 capsule by mouth in the morning., Disp: 90 capsule, Rfl: 3    acetaminophen (TYLENOL) 500 MG

## 2023-06-12 ENCOUNTER — TELEPHONE (OUTPATIENT)
Dept: PRIMARY CARE CLINIC | Age: 88
End: 2023-06-12

## 2023-06-14 ENCOUNTER — HOSPITAL ENCOUNTER (EMERGENCY)
Age: 88
Discharge: HOME | End: 2023-06-14
Payer: MEDICARE

## 2023-06-14 VITALS
RESPIRATION RATE: 14 BRPM | HEART RATE: 88 BPM | TEMPERATURE: 97.5 F | BODY MASS INDEX: 22.84 KG/M2 | OXYGEN SATURATION: 100 % | WEIGHT: 178 LBS | SYSTOLIC BLOOD PRESSURE: 109 MMHG | DIASTOLIC BLOOD PRESSURE: 62 MMHG | HEIGHT: 74 IN
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
## 2023-06-14 DIAGNOSIS — S51.812A SKIN TEAR OF LEFT FOREARM WITHOUT COMPLICATION, INITIAL ENCOUNTER: ICD-10-CM

## 2023-06-14 DIAGNOSIS — S50.02XA CONTUSION OF LEFT ELBOW, INITIAL ENCOUNTER: ICD-10-CM

## 2023-06-14 PROCEDURE — 99284 EMERGENCY DEPT VISIT MOD MDM: CPT

## 2023-06-14 PROCEDURE — 90714 TD VACC NO PRESV 7 YRS+ IM: CPT | Performed by: PHYSICIAN ASSISTANT

## 2023-06-14 PROCEDURE — 6360000002 HC RX W HCPCS: Performed by: PHYSICIAN ASSISTANT

## 2023-06-14 PROCEDURE — 90471 IMMUNIZATION ADMIN: CPT | Performed by: PHYSICIAN ASSISTANT

## 2023-06-14 RX ORDER — TETANUS AND DIPHTHERIA TOXOIDS ADSORBED 2; 2 [LF]/.5ML; [LF]/.5ML
0.5 INJECTION INTRAMUSCULAR ONCE
Status: COMPLETED | OUTPATIENT
Start: 2023-06-14 | End: 2023-06-14

## 2023-06-14 RX ADMIN — TETANUS AND DIPHTHERIA TOXOIDS ADSORBED 0.5 ML: 2; 2 INJECTION INTRAMUSCULAR at 20:36

## 2023-06-14 ASSESSMENT — PAIN - FUNCTIONAL ASSESSMENT: PAIN_FUNCTIONAL_ASSESSMENT: NONE - DENIES PAIN

## 2023-06-15 NOTE — ED PROVIDER NOTES
Independent [facility] Visit. [facility]  ED  Encounter Note  Admit Date/RoomTime: No admission date for patient encounter. ED Room: Room/bed info not found  NAME: Louise Velasco  : 1933  MRN: [id number]  PCP: Calleen Galeazzi Minotti, DO    CHIEF COMPLAINT     Fall (Toothpick in left lower abd. Denies any other injury. States does not want any CT scans or X-rays)    HISTORY OF PRESENT ILLNESS        Louise Velasco is a 80 y.o. male who presents to the ED by private vehicle for fall with bruising to left elbow and toothpick in left groin/hip, beginning just prior to arrival. The complaint has been persistent and are mild in severity. The patient states that he was leaving today's hot dog after dinner when he tripped over one of the parking blocks. He denies hitting his head and denies loss of consciousness. The patient has bruising and some mild discomfort in the left elbow. He was actually carrying a toothpick at the time of his fall and a portion of it impaled into the skin around his left lower pelvis. He states that they were too concerned to pull it out although they made some attempts. There is been no bleeding at the site. Patient denies any other injuries or concerns. He is ambulatory and walked into the building. REVIEW OF SYSTEMS     Pertinent positives and negatives are stated within HPI, all other systems reviewed and are negative. Past Medical History:  has a past medical history of Anxiety, Ascending aortic aneurysm ([address] BPH (benign prostatic hyperplasia), CAD (coronary artery disease), Cancer ([address] Coronary arteriosclerosis, Depression, DJD (degenerative joint disease), Hyperlipidemia, Impacted cerumen, Lumbar spinal stenosis, Macular degeneration, Osteoarthritis, Parkinson disease ([address] RA (rheumatoid arthritis) ([address] and Sepsis ([address]   Surgical History:  has a past surgical history that includes Coronary artery

## 2023-06-21 ENCOUNTER — OFFICE VISIT (OUTPATIENT)
Dept: HEMATOLOGY | Age: 88
End: 2023-06-21
Payer: MEDICARE

## 2023-06-21 VITALS
RESPIRATION RATE: 16 BRPM | TEMPERATURE: 97.4 F | WEIGHT: 175 LBS | DIASTOLIC BLOOD PRESSURE: 69 MMHG | OXYGEN SATURATION: 96 % | SYSTOLIC BLOOD PRESSURE: 121 MMHG | BODY MASS INDEX: 22.46 KG/M2 | HEIGHT: 74 IN | HEART RATE: 64 BPM

## 2023-06-21 DIAGNOSIS — K86.9 PANCREATIC LESION: ICD-10-CM

## 2023-06-21 DIAGNOSIS — D49.0 IPMN (INTRADUCTAL PAPILLARY MUCINOUS NEOPLASM): Primary | ICD-10-CM

## 2023-06-21 PROCEDURE — 99213 OFFICE O/P EST LOW 20 MIN: CPT | Performed by: TRANSPLANT SURGERY

## 2023-06-21 PROCEDURE — 99204 OFFICE O/P NEW MOD 45 MIN: CPT | Performed by: TRANSPLANT SURGERY

## 2023-06-21 PROCEDURE — 1123F ACP DISCUSS/DSCN MKR DOCD: CPT | Performed by: TRANSPLANT SURGERY

## 2023-06-21 NOTE — PROGRESS NOTES
features are.  - repeat imaging in 1 year    45 Minutes of which greater than 50% was spent counseling or coordinating his care. Thank you Dr. Sveta Gonzalez for the consultation allowing me to take part in Mr. Grimaldo's care.      Eriberto Barnes M.D.  6/21/2023  11:21 AM

## 2023-06-23 ASSESSMENT — ENCOUNTER SYMPTOMS
CONSTIPATION: 0
EYE DISCHARGE: 0
SHORTNESS OF BREATH: 0
ABDOMINAL PAIN: 0
DIARRHEA: 0
VOMITING: 0
PHOTOPHOBIA: 0
BLOOD IN STOOL: 0
EYE PAIN: 0
BACK PAIN: 0
NAUSEA: 0

## 2023-06-28 ENCOUNTER — HOSPITAL ENCOUNTER (INPATIENT)
Age: 88
LOS: 2 days | Discharge: OTHER INSTITUTION | DRG: 871 | End: 2023-07-05
Attending: EMERGENCY MEDICINE | Admitting: INTERNAL MEDICINE
Payer: MEDICARE

## 2023-06-28 ENCOUNTER — APPOINTMENT (OUTPATIENT)
Dept: CT IMAGING | Age: 88
DRG: 871 | End: 2023-06-28
Payer: MEDICARE

## 2023-06-28 ENCOUNTER — APPOINTMENT (OUTPATIENT)
Dept: GENERAL RADIOLOGY | Age: 88
DRG: 871 | End: 2023-06-28
Payer: MEDICARE

## 2023-06-28 DIAGNOSIS — R55 SYNCOPE AND COLLAPSE: Primary | ICD-10-CM

## 2023-06-28 LAB
ANION GAP SERPL CALCULATED.3IONS-SCNC: 9 MMOL/L (ref 7–16)
BILIRUB UR QL STRIP: NEGATIVE
BUN SERPL-MCNC: 24 MG/DL (ref 6–23)
CALCIUM SERPL-MCNC: 9.4 MG/DL (ref 8.6–10.2)
CHLORIDE SERPL-SCNC: 102 MMOL/L (ref 98–107)
CLARITY UR: CLEAR
CO2 SERPL-SCNC: 25 MMOL/L (ref 22–29)
COLOR UR: YELLOW
CREAT SERPL-MCNC: 1.1 MG/DL (ref 0.7–1.2)
ERYTHROCYTE [DISTWIDTH] IN BLOOD BY AUTOMATED COUNT: 12.2 FL (ref 11.5–15)
GLUCOSE SERPL-MCNC: 140 MG/DL (ref 74–99)
GLUCOSE UR STRIP-MCNC: NEGATIVE MG/DL
HCT VFR BLD AUTO: 32.8 % (ref 37–54)
HGB BLD-MCNC: 11.1 G/DL (ref 12.5–16.5)
HGB UR QL STRIP: NEGATIVE
KETONES UR STRIP-MCNC: NEGATIVE MG/DL
LEUKOCYTE ESTERASE UR QL STRIP: NEGATIVE
MCH RBC QN AUTO: 35.1 PG (ref 26–35)
MCHC RBC AUTO-ENTMCNC: 33.8 % (ref 32–34.5)
MCV RBC AUTO: 103.8 FL (ref 80–99.9)
NITRITE UR QL STRIP: NEGATIVE
PH UR STRIP: 6.5 [PH] (ref 5–9)
PLATELET # BLD AUTO: 197 E9/L (ref 130–450)
PMV BLD AUTO: 9.5 FL (ref 7–12)
POTASSIUM SERPL-SCNC: 4.4 MMOL/L (ref 3.5–5)
PROT UR STRIP-MCNC: NEGATIVE MG/DL
RBC # BLD AUTO: 3.16 E12/L (ref 3.8–5.8)
SODIUM SERPL-SCNC: 136 MMOL/L (ref 132–146)
SP GR UR STRIP: 1.01 (ref 1–1.03)
TROPONIN, HIGH SENSITIVITY: 20 NG/L (ref 0–11)
UROBILINOGEN UR STRIP-ACNC: 0.2 E.U./DL
WBC # BLD: 13.4 E9/L (ref 4.5–11.5)

## 2023-06-28 PROCEDURE — 93005 ELECTROCARDIOGRAM TRACING: CPT | Performed by: EMERGENCY MEDICINE

## 2023-06-28 PROCEDURE — 71045 X-RAY EXAM CHEST 1 VIEW: CPT

## 2023-06-28 PROCEDURE — 85027 COMPLETE CBC AUTOMATED: CPT

## 2023-06-28 PROCEDURE — 99285 EMERGENCY DEPT VISIT HI MDM: CPT

## 2023-06-28 PROCEDURE — 80048 BASIC METABOLIC PNL TOTAL CA: CPT

## 2023-06-28 PROCEDURE — 81003 URINALYSIS AUTO W/O SCOPE: CPT

## 2023-06-28 PROCEDURE — 70450 CT HEAD/BRAIN W/O DYE: CPT

## 2023-06-28 PROCEDURE — 84484 ASSAY OF TROPONIN QUANT: CPT

## 2023-06-28 RX ORDER — 0.9 % SODIUM CHLORIDE 0.9 %
1000 INTRAVENOUS SOLUTION INTRAVENOUS ONCE
Status: COMPLETED | OUTPATIENT
Start: 2023-06-29 | End: 2023-06-29

## 2023-06-29 ENCOUNTER — APPOINTMENT (OUTPATIENT)
Dept: GENERAL RADIOLOGY | Age: 88
DRG: 871 | End: 2023-06-29
Payer: MEDICARE

## 2023-06-29 ENCOUNTER — APPOINTMENT (OUTPATIENT)
Dept: CT IMAGING | Age: 88
DRG: 871 | End: 2023-06-29
Payer: MEDICARE

## 2023-06-29 PROBLEM — E43 SEVERE PROTEIN-CALORIE MALNUTRITION (HCC): Status: ACTIVE | Noted: 2023-06-29

## 2023-06-29 PROBLEM — R55 PRE-SYNCOPE: Status: ACTIVE | Noted: 2023-06-29

## 2023-06-29 PROBLEM — I50.40 COMBINED SYSTOLIC AND DIASTOLIC CONGESTIVE HEART FAILURE (HCC): Status: ACTIVE | Noted: 2023-06-29

## 2023-06-29 LAB
ALBUMIN SERPL-MCNC: 3.6 G/DL (ref 3.5–5.2)
ALP SERPL-CCNC: 98 U/L (ref 40–129)
ALT SERPL-CCNC: 10 U/L (ref 0–40)
ANION GAP SERPL CALCULATED.3IONS-SCNC: 7 MMOL/L (ref 7–16)
AST SERPL-CCNC: 15 U/L (ref 0–39)
B PARAP IS1001 DNA NPH QL NAA+NON-PROBE: NOT DETECTED
B PERT.PT PRMT NPH QL NAA+NON-PROBE: NOT DETECTED
BASOPHILS # BLD: 0.04 E9/L (ref 0–0.2)
BASOPHILS NFR BLD: 0.3 % (ref 0–2)
BILIRUB SERPL-MCNC: 0.9 MG/DL (ref 0–1.2)
BUN SERPL-MCNC: 24 MG/DL (ref 6–23)
C PNEUM DNA NPH QL NAA+NON-PROBE: NOT DETECTED
CALCIUM SERPL-MCNC: 9 MG/DL (ref 8.6–10.2)
CHLORIDE SERPL-SCNC: 104 MMOL/L (ref 98–107)
CO2 SERPL-SCNC: 26 MMOL/L (ref 22–29)
CREAT SERPL-MCNC: 1 MG/DL (ref 0.7–1.2)
EKG ATRIAL RATE: 94 BPM
EKG P AXIS: 45 DEGREES
EKG P-R INTERVAL: 190 MS
EKG Q-T INTERVAL: 398 MS
EKG QRS DURATION: 158 MS
EKG QTC CALCULATION (BAZETT): 497 MS
EKG R AXIS: -37 DEGREES
EKG T AXIS: 91 DEGREES
EKG VENTRICULAR RATE: 94 BPM
EOSINOPHIL # BLD: 0.01 E9/L (ref 0.05–0.5)
EOSINOPHIL NFR BLD: 0.1 % (ref 0–6)
ERYTHROCYTE [DISTWIDTH] IN BLOOD BY AUTOMATED COUNT: 12.5 FL (ref 11.5–15)
FLUAV RNA NPH QL NAA+NON-PROBE: NOT DETECTED
FLUBV RNA NPH QL NAA+NON-PROBE: NOT DETECTED
FOLATE SERPL-MCNC: 18.4 NG/ML (ref 4.8–24.2)
GLUCOSE SERPL-MCNC: 133 MG/DL (ref 74–99)
HADV DNA NPH QL NAA+NON-PROBE: NOT DETECTED
HBA1C MFR BLD: 5.6 % (ref 4–5.6)
HCOV 229E RNA NPH QL NAA+NON-PROBE: NOT DETECTED
HCOV HKU1 RNA NPH QL NAA+NON-PROBE: NOT DETECTED
HCOV NL63 RNA NPH QL NAA+NON-PROBE: NOT DETECTED
HCOV OC43 RNA NPH QL NAA+NON-PROBE: NOT DETECTED
HCT VFR BLD AUTO: 31 % (ref 37–54)
HGB BLD-MCNC: 10.4 G/DL (ref 12.5–16.5)
HMPV RNA NPH QL NAA+NON-PROBE: NOT DETECTED
HPIV1 RNA NPH QL NAA+NON-PROBE: NOT DETECTED
HPIV2 RNA NPH QL NAA+NON-PROBE: NOT DETECTED
HPIV3 RNA NPH QL NAA+NON-PROBE: NOT DETECTED
HPIV4 RNA NPH QL NAA+NON-PROBE: NOT DETECTED
IMM GRANULOCYTES # BLD: 0.11 E9/L
IMM GRANULOCYTES NFR BLD: 0.7 % (ref 0–5)
LACTATE BLDV-SCNC: 0.8 MMOL/L (ref 0.5–2.2)
LV EF: 38 %
LVEF MODALITY: NORMAL
LYMPHOCYTES # BLD: 0.66 E9/L (ref 1.5–4)
LYMPHOCYTES NFR BLD: 4.5 % (ref 20–42)
M PNEUMO DNA NPH QL NAA+NON-PROBE: NOT DETECTED
MCH RBC QN AUTO: 34.7 PG (ref 26–35)
MCHC RBC AUTO-ENTMCNC: 33.5 % (ref 32–34.5)
MCV RBC AUTO: 103.3 FL (ref 80–99.9)
MONOCYTES # BLD: 0.86 E9/L (ref 0.1–0.95)
MONOCYTES NFR BLD: 5.9 % (ref 2–12)
NEUTROPHILS # BLD: 12.99 E9/L (ref 1.8–7.3)
NEUTS SEG NFR BLD: 88.5 % (ref 43–80)
PLATELET # BLD AUTO: 192 E9/L (ref 130–450)
PMV BLD AUTO: 9.5 FL (ref 7–12)
POTASSIUM SERPL-SCNC: 4.3 MMOL/L (ref 3.5–5)
PROT SERPL-MCNC: 5.8 G/DL (ref 6.4–8.3)
RBC # BLD AUTO: 3 E12/L (ref 3.8–5.8)
RSV RNA NPH QL NAA+NON-PROBE: NOT DETECTED
RV+EV RNA NPH QL NAA+NON-PROBE: NOT DETECTED
SARS-COV-2 RNA NPH QL NAA+NON-PROBE: NOT DETECTED
SODIUM SERPL-SCNC: 137 MMOL/L (ref 132–146)
TROPONIN, HIGH SENSITIVITY: 33 NG/L (ref 0–11)
TROPONIN, HIGH SENSITIVITY: 37 NG/L (ref 0–11)
TSH SERPL-MCNC: 1.71 UIU/ML (ref 0.27–4.2)
VIT B12 SERPL-MCNC: 395 PG/ML (ref 211–946)
WBC # BLD: 14.7 E9/L (ref 4.5–11.5)

## 2023-06-29 PROCEDURE — 6370000000 HC RX 637 (ALT 250 FOR IP)

## 2023-06-29 PROCEDURE — 84145 PROCALCITONIN (PCT): CPT

## 2023-06-29 PROCEDURE — 83605 ASSAY OF LACTIC ACID: CPT

## 2023-06-29 PROCEDURE — 71045 X-RAY EXAM CHEST 1 VIEW: CPT

## 2023-06-29 PROCEDURE — 2700000000 HC OXYGEN THERAPY PER DAY

## 2023-06-29 PROCEDURE — 36415 COLL VENOUS BLD VENIPUNCTURE: CPT

## 2023-06-29 PROCEDURE — 99233 SBSQ HOSP IP/OBS HIGH 50: CPT | Performed by: INTERNAL MEDICINE

## 2023-06-29 PROCEDURE — 0202U NFCT DS 22 TRGT SARS-COV-2: CPT

## 2023-06-29 PROCEDURE — 2580000003 HC RX 258

## 2023-06-29 PROCEDURE — 85025 COMPLETE CBC W/AUTO DIFF WBC: CPT

## 2023-06-29 PROCEDURE — 74177 CT ABD & PELVIS W/CONTRAST: CPT

## 2023-06-29 PROCEDURE — 83036 HEMOGLOBIN GLYCOSYLATED A1C: CPT

## 2023-06-29 PROCEDURE — 6360000004 HC RX CONTRAST MEDICATION: Performed by: RADIOLOGY

## 2023-06-29 PROCEDURE — 96361 HYDRATE IV INFUSION ADD-ON: CPT

## 2023-06-29 PROCEDURE — 82607 VITAMIN B-12: CPT

## 2023-06-29 PROCEDURE — 2580000003 HC RX 258: Performed by: INTERNAL MEDICINE

## 2023-06-29 PROCEDURE — 87077 CULTURE AEROBIC IDENTIFY: CPT

## 2023-06-29 PROCEDURE — G0378 HOSPITAL OBSERVATION PER HR: HCPCS

## 2023-06-29 PROCEDURE — 80053 COMPREHEN METABOLIC PANEL: CPT

## 2023-06-29 PROCEDURE — 82746 ASSAY OF FOLIC ACID SERUM: CPT

## 2023-06-29 PROCEDURE — 6360000002 HC RX W HCPCS

## 2023-06-29 PROCEDURE — APPSS45 APP SPLIT SHARED TIME 31-45 MINUTES

## 2023-06-29 PROCEDURE — 6370000000 HC RX 637 (ALT 250 FOR IP): Performed by: INTERNAL MEDICINE

## 2023-06-29 PROCEDURE — 87040 BLOOD CULTURE FOR BACTERIA: CPT

## 2023-06-29 PROCEDURE — 87186 SC STD MICRODIL/AGAR DIL: CPT

## 2023-06-29 PROCEDURE — 96372 THER/PROPH/DIAG INJ SC/IM: CPT

## 2023-06-29 PROCEDURE — 96367 TX/PROPH/DG ADDL SEQ IV INF: CPT

## 2023-06-29 PROCEDURE — 93010 ELECTROCARDIOGRAM REPORT: CPT | Performed by: INTERNAL MEDICINE

## 2023-06-29 PROCEDURE — 2580000003 HC RX 258: Performed by: EMERGENCY MEDICINE

## 2023-06-29 PROCEDURE — 6360000002 HC RX W HCPCS: Performed by: INTERNAL MEDICINE

## 2023-06-29 PROCEDURE — 84443 ASSAY THYROID STIM HORMONE: CPT

## 2023-06-29 PROCEDURE — 84484 ASSAY OF TROPONIN QUANT: CPT

## 2023-06-29 PROCEDURE — 96365 THER/PROPH/DIAG IV INF INIT: CPT

## 2023-06-29 PROCEDURE — 87150 DNA/RNA AMPLIFIED PROBE: CPT

## 2023-06-29 PROCEDURE — 93306 TTE W/DOPPLER COMPLETE: CPT

## 2023-06-29 RX ORDER — LEUCOVORIN CALCIUM 5 MG/1
5 TABLET ORAL
COMMUNITY

## 2023-06-29 RX ORDER — SODIUM CHLORIDE 0.9 % (FLUSH) 0.9 %
5-40 SYRINGE (ML) INJECTION PRN
Status: DISCONTINUED | OUTPATIENT
Start: 2023-06-29 | End: 2023-07-05 | Stop reason: HOSPADM

## 2023-06-29 RX ORDER — LEVOTHYROXINE SODIUM 0.05 MG/1
50 TABLET ORAL DAILY
Status: DISCONTINUED | OUTPATIENT
Start: 2023-06-29 | End: 2023-07-05 | Stop reason: HOSPADM

## 2023-06-29 RX ORDER — PANTOPRAZOLE SODIUM 40 MG/1
40 TABLET, DELAYED RELEASE ORAL
Status: DISCONTINUED | OUTPATIENT
Start: 2023-06-29 | End: 2023-07-05 | Stop reason: HOSPADM

## 2023-06-29 RX ORDER — ACETAMINOPHEN 650 MG/1
650 SUPPOSITORY RECTAL EVERY 6 HOURS PRN
Status: DISCONTINUED | OUTPATIENT
Start: 2023-06-29 | End: 2023-07-05 | Stop reason: HOSPADM

## 2023-06-29 RX ORDER — CARBIDOPA AND LEVODOPA 25; 100 MG/1; MG/1
2 TABLET, EXTENDED RELEASE ORAL 4 TIMES DAILY
Status: DISCONTINUED | OUTPATIENT
Start: 2023-06-29 | End: 2023-07-05 | Stop reason: HOSPADM

## 2023-06-29 RX ORDER — ONDANSETRON 2 MG/ML
4 INJECTION INTRAMUSCULAR; INTRAVENOUS EVERY 6 HOURS PRN
Status: DISCONTINUED | OUTPATIENT
Start: 2023-06-29 | End: 2023-07-05 | Stop reason: HOSPADM

## 2023-06-29 RX ORDER — NIACINAMIDE 500 MG
500 TABLET ORAL
COMMUNITY

## 2023-06-29 RX ORDER — ASPIRIN 81 MG/1
81 TABLET, CHEWABLE ORAL DAILY
Status: DISCONTINUED | OUTPATIENT
Start: 2023-06-29 | End: 2023-07-05 | Stop reason: HOSPADM

## 2023-06-29 RX ORDER — ONDANSETRON 4 MG/1
4 TABLET, ORALLY DISINTEGRATING ORAL EVERY 8 HOURS PRN
Status: DISCONTINUED | OUTPATIENT
Start: 2023-06-29 | End: 2023-07-05 | Stop reason: HOSPADM

## 2023-06-29 RX ORDER — LEUCOVORIN CALCIUM 5 MG/1
5 TABLET ORAL
Status: DISCONTINUED | OUTPATIENT
Start: 2023-06-29 | End: 2023-07-05 | Stop reason: HOSPADM

## 2023-06-29 RX ORDER — SODIUM CHLORIDE 9 MG/ML
INJECTION, SOLUTION INTRAVENOUS CONTINUOUS
Status: DISCONTINUED | OUTPATIENT
Start: 2023-06-29 | End: 2023-07-03

## 2023-06-29 RX ORDER — POLYETHYLENE GLYCOL 3350 17 G/17G
17 POWDER, FOR SOLUTION ORAL DAILY PRN
Status: DISCONTINUED | OUTPATIENT
Start: 2023-06-29 | End: 2023-07-05 | Stop reason: HOSPADM

## 2023-06-29 RX ORDER — SODIUM CHLORIDE 9 MG/ML
INJECTION, SOLUTION INTRAVENOUS PRN
Status: DISCONTINUED | OUTPATIENT
Start: 2023-06-29 | End: 2023-07-05 | Stop reason: HOSPADM

## 2023-06-29 RX ORDER — PAROXETINE HYDROCHLORIDE 20 MG/1
20 TABLET, FILM COATED ORAL EVERY MORNING
Status: DISCONTINUED | OUTPATIENT
Start: 2023-06-29 | End: 2023-07-05 | Stop reason: HOSPADM

## 2023-06-29 RX ORDER — SODIUM CHLORIDE 0.9 % (FLUSH) 0.9 %
5-40 SYRINGE (ML) INJECTION EVERY 12 HOURS SCHEDULED
Status: DISCONTINUED | OUTPATIENT
Start: 2023-06-29 | End: 2023-07-05 | Stop reason: HOSPADM

## 2023-06-29 RX ORDER — MIDODRINE HYDROCHLORIDE 5 MG/1
2.5 TABLET ORAL 2 TIMES DAILY WITH MEALS
Status: DISCONTINUED | OUTPATIENT
Start: 2023-06-29 | End: 2023-07-05 | Stop reason: HOSPADM

## 2023-06-29 RX ORDER — ACETAMINOPHEN 325 MG/1
650 TABLET ORAL EVERY 6 HOURS PRN
Status: DISCONTINUED | OUTPATIENT
Start: 2023-06-29 | End: 2023-07-05 | Stop reason: HOSPADM

## 2023-06-29 RX ORDER — ENOXAPARIN SODIUM 100 MG/ML
40 INJECTION SUBCUTANEOUS DAILY
Status: DISCONTINUED | OUTPATIENT
Start: 2023-06-29 | End: 2023-07-05 | Stop reason: HOSPADM

## 2023-06-29 RX ORDER — LANOLIN ALCOHOL/MO/W.PET/CERES
500 CREAM (GRAM) TOPICAL
Status: DISCONTINUED | OUTPATIENT
Start: 2023-06-29 | End: 2023-07-05 | Stop reason: HOSPADM

## 2023-06-29 RX ADMIN — Medication 500 MG: at 17:53

## 2023-06-29 RX ADMIN — PAROXETINE HYDROCHLORIDE 20 MG: 20 TABLET, FILM COATED ORAL at 09:09

## 2023-06-29 RX ADMIN — SODIUM CHLORIDE: 9 INJECTION, SOLUTION INTRAVENOUS at 20:10

## 2023-06-29 RX ADMIN — SODIUM CHLORIDE 1000 ML: 9 INJECTION, SOLUTION INTRAVENOUS at 01:05

## 2023-06-29 RX ADMIN — ENOXAPARIN SODIUM 40 MG: 100 INJECTION SUBCUTANEOUS at 09:10

## 2023-06-29 RX ADMIN — CARBIDOPA AND LEVODOPA 2 TABLET: 25; 100 TABLET, EXTENDED RELEASE ORAL at 09:09

## 2023-06-29 RX ADMIN — Medication 10 ML: at 21:26

## 2023-06-29 RX ADMIN — CARBIDOPA AND LEVODOPA 2 TABLET: 25; 100 TABLET, EXTENDED RELEASE ORAL at 13:27

## 2023-06-29 RX ADMIN — CEFEPIME 2000 MG: 2 INJECTION, POWDER, FOR SOLUTION INTRAVENOUS at 22:14

## 2023-06-29 RX ADMIN — SODIUM CHLORIDE: 9 INJECTION, SOLUTION INTRAVENOUS at 11:19

## 2023-06-29 RX ADMIN — METHOTREXATE 7.5 MG: 2.5 TABLET ORAL at 11:19

## 2023-06-29 RX ADMIN — ASPIRIN 81 MG CHEWABLE TABLET 81 MG: 81 TABLET CHEWABLE at 09:09

## 2023-06-29 RX ADMIN — MIDODRINE HYDROCHLORIDE 2.5 MG: 5 TABLET ORAL at 09:09

## 2023-06-29 RX ADMIN — IOPAMIDOL 75 ML: 755 INJECTION, SOLUTION INTRAVENOUS at 00:30

## 2023-06-29 RX ADMIN — CARBIDOPA AND LEVODOPA 2 TABLET: 25; 100 TABLET, EXTENDED RELEASE ORAL at 17:53

## 2023-06-29 RX ADMIN — LEVOTHYROXINE SODIUM 50 MCG: 0.05 TABLET ORAL at 06:58

## 2023-06-29 RX ADMIN — PANTOPRAZOLE SODIUM 40 MG: 40 TABLET, DELAYED RELEASE ORAL at 06:58

## 2023-06-29 RX ADMIN — Medication 10 ML: at 09:12

## 2023-06-29 RX ADMIN — CARBIDOPA AND LEVODOPA 2 TABLET: 25; 100 TABLET, EXTENDED RELEASE ORAL at 20:23

## 2023-06-29 RX ADMIN — MIDODRINE HYDROCHLORIDE 2.5 MG: 5 TABLET ORAL at 17:53

## 2023-06-29 ASSESSMENT — LIFESTYLE VARIABLES
HOW OFTEN DO YOU HAVE A DRINK CONTAINING ALCOHOL: NEVER
HOW MANY STANDARD DRINKS CONTAINING ALCOHOL DO YOU HAVE ON A TYPICAL DAY: PATIENT DOES NOT DRINK

## 2023-06-30 LAB
ALBUMIN SERPL-MCNC: 3 G/DL (ref 3.5–5.2)
ALP SERPL-CCNC: 88 U/L (ref 40–129)
ALT SERPL-CCNC: <5 U/L (ref 0–40)
ANION GAP SERPL CALCULATED.3IONS-SCNC: 8 MMOL/L (ref 7–16)
AST SERPL-CCNC: 22 U/L (ref 0–39)
BACTERIA URNS QL MICRO: ABNORMAL /HPF
BASOPHILS # BLD: 0.02 E9/L (ref 0–0.2)
BASOPHILS NFR BLD: 0.1 % (ref 0–2)
BILIRUB SERPL-MCNC: 1.2 MG/DL (ref 0–1.2)
BILIRUB UR QL STRIP: NEGATIVE
BUN SERPL-MCNC: 23 MG/DL (ref 6–23)
CALCIUM SERPL-MCNC: 8.6 MG/DL (ref 8.6–10.2)
CHLORIDE SERPL-SCNC: 101 MMOL/L (ref 98–107)
CLARITY UR: CLEAR
CO2 SERPL-SCNC: 23 MMOL/L (ref 22–29)
COLOR UR: YELLOW
CREAT SERPL-MCNC: 1 MG/DL (ref 0.7–1.2)
EOSINOPHIL # BLD: 0 E9/L (ref 0.05–0.5)
EOSINOPHIL NFR BLD: 0 % (ref 0–6)
ERYTHROCYTE [DISTWIDTH] IN BLOOD BY AUTOMATED COUNT: 12.3 FL (ref 11.5–15)
GLUCOSE SERPL-MCNC: 127 MG/DL (ref 74–99)
GLUCOSE UR STRIP-MCNC: NEGATIVE MG/DL
HCT VFR BLD AUTO: 29.2 % (ref 37–54)
HGB BLD-MCNC: 9.8 G/DL (ref 12.5–16.5)
HGB UR QL STRIP: NEGATIVE
IMM GRANULOCYTES # BLD: 0.14 E9/L
IMM GRANULOCYTES NFR BLD: 0.9 % (ref 0–5)
KETONES UR STRIP-MCNC: ABNORMAL MG/DL
LEUKOCYTE ESTERASE UR QL STRIP: ABNORMAL
LYMPHOCYTES # BLD: 0.61 E9/L (ref 1.5–4)
LYMPHOCYTES NFR BLD: 3.7 % (ref 20–42)
MCH RBC QN AUTO: 34.9 PG (ref 26–35)
MCHC RBC AUTO-ENTMCNC: 33.6 % (ref 32–34.5)
MCV RBC AUTO: 103.9 FL (ref 80–99.9)
MONOCYTES # BLD: 0.84 E9/L (ref 0.1–0.95)
MONOCYTES NFR BLD: 5.2 % (ref 2–12)
NEUTROPHILS # BLD: 14.67 E9/L (ref 1.8–7.3)
NEUTS SEG NFR BLD: 90.1 % (ref 43–80)
NITRITE UR QL STRIP: POSITIVE
PH UR STRIP: 6 [PH] (ref 5–9)
PLATELET # BLD AUTO: 163 E9/L (ref 130–450)
PMV BLD AUTO: 9.7 FL (ref 7–12)
POTASSIUM SERPL-SCNC: 3.8 MMOL/L (ref 3.5–5)
PROCALCITONIN: 1 NG/ML (ref 0–0.08)
PROT SERPL-MCNC: 5.5 G/DL (ref 6.4–8.3)
PROT UR STRIP-MCNC: NEGATIVE MG/DL
RBC # BLD AUTO: 2.81 E12/L (ref 3.8–5.8)
RBC #/AREA URNS HPF: ABNORMAL /HPF (ref 0–2)
SODIUM SERPL-SCNC: 132 MMOL/L (ref 132–146)
SP GR UR STRIP: 1.02 (ref 1–1.03)
UROBILINOGEN UR STRIP-ACNC: 0.2 E.U./DL
WBC # BLD: 16.3 E9/L (ref 4.5–11.5)
WBC #/AREA URNS HPF: ABNORMAL /HPF (ref 0–5)
YEAST URNS QL MICRO: PRESENT /HPF

## 2023-06-30 PROCEDURE — 92610 EVALUATE SWALLOWING FUNCTION: CPT | Performed by: SPEECH-LANGUAGE PATHOLOGIST

## 2023-06-30 PROCEDURE — 80053 COMPREHEN METABOLIC PANEL: CPT

## 2023-06-30 PROCEDURE — 92526 ORAL FUNCTION THERAPY: CPT | Performed by: SPEECH-LANGUAGE PATHOLOGIST

## 2023-06-30 PROCEDURE — 99233 SBSQ HOSP IP/OBS HIGH 50: CPT | Performed by: INTERNAL MEDICINE

## 2023-06-30 PROCEDURE — 97535 SELF CARE MNGMENT TRAINING: CPT

## 2023-06-30 PROCEDURE — 85025 COMPLETE CBC W/AUTO DIFF WBC: CPT

## 2023-06-30 PROCEDURE — 81001 URINALYSIS AUTO W/SCOPE: CPT

## 2023-06-30 PROCEDURE — 97165 OT EVAL LOW COMPLEX 30 MIN: CPT

## 2023-06-30 PROCEDURE — 96368 THER/DIAG CONCURRENT INF: CPT

## 2023-06-30 PROCEDURE — 2500000003 HC RX 250 WO HCPCS: Performed by: INTERNAL MEDICINE

## 2023-06-30 PROCEDURE — 97161 PT EVAL LOW COMPLEX 20 MIN: CPT

## 2023-06-30 PROCEDURE — 6370000000 HC RX 637 (ALT 250 FOR IP)

## 2023-06-30 PROCEDURE — 2700000000 HC OXYGEN THERAPY PER DAY

## 2023-06-30 PROCEDURE — 2580000003 HC RX 258

## 2023-06-30 PROCEDURE — 6370000000 HC RX 637 (ALT 250 FOR IP): Performed by: INTERNAL MEDICINE

## 2023-06-30 PROCEDURE — 96361 HYDRATE IV INFUSION ADD-ON: CPT

## 2023-06-30 PROCEDURE — 97530 THERAPEUTIC ACTIVITIES: CPT

## 2023-06-30 PROCEDURE — 36415 COLL VENOUS BLD VENIPUNCTURE: CPT

## 2023-06-30 PROCEDURE — 96366 THER/PROPH/DIAG IV INF ADDON: CPT

## 2023-06-30 PROCEDURE — 96372 THER/PROPH/DIAG INJ SC/IM: CPT

## 2023-06-30 PROCEDURE — 6360000002 HC RX W HCPCS

## 2023-06-30 PROCEDURE — 2580000003 HC RX 258: Performed by: INTERNAL MEDICINE

## 2023-06-30 PROCEDURE — 6360000002 HC RX W HCPCS: Performed by: INTERNAL MEDICINE

## 2023-06-30 PROCEDURE — G0378 HOSPITAL OBSERVATION PER HR: HCPCS

## 2023-06-30 PROCEDURE — 96367 TX/PROPH/DG ADDL SEQ IV INF: CPT

## 2023-06-30 RX ORDER — LEVOFLOXACIN 5 MG/ML
750 INJECTION, SOLUTION INTRAVENOUS EVERY 24 HOURS
Status: DISCONTINUED | OUTPATIENT
Start: 2023-06-30 | End: 2023-07-02

## 2023-06-30 RX ADMIN — Medication 500 MG: at 16:24

## 2023-06-30 RX ADMIN — CARBIDOPA AND LEVODOPA 2 TABLET: 25; 100 TABLET, EXTENDED RELEASE ORAL at 16:25

## 2023-06-30 RX ADMIN — ASPIRIN 81 MG CHEWABLE TABLET 81 MG: 81 TABLET CHEWABLE at 08:38

## 2023-06-30 RX ADMIN — DOXYCYCLINE 100 MG: 100 INJECTION, POWDER, LYOPHILIZED, FOR SOLUTION INTRAVENOUS at 09:24

## 2023-06-30 RX ADMIN — SODIUM CHLORIDE: 9 INJECTION, SOLUTION INTRAVENOUS at 05:16

## 2023-06-30 RX ADMIN — LEVOTHYROXINE SODIUM 50 MCG: 0.05 TABLET ORAL at 05:14

## 2023-06-30 RX ADMIN — PANTOPRAZOLE SODIUM 40 MG: 40 TABLET, DELAYED RELEASE ORAL at 05:14

## 2023-06-30 RX ADMIN — DOXYCYCLINE 100 MG: 100 INJECTION, POWDER, LYOPHILIZED, FOR SOLUTION INTRAVENOUS at 20:56

## 2023-06-30 RX ADMIN — LEVOFLOXACIN 750 MG: 5 INJECTION, SOLUTION INTRAVENOUS at 10:21

## 2023-06-30 RX ADMIN — SODIUM CHLORIDE: 9 INJECTION, SOLUTION INTRAVENOUS at 14:50

## 2023-06-30 RX ADMIN — ACETAMINOPHEN 650 MG: 325 TABLET ORAL at 22:20

## 2023-06-30 RX ADMIN — PETROLATUM: 420 OINTMENT TOPICAL at 13:20

## 2023-06-30 RX ADMIN — VANCOMYCIN HYDROCHLORIDE 1750 MG: 5 INJECTION, POWDER, LYOPHILIZED, FOR SOLUTION INTRAVENOUS at 01:03

## 2023-06-30 RX ADMIN — ENOXAPARIN SODIUM 40 MG: 100 INJECTION SUBCUTANEOUS at 08:38

## 2023-06-30 RX ADMIN — ACETAMINOPHEN 650 MG: 325 TABLET ORAL at 14:40

## 2023-06-30 RX ADMIN — CARBIDOPA AND LEVODOPA 2 TABLET: 25; 100 TABLET, EXTENDED RELEASE ORAL at 13:19

## 2023-06-30 RX ADMIN — MIDODRINE HYDROCHLORIDE 2.5 MG: 5 TABLET ORAL at 08:38

## 2023-06-30 RX ADMIN — MIDODRINE HYDROCHLORIDE 2.5 MG: 5 TABLET ORAL at 16:25

## 2023-06-30 RX ADMIN — CARBIDOPA AND LEVODOPA 2 TABLET: 25; 100 TABLET, EXTENDED RELEASE ORAL at 20:49

## 2023-06-30 RX ADMIN — CARBIDOPA AND LEVODOPA 2 TABLET: 25; 100 TABLET, EXTENDED RELEASE ORAL at 08:38

## 2023-06-30 RX ADMIN — PAROXETINE HYDROCHLORIDE 20 MG: 20 TABLET, FILM COATED ORAL at 08:38

## 2023-06-30 RX ADMIN — PETROLATUM: 420 OINTMENT TOPICAL at 20:56

## 2023-06-30 ASSESSMENT — PAIN DESCRIPTION - DESCRIPTORS
DESCRIPTORS: ACHING;DISCOMFORT
DESCRIPTORS: ACHING;DISCOMFORT

## 2023-06-30 ASSESSMENT — PAIN DESCRIPTION - LOCATION
LOCATION: BACK
LOCATION: BACK;SHOULDER

## 2023-06-30 ASSESSMENT — PAIN - FUNCTIONAL ASSESSMENT: PAIN_FUNCTIONAL_ASSESSMENT: ACTIVITIES ARE NOT PREVENTED

## 2023-06-30 ASSESSMENT — PAIN DESCRIPTION - PAIN TYPE: TYPE: ACUTE PAIN

## 2023-06-30 ASSESSMENT — PAIN SCALES - GENERAL
PAINLEVEL_OUTOF10: 3
PAINLEVEL_OUTOF10: 3

## 2023-07-01 LAB
A BAUMANNII DNA BLD POS QL NAA+NON-PROBE: NOT DETECTED
BASOPHILS # BLD: 0.01 E9/L (ref 0–0.2)
BASOPHILS NFR BLD: 0.1 % (ref 0–2)
BLACTX-M ISLT/SPM QL: NOT DETECTED
BLAIMP ISLT/SPM QL: NOT DETECTED
BLAKPC ISLT/SPM QL: NOT DETECTED
BLAOXA-48 ISLT/SPM QL: NOT DETECTED
BLAVIM ISLT/SPM QL: NOT DETECTED
BOTTLE TYPE: ABNORMAL
BURR CELLS: ABNORMAL
C ALBICANS DNA BLD POS QL NAA+NON-PROBE: NOT DETECTED
C AURIS DNA BLD POS QL NAA+PROBE: NOT DETECTED
C GLABRATA DNA BLD POS QL NAA+NON-PROBE: NOT DETECTED
C KRUSEI DNA BLD POS QL NAA+NON-PROBE: NOT DETECTED
C PARAP DNA BLD POS QL NAA+NON-PROBE: NOT DETECTED
C TROPICLS DNA BLD POS QL NAA+NON-PROBE: NOT DETECTED
CARBAPENEM RESISTANCE NDM GENE BY PCR: NOT DETECTED
CRYPTOCOCCUS NEOFORMANS/GATTII BY PCR: NOT DETECTED
E CLOAC COMP DNA BLD POS NAA+NON-PROBE: NOT DETECTED
E COLI DNA BLD POS QL NAA+NON-PROBE: NOT DETECTED
E FAECALIS DNA BLD POS QL NAA+PROBE: NOT DETECTED
E FAECIUM DNA BLD POS QL NAA+PROBE: NOT DETECTED
ENTEROBACT DNA BLD POS QL NAA+NON-PROBE: DETECTED
ENTEROCOC DNA BLD POS QL NAA+NON-PROBE: NOT DETECTED
EOSINOPHIL # BLD: 0.01 E9/L (ref 0.05–0.5)
EOSINOPHIL NFR BLD: 0.1 % (ref 0–6)
ERYTHROCYTE [DISTWIDTH] IN BLOOD BY AUTOMATED COUNT: 12.1 FL (ref 11.5–15)
GN BLD CULTURE PNL BLD POS NAA+PROBE: NOT DETECTED
HCT VFR BLD AUTO: 28.9 % (ref 37–54)
HGB BLD-MCNC: 9.8 G/DL (ref 12.5–16.5)
IMM GRANULOCYTES # BLD: 0.05 E9/L
IMM GRANULOCYTES NFR BLD: 0.6 % (ref 0–5)
K OXYTOCA DNA BLD POS QL NAA+NON-PROBE: NOT DETECTED
K PNEUMON DNA SPEC QL NAA+PROBE: NOT DETECTED
K. AEROGENES DNA SPEC QL NAA+PROBE: NOT DETECTED
L MONOCYTOG DNA BLD POS QL NAA+NON-PROBE: NOT DETECTED
LYMPHOCYTES # BLD: 0.41 E9/L (ref 1.5–4)
LYMPHOCYTES NFR BLD: 4.8 % (ref 20–42)
MCH RBC QN AUTO: 35 PG (ref 26–35)
MCHC RBC AUTO-ENTMCNC: 33.9 % (ref 32–34.5)
MCV RBC AUTO: 103.2 FL (ref 80–99.9)
MONOCYTES # BLD: 0.24 E9/L (ref 0.1–0.95)
MONOCYTES NFR BLD: 2.8 % (ref 2–12)
N MEN DNA BLD POS QL NAA+NON-PROBE: NOT DETECTED
NEUTROPHILS # BLD: 7.9 E9/L (ref 1.8–7.3)
NEUTS SEG NFR BLD: 91.6 % (ref 43–80)
ORDER NUMBER: ABNORMAL
OVALOCYTES: ABNORMAL
P AERUGINOSA DNA BLD POS NAA+NON-PROBE: NOT DETECTED
PLATELET # BLD AUTO: 146 E9/L (ref 130–450)
PMV BLD AUTO: 10 FL (ref 7–12)
POIKILOCYTES: ABNORMAL
PROTEUS SP DNA BLD POS QL NAA+NON-PROBE: NOT DETECTED
RBC # BLD AUTO: 2.8 E12/L (ref 3.8–5.8)
S AUREUS DNA BLD POS QL NAA+NON-PROBE: NOT DETECTED
S AUREUS+CONS DNA BLD POS NAA+NON-PROBE: NOT DETECTED
S EPIDERMIDIS DNA BLD POS QL NAA+PROBE: NOT DETECTED
S LUGDUNENSIS DNA BLD POS QL NAA+PROBE: NOT DETECTED
S MALTOPH DNA BLD POS QL NAA+PROBE: NOT DETECTED
S MARCESCENS DNA BLD POS NAA+NON-PROBE: NOT DETECTED
S PNEUM DNA BLD POS QL NAA+NON-PROBE: NOT DETECTED
S PYO DNA BLD POS QL NAA+NON-PROBE: NOT DETECTED
SALMONELLA DNA BLD POS QL NAA+PROBE: NOT DETECTED
SOURCE OF BLOOD CULTURE: ABNORMAL
STREPTOCOCCUS AGALACTIAE BY PCR: NOT DETECTED
STREPTOCOCCUS DNA BLD POS NAA+NON-PROBE: NOT DETECTED
WBC # BLD: 8.6 E9/L (ref 4.5–11.5)

## 2023-07-01 PROCEDURE — 2580000003 HC RX 258: Performed by: INTERNAL MEDICINE

## 2023-07-01 PROCEDURE — 6360000002 HC RX W HCPCS: Performed by: INTERNAL MEDICINE

## 2023-07-01 PROCEDURE — 36415 COLL VENOUS BLD VENIPUNCTURE: CPT

## 2023-07-01 PROCEDURE — 85025 COMPLETE CBC W/AUTO DIFF WBC: CPT

## 2023-07-01 PROCEDURE — 2700000000 HC OXYGEN THERAPY PER DAY

## 2023-07-01 PROCEDURE — 6370000000 HC RX 637 (ALT 250 FOR IP): Performed by: INTERNAL MEDICINE

## 2023-07-01 PROCEDURE — 6360000002 HC RX W HCPCS

## 2023-07-01 PROCEDURE — 99233 SBSQ HOSP IP/OBS HIGH 50: CPT | Performed by: INTERNAL MEDICINE

## 2023-07-01 PROCEDURE — 96361 HYDRATE IV INFUSION ADD-ON: CPT

## 2023-07-01 PROCEDURE — 96372 THER/PROPH/DIAG INJ SC/IM: CPT

## 2023-07-01 PROCEDURE — 6370000000 HC RX 637 (ALT 250 FOR IP)

## 2023-07-01 PROCEDURE — 96366 THER/PROPH/DIAG IV INF ADDON: CPT

## 2023-07-01 PROCEDURE — G0378 HOSPITAL OBSERVATION PER HR: HCPCS

## 2023-07-01 RX ADMIN — LEVOTHYROXINE SODIUM 50 MCG: 0.05 TABLET ORAL at 05:34

## 2023-07-01 RX ADMIN — PANTOPRAZOLE SODIUM 40 MG: 40 TABLET, DELAYED RELEASE ORAL at 05:34

## 2023-07-01 RX ADMIN — PETROLATUM: 420 OINTMENT TOPICAL at 23:03

## 2023-07-01 RX ADMIN — CARBIDOPA AND LEVODOPA 2 TABLET: 25; 100 TABLET, EXTENDED RELEASE ORAL at 09:40

## 2023-07-01 RX ADMIN — SODIUM CHLORIDE: 9 INJECTION, SOLUTION INTRAVENOUS at 00:50

## 2023-07-01 RX ADMIN — CARBIDOPA AND LEVODOPA 2 TABLET: 25; 100 TABLET, EXTENDED RELEASE ORAL at 23:01

## 2023-07-01 RX ADMIN — PAROXETINE HYDROCHLORIDE 20 MG: 20 TABLET, FILM COATED ORAL at 09:39

## 2023-07-01 RX ADMIN — SODIUM CHLORIDE: 9 INJECTION, SOLUTION INTRAVENOUS at 09:53

## 2023-07-01 RX ADMIN — ACETAMINOPHEN 650 MG: 325 TABLET ORAL at 05:34

## 2023-07-01 RX ADMIN — ENOXAPARIN SODIUM 40 MG: 100 INJECTION SUBCUTANEOUS at 09:40

## 2023-07-01 RX ADMIN — LEVOFLOXACIN 750 MG: 5 INJECTION, SOLUTION INTRAVENOUS at 09:38

## 2023-07-01 RX ADMIN — PETROLATUM: 420 OINTMENT TOPICAL at 09:39

## 2023-07-01 RX ADMIN — CARBIDOPA AND LEVODOPA 2 TABLET: 25; 100 TABLET, EXTENDED RELEASE ORAL at 13:01

## 2023-07-01 RX ADMIN — CARBIDOPA AND LEVODOPA 2 TABLET: 25; 100 TABLET, EXTENDED RELEASE ORAL at 17:15

## 2023-07-01 RX ADMIN — MIDODRINE HYDROCHLORIDE 2.5 MG: 5 TABLET ORAL at 09:39

## 2023-07-01 RX ADMIN — MIDODRINE HYDROCHLORIDE 2.5 MG: 5 TABLET ORAL at 17:14

## 2023-07-01 RX ADMIN — Medication 500 MG: at 17:15

## 2023-07-01 RX ADMIN — ASPIRIN 81 MG CHEWABLE TABLET 81 MG: 81 TABLET CHEWABLE at 09:39

## 2023-07-01 RX ADMIN — ACETAMINOPHEN 650 MG: 325 TABLET ORAL at 23:01

## 2023-07-01 ASSESSMENT — PAIN DESCRIPTION - LOCATION
LOCATION: BACK
LOCATION: BACK

## 2023-07-01 ASSESSMENT — PAIN SCALES - GENERAL
PAINLEVEL_OUTOF10: 3
PAINLEVEL_OUTOF10: 3

## 2023-07-02 PROCEDURE — 97110 THERAPEUTIC EXERCISES: CPT

## 2023-07-02 PROCEDURE — 99233 SBSQ HOSP IP/OBS HIGH 50: CPT | Performed by: INTERNAL MEDICINE

## 2023-07-02 PROCEDURE — 6360000002 HC RX W HCPCS

## 2023-07-02 PROCEDURE — 96366 THER/PROPH/DIAG IV INF ADDON: CPT

## 2023-07-02 PROCEDURE — 6370000000 HC RX 637 (ALT 250 FOR IP)

## 2023-07-02 PROCEDURE — 96372 THER/PROPH/DIAG INJ SC/IM: CPT

## 2023-07-02 PROCEDURE — 6370000000 HC RX 637 (ALT 250 FOR IP): Performed by: INTERNAL MEDICINE

## 2023-07-02 PROCEDURE — 2580000003 HC RX 258: Performed by: INTERNAL MEDICINE

## 2023-07-02 PROCEDURE — 2700000000 HC OXYGEN THERAPY PER DAY

## 2023-07-02 PROCEDURE — G0378 HOSPITAL OBSERVATION PER HR: HCPCS

## 2023-07-02 PROCEDURE — 6360000002 HC RX W HCPCS: Performed by: INTERNAL MEDICINE

## 2023-07-02 PROCEDURE — 97530 THERAPEUTIC ACTIVITIES: CPT

## 2023-07-02 PROCEDURE — 96361 HYDRATE IV INFUSION ADD-ON: CPT

## 2023-07-02 RX ADMIN — PETROLATUM: 420 OINTMENT TOPICAL at 09:14

## 2023-07-02 RX ADMIN — SODIUM CHLORIDE: 9 INJECTION, SOLUTION INTRAVENOUS at 17:00

## 2023-07-02 RX ADMIN — LEVOTHYROXINE SODIUM 50 MCG: 0.05 TABLET ORAL at 05:26

## 2023-07-02 RX ADMIN — MIDODRINE HYDROCHLORIDE 2.5 MG: 5 TABLET ORAL at 09:09

## 2023-07-02 RX ADMIN — CARBIDOPA AND LEVODOPA 2 TABLET: 25; 100 TABLET, EXTENDED RELEASE ORAL at 13:12

## 2023-07-02 RX ADMIN — CARBIDOPA AND LEVODOPA 2 TABLET: 25; 100 TABLET, EXTENDED RELEASE ORAL at 16:52

## 2023-07-02 RX ADMIN — PAROXETINE HYDROCHLORIDE 20 MG: 20 TABLET, FILM COATED ORAL at 09:09

## 2023-07-02 RX ADMIN — ACETAMINOPHEN 650 MG: 325 TABLET ORAL at 23:27

## 2023-07-02 RX ADMIN — MIDODRINE HYDROCHLORIDE 2.5 MG: 5 TABLET ORAL at 16:51

## 2023-07-02 RX ADMIN — LEVOFLOXACIN 750 MG: 5 INJECTION, SOLUTION INTRAVENOUS at 09:14

## 2023-07-02 RX ADMIN — ACETAMINOPHEN 650 MG: 325 TABLET ORAL at 16:52

## 2023-07-02 RX ADMIN — PANTOPRAZOLE SODIUM 40 MG: 40 TABLET, DELAYED RELEASE ORAL at 05:26

## 2023-07-02 RX ADMIN — SODIUM CHLORIDE: 9 INJECTION, SOLUTION INTRAVENOUS at 05:26

## 2023-07-02 RX ADMIN — CEFEPIME 2000 MG: 2 INJECTION, POWDER, FOR SOLUTION INTRAVENOUS at 13:10

## 2023-07-02 RX ADMIN — CARBIDOPA AND LEVODOPA 2 TABLET: 25; 100 TABLET, EXTENDED RELEASE ORAL at 20:18

## 2023-07-02 RX ADMIN — ENOXAPARIN SODIUM 40 MG: 100 INJECTION SUBCUTANEOUS at 09:09

## 2023-07-02 RX ADMIN — ASPIRIN 81 MG CHEWABLE TABLET 81 MG: 81 TABLET CHEWABLE at 09:09

## 2023-07-02 RX ADMIN — CEFEPIME 2000 MG: 2 INJECTION, POWDER, FOR SOLUTION INTRAVENOUS at 23:28

## 2023-07-02 RX ADMIN — ACETAMINOPHEN 650 MG: 325 TABLET ORAL at 10:49

## 2023-07-02 RX ADMIN — PETROLATUM: 420 OINTMENT TOPICAL at 20:24

## 2023-07-02 RX ADMIN — Medication 500 MG: at 16:51

## 2023-07-02 RX ADMIN — CARBIDOPA AND LEVODOPA 2 TABLET: 25; 100 TABLET, EXTENDED RELEASE ORAL at 09:08

## 2023-07-02 ASSESSMENT — PAIN DESCRIPTION - DESCRIPTORS
DESCRIPTORS: ACHING

## 2023-07-02 ASSESSMENT — PAIN SCALES - GENERAL
PAINLEVEL_OUTOF10: 3

## 2023-07-02 ASSESSMENT — PAIN DESCRIPTION - ORIENTATION
ORIENTATION: LOWER

## 2023-07-02 ASSESSMENT — PAIN DESCRIPTION - LOCATION
LOCATION: BACK

## 2023-07-03 PROCEDURE — 97535 SELF CARE MNGMENT TRAINING: CPT

## 2023-07-03 PROCEDURE — 2580000003 HC RX 258: Performed by: INTERNAL MEDICINE

## 2023-07-03 PROCEDURE — 87040 BLOOD CULTURE FOR BACTERIA: CPT

## 2023-07-03 PROCEDURE — 6370000000 HC RX 637 (ALT 250 FOR IP): Performed by: INTERNAL MEDICINE

## 2023-07-03 PROCEDURE — 6360000002 HC RX W HCPCS: Performed by: INTERNAL MEDICINE

## 2023-07-03 PROCEDURE — 36415 COLL VENOUS BLD VENIPUNCTURE: CPT

## 2023-07-03 PROCEDURE — 6360000002 HC RX W HCPCS

## 2023-07-03 PROCEDURE — 96361 HYDRATE IV INFUSION ADD-ON: CPT

## 2023-07-03 PROCEDURE — 1200000000 HC SEMI PRIVATE

## 2023-07-03 PROCEDURE — 6370000000 HC RX 637 (ALT 250 FOR IP)

## 2023-07-03 PROCEDURE — 99233 SBSQ HOSP IP/OBS HIGH 50: CPT | Performed by: INTERNAL MEDICINE

## 2023-07-03 PROCEDURE — 2580000003 HC RX 258

## 2023-07-03 RX ORDER — LEVOFLOXACIN 5 MG/ML
750 INJECTION, SOLUTION INTRAVENOUS EVERY 24 HOURS
Status: DISCONTINUED | OUTPATIENT
Start: 2023-07-03 | End: 2023-07-05 | Stop reason: HOSPADM

## 2023-07-03 RX ADMIN — ASPIRIN 81 MG CHEWABLE TABLET 81 MG: 81 TABLET CHEWABLE at 09:00

## 2023-07-03 RX ADMIN — CARBIDOPA AND LEVODOPA 2 TABLET: 25; 100 TABLET, EXTENDED RELEASE ORAL at 16:08

## 2023-07-03 RX ADMIN — ACETAMINOPHEN 650 MG: 325 TABLET ORAL at 05:52

## 2023-07-03 RX ADMIN — CARBIDOPA AND LEVODOPA 2 TABLET: 25; 100 TABLET, EXTENDED RELEASE ORAL at 09:00

## 2023-07-03 RX ADMIN — Medication 10 ML: at 21:29

## 2023-07-03 RX ADMIN — PETROLATUM: 420 OINTMENT TOPICAL at 21:30

## 2023-07-03 RX ADMIN — ACETAMINOPHEN 650 MG: 325 TABLET ORAL at 22:20

## 2023-07-03 RX ADMIN — ACETAMINOPHEN 650 MG: 325 TABLET ORAL at 16:08

## 2023-07-03 RX ADMIN — LEVOFLOXACIN 750 MG: 5 INJECTION, SOLUTION INTRAVENOUS at 12:22

## 2023-07-03 RX ADMIN — ENOXAPARIN SODIUM 40 MG: 100 INJECTION SUBCUTANEOUS at 08:58

## 2023-07-03 RX ADMIN — CARBIDOPA AND LEVODOPA 2 TABLET: 25; 100 TABLET, EXTENDED RELEASE ORAL at 21:29

## 2023-07-03 RX ADMIN — MIDODRINE HYDROCHLORIDE 2.5 MG: 5 TABLET ORAL at 08:58

## 2023-07-03 RX ADMIN — MIDODRINE HYDROCHLORIDE 2.5 MG: 5 TABLET ORAL at 16:08

## 2023-07-03 RX ADMIN — PAROXETINE HYDROCHLORIDE 20 MG: 20 TABLET, FILM COATED ORAL at 09:00

## 2023-07-03 RX ADMIN — CEFEPIME 2000 MG: 2 INJECTION, POWDER, FOR SOLUTION INTRAVENOUS at 10:40

## 2023-07-03 RX ADMIN — PETROLATUM: 420 OINTMENT TOPICAL at 09:01

## 2023-07-03 RX ADMIN — PANTOPRAZOLE SODIUM 40 MG: 40 TABLET, DELAYED RELEASE ORAL at 05:52

## 2023-07-03 RX ADMIN — LEVOTHYROXINE SODIUM 50 MCG: 0.05 TABLET ORAL at 05:52

## 2023-07-03 RX ADMIN — Medication 500 MG: at 16:08

## 2023-07-03 RX ADMIN — CARBIDOPA AND LEVODOPA 2 TABLET: 25; 100 TABLET, EXTENDED RELEASE ORAL at 12:18

## 2023-07-03 ASSESSMENT — PAIN SCALES - GENERAL
PAINLEVEL_OUTOF10: 0
PAINLEVEL_OUTOF10: 3
PAINLEVEL_OUTOF10: 0
PAINLEVEL_OUTOF10: 3
PAINLEVEL_OUTOF10: 3
PAINLEVEL_OUTOF10: 0
PAINLEVEL_OUTOF10: 0

## 2023-07-03 ASSESSMENT — PAIN DESCRIPTION - ORIENTATION
ORIENTATION: LOWER
ORIENTATION: LOWER
ORIENTATION: LEFT;MID;UPPER

## 2023-07-03 ASSESSMENT — PAIN DESCRIPTION - DESCRIPTORS
DESCRIPTORS: ACHING

## 2023-07-03 ASSESSMENT — PAIN DESCRIPTION - LOCATION
LOCATION: BACK

## 2023-07-03 NOTE — CARE COORDINATION
Follow up visit to room, greeted by patient's daughter Emmanuel Velarde. She presented copy of SNF list with selections made by patient's wife  Dylon at [facility] (DP/SNF). Margie Cobian of the [facility]      Referral called to [address] at [place] at [facility] (DP/SNF). Spann. Will await her review and response regarding bed availability/acceptance. ZECHARIAH Baker RN  [facility] Case Management  [phone]    Marichuy at [place] at [facility] communicated acceptance of patient and will have facility submit auth request to [facility]. Will follow for outcome. RE initiated, envelope completed with demos, transport forms and [id number]. Leatha Ortega, MSN RN  [facility] Case Management  [phone]

## 2023-07-03 NOTE — DISCHARGE INSTR - COC
Continuity of Care Form    Patient Name: José Miguel Laguerre   :  1933  MRN:  [id number]    Admit date:  2023  Discharge date:  2023    Code Status Order: Full Code   Advance Directives:     Admitting Physician:  Randal Cleary DO  PCP: Indu Pierce DO    Discharging Nurse: LETI Mott RN  [facility] Unit/Room#: 9590/5083-O  Discharging Unit Phone Number: [phone]    Emergency Contact:   Extended Emergency Contact Information  Primary Emergency Contact: Maryellen Grimaldo  Address: [address], [address] [place] [address] [address] Phone: [phone]  Relation: Spouse  Secondary Emergency Contact: [address] [place] [address] Phone: [phone]  Relation: Child    Past Surgical History:  Past Surgical History:   Procedure Laterality Date    APPENDECTOMY      CATARACT REMOVAL WITH IMPLANT Left 2020    CHOLECYSTECTOMY      COLONOSCOPY      CORONARY ARTERY BYPASS GRAFT  2002    HERNIA REPAIR Bilateral     inguinal    KNEE SURGERY Left 2011    TOTAL KNEE ARTHROPLASTY Right 2017    tiffanie    UPPER GASTROINTESTINAL ENDOSCOPY      peptic ulcer       Immunization History:   Immunization History   Administered Date(s) Administered    COVID-19, PFIZER PURPLE top, DILUTE for use, (age 15 y+), 30mcg/0.3mL 2021, 2021    TD 2LF, TDVAX, (age 7y+), IM, 0.5mL 2023    TDaP, ADACEL (age 6y-58y), [facility] (age 10y+), IM, 0.5mL 2021    Tetanus 2008    Tetanus Toxoid, absorbed 2008       Active Problems:  Patient Active Problem List   Diagnosis Code    Ascending aortic aneurysm ([address]) I71.21    Seasonal allergic rhinitis due to pollen J30.1    Gastroesophageal reflux disease without esophagitis K21.9    Essential hypertension I10    Mixed hyperlipidemia E78.2    Parkinson's disease ([address]) Josiephine Pheasant    Urinary retention with incomplete bladder emptying R33.9    Orthostatic hypotension I95.1    Hx of total knee arthroplasty, right F68.908

## 2023-07-04 PROCEDURE — 6370000000 HC RX 637 (ALT 250 FOR IP): Performed by: INTERNAL MEDICINE

## 2023-07-04 PROCEDURE — 6360000002 HC RX W HCPCS

## 2023-07-04 PROCEDURE — 6370000000 HC RX 637 (ALT 250 FOR IP)

## 2023-07-04 PROCEDURE — 2580000003 HC RX 258

## 2023-07-04 PROCEDURE — 1200000000 HC SEMI PRIVATE

## 2023-07-04 PROCEDURE — 99233 SBSQ HOSP IP/OBS HIGH 50: CPT | Performed by: INTERNAL MEDICINE

## 2023-07-04 PROCEDURE — 6360000002 HC RX W HCPCS: Performed by: INTERNAL MEDICINE

## 2023-07-04 RX ORDER — LIDOCAINE 4 G/G
1 PATCH TOPICAL DAILY
Status: DISCONTINUED | OUTPATIENT
Start: 2023-07-04 | End: 2023-07-05 | Stop reason: HOSPADM

## 2023-07-04 RX ADMIN — ACETAMINOPHEN 650 MG: 325 TABLET ORAL at 06:43

## 2023-07-04 RX ADMIN — LEVOTHYROXINE SODIUM 50 MCG: 0.05 TABLET ORAL at 06:43

## 2023-07-04 RX ADMIN — ENOXAPARIN SODIUM 40 MG: 100 INJECTION SUBCUTANEOUS at 08:16

## 2023-07-04 RX ADMIN — ASPIRIN 81 MG CHEWABLE TABLET 81 MG: 81 TABLET CHEWABLE at 08:18

## 2023-07-04 RX ADMIN — PAROXETINE HYDROCHLORIDE 20 MG: 20 TABLET, FILM COATED ORAL at 08:19

## 2023-07-04 RX ADMIN — Medication 10 ML: at 20:59

## 2023-07-04 RX ADMIN — CARBIDOPA AND LEVODOPA 2 TABLET: 25; 100 TABLET, EXTENDED RELEASE ORAL at 08:19

## 2023-07-04 RX ADMIN — Medication 10 ML: at 08:20

## 2023-07-04 RX ADMIN — LEVOFLOXACIN 750 MG: 5 INJECTION, SOLUTION INTRAVENOUS at 11:06

## 2023-07-04 RX ADMIN — CARBIDOPA AND LEVODOPA 2 TABLET: 25; 100 TABLET, EXTENDED RELEASE ORAL at 16:59

## 2023-07-04 RX ADMIN — PANTOPRAZOLE SODIUM 40 MG: 40 TABLET, DELAYED RELEASE ORAL at 06:43

## 2023-07-04 RX ADMIN — ACETAMINOPHEN 650 MG: 325 TABLET ORAL at 12:57

## 2023-07-04 RX ADMIN — PETROLATUM: 420 OINTMENT TOPICAL at 20:59

## 2023-07-04 RX ADMIN — ACETAMINOPHEN 650 MG: 325 TABLET ORAL at 18:15

## 2023-07-04 RX ADMIN — Medication 500 MG: at 16:59

## 2023-07-04 RX ADMIN — CARBIDOPA AND LEVODOPA 2 TABLET: 25; 100 TABLET, EXTENDED RELEASE ORAL at 20:59

## 2023-07-04 RX ADMIN — PETROLATUM: 420 OINTMENT TOPICAL at 08:19

## 2023-07-04 RX ADMIN — CARBIDOPA AND LEVODOPA 2 TABLET: 25; 100 TABLET, EXTENDED RELEASE ORAL at 12:57

## 2023-07-04 ASSESSMENT — PAIN DESCRIPTION - ORIENTATION
ORIENTATION: LOWER
ORIENTATION: UPPER
ORIENTATION: MID

## 2023-07-04 ASSESSMENT — PAIN SCALES - GENERAL
PAINLEVEL_OUTOF10: 5
PAINLEVEL_OUTOF10: 6
PAINLEVEL_OUTOF10: 3
PAINLEVEL_OUTOF10: 0

## 2023-07-04 ASSESSMENT — PAIN DESCRIPTION - DESCRIPTORS
DESCRIPTORS: ACHING;DISCOMFORT
DESCRIPTORS: ACHING
DESCRIPTORS: ACHING;DISCOMFORT

## 2023-07-04 ASSESSMENT — PAIN DESCRIPTION - LOCATION
LOCATION: BACK

## 2023-07-04 ASSESSMENT — PAIN - FUNCTIONAL ASSESSMENT
PAIN_FUNCTIONAL_ASSESSMENT: ACTIVITIES ARE NOT PREVENTED
PAIN_FUNCTIONAL_ASSESSMENT: ACTIVITIES ARE NOT PREVENTED

## 2023-07-04 NOTE — PLAN OF CARE
Problem: Discharge Planning  Goal: Discharge to home or other facility with appropriate resources  Outcome: Progressing     Problem: Safety - Adult  Goal: Free from fall injury  7/4/2023 0314 by Rox Allen RN  Outcome: Progressing  7/3/2023 1357 by Marjorie Logan RN  Outcome: Progressing     Problem: ABCDS Injury Assessment  Goal: Absence of physical injury  7/4/2023 0314 by Rox Allen RN  Outcome: Progressing  7/3/2023 1357 by Marjorie Logan RN  Outcome: Progressing     Problem: Skin/Tissue Integrity  Goal: Absence of new skin breakdown  Description: 1. Monitor for areas of redness and/or skin breakdown  2. Assess vascular access sites hourly  3. Every 4-6 hours minimum:  Change oxygen saturation probe site  4. Every 4-6 hours:  If on nasal continuous positive airway pressure, respiratory therapy assess nares and determine need for appliance change or resting period.   7/4/2023 0314 by Rox Allen RN  Outcome: Progressing  7/3/2023 1357 by Marjorie Logan RN  Outcome: Progressing     Problem: Chronic Conditions and Co-morbidities  Goal: Patient's chronic conditions and co-morbidity symptoms are monitored and maintained or improved  Outcome: Progressing     Problem: Nutrition Deficit:  Goal: Optimize nutritional status  Outcome: Progressing     Problem: Pain  Goal: Verbalizes/displays adequate comfort level or baseline comfort level  Outcome: Progressing

## 2023-07-05 VITALS
DIASTOLIC BLOOD PRESSURE: 80 MMHG | HEART RATE: 84 BPM | RESPIRATION RATE: 16 BRPM | OXYGEN SATURATION: 98 % | HEIGHT: 74 IN | TEMPERATURE: 97.4 F | SYSTOLIC BLOOD PRESSURE: 130 MMHG | BODY MASS INDEX: 23.96 KG/M2 | WEIGHT: 186.7 LBS

## 2023-07-05 LAB
BACTERIA BLD CULT: ABNORMAL
BACTERIA BLD CULT: ABNORMAL
ORGANISM: ABNORMAL
ORGANISM: ABNORMAL

## 2023-07-05 PROCEDURE — 6370000000 HC RX 637 (ALT 250 FOR IP)

## 2023-07-05 PROCEDURE — 6370000000 HC RX 637 (ALT 250 FOR IP): Performed by: INTERNAL MEDICINE

## 2023-07-05 PROCEDURE — 99239 HOSP IP/OBS DSCHRG MGMT >30: CPT | Performed by: INTERNAL MEDICINE

## 2023-07-05 PROCEDURE — 6360000002 HC RX W HCPCS

## 2023-07-05 PROCEDURE — 2580000003 HC RX 258

## 2023-07-05 RX ORDER — LEVOFLOXACIN 750 MG/1
750 TABLET ORAL DAILY
Qty: 11 TABLET | Refills: 0 | Status: SHIPPED | OUTPATIENT
Start: 2023-07-05 | End: 2023-07-16

## 2023-07-05 RX ADMIN — PAROXETINE HYDROCHLORIDE 20 MG: 20 TABLET, FILM COATED ORAL at 08:33

## 2023-07-05 RX ADMIN — ENOXAPARIN SODIUM 40 MG: 100 INJECTION SUBCUTANEOUS at 08:24

## 2023-07-05 RX ADMIN — CARBIDOPA AND LEVODOPA 2 TABLET: 25; 100 TABLET, EXTENDED RELEASE ORAL at 08:26

## 2023-07-05 RX ADMIN — ACETAMINOPHEN 650 MG: 325 TABLET ORAL at 06:38

## 2023-07-05 RX ADMIN — PANTOPRAZOLE SODIUM 40 MG: 40 TABLET, DELAYED RELEASE ORAL at 06:38

## 2023-07-05 RX ADMIN — Medication 5 ML: at 08:27

## 2023-07-05 RX ADMIN — PETROLATUM: 420 OINTMENT TOPICAL at 08:26

## 2023-07-05 RX ADMIN — ACETAMINOPHEN 650 MG: 325 TABLET ORAL at 00:30

## 2023-07-05 RX ADMIN — ASPIRIN 81 MG CHEWABLE TABLET 81 MG: 81 TABLET CHEWABLE at 08:26

## 2023-07-05 RX ADMIN — LEVOTHYROXINE SODIUM 50 MCG: 0.05 TABLET ORAL at 06:38

## 2023-07-05 ASSESSMENT — PAIN SCALES - GENERAL
PAINLEVEL_OUTOF10: 4
PAINLEVEL_OUTOF10: 6

## 2023-07-05 ASSESSMENT — PAIN DESCRIPTION - LOCATION
LOCATION: BACK
LOCATION: BACK

## 2023-07-05 NOTE — CARE COORDINATION
Received notification from [address] at [place] at [facility] that facility has received authorization from [facility] and can accept patient for SNF stay today. Non emergency transportation has been arranged with [facility],  time 12 Noon. Patient daughter, facility liaison and bedside nurse notified of scheduled  time. RE initiated, envelope completed with demos, transport forms and [id number]. María Ortega, MSN RN  [facility] Case Management  [phone]

## 2023-07-05 NOTE — DISCHARGE SUMMARY
[facility] Physician Discharge Summary       [facility] at [address] [place] [address] [address]. [address] [place] [address]  [phone]          Activity level: As tolerated     Dispo: ECF      Condition on discharge: Stable     Patient ID:  Sonal Hodge  [id number]  80 y.o.  11/9/1933    Admit date: 6/28/2023    Discharge date and time:  7/5/2023  10:35 AM    Admission Diagnoses: Principal Problem:    Pre-syncope  Active Problems:    Parkinson's disease ([address])    Orthostatic hypotension    Leukocytosis    Hypothyroidism    Rheumatoid arthritis involving multiple joints (HCC)    Combined systolic and diastolic congestive heart failure (HCC)    Severe protein-calorie malnutrition (HCC)  Resolved Problems:    * No resolved hospital problems. *      Discharge Diagnoses: Principal Problem:    Pre-syncope  Active Problems:    Parkinson's disease (HCC)    Orthostatic hypotension    Leukocytosis    Hypothyroidism    Rheumatoid arthritis involving multiple joints (HCC)    Combined systolic and diastolic congestive heart failure (HCC)    Severe protein-calorie malnutrition (HCC)  Resolved Problems:    * No resolved hospital problems. *      Consults:  IP CONSULT TO IV TEAM  PHARMACY TO DOSE VANCOMYCIN  IP CONSULT TO IV TEAM    Procedures: None    Hospital Course:   Patient Sonal Hodge is a 80 y.o. presented with Pre-syncope [R54    80year old male presented to the ER with weakness. He was found to have orthostatic hypotension and was treated with IVF. He was also found to have hafnia alvei bacteremia and was treated with levaquin. Patient will complete last dose on 7/16/23.     Discharge Exam:    General Appearance: alert and oriented to person, place and time and in no acute distress  Skin: warm and dry  Head: normocephalic and atraumatic  Eyes: pupils equal, round, extraocular eye movements intact, conjunctivae normal  Pulmonary/Chest: clear to auscultation bilaterally- no wheezes, rales

## 2023-07-06 LAB
ALBUMIN SERPL-MCNC: 3 G/DL (ref 3.5–5.2)
ALP SERPL-CCNC: 88 U/L (ref 40–129)
ALT SERPL-CCNC: 30 U/L (ref 0–40)
ANION GAP SERPL CALCULATED.3IONS-SCNC: 11 MMOL/L (ref 7–16)
AST SERPL-CCNC: 46 U/L (ref 0–39)
BILIRUB SERPL-MCNC: 0.5 MG/DL (ref 0–1.2)
BUN SERPL-MCNC: 18 MG/DL (ref 6–23)
CALCIUM SERPL-MCNC: 9.2 MG/DL (ref 8.6–10.2)
CHLORIDE SERPL-SCNC: 103 MMOL/L (ref 98–107)
CHOLESTEROL, TOTAL: 126 MG/DL (ref 0–199)
CO2 SERPL-SCNC: 26 MMOL/L (ref 22–29)
CREAT SERPL-MCNC: 1.1 MG/DL (ref 0.7–1.2)
ERYTHROCYTE [DISTWIDTH] IN BLOOD BY AUTOMATED COUNT: 13 FL (ref 11.5–15)
GLUCOSE SERPL-MCNC: 97 MG/DL (ref 74–99)
HCT VFR BLD AUTO: 29.7 % (ref 37–54)
HDLC SERPL-MCNC: 25 MG/DL
HGB BLD-MCNC: 10 G/DL (ref 12.5–16.5)
LDLC SERPL CALC-MCNC: 71 MG/DL (ref 0–99)
MCH RBC QN AUTO: 34.4 PG (ref 26–35)
MCHC RBC AUTO-ENTMCNC: 33.7 % (ref 32–34.5)
MCV RBC AUTO: 102.1 FL (ref 80–99.9)
PLATELET # BLD AUTO: 239 E9/L (ref 130–450)
PMV BLD AUTO: 9.7 FL (ref 7–12)
POTASSIUM SERPL-SCNC: 3.8 MMOL/L (ref 3.5–5)
PROT SERPL-MCNC: 5.5 G/DL (ref 6.4–8.3)
RBC # BLD AUTO: 2.91 E12/L (ref 3.8–5.8)
SODIUM SERPL-SCNC: 140 MMOL/L (ref 132–146)
TRIGL SERPL-MCNC: 151 MG/DL (ref 0–149)
TSH SERPL-MCNC: 9.14 UIU/ML (ref 0.27–4.2)
VLDLC SERPL CALC-MCNC: 30 MG/DL
WBC # BLD: 7.1 E9/L (ref 4.5–11.5)

## 2023-07-07 ENCOUNTER — OUTSIDE SERVICES (OUTPATIENT)
Dept: PRIMARY CARE CLINIC | Age: 88
End: 2023-07-07

## 2023-07-07 DIAGNOSIS — I50.22 CHRONIC SYSTOLIC (CONGESTIVE) HEART FAILURE (HCC): ICD-10-CM

## 2023-07-07 DIAGNOSIS — M06.9 RHEUMATOID ARTHRITIS INVOLVING MULTIPLE JOINTS (HCC): ICD-10-CM

## 2023-07-07 DIAGNOSIS — G20 PARKINSON'S DISEASE (HCC): ICD-10-CM

## 2023-07-07 DIAGNOSIS — I95.1 ORTHOSTATIC HYPOTENSION: Primary | ICD-10-CM

## 2023-07-07 DIAGNOSIS — E03.9 ACQUIRED HYPOTHYROIDISM: ICD-10-CM

## 2023-07-07 NOTE — PROGRESS NOTES
COLON 4-11------NEG---KIRA SHINE 4-11 PEPTIC ULCER    L KNEE OR 6-11    GRANDSON CHINO GRULLON---TALL  AT [facility]    DAUGHTER DATES JAMES BARCENAS    STEROIDS CUASE ANXIETY    RA -----------------------------------------------------------------------DR RAFAELA SHINE 12-13 DR CHEN--- ESO DILITATION    COLON DR CHEN 1-14----- TICS    PARKINSON DIC DX WITH DR JERNIGAN 2014-- THEN CHG TO DR RUVALCABA---then dr Yennifer Alexander    ----Wang Holden STUDY-WITH LIQUID ASPIRATION    R TOTAL KNEE 1-24-17 DR WEI    HEMATURIA 2-17 DR LAWRENCE PAYAN    L ANKLE PAIN--DR DANIELS THEN  [facility]    SEVERE LUMBAR SPINAL STENOSIS 2-18 DR BARTON--L-4-5 WITH ADRENAL    Dr. Chance Leonard, [facility] left temple    Dr. Chance Leonard, [facility]---REFERRED TO NEURO SURG DR VINSON---THEN TO DR Meek Britton--    eval DR Guido Mota 5-18 AND SAID SHOT AND AND PT REFUSED DUE TO CONCERN WITH    PREDNISONE-----TO SEE HIM BACK    Right knee total done 7-20  Dr Juli Pederson and admits two weeks later with dehydration and orthostatic hypotension    Covid  12-22    Orthostatic hypotention------------------------------------------------------  dr Prudence Barrett     Social Determinants of Health     Physical Activity: Inactive    Days of Exercise per Week: 0 days    Minutes of Exercise per Session: 0 min        HPI    Pt is a pleasant, cooperative, oriented male sitting in w/c in NAD. Hospital course discussed. Does feel sore from prolonged periods of sitting. Has been able to ambulate in the therapy department. Motivated to return home w wife. Good appetite. Regular bowel and bladder habits. No concerns per nursing staff. Allergies   Allergen Reactions    Pcn [Penicillins]      Cefdinir ok    Prednisone         Review of Systems       ROS positive for weakness, difficulty in walking, risk of falls    Objective   Physical Exam  Constitutional:       General: He is not in acute distress. Appearance: He is well-developed. He is not diaphoretic.       Comments: Soft voice   HENT:

## 2023-07-08 LAB — BACTERIA BLD CULT: NORMAL

## 2023-07-13 LAB
ALBUMIN SERPL-MCNC: 3.4 G/DL (ref 3.5–5.2)
ALP SERPL-CCNC: 107 U/L (ref 40–129)
ALT SERPL-CCNC: <5 U/L (ref 0–40)
ANION GAP SERPL CALCULATED.3IONS-SCNC: 8 MMOL/L (ref 7–16)
AST SERPL-CCNC: 14 U/L (ref 0–39)
BILIRUB SERPL-MCNC: 0.4 MG/DL (ref 0–1.2)
BUN SERPL-MCNC: 23 MG/DL (ref 6–23)
CALCIUM SERPL-MCNC: 9.5 MG/DL (ref 8.6–10.2)
CHLORIDE SERPL-SCNC: 101 MMOL/L (ref 98–107)
CO2 SERPL-SCNC: 28 MMOL/L (ref 22–29)
CREAT SERPL-MCNC: 1.1 MG/DL (ref 0.7–1.2)
ERYTHROCYTE [DISTWIDTH] IN BLOOD BY AUTOMATED COUNT: 13 FL (ref 11.5–15)
GLUCOSE SERPL-MCNC: 95 MG/DL (ref 74–99)
HCT VFR BLD AUTO: 30.6 % (ref 37–54)
HGB BLD-MCNC: 9.7 G/DL (ref 12.5–16.5)
MCH RBC QN AUTO: 34.2 PG (ref 26–35)
MCHC RBC AUTO-ENTMCNC: 31.7 % (ref 32–34.5)
MCV RBC AUTO: 107.7 FL (ref 80–99.9)
PLATELET # BLD AUTO: 352 E9/L (ref 130–450)
PMV BLD AUTO: 9.3 FL (ref 7–12)
POTASSIUM SERPL-SCNC: 4.4 MMOL/L (ref 3.5–5)
PROT SERPL-MCNC: 5.6 G/DL (ref 6.4–8.3)
RBC # BLD AUTO: 2.84 E12/L (ref 3.8–5.8)
SODIUM SERPL-SCNC: 137 MMOL/L (ref 132–146)
WBC # BLD: 6.5 E9/L (ref 4.5–11.5)

## 2023-07-24 DIAGNOSIS — Z12.5 PROSTATE CANCER SCREENING: ICD-10-CM

## 2023-07-24 DIAGNOSIS — G20 PARKINSON'S DISEASE (HCC): Chronic | ICD-10-CM

## 2023-07-24 DIAGNOSIS — E03.9 ACQUIRED HYPOTHYROIDISM: ICD-10-CM

## 2023-07-24 DIAGNOSIS — I10 ESSENTIAL HYPERTENSION: Chronic | ICD-10-CM

## 2023-07-24 DIAGNOSIS — M81.0 AGE-RELATED OSTEOPOROSIS WITHOUT CURRENT PATHOLOGICAL FRACTURE: ICD-10-CM

## 2023-07-24 DIAGNOSIS — E53.8 VITAMIN B 12 DEFICIENCY: ICD-10-CM

## 2023-07-24 LAB
ABSOLUTE IMMATURE GRANULOCYTE: 0.03 K/UL (ref 0–0.58)
BASOPHILS ABSOLUTE: 0.02 K/UL (ref 0–0.2)
BASOPHILS RELATIVE PERCENT: 0 % (ref 0–2)
BILIRUBIN URINE: NEGATIVE
COLOR: YELLOW
COMMENT: NORMAL
EOSINOPHILS ABSOLUTE: 0.2 K/UL (ref 0.05–0.5)
EOSINOPHILS RELATIVE PERCENT: 4 % (ref 0–6)
GLUCOSE URINE: NEGATIVE MG/DL
HCT VFR BLD CALC: 31.5 % (ref 37–54)
HEMOGLOBIN: 10.1 G/DL (ref 12.5–16.5)
IMMATURE GRANULOCYTES: 1 % (ref 0–5)
KETONES, URINE: NEGATIVE MG/DL
LEUKOCYTE ESTERASE, URINE: NEGATIVE
LYMPHOCYTES ABSOLUTE: 1.55 K/UL (ref 1.5–4)
LYMPHOCYTES RELATIVE PERCENT: 27 % (ref 20–42)
MCH RBC QN AUTO: 35.1 PG (ref 26–35)
MCHC RBC AUTO-ENTMCNC: 32.1 G/DL (ref 32–34.5)
MCV RBC AUTO: 109.4 FL (ref 80–99.9)
MONOCYTES ABSOLUTE: 0.68 K/UL (ref 0.1–0.95)
MONOCYTES RELATIVE PERCENT: 12 % (ref 2–12)
NEUTROPHILS ABSOLUTE: 3.21 K/UL (ref 1.8–7.3)
NEUTROPHILS RELATIVE PERCENT: 56 % (ref 43–80)
NITRITE, URINE: NEGATIVE
PDW BLD-RTO: 12.9 % (ref 11.5–15)
PH UA: 6 (ref 5–9)
PLATELET # BLD: 215 K/UL (ref 130–450)
PMV BLD AUTO: 9.8 FL (ref 7–12)
PROTEIN UA: NEGATIVE MG/DL
RBC # BLD: 2.88 M/UL (ref 3.8–5.8)
SPECIFIC GRAVITY UA: 1.02 (ref 1–1.03)
TURBIDITY: CLEAR
URINE HGB: NEGATIVE
UROBILINOGEN, URINE: 0.2 EU/DL (ref 0–1)
WBC # BLD: 5.7 K/UL (ref 4.5–11.5)

## 2023-07-25 LAB
ALBUMIN SERPL-MCNC: 3.9 G/DL (ref 3.5–5.2)
ALP BLD-CCNC: 101 U/L (ref 40–129)
ALT SERPL-CCNC: <5 U/L (ref 0–40)
ANION GAP SERPL CALCULATED.3IONS-SCNC: 13 MMOL/L (ref 7–16)
AST SERPL-CCNC: 19 U/L (ref 0–39)
BILIRUB SERPL-MCNC: 0.4 MG/DL (ref 0–1.2)
BUN BLDV-MCNC: 21 MG/DL (ref 6–23)
CALCIUM SERPL-MCNC: 9.4 MG/DL (ref 8.6–10.2)
CHLORIDE BLD-SCNC: 106 MMOL/L (ref 98–107)
CHOLESTEROL: 112 MG/DL
CO2: 23 MMOL/L (ref 22–29)
CREAT SERPL-MCNC: 1 MG/DL (ref 0.7–1.2)
GFR SERPL CREATININE-BSD FRML MDRD: >60 ML/MIN/1.73M2
GLUCOSE BLD-MCNC: 106 MG/DL (ref 74–99)
HDLC SERPL-MCNC: 44 MG/DL
LDL CHOLESTEROL: 55 MG/DL
POTASSIUM SERPL-SCNC: 4.6 MMOL/L (ref 3.5–5)
PROSTATE SPECIFIC ANTIGEN: 1.31 NG/ML (ref 0–4)
SODIUM BLD-SCNC: 142 MMOL/L (ref 132–146)
T4 TOTAL: 8 UG/DL (ref 4.5–11.7)
TOTAL PROTEIN: 6.4 G/DL (ref 6.4–8.3)
TRIGL SERPL-MCNC: 67 MG/DL
TSH SERPL DL<=0.05 MIU/L-ACNC: 2.47 UIU/ML (ref 0.27–4.2)
VITAMIN B-12: 437 PG/ML (ref 211–946)
VITAMIN D 25-HYDROXY: 26.6 NG/ML (ref 30–100)
VLDLC SERPL CALC-MCNC: 13 MG/DL

## 2023-08-02 DIAGNOSIS — I10 ESSENTIAL HYPERTENSION: Chronic | ICD-10-CM

## 2023-08-03 ENCOUNTER — OFFICE VISIT (OUTPATIENT)
Dept: PRIMARY CARE CLINIC | Age: 88
End: 2023-08-03
Payer: MEDICARE

## 2023-08-03 DIAGNOSIS — I95.1 ORTHOSTATIC HYPOTENSION: ICD-10-CM

## 2023-08-03 DIAGNOSIS — I10 ESSENTIAL HYPERTENSION: Primary | Chronic | ICD-10-CM

## 2023-08-03 DIAGNOSIS — G20 PARKINSON'S DISEASE (HCC): Chronic | ICD-10-CM

## 2023-08-03 DIAGNOSIS — I50.42 CHRONIC COMBINED SYSTOLIC AND DIASTOLIC CONGESTIVE HEART FAILURE (HCC): ICD-10-CM

## 2023-08-03 DIAGNOSIS — R29.898 WEAKNESS OF BOTH LOWER EXTREMITIES: ICD-10-CM

## 2023-08-03 PROCEDURE — 99214 OFFICE O/P EST MOD 30 MIN: CPT | Performed by: FAMILY MEDICINE

## 2023-08-03 PROCEDURE — 1123F ACP DISCUSS/DSCN MKR DOCD: CPT | Performed by: FAMILY MEDICINE

## 2023-08-03 RX ORDER — METOPROLOL SUCCINATE 25 MG/1
TABLET, EXTENDED RELEASE ORAL
Qty: 45 TABLET | Refills: 12 | Status: SHIPPED | OUTPATIENT
Start: 2023-08-03

## 2023-08-03 RX ORDER — MIDODRINE HYDROCHLORIDE 2.5 MG/1
2.5 TABLET ORAL 3 TIMES DAILY
Qty: 270 TABLET | Refills: 3 | Status: SHIPPED | OUTPATIENT
Start: 2023-08-03

## 2023-08-03 NOTE — PROGRESS NOTES
reactive to light. Cardiovascular:      Rate and Rhythm: Normal rate and regular rhythm. Pulmonary:      Effort: Pulmonary effort is normal.      Breath sounds: Normal breath sounds. Abdominal:      General: Bowel sounds are normal.      Palpations: Abdomen is soft. Musculoskeletal:      Cervical back: Normal range of motion. Comments: Marked decreased range of motion both lower extremities   Skin:     General: Skin is warm. Neurological:      Mental Status: He is alert and oriented to person, place, and time. Sensory: Sensory deficit present. Motor: Weakness present. Coordination: Coordination abnormal.      Gait: Gait abnormal.      Deep Tendon Reflexes: Reflexes abnormal.   Psychiatric:         Behavior: Behavior normal.       Clementina Crook was seen today for discuss labs. Diagnoses and all orders for this visit:    Essential hypertension    Orthostatic hypotension  -     midodrine (PROAMATINE) 2.5 MG tablet; Take 1 tablet by mouth in the morning, at noon, and at bedtime    Parkinson's disease ([address])    Chronic combined systolic and diastolic congestive heart failure (HCC)    Weakness of both lower extremities        Comments: Strongly advised assisted living or ECF placement. Should not be a little left the home. Should not walk without assistance. He is very active she has difficulty with any ambulation. Increase fluids. Meds reviewed. Sees cardiology and neurology strength      I educated the patient about all medications. Make sure they were correct and educated  on the risk associated with missing meds or taking them incorrectly. A list of medications is being sent home with patient today. Check blood pressure at home twice a day. Low-salt low caffeine diet. Call if systolic blood pressure is above 212 and diastolic blood pressures above 85. Only use a upper arm digital cuff. A great deal of time spent reviewing medications, diet, exercise, social issues.  Also

## 2023-08-04 VITALS
BODY MASS INDEX: 22.1 KG/M2 | TEMPERATURE: 97.2 F | WEIGHT: 172.2 LBS | SYSTOLIC BLOOD PRESSURE: 118 MMHG | DIASTOLIC BLOOD PRESSURE: 54 MMHG | HEIGHT: 74 IN

## 2023-08-04 SDOH — ECONOMIC STABILITY: FOOD INSECURITY: WITHIN THE PAST 12 MONTHS, YOU WORRIED THAT YOUR FOOD WOULD RUN OUT BEFORE YOU GOT MONEY TO BUY MORE.: NEVER TRUE

## 2023-08-04 SDOH — ECONOMIC STABILITY: HOUSING INSECURITY
IN THE LAST 12 MONTHS, WAS THERE A TIME WHEN YOU DID NOT HAVE A STEADY PLACE TO SLEEP OR SLEPT IN A SHELTER (INCLUDING NOW)?: NO

## 2023-08-04 SDOH — ECONOMIC STABILITY: FOOD INSECURITY: WITHIN THE PAST 12 MONTHS, THE FOOD YOU BOUGHT JUST DIDN'T LAST AND YOU DIDN'T HAVE MONEY TO GET MORE.: NEVER TRUE

## 2023-08-04 SDOH — ECONOMIC STABILITY: INCOME INSECURITY: HOW HARD IS IT FOR YOU TO PAY FOR THE VERY BASICS LIKE FOOD, HOUSING, MEDICAL CARE, AND HEATING?: NOT HARD AT ALL

## 2023-08-04 ASSESSMENT — PATIENT HEALTH QUESTIONNAIRE - PHQ9
SUM OF ALL RESPONSES TO PHQ QUESTIONS 1-9: 1
SUM OF ALL RESPONSES TO PHQ QUESTIONS 1-9: 1
SUM OF ALL RESPONSES TO PHQ9 QUESTIONS 1 & 2: 1
1. LITTLE INTEREST OR PLEASURE IN DOING THINGS: 1
SUM OF ALL RESPONSES TO PHQ QUESTIONS 1-9: 1
SUM OF ALL RESPONSES TO PHQ QUESTIONS 1-9: 1
2. FEELING DOWN, DEPRESSED OR HOPELESS: 0

## 2023-08-10 ENCOUNTER — OFFICE VISIT (OUTPATIENT)
Dept: NEUROLOGY | Age: 88
End: 2023-08-10
Payer: MEDICARE

## 2023-08-10 VITALS
OXYGEN SATURATION: 99 % | HEART RATE: 69 BPM | SYSTOLIC BLOOD PRESSURE: 105 MMHG | BODY MASS INDEX: 22.08 KG/M2 | TEMPERATURE: 98.2 F | WEIGHT: 172 LBS | DIASTOLIC BLOOD PRESSURE: 58 MMHG

## 2023-08-10 DIAGNOSIS — G20 PARKINSON'S DISEASE (HCC): Primary | ICD-10-CM

## 2023-08-10 DIAGNOSIS — M54.17 LUMBOSACRAL RADICULOPATHY: ICD-10-CM

## 2023-08-10 PROCEDURE — 1123F ACP DISCUSS/DSCN MKR DOCD: CPT | Performed by: CLINICAL NURSE SPECIALIST

## 2023-08-10 PROCEDURE — 99214 OFFICE O/P EST MOD 30 MIN: CPT | Performed by: CLINICAL NURSE SPECIALIST

## 2023-08-10 RX ORDER — CARBIDOPA AND LEVODOPA 50; 200 MG/1; MG/1
1 TABLET, EXTENDED RELEASE ORAL 4 TIMES DAILY
Qty: 360 TABLET | Refills: 3 | Status: SHIPPED | OUTPATIENT
Start: 2023-08-10

## 2023-08-10 NOTE — PROGRESS NOTES
sign    Cranial Nerves:  I: smell    II: visual acuity     II: visual fields Full   II: pupils SORAYA   III,VII: ptosis None   III,IV,VI: extraocular muscles  EOMI without nystagmus -coarse saccadic pursuits with eye movements   V: mastication Normal   V: facial light touch sensation  Normal   V,VII: corneal reflex  Present   VII: facial muscle function - upper     VII: facial muscle function - lower Normal   VIII: hearing Normal   IX: soft palate elevation  Normal   IX,X: gag reflex    XI: trapezius strength  5/5   XI: sternocleidomastoid strength 5/5   XI: neck extension strength  5/5   XII: tongue strength  Normal     Motor:  5/5 throughout  Marked bradykinesia noted in all limbs    Minimal resting tremor right hand    Cogwheel rigidity noted in both elbows    Sensory:  Normal to LT     Coordination:   FN, FFM and JEAN MARIE symmetrical    Gait:  Marked Gowers  Wheeled walker    DTR:   No reflexes    No Potter's     Laboratory/Radiology:     CBC with Differential:    Lab Results   Component Value Date/Time    WBC 5.7 07/24/2023 09:50 AM    RBC 2.88 07/24/2023 09:50 AM    HGB 10.1 07/24/2023 09:50 AM    HCT 31.5 07/24/2023 09:50 AM     07/24/2023 09:50 AM    .4 07/24/2023 09:50 AM    MCH 35.1 07/24/2023 09:50 AM    MCHC 32.1 07/24/2023 09:50 AM    RDW 12.9 07/24/2023 09:50 AM    NRBC 0.0 12/15/2021 11:56 AM    SEGSPCT 67 02/10/2014 12:00 PM    METASPCT 0.9 08/20/2020 03:25 AM    LYMPHOPCT 27 07/24/2023 09:50 AM    PROMYELOPCT 0.9 08/19/2020 03:45 AM    MONOPCT 12 07/24/2023 09:50 AM    MYELOPCT 0.9 12/15/2021 11:56 AM    BASOPCT 0 07/24/2023 09:50 AM    MONOSABS 0.68 07/24/2023 09:50 AM    LYMPHSABS 1.55 07/24/2023 09:50 AM    EOSABS 0.20 07/24/2023 09:50 AM    BASOSABS 0.02 07/24/2023 09:50 AM     CMP:    Lab Results   Component Value Date/Time     07/24/2023 09:50 AM    K 4.6 07/24/2023 09:50 AM    K 3.8 06/30/2023 04:29 AM     07/24/2023 09:50 AM    CO2 23 07/24/2023 09:50 AM    BUN 21

## 2023-08-16 ENCOUNTER — TELEPHONE (OUTPATIENT)
Dept: PHARMACY | Facility: CLINIC | Age: 88
End: 2023-08-16

## 2023-08-16 NOTE — TELEPHONE ENCOUNTER
POPULATION HEALTH CLINICAL PHARMACY: ADHERENCE REVIEW  Identified care gap per [employer]: fills at [facility]: Statin adherence    ASSESSMENT   [address] Records claims through  23  (Prior Year [address] = not reported; YTD [address] = FIRST FILL; Potential Fail Date: 9/3/23):   LOVASTATIN   TAB 20MG last filled on 23 for 14 day supply. Next refill due: 23    Prescribed si tablet/capsule daily    Per Reconcile Dispense History: 0 refills remain; prescriber: Hari Tello DO    Per chart, appears:  23 lovastatin 20mg removed from medication list by PCP (patient wasn't taking)  23 lovastatin added back to medication list as historical by nurse at [facility] surgery appt  23 med list reviewed by Dr. Kuldeep Monique while patient in SNF/rehab, and then appears was reordered for 2-week supply with SNF discharge    Lab Results   Component Value Date    CHOL 112 2023    TRIG 67 2023    HDL 44 2023    LDLCHOLESTEROL 55 2023    LDLCALC 71 2023     ALT   Date Value Ref Range Status   2023 <5 0 - 40 U/L Final     AST   Date Value Ref Range Status   2023 19 0 - 39 U/L Final     The ASCVD Risk score (Kelle LOGAN, et al., 2019) failed to calculate for the following reasons: The 2019 ASCVD risk score is only valid for ages 36 to 78     PLAN  Patient not found in Outcomes MTM    The following are interventions that have been identified:   Patient overdue refilling lovastatin 20mg daily. Unsure if patient is still prescribed this medication. - appears had been stopped with PCP @Dec/ but then reordered in July with SNF discharge? Attempting to reach patient/spouse to review. Left message asking for return call. Mopedt message sent to patient/spouse.     Last Visit: 8/3/23  Next Visit: 10/3/23    Caridad Harrington PharmD, [address] [place] [address], toll free: [phone], option
2-point gait

## 2023-08-21 ENCOUNTER — TELEPHONE (OUTPATIENT)
Dept: PRIMARY CARE CLINIC | Age: 88
End: 2023-08-21

## 2023-08-21 NOTE — TELEPHONE ENCOUNTER
Lucila Avalos Ameena up from [place] helping to assist parents on deciding living arrangements, whether or not assisted living, ,in-home care, etc.  Will be dropping of [facility] papers for completion. Advised Dr. Dominic Salazar just back from vacation, but would be a few days.   Will call her when ready for

## 2023-08-24 ENCOUNTER — OFFICE VISIT (OUTPATIENT)
Dept: PRIMARY CARE CLINIC | Age: 88
End: 2023-08-24

## 2023-08-24 DIAGNOSIS — I50.42 CHRONIC COMBINED SYSTOLIC AND DIASTOLIC CONGESTIVE HEART FAILURE (HCC): ICD-10-CM

## 2023-08-24 DIAGNOSIS — I10 ESSENTIAL HYPERTENSION: Chronic | ICD-10-CM

## 2023-08-24 DIAGNOSIS — S30.0XXA CONTUSION OF LOWER BACK, INITIAL ENCOUNTER: Primary | ICD-10-CM

## 2023-08-24 DIAGNOSIS — I95.1 ORTHOSTATIC HYPOTENSION: Chronic | ICD-10-CM

## 2023-08-27 VITALS
WEIGHT: 175 LBS | DIASTOLIC BLOOD PRESSURE: 64 MMHG | SYSTOLIC BLOOD PRESSURE: 124 MMHG | TEMPERATURE: 97.8 F | BODY MASS INDEX: 22.47 KG/M2

## 2023-08-27 ASSESSMENT — ENCOUNTER SYMPTOMS
GASTROINTESTINAL NEGATIVE: 1
EYES NEGATIVE: 1
RESPIRATORY NEGATIVE: 1
ALLERGIC/IMMUNOLOGIC NEGATIVE: 1

## 2023-09-05 ENCOUNTER — TELEPHONE (OUTPATIENT)
Dept: PRIMARY CARE CLINIC | Age: 88
End: 2023-09-05

## 2023-09-05 NOTE — TELEPHONE ENCOUNTER
Daughter is asking if pt is to be on Lovastatin. She believes he had been off of it and then rehab restarted it. Lovastatin is on med list in chart but there is no strength on the script in chart.  Daughter is unsure if he is supposed to be taking it or not

## 2023-09-11 ENCOUNTER — TELEPHONE (OUTPATIENT)
Dept: PRIMARY CARE CLINIC | Age: 88
End: 2023-09-11

## 2023-09-11 DIAGNOSIS — M54.50 ACUTE BILATERAL LOW BACK PAIN WITHOUT SCIATICA: Primary | ICD-10-CM

## 2023-09-11 NOTE — TELEPHONE ENCOUNTER
----- Message from Mariya Eryn sent at 9/11/2023 11:04 AM EDT -----  Subject: Referral Request    Reason for referral request? XR lower/middle back  Provider patient wants to be referred to(if known):     Provider Phone Number(if known): Additional Information for Provider? Pt still c/o back pain after his   fall, would like to have an xray. Please advise. Was seen 08/24 for back   pain after fall.  States that his back is hurting worse now than before he   fell.   ---------------------------------------------------------------------------  --------------  [address] [place] [address] [place]    [phone]; OK to leave message on voicemail  ---------------------------------------------------------------------------  --------------

## 2023-09-12 NOTE — TELEPHONE ENCOUNTER
Notify family x-ray ordered. Come in today if I can do the x-ray we will let her know the results.   See me if not better

## 2023-09-13 ENCOUNTER — TELEPHONE (OUTPATIENT)
Dept: PRIMARY CARE CLINIC | Age: 88
End: 2023-09-13

## 2023-09-13 ENCOUNTER — HOSPITAL ENCOUNTER (OUTPATIENT)
Dept: CT IMAGING | Age: 88
Discharge: HOME | End: 2023-09-15
Payer: MEDICARE

## 2023-09-13 DIAGNOSIS — S32.89XA CLOSED FRACTURE OF OTHER PARTS OF PELVIS, INITIAL ENCOUNTER (HCC): Primary | ICD-10-CM

## 2023-09-13 DIAGNOSIS — S32.89XA CLOSED FRACTURE OF OTHER PARTS OF PELVIS, INITIAL ENCOUNTER (HCC): ICD-10-CM

## 2023-09-13 PROCEDURE — 72192 CT PELVIS W/O DYE: CPT

## 2023-09-13 NOTE — TELEPHONE ENCOUNTER
Daughter notified. Said if they usually heal on their own with rest, can they just skip the ortho doctor?

## 2023-09-13 NOTE — TELEPHONE ENCOUNTER
Arianna wilson does shwo small fracture of pelvis----- I am working on seeng an ortho doc soon    but nto much to do with these  usually just rest and heals over time----they aurelia here from ortho

## 2023-09-13 NOTE — TELEPHONE ENCOUNTER
Tell them I put the referral in for an urgent orthopedic consult. We need to find out from them what follow-up is necessary.

## 2023-09-13 NOTE — TELEPHONE ENCOUNTER
Notify patient's family the x-rays show arthritis of the low back and possible fracture in the pelvis. They recommend a CAT scan of the pelvis. Not much to do about that until we see the results back. The only treatment in the last.  See me after the CAT scan is done. I put that order in  for stat    as soon as possible.

## 2023-09-15 ENCOUNTER — TELEPHONE (OUTPATIENT)
Dept: ORTHOPEDIC SURGERY | Age: 88
End: 2023-09-15

## 2023-09-15 NOTE — TELEPHONE ENCOUNTER
Patient called office requesting appointment for referral placed by Hobson Osgood,     Referral reason:S32.89XA (ICD-10-CM) - Closed fracture of other parts of pelvis, initial encounter [facility])    Routed to providers for recommendations.     Future Appointments   Date Time Provider [address]   10/3/2023  4:30 PM Hobson Osgood, DO [place] [facility]   11/13/2023  1:20 PM FABIANO Rogers - CNS [facility] -

## 2023-09-20 DIAGNOSIS — F41.9 ANXIETY: ICD-10-CM

## 2023-09-20 DIAGNOSIS — K21.9 GASTROESOPHAGEAL REFLUX DISEASE WITHOUT ESOPHAGITIS: ICD-10-CM

## 2023-09-21 RX ORDER — OMEPRAZOLE 20 MG/1
20 CAPSULE, DELAYED RELEASE ORAL DAILY
Qty: 90 CAPSULE | Refills: 3 | Status: SHIPPED | OUTPATIENT
Start: 2023-09-21

## 2023-09-21 RX ORDER — PAROXETINE HYDROCHLORIDE 20 MG/1
20 TABLET, FILM COATED ORAL EVERY MORNING
Qty: 90 TABLET | Refills: 3 | Status: SHIPPED | OUTPATIENT
Start: 2023-09-21

## 2023-09-22 ENCOUNTER — TELEPHONE (OUTPATIENT)
Dept: PRIMARY CARE CLINIC | Age: 88
End: 2023-09-22

## 2023-09-22 DIAGNOSIS — R10.2 PAIN IN PELVIS: Primary | ICD-10-CM

## 2023-09-22 NOTE — TELEPHONE ENCOUNTER
Daughter, Katherine Salinas, needs his paperwork signed again (it's in your folder), she will pick it up Monday. It needs signed again because her employer wanted an end date for her leave, she says she put in 11/9 or 11/10, she couldn't remember.   Please call when done, she'll come pick it up.  [phone]

## 2023-09-25 ENCOUNTER — HOSPITAL ENCOUNTER (OUTPATIENT)
Dept: GENERAL RADIOLOGY | Age: 88
Discharge: HOME | End: 2023-09-27
Payer: MEDICARE

## 2023-09-25 ENCOUNTER — OFFICE VISIT (OUTPATIENT)
Dept: ORTHOPEDIC SURGERY | Age: 88
End: 2023-09-25
Payer: MEDICARE

## 2023-09-25 DIAGNOSIS — R10.2 PAIN IN PELVIS: ICD-10-CM

## 2023-09-25 DIAGNOSIS — S32.592A CLOSED FRACTURE OF LEFT INFERIOR PUBIC RAMUS, INITIAL ENCOUNTER (HCC): Primary | ICD-10-CM

## 2023-09-25 PROCEDURE — 99202 OFFICE O/P NEW SF 15 MIN: CPT

## 2023-09-25 PROCEDURE — 72170 X-RAY EXAM OF PELVIS: CPT

## 2023-09-25 PROCEDURE — 99203 OFFICE O/P NEW LOW 30 MIN: CPT | Performed by: PHYSICIAN ASSISTANT

## 2023-09-25 PROCEDURE — 1123F ACP DISCUSS/DSCN MKR DOCD: CPT | Performed by: PHYSICIAN ASSISTANT

## 2023-09-25 RX ORDER — ASCORBIC ACID 500 MG
500 TABLET ORAL DAILY
COMMUNITY

## 2023-09-25 NOTE — PROGRESS NOTES
New Patient Orthopaedic Progress Note    Abdifatah Jarrell is a 80 y.o. male, his YOB: 1933 with the following history as recorded in [facility]:      Patient Active Problem List    Diagnosis Date Noted    Weakness of both lower extremities 11/13/2022    Primary osteoarthritis of left shoulder 07/25/2022    Pre-syncope 06/29/2023    Combined systolic and diastolic congestive heart failure ([address]) 06/29/2023    Severe protein-calorie malnutrition ([address]) 06/29/2023    Adrenal nodule ([address]) 05/30/2023    Diet-controlled diabetes mellitus ([address]) 05/30/2023    Chronic systolic (congestive) heart failure 05/30/2023    Arthritis of knee 05/26/2023    Arteriosclerosis of coronary artery 05/26/2023    Fracture of patella 05/26/2023    Impaired fasting glucose 05/26/2023    Rheumatoid arthritis involving multiple joints ([address]) 05/26/2023    Chronic bilateral low back pain without sciatica 01/24/2022    Anxiety 08/30/2020    Aspiration into airway 08/30/2020    Hypothyroidism     Urinary retention with incomplete bladder emptying 08/13/2020    Orthostatic hypotension 08/13/2020    Hx of total knee arthroplasty, right 08/13/2020    Leukocytosis 08/13/2020    Macrocytic anemia 08/13/2020    Parkinson's disease ([address]) 11/13/2019    Difficulty walking 08/26/2019    Seasonal allergic rhinitis due to pollen 05/13/2019    Gastroesophageal reflux disease without esophagitis 05/13/2019    Essential hypertension 05/13/2019    Mixed hyperlipidemia 05/13/2019    Ascending aortic aneurysm ([address]) 05/10/2011    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

## 2023-09-25 NOTE — PATIENT INSTRUCTIONS
Weightbearing as tolerated left lower extremity. Follow-up in 6-8 weeks for reevaluation and x-rays. Call if any questions or concerns.

## 2023-10-03 ENCOUNTER — OFFICE VISIT (OUTPATIENT)
Dept: PRIMARY CARE CLINIC | Age: 88
End: 2023-10-03
Payer: MEDICARE

## 2023-10-03 DIAGNOSIS — I10 ESSENTIAL HYPERTENSION: Chronic | ICD-10-CM

## 2023-10-03 DIAGNOSIS — G20.A1 PARKINSON'S DISEASE WITHOUT DYSKINESIA OR FLUCTUATING MANIFESTATIONS: Chronic | ICD-10-CM

## 2023-10-03 DIAGNOSIS — I50.22 CHRONIC SYSTOLIC (CONGESTIVE) HEART FAILURE (HCC): ICD-10-CM

## 2023-10-03 DIAGNOSIS — S32.591D OTHER CLOSED FRACTURE OF RIGHT PUBIS WITH ROUTINE HEALING, SUBSEQUENT ENCOUNTER: ICD-10-CM

## 2023-10-03 DIAGNOSIS — I95.1 ORTHOSTATIC HYPOTENSION: Primary | Chronic | ICD-10-CM

## 2023-10-03 DIAGNOSIS — G47.33 SLEEP APNEA, OBSTRUCTIVE: ICD-10-CM

## 2023-10-03 PROCEDURE — 99214 OFFICE O/P EST MOD 30 MIN: CPT | Performed by: FAMILY MEDICINE

## 2023-10-03 PROCEDURE — 1123F ACP DISCUSS/DSCN MKR DOCD: CPT | Performed by: FAMILY MEDICINE

## 2023-10-04 VITALS
DIASTOLIC BLOOD PRESSURE: 70 MMHG | HEIGHT: 74 IN | TEMPERATURE: 97.5 F | WEIGHT: 174.6 LBS | SYSTOLIC BLOOD PRESSURE: 122 MMHG | BODY MASS INDEX: 22.41 KG/M2

## 2023-10-04 PROBLEM — S32.501D: Status: ACTIVE | Noted: 2023-10-04

## 2023-10-04 SDOH — ECONOMIC STABILITY: FOOD INSECURITY: WITHIN THE PAST 12 MONTHS, THE FOOD YOU BOUGHT JUST DIDN'T LAST AND YOU DIDN'T HAVE MONEY TO GET MORE.: NEVER TRUE

## 2023-10-04 SDOH — ECONOMIC STABILITY: FOOD INSECURITY: WITHIN THE PAST 12 MONTHS, YOU WORRIED THAT YOUR FOOD WOULD RUN OUT BEFORE YOU GOT MONEY TO BUY MORE.: NEVER TRUE

## 2023-10-04 SDOH — ECONOMIC STABILITY: INCOME INSECURITY: HOW HARD IS IT FOR YOU TO PAY FOR THE VERY BASICS LIKE FOOD, HOUSING, MEDICAL CARE, AND HEATING?: NOT HARD AT ALL

## 2023-10-04 ASSESSMENT — PATIENT HEALTH QUESTIONNAIRE - PHQ9
SUM OF ALL RESPONSES TO PHQ QUESTIONS 1-9: 1
1. LITTLE INTEREST OR PLEASURE IN DOING THINGS: 1
SUM OF ALL RESPONSES TO PHQ9 QUESTIONS 1 & 2: 1
SUM OF ALL RESPONSES TO PHQ QUESTIONS 1-9: 1
SUM OF ALL RESPONSES TO PHQ QUESTIONS 1-9: 1
2. FEELING DOWN, DEPRESSED OR HOPELESS: 0
SUM OF ALL RESPONSES TO PHQ QUESTIONS 1-9: 1

## 2023-10-04 ASSESSMENT — ENCOUNTER SYMPTOMS
RESPIRATORY NEGATIVE: 1
EYES NEGATIVE: 1
ALLERGIC/IMMUNOLOGIC NEGATIVE: 1
GASTROINTESTINAL NEGATIVE: 1

## 2023-10-05 PROBLEM — G47.33 SLEEP APNEA, OBSTRUCTIVE: Status: ACTIVE | Noted: 2023-10-05

## 2023-10-27 ENCOUNTER — HOSPITAL ENCOUNTER (OUTPATIENT)
Dept: SLEEP CENTER | Age: 88
Discharge: HOME | End: 2023-10-27
Payer: MEDICARE

## 2023-10-27 DIAGNOSIS — G47.33 SLEEP APNEA, OBSTRUCTIVE: ICD-10-CM

## 2023-10-27 PROCEDURE — 95800 SLP STDY UNATTENDED: CPT

## 2023-11-02 ENCOUNTER — TELEPHONE (OUTPATIENT)
Dept: ORTHOPEDIC SURGERY | Age: 88
End: 2023-11-02

## 2023-11-02 NOTE — TELEPHONE ENCOUNTER
Phone # [phone]  Colten Humphreys, daughter of patient called in. Patient was in the background taking. Daughter and patient are requesting to cancel appointment scheduled on 11- @ 11 am with Dr. Alejandra Moreno for 7 week f/u for Closed Fracture of the Left Inferior Pubic Ramus. Patient does not want to r/s appointment. Patient has decided to let the fracture on its own. He doesn't want further Orthopedic treatment at this time. Will call back to r/s if he changes his mind.

## 2023-11-13 ENCOUNTER — OFFICE VISIT (OUTPATIENT)
Dept: NEUROLOGY | Age: 88
End: 2023-11-13
Payer: MEDICARE

## 2023-11-13 VITALS
OXYGEN SATURATION: 98 % | WEIGHT: 174 LBS | BODY MASS INDEX: 22.34 KG/M2 | SYSTOLIC BLOOD PRESSURE: 124 MMHG | HEART RATE: 69 BPM | TEMPERATURE: 97.6 F | DIASTOLIC BLOOD PRESSURE: 77 MMHG

## 2023-11-13 DIAGNOSIS — E03.9 ACQUIRED HYPOTHYROIDISM: Chronic | ICD-10-CM

## 2023-11-13 DIAGNOSIS — G20.A2 PARKINSON'S DISEASE WITHOUT DYSKINESIA, WITH FLUCTUATING MANIFESTATIONS: Primary | ICD-10-CM

## 2023-11-13 DIAGNOSIS — M54.17 LUMBOSACRAL RADICULOPATHY: ICD-10-CM

## 2023-11-13 PROCEDURE — 99214 OFFICE O/P EST MOD 30 MIN: CPT | Performed by: CLINICAL NURSE SPECIALIST

## 2023-11-13 PROCEDURE — 1123F ACP DISCUSS/DSCN MKR DOCD: CPT | Performed by: CLINICAL NURSE SPECIALIST

## 2023-11-14 RX ORDER — LEVOTHYROXINE SODIUM 0.05 MG/1
50 TABLET ORAL DAILY
Qty: 90 TABLET | Refills: 3 | Status: SHIPPED | OUTPATIENT
Start: 2023-11-14

## 2023-12-03 ENCOUNTER — TELEPHONE (OUTPATIENT)
Dept: PRIMARY CARE CLINIC | Age: 88
End: 2023-12-03

## 2023-12-03 NOTE — TELEPHONE ENCOUNTER
Notify family that the sleep study only showed very mild sleep apnea. The sleep specialist said he would like to see him to discuss it. .  If they do not get an appointment let my office know

## 2023-12-06 ENCOUNTER — TELEPHONE (OUTPATIENT)
Dept: SLEEP CENTER | Age: 88
End: 2023-12-06

## 2023-12-06 NOTE — TELEPHONE ENCOUNTER
Call to patient to schedule NP appt to go over sleep study results. LMOM for patient to call us back.

## 2023-12-27 ENCOUNTER — APPOINTMENT (OUTPATIENT)
Dept: CT IMAGING | Age: 88
DRG: 195 | End: 2023-12-27
Payer: MEDICARE

## 2023-12-27 ENCOUNTER — APPOINTMENT (OUTPATIENT)
Dept: GENERAL RADIOLOGY | Age: 88
DRG: 195 | End: 2023-12-27
Payer: MEDICARE

## 2023-12-27 ENCOUNTER — HOSPITAL ENCOUNTER (INPATIENT)
Age: 88
LOS: 8 days | Discharge: NURSING, REHAB OR LONG-TERM CARE | DRG: 195 | End: 2024-01-04
Attending: STUDENT IN AN ORGANIZED HEALTH CARE EDUCATION/TRAINING PROGRAM | Admitting: STUDENT IN AN ORGANIZED HEALTH CARE EDUCATION/TRAINING PROGRAM
Payer: MEDICARE

## 2023-12-27 DIAGNOSIS — Z86.69 HISTORY OF PARKINSON'S DISEASE: ICD-10-CM

## 2023-12-27 DIAGNOSIS — R53.83 FATIGUE, UNSPECIFIED TYPE: ICD-10-CM

## 2023-12-27 DIAGNOSIS — J10.1 INFLUENZA A: Primary | ICD-10-CM

## 2023-12-27 LAB
ALBUMIN SERPL-MCNC: 3.8 G/DL (ref 3.5–5.2)
ALP SERPL-CCNC: 114 U/L (ref 40–129)
ALT SERPL-CCNC: <5 U/L (ref 0–40)
ANION GAP SERPL CALCULATED.3IONS-SCNC: 9 MMOL/L (ref 7–16)
AST SERPL-CCNC: 18 U/L (ref 0–39)
BASOPHILS # BLD: 0.02 K/UL (ref 0–0.2)
BASOPHILS NFR BLD: 1 % (ref 0–2)
BILIRUB SERPL-MCNC: 0.6 MG/DL (ref 0–1.2)
BUN SERPL-MCNC: 23 MG/DL (ref 6–23)
CALCIUM SERPL-MCNC: 9.2 MG/DL (ref 8.6–10.2)
CHLORIDE SERPL-SCNC: 100 MMOL/L (ref 98–107)
CO2 SERPL-SCNC: 25 MMOL/L (ref 22–29)
CREAT SERPL-MCNC: 1.1 MG/DL (ref 0.7–1.2)
EOSINOPHIL # BLD: 0.02 K/UL (ref 0.05–0.5)
EOSINOPHILS RELATIVE PERCENT: 1 % (ref 0–6)
ERYTHROCYTE [DISTWIDTH] IN BLOOD BY AUTOMATED COUNT: 12.6 % (ref 11.5–15)
GFR SERPL CREATININE-BSD FRML MDRD: >60 ML/MIN/1.73M2
GLUCOSE SERPL-MCNC: 108 MG/DL (ref 74–99)
HCT VFR BLD AUTO: 31.6 % (ref 37–54)
HGB BLD-MCNC: 10.6 G/DL (ref 12.5–16.5)
IMM GRANULOCYTES # BLD AUTO: <0.03 K/UL (ref 0–0.58)
IMM GRANULOCYTES NFR BLD: 1 % (ref 0–5)
INFLUENZA A BY PCR: DETECTED
INFLUENZA B BY PCR: NOT DETECTED
LACTATE BLDV-SCNC: 1.1 MMOL/L (ref 0.5–2.2)
LIPASE SERPL-CCNC: 22 U/L (ref 13–60)
LYMPHOCYTES NFR BLD: 0.32 K/UL (ref 1.5–4)
LYMPHOCYTES RELATIVE PERCENT: 8 % (ref 20–42)
MCH RBC QN AUTO: 34.6 PG (ref 26–35)
MCHC RBC AUTO-ENTMCNC: 33.5 G/DL (ref 32–34.5)
MCV RBC AUTO: 103.3 FL (ref 80–99.9)
MONOCYTES NFR BLD: 0.77 K/UL (ref 0.1–0.95)
MONOCYTES NFR BLD: 20 % (ref 2–12)
NEUTROPHILS NFR BLD: 70 % (ref 43–80)
NEUTS SEG NFR BLD: 2.66 K/UL (ref 1.8–7.3)
PLATELET # BLD AUTO: 162 K/UL (ref 130–450)
PMV BLD AUTO: 9.3 FL (ref 7–12)
POTASSIUM SERPL-SCNC: 4.1 MMOL/L (ref 3.5–5)
PROT SERPL-MCNC: 6.4 G/DL (ref 6.4–8.3)
RBC # BLD AUTO: 3.06 M/UL (ref 3.8–5.8)
RBC # BLD: ABNORMAL 10*6/UL
RBC # BLD: ABNORMAL 10*6/UL
SARS-COV-2 RDRP RESP QL NAA+PROBE: NOT DETECTED
SODIUM SERPL-SCNC: 134 MMOL/L (ref 132–146)
SPECIMEN DESCRIPTION: NORMAL
TROPONIN I SERPL HS-MCNC: 28 NG/L (ref 0–11)
TROPONIN I SERPL HS-MCNC: 29 NG/L (ref 0–11)
TSH SERPL DL<=0.05 MIU/L-ACNC: 1.87 UIU/ML (ref 0.27–4.2)
WBC OTHER # BLD: 3.8 K/UL (ref 4.5–11.5)

## 2023-12-27 PROCEDURE — 87635 SARS-COV-2 COVID-19 AMP PRB: CPT

## 2023-12-27 PROCEDURE — 93005 ELECTROCARDIOGRAM TRACING: CPT | Performed by: STUDENT IN AN ORGANIZED HEALTH CARE EDUCATION/TRAINING PROGRAM

## 2023-12-27 PROCEDURE — 71045 X-RAY EXAM CHEST 1 VIEW: CPT

## 2023-12-27 PROCEDURE — 2580000003 HC RX 258: Performed by: STUDENT IN AN ORGANIZED HEALTH CARE EDUCATION/TRAINING PROGRAM

## 2023-12-27 PROCEDURE — 80053 COMPREHEN METABOLIC PANEL: CPT

## 2023-12-27 PROCEDURE — 83605 ASSAY OF LACTIC ACID: CPT

## 2023-12-27 PROCEDURE — 84484 ASSAY OF TROPONIN QUANT: CPT

## 2023-12-27 PROCEDURE — 70450 CT HEAD/BRAIN W/O DYE: CPT

## 2023-12-27 PROCEDURE — 84443 ASSAY THYROID STIM HORMONE: CPT

## 2023-12-27 PROCEDURE — 2060000000 HC ICU INTERMEDIATE R&B

## 2023-12-27 PROCEDURE — 87502 INFLUENZA DNA AMP PROBE: CPT

## 2023-12-27 PROCEDURE — 99285 EMERGENCY DEPT VISIT HI MDM: CPT

## 2023-12-27 PROCEDURE — 85025 COMPLETE CBC W/AUTO DIFF WBC: CPT

## 2023-12-27 PROCEDURE — 83690 ASSAY OF LIPASE: CPT

## 2023-12-27 PROCEDURE — 99221 1ST HOSP IP/OBS SF/LOW 40: CPT | Performed by: STUDENT IN AN ORGANIZED HEALTH CARE EDUCATION/TRAINING PROGRAM

## 2023-12-27 RX ORDER — POLYETHYLENE GLYCOL 3350 17 G/17G
17 POWDER, FOR SOLUTION ORAL DAILY PRN
Status: DISCONTINUED | OUTPATIENT
Start: 2023-12-27 | End: 2024-01-04 | Stop reason: HOSPADM

## 2023-12-27 RX ORDER — OSELTAMIVIR PHOSPHATE 6 MG/ML
75 FOR SUSPENSION ORAL 2 TIMES DAILY
Status: DISCONTINUED | OUTPATIENT
Start: 2023-12-27 | End: 2023-12-27 | Stop reason: CLARIF

## 2023-12-27 RX ORDER — POTASSIUM CHLORIDE 7.45 MG/ML
10 INJECTION INTRAVENOUS PRN
Status: DISCONTINUED | OUTPATIENT
Start: 2023-12-27 | End: 2024-01-04 | Stop reason: HOSPADM

## 2023-12-27 RX ORDER — LANOLIN ALCOHOL/MO/W.PET/CERES
3 CREAM (GRAM) TOPICAL NIGHTLY
Status: DISCONTINUED | OUTPATIENT
Start: 2023-12-27 | End: 2024-01-04 | Stop reason: HOSPADM

## 2023-12-27 RX ORDER — SODIUM CHLORIDE 9 MG/ML
INJECTION, SOLUTION INTRAVENOUS PRN
Status: DISCONTINUED | OUTPATIENT
Start: 2023-12-27 | End: 2024-01-04 | Stop reason: HOSPADM

## 2023-12-27 RX ORDER — POTASSIUM CHLORIDE 20 MEQ/1
40 TABLET, EXTENDED RELEASE ORAL PRN
Status: DISCONTINUED | OUTPATIENT
Start: 2023-12-27 | End: 2024-01-04 | Stop reason: HOSPADM

## 2023-12-27 RX ORDER — DEXTROSE MONOHYDRATE 100 MG/ML
INJECTION, SOLUTION INTRAVENOUS CONTINUOUS PRN
Status: DISCONTINUED | OUTPATIENT
Start: 2023-12-27 | End: 2024-01-04 | Stop reason: HOSPADM

## 2023-12-27 RX ORDER — CARBIDOPA AND LEVODOPA 25; 100 MG/1; MG/1
2 TABLET, EXTENDED RELEASE ORAL 4 TIMES DAILY
Status: DISCONTINUED | OUTPATIENT
Start: 2023-12-27 | End: 2024-01-04 | Stop reason: HOSPADM

## 2023-12-27 RX ORDER — SODIUM CHLORIDE 0.9 % (FLUSH) 0.9 %
5-40 SYRINGE (ML) INJECTION PRN
Status: DISCONTINUED | OUTPATIENT
Start: 2023-12-27 | End: 2024-01-04 | Stop reason: HOSPADM

## 2023-12-27 RX ORDER — INSULIN LISPRO 100 [IU]/ML
0-4 INJECTION, SOLUTION INTRAVENOUS; SUBCUTANEOUS NIGHTLY
Status: DISCONTINUED | OUTPATIENT
Start: 2023-12-27 | End: 2023-12-31

## 2023-12-27 RX ORDER — MAGNESIUM SULFATE IN WATER 40 MG/ML
2000 INJECTION, SOLUTION INTRAVENOUS PRN
Status: DISCONTINUED | OUTPATIENT
Start: 2023-12-27 | End: 2024-01-04 | Stop reason: HOSPADM

## 2023-12-27 RX ORDER — ONDANSETRON 4 MG/1
4 TABLET, ORALLY DISINTEGRATING ORAL EVERY 8 HOURS PRN
Status: DISCONTINUED | OUTPATIENT
Start: 2023-12-27 | End: 2024-01-04 | Stop reason: HOSPADM

## 2023-12-27 RX ORDER — ASPIRIN 81 MG/1
81 TABLET ORAL DAILY
Status: DISCONTINUED | OUTPATIENT
Start: 2023-12-27 | End: 2024-01-04 | Stop reason: HOSPADM

## 2023-12-27 RX ORDER — ACETAMINOPHEN 650 MG/1
650 SUPPOSITORY RECTAL EVERY 6 HOURS PRN
Status: DISCONTINUED | OUTPATIENT
Start: 2023-12-27 | End: 2024-01-04 | Stop reason: HOSPADM

## 2023-12-27 RX ORDER — SODIUM CHLORIDE 0.9 % (FLUSH) 0.9 %
5-40 SYRINGE (ML) INJECTION EVERY 12 HOURS SCHEDULED
Status: DISCONTINUED | OUTPATIENT
Start: 2023-12-27 | End: 2024-01-04 | Stop reason: HOSPADM

## 2023-12-27 RX ORDER — ACETAMINOPHEN 325 MG/1
650 TABLET ORAL EVERY 6 HOURS PRN
Status: DISCONTINUED | OUTPATIENT
Start: 2023-12-27 | End: 2024-01-04 | Stop reason: HOSPADM

## 2023-12-27 RX ORDER — PAROXETINE HYDROCHLORIDE 20 MG/1
20 TABLET, FILM COATED ORAL EVERY MORNING
Status: DISCONTINUED | OUTPATIENT
Start: 2023-12-28 | End: 2024-01-04 | Stop reason: HOSPADM

## 2023-12-27 RX ORDER — METHOTREXATE 2.5 MG/1
7.5 TABLET ORAL WEEKLY
Status: DISCONTINUED | OUTPATIENT
Start: 2023-12-28 | End: 2023-12-28

## 2023-12-27 RX ORDER — LEVOTHYROXINE SODIUM 0.05 MG/1
50 TABLET ORAL DAILY
Status: DISCONTINUED | OUTPATIENT
Start: 2023-12-28 | End: 2024-01-04 | Stop reason: HOSPADM

## 2023-12-27 RX ORDER — ENOXAPARIN SODIUM 100 MG/ML
40 INJECTION SUBCUTANEOUS DAILY
Status: DISCONTINUED | OUTPATIENT
Start: 2023-12-27 | End: 2024-01-04 | Stop reason: HOSPADM

## 2023-12-27 RX ORDER — MIDODRINE HYDROCHLORIDE 5 MG/1
2.5 TABLET ORAL 3 TIMES DAILY
Status: DISCONTINUED | OUTPATIENT
Start: 2023-12-27 | End: 2024-01-04 | Stop reason: HOSPADM

## 2023-12-27 RX ORDER — METOPROLOL SUCCINATE 25 MG/1
25 TABLET, EXTENDED RELEASE ORAL DAILY
Status: DISCONTINUED | OUTPATIENT
Start: 2023-12-28 | End: 2024-01-04 | Stop reason: HOSPADM

## 2023-12-27 RX ORDER — OSELTAMIVIR PHOSPHATE 30 MG/1
30 CAPSULE ORAL 2 TIMES DAILY
Status: COMPLETED | OUTPATIENT
Start: 2023-12-28 | End: 2024-01-01

## 2023-12-27 RX ORDER — 0.9 % SODIUM CHLORIDE 0.9 %
1000 INTRAVENOUS SOLUTION INTRAVENOUS ONCE
Status: COMPLETED | OUTPATIENT
Start: 2023-12-27 | End: 2023-12-27

## 2023-12-27 RX ORDER — PANTOPRAZOLE SODIUM 40 MG/1
40 TABLET, DELAYED RELEASE ORAL
Status: DISCONTINUED | OUTPATIENT
Start: 2023-12-28 | End: 2024-01-04 | Stop reason: HOSPADM

## 2023-12-27 RX ORDER — OSELTAMIVIR PHOSPHATE 75 MG/1
75 CAPSULE ORAL ONCE
Status: COMPLETED | OUTPATIENT
Start: 2023-12-27 | End: 2023-12-28

## 2023-12-27 RX ORDER — ONDANSETRON 2 MG/ML
4 INJECTION INTRAMUSCULAR; INTRAVENOUS EVERY 6 HOURS PRN
Status: DISCONTINUED | OUTPATIENT
Start: 2023-12-27 | End: 2024-01-04 | Stop reason: HOSPADM

## 2023-12-27 RX ORDER — INSULIN LISPRO 100 [IU]/ML
0-4 INJECTION, SOLUTION INTRAVENOUS; SUBCUTANEOUS
Status: DISCONTINUED | OUTPATIENT
Start: 2023-12-28 | End: 2023-12-31

## 2023-12-27 RX ADMIN — SODIUM CHLORIDE 1000 ML: 9 INJECTION, SOLUTION INTRAVENOUS at 16:20

## 2023-12-27 NOTE — ED PROVIDER NOTES
°C) Oral 60 16 97 %       Oxygen Saturation Interpretation: Normal    ------------------------------------------ PROGRESS NOTES ------------------------------------------  Counseling:  I have spoken with the patient and daughter  and discussed today’s results, in addition to providing specific details for the plan of care and counseling regarding the diagnosis and prognosis.  Their questions are answered at this time and they are agreeable with the plan of admission.    --------------------------------- ADDITIONAL PROVIDER NOTES ---------------------------------  Consultations:  Spoke with Dr. Austin.  Discussed case.  They will admit the patient.  This patient's ED course included: a personal history and physicial examination, re-evaluation prior to disposition, multiple bedside re-evaluations, IV medications, cardiac monitoring, continuous pulse oximetry, and complex medical decision making and emergency management    This patient has remained hemodynamically stable during their ED course.    Diagnosis:  1. Influenza A    2. Fatigue, unspecified type    3. History of Parkinson's disease        Disposition:  Patient's disposition: Admit to telemetry  Patient's condition is stable.    Please note that the above documentation was prepared using voice recognition software. Every attempt was made to ensure accuracy but there may be spelling, grammatical, and contextual errors.       Avila Langston DO  01/01/24 1737

## 2023-12-27 NOTE — TELEPHONE ENCOUNTER
Call to patient to schedule appointment to go over Sleep study results, LVM.  We have not heard back from patient to schedule, will notify PCP

## 2023-12-28 PROBLEM — M06.9 RHEUMATOID ARTHRITIS (HCC): Status: ACTIVE | Noted: 2023-12-28

## 2023-12-28 PROBLEM — E86.0 DEHYDRATION: Status: ACTIVE | Noted: 2023-12-28

## 2023-12-28 LAB
EKG ATRIAL RATE: 74 BPM
EKG P AXIS: 31 DEGREES
EKG P-R INTERVAL: 182 MS
EKG Q-T INTERVAL: 438 MS
EKG QRS DURATION: 166 MS
EKG QTC CALCULATION (BAZETT): 486 MS
EKG R AXIS: -27 DEGREES
EKG T AXIS: 103 DEGREES
EKG VENTRICULAR RATE: 74 BPM
GLUCOSE BLD-MCNC: 101 MG/DL (ref 74–99)
GLUCOSE BLD-MCNC: 104 MG/DL (ref 74–99)
GLUCOSE BLD-MCNC: 88 MG/DL (ref 74–99)
GLUCOSE BLD-MCNC: 93 MG/DL (ref 74–99)

## 2023-12-28 PROCEDURE — 93010 ELECTROCARDIOGRAM REPORT: CPT | Performed by: INTERNAL MEDICINE

## 2023-12-28 PROCEDURE — 97530 THERAPEUTIC ACTIVITIES: CPT

## 2023-12-28 PROCEDURE — 6360000002 HC RX W HCPCS: Performed by: STUDENT IN AN ORGANIZED HEALTH CARE EDUCATION/TRAINING PROGRAM

## 2023-12-28 PROCEDURE — 97535 SELF CARE MNGMENT TRAINING: CPT

## 2023-12-28 PROCEDURE — 2580000003 HC RX 258: Performed by: STUDENT IN AN ORGANIZED HEALTH CARE EDUCATION/TRAINING PROGRAM

## 2023-12-28 PROCEDURE — 82962 GLUCOSE BLOOD TEST: CPT

## 2023-12-28 PROCEDURE — 6370000000 HC RX 637 (ALT 250 FOR IP): Performed by: STUDENT IN AN ORGANIZED HEALTH CARE EDUCATION/TRAINING PROGRAM

## 2023-12-28 PROCEDURE — 97161 PT EVAL LOW COMPLEX 20 MIN: CPT

## 2023-12-28 PROCEDURE — 2060000000 HC ICU INTERMEDIATE R&B

## 2023-12-28 PROCEDURE — 2580000003 HC RX 258: Performed by: INTERNAL MEDICINE

## 2023-12-28 PROCEDURE — 97165 OT EVAL LOW COMPLEX 30 MIN: CPT

## 2023-12-28 PROCEDURE — 99232 SBSQ HOSP IP/OBS MODERATE 35: CPT | Performed by: INTERNAL MEDICINE

## 2023-12-28 RX ORDER — SODIUM CHLORIDE 9 MG/ML
INJECTION, SOLUTION INTRAVENOUS CONTINUOUS
Status: DISCONTINUED | OUTPATIENT
Start: 2023-12-28 | End: 2023-12-31

## 2023-12-28 RX ORDER — LEUCOVORIN CALCIUM 5 MG/1
5 TABLET ORAL ONCE
Status: COMPLETED | OUTPATIENT
Start: 2023-12-29 | End: 2023-12-29

## 2023-12-28 RX ADMIN — ACETAMINOPHEN 650 MG: 325 TABLET ORAL at 22:33

## 2023-12-28 RX ADMIN — CARBIDOPA AND LEVODOPA 2 TABLET: 25; 100 TABLET, EXTENDED RELEASE ORAL at 21:17

## 2023-12-28 RX ADMIN — OSELTAMIVIR PHOSPHATE 30 MG: 30 CAPSULE ORAL at 22:30

## 2023-12-28 RX ADMIN — CARBIDOPA AND LEVODOPA 2 TABLET: 25; 100 TABLET, EXTENDED RELEASE ORAL at 12:25

## 2023-12-28 RX ADMIN — PANTOPRAZOLE SODIUM 40 MG: 40 TABLET, DELAYED RELEASE ORAL at 05:44

## 2023-12-28 RX ADMIN — ENOXAPARIN SODIUM 40 MG: 100 INJECTION SUBCUTANEOUS at 10:16

## 2023-12-28 RX ADMIN — CARBIDOPA AND LEVODOPA 2 TABLET: 25; 100 TABLET, EXTENDED RELEASE ORAL at 10:16

## 2023-12-28 RX ADMIN — ASPIRIN 81 MG: 81 TABLET, COATED ORAL at 00:12

## 2023-12-28 RX ADMIN — CARBIDOPA AND LEVODOPA 2 TABLET: 25; 100 TABLET, EXTENDED RELEASE ORAL at 00:12

## 2023-12-28 RX ADMIN — MIDODRINE HYDROCHLORIDE 2.5 MG: 5 TABLET ORAL at 21:20

## 2023-12-28 RX ADMIN — PAROXETINE HYDROCHLORIDE 20 MG: 20 TABLET, FILM COATED ORAL at 10:16

## 2023-12-28 RX ADMIN — SODIUM CHLORIDE, PRESERVATIVE FREE 10 ML: 5 INJECTION INTRAVENOUS at 10:17

## 2023-12-28 RX ADMIN — LEVOTHYROXINE SODIUM 50 MCG: 0.05 TABLET ORAL at 05:44

## 2023-12-28 RX ADMIN — SODIUM CHLORIDE: 9 INJECTION, SOLUTION INTRAVENOUS at 12:21

## 2023-12-28 RX ADMIN — Medication 3 MG: at 21:20

## 2023-12-28 RX ADMIN — METOPROLOL SUCCINATE 25 MG: 25 TABLET, EXTENDED RELEASE ORAL at 10:16

## 2023-12-28 RX ADMIN — SODIUM CHLORIDE, PRESERVATIVE FREE 10 ML: 5 INJECTION INTRAVENOUS at 00:12

## 2023-12-28 RX ADMIN — METHOTREXATE 7.5 MG: 2.5 TABLET ORAL at 10:16

## 2023-12-28 RX ADMIN — MIDODRINE HYDROCHLORIDE 2.5 MG: 5 TABLET ORAL at 00:12

## 2023-12-28 RX ADMIN — OSELTAMIVIR PHOSPHATE 30 MG: 30 CAPSULE ORAL at 10:16

## 2023-12-28 RX ADMIN — MIDODRINE HYDROCHLORIDE 2.5 MG: 5 TABLET ORAL at 10:16

## 2023-12-28 RX ADMIN — MIDODRINE HYDROCHLORIDE 2.5 MG: 5 TABLET ORAL at 16:12

## 2023-12-28 RX ADMIN — OSELTAMIVIR PHOSPHATE 75 MG: 75 CAPSULE ORAL at 00:12

## 2023-12-28 RX ADMIN — CARBIDOPA AND LEVODOPA 2 TABLET: 25; 100 TABLET, EXTENDED RELEASE ORAL at 17:43

## 2023-12-28 RX ADMIN — ENOXAPARIN SODIUM 40 MG: 100 INJECTION SUBCUTANEOUS at 00:23

## 2023-12-28 NOTE — ACP (ADVANCE CARE PLANNING)
Advance Care Planning   Healthcare Decision Maker:    Primary Decision Maker: AdvanceMarline fair - Child - [phone]    Secondary Decision Maker: Maryellen Grimaldo - Spouse - [phone]      Today we documented Decision Maker(s) consistent with Legal Next of Kin hierarchy.

## 2023-12-28 NOTE — PLAN OF CARE
Problem: Safety - Adult  Goal: Free from fall injury  12/28/2023 1013 by Cynthia Pizano RN  Outcome: Progressing  12/28/2023 0044 by Suzette Macias RN  Outcome: Progressing     Problem: ABCDS Injury Assessment  Goal: Absence of physical injury  12/28/2023 1013 by Cynthia Pizano RN  Outcome: Progressing  12/28/2023 0044 by Suzette Macias RN  Outcome: Progressing

## 2023-12-28 NOTE — H&P
smoking. His smoking use included pipe and cigars. He has never used smokeless tobacco. He reports current alcohol use. He reports that he does not use drugs.    Family History:   family history includes Lung Cancer in his father; No Known Problems in his mother.       PHYSICAL EXAM:  Vitals:  BP (!) 147/85   Pulse 71   Temp 99.9 °F (37.7 °C) (Oral)   Resp 18   Ht 1.88 m (6' 2\")   Wt 77.1 kg (170 lb)   SpO2 94%   BMI 21.83 kg/m²     General Appearance: alert and oriented to person, place and time and in no acute distress  Skin: warm and dry  Head: normocephalic and atraumatic  Eyes: pupils equal, round, and reactive to light, extraocular eye movements intact, conjunctivae normal  Neck: neck supple and non tender without mass   Pulmonary/Chest: clear to auscultation bilaterally- no wheezes, rales or rhonchi, normal air movement, no respiratory distress  Cardiovascular: normal rate, normal S1 and S2 and no carotid bruits  Abdomen: soft, non-tender, non-distended, normal bowel sounds, no masses or organomegaly  Extremities: no cyanosis, no clubbing and no edema  Neurologic: no cranial nerve deficit and speech normal        LABS:  Recent Labs     12/27/23  1409      K 4.1      CO2 25   BUN 23   CREATININE 1.1   GLUCOSE 108*   CALCIUM 9.2       Recent Labs     12/27/23  1409   WBC 3.8*   RBC 3.06*   HGB 10.6*   HCT 31.6*   .3*   MCH 34.6   MCHC 33.5   RDW 12.6      MPV 9.3       No results for input(s): \"POCGLU\" in the last 72 hours.        Radiology:   XR CHEST PORTABLE   Final Result   No acute process.         CT HEAD WO CONTRAST   Final Result   No acute intracranial abnormality.             EKG:     ASSESSMENT:      Principal Problem:    Influenza A  Resolved Problems:    * No resolved hospital problems. *      PLAN:    1.  Influenza A-stable vitals, will start Tamiflu, symptomatic treatment, labs in the morning, and follow.  2.  Tiredness and fatigue-secondary to flu, symptomatic

## 2023-12-28 NOTE — PLAN OF CARE
Problem: Safety - Adult  Goal: Free from fall injury  Outcome: Progressing     Problem: ABCDS Injury Assessment  Goal: Absence of physical injury  Outcome: Progressing     Problem: Skin/Tissue Integrity  Goal: Absence of new skin breakdown  Description: 1.  Monitor for areas of redness and/or skin breakdown  2.  Assess vascular access sites hourly  3.  Every 4-6 hours minimum:  Change oxygen saturation probe site  4.  Every 4-6 hours:  If on nasal continuous positive airway pressure, respiratory therapy assess nares and determine need for appliance change or resting period.  Outcome: Progressing

## 2023-12-28 NOTE — CARE COORDINATION
Influenza + on IVFs. CM placed a call to dtr Marline who states pt resides w/his dtr Trudi and her spouse in a one story home, four steps to enter and requires assistance w/ADLs and uses a walker. Pt has a PCP and no issues obtaining medications. No home O2, cpap or nebulizer. Hx of SNF stay at [facility] and [facility]. Pt.'s spouse is currently at [facility] and dtr would prefer pt has a rehab stay as pt is progressively declining. PT am-pac: 12/24, awaiting OT for precert. Will follow.  KEYSHAWN CampN, RN  [facility] Case Management  [phone]

## 2023-12-28 NOTE — ED NOTES
ED to Inpatient Handoff Report    Notified Bowie that electronic handoff available and patient ready for transport to room 640.    Safety Risks: Risk of falls    Patient in Restraints: no    Constant Observer or Patient : no    Telemetry Monitoring Ordered :Yes           Order to transfer to unit without monitor:YES    Last MEWS: 2 Time completed: 2210    Deterioration Index Score:   Predictive Model Details          21 (Normal)  Factor Value    Calculated 12/27/2023 22:11 74% Age 89 years old    Deterioration Index Model 8% Systolic 139     5% Sodium 134 mmol/L     4% Respiratory rate 17     3% Hematocrit abnormal (31.6 %)     3% Potassium 4.1 mmol/L     2% Pulse oximetry 94 %     2% WBC count abnormal (3.8 k/uL)     0% Pulse 78     0% Temperature 98.4 °F (36.9 °C)        Vitals:    12/27/23 1632 12/27/23 1711 12/27/23 1945 12/27/23 2210   BP:  (!) 153/72 (!) 147/85 (!) 139/90   Pulse:  64 71 78   Resp:  16 18 17   Temp:  98.3 °F (36.8 °C) 99.9 °F (37.7 °C) 98.4 °F (36.9 °C)   TempSrc:  Oral Oral Oral   SpO2:  94% 94% 94%   Weight: 77.1 kg (170 lb)      Height: 1.88 m (6' 2\")            Opportunity for questions and clarification was provided.

## 2023-12-29 LAB
ANION GAP SERPL CALCULATED.3IONS-SCNC: 12 MMOL/L (ref 7–16)
BASOPHILS # BLD: 0.02 K/UL (ref 0–0.2)
BASOPHILS NFR BLD: 1 % (ref 0–2)
BUN SERPL-MCNC: 28 MG/DL (ref 6–23)
CALCIUM SERPL-MCNC: 9.1 MG/DL (ref 8.6–10.2)
CHLORIDE SERPL-SCNC: 102 MMOL/L (ref 98–107)
CO2 SERPL-SCNC: 20 MMOL/L (ref 22–29)
CREAT SERPL-MCNC: 1.1 MG/DL (ref 0.7–1.2)
EOSINOPHIL # BLD: 0.07 K/UL (ref 0.05–0.5)
EOSINOPHILS RELATIVE PERCENT: 2 % (ref 0–6)
ERYTHROCYTE [DISTWIDTH] IN BLOOD BY AUTOMATED COUNT: 12.7 % (ref 11.5–15)
GFR SERPL CREATININE-BSD FRML MDRD: >60 ML/MIN/1.73M2
GLUCOSE BLD-MCNC: 96 MG/DL (ref 74–99)
GLUCOSE SERPL-MCNC: 84 MG/DL (ref 74–99)
HCT VFR BLD AUTO: 35.9 % (ref 37–54)
HGB BLD-MCNC: 12.1 G/DL (ref 12.5–16.5)
IMM GRANULOCYTES # BLD AUTO: <0.03 K/UL (ref 0–0.58)
IMM GRANULOCYTES NFR BLD: 0 % (ref 0–5)
LYMPHOCYTES NFR BLD: 1.2 K/UL (ref 1.5–4)
LYMPHOCYTES RELATIVE PERCENT: 32 % (ref 20–42)
MCH RBC QN AUTO: 34.3 PG (ref 26–35)
MCHC RBC AUTO-ENTMCNC: 33.7 G/DL (ref 32–34.5)
MCV RBC AUTO: 101.7 FL (ref 80–99.9)
MONOCYTES NFR BLD: 0.82 K/UL (ref 0.1–0.95)
MONOCYTES NFR BLD: 22 % (ref 2–12)
NEUTROPHILS NFR BLD: 44 % (ref 43–80)
NEUTS SEG NFR BLD: 1.66 K/UL (ref 1.8–7.3)
PLATELET # BLD AUTO: 172 K/UL (ref 130–450)
PMV BLD AUTO: 9.8 FL (ref 7–12)
POTASSIUM SERPL-SCNC: 4.1 MMOL/L (ref 3.5–5)
RBC # BLD AUTO: 3.53 M/UL (ref 3.8–5.8)
SODIUM SERPL-SCNC: 134 MMOL/L (ref 132–146)
WBC OTHER # BLD: 3.8 K/UL (ref 4.5–11.5)

## 2023-12-29 PROCEDURE — 6360000002 HC RX W HCPCS: Performed by: STUDENT IN AN ORGANIZED HEALTH CARE EDUCATION/TRAINING PROGRAM

## 2023-12-29 PROCEDURE — 82962 GLUCOSE BLOOD TEST: CPT

## 2023-12-29 PROCEDURE — 6370000000 HC RX 637 (ALT 250 FOR IP): Performed by: STUDENT IN AN ORGANIZED HEALTH CARE EDUCATION/TRAINING PROGRAM

## 2023-12-29 PROCEDURE — 85025 COMPLETE CBC W/AUTO DIFF WBC: CPT

## 2023-12-29 PROCEDURE — 36415 COLL VENOUS BLD VENIPUNCTURE: CPT

## 2023-12-29 PROCEDURE — 6370000000 HC RX 637 (ALT 250 FOR IP): Performed by: INTERNAL MEDICINE

## 2023-12-29 PROCEDURE — 2580000003 HC RX 258: Performed by: STUDENT IN AN ORGANIZED HEALTH CARE EDUCATION/TRAINING PROGRAM

## 2023-12-29 PROCEDURE — 2060000000 HC ICU INTERMEDIATE R&B

## 2023-12-29 PROCEDURE — 80048 BASIC METABOLIC PNL TOTAL CA: CPT

## 2023-12-29 PROCEDURE — 99232 SBSQ HOSP IP/OBS MODERATE 35: CPT | Performed by: INTERNAL MEDICINE

## 2023-12-29 RX ADMIN — OSELTAMIVIR PHOSPHATE 30 MG: 30 CAPSULE ORAL at 08:35

## 2023-12-29 RX ADMIN — ACETAMINOPHEN 650 MG: 325 TABLET ORAL at 05:08

## 2023-12-29 RX ADMIN — LEUCOVORIN CALCIUM 5 MG: 5 TABLET ORAL at 08:35

## 2023-12-29 RX ADMIN — CARBIDOPA AND LEVODOPA 2 TABLET: 25; 100 TABLET, EXTENDED RELEASE ORAL at 08:36

## 2023-12-29 RX ADMIN — ENOXAPARIN SODIUM 40 MG: 100 INJECTION SUBCUTANEOUS at 08:36

## 2023-12-29 RX ADMIN — Medication 3 MG: at 21:26

## 2023-12-29 RX ADMIN — SODIUM CHLORIDE, PRESERVATIVE FREE 10 ML: 5 INJECTION INTRAVENOUS at 08:36

## 2023-12-29 RX ADMIN — PANTOPRAZOLE SODIUM 40 MG: 40 TABLET, DELAYED RELEASE ORAL at 05:08

## 2023-12-29 RX ADMIN — LEVOTHYROXINE SODIUM 50 MCG: 0.05 TABLET ORAL at 05:08

## 2023-12-29 RX ADMIN — PAROXETINE HYDROCHLORIDE 20 MG: 20 TABLET, FILM COATED ORAL at 08:36

## 2023-12-29 RX ADMIN — CARBIDOPA AND LEVODOPA 2 TABLET: 25; 100 TABLET, EXTENDED RELEASE ORAL at 15:09

## 2023-12-29 RX ADMIN — ACETAMINOPHEN 650 MG: 325 TABLET ORAL at 18:36

## 2023-12-29 RX ADMIN — ASPIRIN 81 MG: 81 TABLET, COATED ORAL at 08:36

## 2023-12-29 RX ADMIN — CARBIDOPA AND LEVODOPA 2 TABLET: 25; 100 TABLET, EXTENDED RELEASE ORAL at 21:24

## 2023-12-29 RX ADMIN — CARBIDOPA AND LEVODOPA 2 TABLET: 25; 100 TABLET, EXTENDED RELEASE ORAL at 18:10

## 2023-12-29 RX ADMIN — MIDODRINE HYDROCHLORIDE 2.5 MG: 5 TABLET ORAL at 08:35

## 2023-12-29 RX ADMIN — MIDODRINE HYDROCHLORIDE 2.5 MG: 5 TABLET ORAL at 15:10

## 2023-12-29 RX ADMIN — METOPROLOL SUCCINATE 25 MG: 25 TABLET, EXTENDED RELEASE ORAL at 08:36

## 2023-12-29 RX ADMIN — SODIUM CHLORIDE, PRESERVATIVE FREE 10 ML: 5 INJECTION INTRAVENOUS at 21:24

## 2023-12-29 RX ADMIN — OSELTAMIVIR PHOSPHATE 30 MG: 30 CAPSULE ORAL at 21:24

## 2023-12-29 NOTE — DISCHARGE INSTR - COC
Continuity of Care Form    Patient Name: Toi Grimaldo   :  1933  MRN:  [id number]    Admit date:  2023  Discharge date:  ***    Code Status Order: Full Code   Advance Directives:     Admitting Physician:  Ahsan Valiente MD  PCP: Jasvir Pete DO    Discharging Nurse: ***  Discharging Hospital Unit/Room#: 0640/0640-A  Discharging Unit Phone Number: ***    Emergency Contact:   Extended Emergency Contact Information  Primary Emergency Contact: Maryellen Grimaldo  Address: [address] [place] [address] [place] [address] [facility]  Home Phone: [phone]  Mobile Phone: [phone]  Relation: Spouse  Secondary Emergency Contact: Marline Adame  Home Phone: [phone]  Relation: Child    Past Surgical History:  Past Surgical History:   Procedure Laterality Date    APPENDECTOMY      CATARACT REMOVAL WITH IMPLANT Left 2020    CHOLECYSTECTOMY      COLONOSCOPY      CORONARY ARTERY BYPASS GRAFT  2002    HERNIA REPAIR Bilateral     inguinal    KNEE SURGERY Left 2011    TOTAL KNEE ARTHROPLASTY Right 2017    tiffanie    UPPER GASTROINTESTINAL ENDOSCOPY      peptic ulcer       Immunization History:   Immunization History   Administered Date(s) Administered    COVID-19, PFIZER GRAY top, DO NOT Dilute, (age 12 y+), IM, 30 mcg/0.3 mL 2022    COVID-19, PFIZER PURPLE top, DILUTE for use, (age 12 y+), 30mcg/0.3mL 2021, 2021    TD 2LF, TDVAX, (age 7y+), IM, 0.5mL 2023    TDaP, ADACEL (age 10y-64y), BOOSTRIX (age 10y+), IM, 0.5mL 2021    Tetanus 2008    Tetanus Toxoid, absorbed 2008       Active Problems:  Patient Active Problem List   Diagnosis Code    Ascending aortic aneurysm (HCC) I71.21    Seasonal allergic rhinitis due to pollen J30.1    Gastroesophageal reflux disease without esophagitis K21.9    Essential hypertension I10    Mixed hyperlipidemia E78.2    Parkinson's disease G20.A1    Urinary retention with incomplete bladder emptying

## 2023-12-29 NOTE — CARE COORDINATION
Patient was scheduled for an INR in the lab at 0945 but RN noticed he did not show up. RN contacted patient. He stated he did not know he was supposed to have a lab draw today. He states there is no way for him to come in today, but he can get to the lab tomorrow. Appointment scheduled for tomorrow.    Precert initiated today for [facility] where pt.'s spouse is. Envelope, transport form and 7000 completed w/RE initiated. Await precert w/family updated.   KEYSHAWN CampN, RN  [facility] Case Management  [phone]

## 2023-12-30 LAB
ANION GAP SERPL CALCULATED.3IONS-SCNC: 13 MMOL/L (ref 7–16)
BASOPHILS # BLD: 0.02 K/UL (ref 0–0.2)
BASOPHILS NFR BLD: 1 % (ref 0–2)
BUN SERPL-MCNC: 32 MG/DL (ref 6–23)
CALCIUM SERPL-MCNC: 9 MG/DL (ref 8.6–10.2)
CHLORIDE SERPL-SCNC: 101 MMOL/L (ref 98–107)
CO2 SERPL-SCNC: 20 MMOL/L (ref 22–29)
CREAT SERPL-MCNC: 1.2 MG/DL (ref 0.7–1.2)
EOSINOPHIL # BLD: 0.15 K/UL (ref 0.05–0.5)
EOSINOPHILS RELATIVE PERCENT: 4 % (ref 0–6)
ERYTHROCYTE [DISTWIDTH] IN BLOOD BY AUTOMATED COUNT: 12.7 % (ref 11.5–15)
GFR SERPL CREATININE-BSD FRML MDRD: 55 ML/MIN/1.73M2
GLUCOSE BLD-MCNC: 113 MG/DL (ref 74–99)
GLUCOSE BLD-MCNC: 113 MG/DL (ref 74–99)
GLUCOSE BLD-MCNC: 127 MG/DL (ref 74–99)
GLUCOSE BLD-MCNC: 88 MG/DL (ref 74–99)
GLUCOSE SERPL-MCNC: 83 MG/DL (ref 74–99)
HCT VFR BLD AUTO: 35.1 % (ref 37–54)
HGB BLD-MCNC: 11.9 G/DL (ref 12.5–16.5)
IMM GRANULOCYTES # BLD AUTO: <0.03 K/UL (ref 0–0.58)
IMM GRANULOCYTES NFR BLD: 1 % (ref 0–5)
LYMPHOCYTES NFR BLD: 1.28 K/UL (ref 1.5–4)
LYMPHOCYTES RELATIVE PERCENT: 33 % (ref 20–42)
MCH RBC QN AUTO: 34.1 PG (ref 26–35)
MCHC RBC AUTO-ENTMCNC: 33.9 G/DL (ref 32–34.5)
MCV RBC AUTO: 100.6 FL (ref 80–99.9)
MONOCYTES NFR BLD: 0.52 K/UL (ref 0.1–0.95)
MONOCYTES NFR BLD: 13 % (ref 2–12)
NEUTROPHILS NFR BLD: 49 % (ref 43–80)
NEUTS SEG NFR BLD: 1.89 K/UL (ref 1.8–7.3)
PLATELET # BLD AUTO: 183 K/UL (ref 130–450)
PMV BLD AUTO: 9.8 FL (ref 7–12)
POTASSIUM SERPL-SCNC: 3.8 MMOL/L (ref 3.5–5)
RBC # BLD AUTO: 3.49 M/UL (ref 3.8–5.8)
SODIUM SERPL-SCNC: 134 MMOL/L (ref 132–146)
WBC OTHER # BLD: 3.9 K/UL (ref 4.5–11.5)

## 2023-12-30 PROCEDURE — 6360000002 HC RX W HCPCS: Performed by: STUDENT IN AN ORGANIZED HEALTH CARE EDUCATION/TRAINING PROGRAM

## 2023-12-30 PROCEDURE — 36415 COLL VENOUS BLD VENIPUNCTURE: CPT

## 2023-12-30 PROCEDURE — 6370000000 HC RX 637 (ALT 250 FOR IP): Performed by: STUDENT IN AN ORGANIZED HEALTH CARE EDUCATION/TRAINING PROGRAM

## 2023-12-30 PROCEDURE — 80048 BASIC METABOLIC PNL TOTAL CA: CPT

## 2023-12-30 PROCEDURE — 2060000000 HC ICU INTERMEDIATE R&B

## 2023-12-30 PROCEDURE — 82962 GLUCOSE BLOOD TEST: CPT

## 2023-12-30 PROCEDURE — 85025 COMPLETE CBC W/AUTO DIFF WBC: CPT

## 2023-12-30 PROCEDURE — 99232 SBSQ HOSP IP/OBS MODERATE 35: CPT | Performed by: INTERNAL MEDICINE

## 2023-12-30 PROCEDURE — 2580000003 HC RX 258: Performed by: STUDENT IN AN ORGANIZED HEALTH CARE EDUCATION/TRAINING PROGRAM

## 2023-12-30 RX ADMIN — PAROXETINE HYDROCHLORIDE 20 MG: 20 TABLET, FILM COATED ORAL at 09:23

## 2023-12-30 RX ADMIN — METOPROLOL SUCCINATE 25 MG: 25 TABLET, EXTENDED RELEASE ORAL at 09:24

## 2023-12-30 RX ADMIN — SODIUM CHLORIDE, PRESERVATIVE FREE 10 ML: 5 INJECTION INTRAVENOUS at 19:49

## 2023-12-30 RX ADMIN — MIDODRINE HYDROCHLORIDE 2.5 MG: 5 TABLET ORAL at 09:24

## 2023-12-30 RX ADMIN — OSELTAMIVIR PHOSPHATE 30 MG: 30 CAPSULE ORAL at 19:48

## 2023-12-30 RX ADMIN — CARBIDOPA AND LEVODOPA 2 TABLET: 25; 100 TABLET, EXTENDED RELEASE ORAL at 09:24

## 2023-12-30 RX ADMIN — CARBIDOPA AND LEVODOPA 2 TABLET: 25; 100 TABLET, EXTENDED RELEASE ORAL at 19:48

## 2023-12-30 RX ADMIN — CARBIDOPA AND LEVODOPA 2 TABLET: 25; 100 TABLET, EXTENDED RELEASE ORAL at 17:14

## 2023-12-30 RX ADMIN — LEVOTHYROXINE SODIUM 50 MCG: 0.05 TABLET ORAL at 05:42

## 2023-12-30 RX ADMIN — OSELTAMIVIR PHOSPHATE 30 MG: 30 CAPSULE ORAL at 09:24

## 2023-12-30 RX ADMIN — CARBIDOPA AND LEVODOPA 2 TABLET: 25; 100 TABLET, EXTENDED RELEASE ORAL at 13:19

## 2023-12-30 RX ADMIN — ACETAMINOPHEN 650 MG: 325 TABLET ORAL at 15:27

## 2023-12-30 RX ADMIN — SODIUM CHLORIDE, PRESERVATIVE FREE 10 ML: 5 INJECTION INTRAVENOUS at 09:34

## 2023-12-30 RX ADMIN — ENOXAPARIN SODIUM 40 MG: 100 INJECTION SUBCUTANEOUS at 09:24

## 2023-12-30 RX ADMIN — PANTOPRAZOLE SODIUM 40 MG: 40 TABLET, DELAYED RELEASE ORAL at 05:42

## 2023-12-30 RX ADMIN — ASPIRIN 81 MG: 81 TABLET, COATED ORAL at 09:24

## 2023-12-30 RX ADMIN — Medication 3 MG: at 19:48

## 2023-12-30 RX ADMIN — MIDODRINE HYDROCHLORIDE 2.5 MG: 5 TABLET ORAL at 15:18

## 2023-12-30 RX ADMIN — ACETAMINOPHEN 650 MG: 325 TABLET ORAL at 21:07

## 2023-12-30 ASSESSMENT — PAIN SCALES - GENERAL
PAINLEVEL_OUTOF10: 7
PAINLEVEL_OUTOF10: 8
PAINLEVEL_OUTOF10: 7

## 2023-12-30 ASSESSMENT — PAIN DESCRIPTION - LOCATION
LOCATION: SHOULDER;BACK
LOCATION: BACK
LOCATION: BACK

## 2023-12-30 NOTE — PLAN OF CARE
Problem: Safety - Adult  Goal: Free from fall injury  12/29/2023 2252 by Kari Schreiber RN  Outcome: Progressing  12/29/2023 1146 by Cynthia Pizano RN  Outcome: Progressing     Problem: ABCDS Injury Assessment  Goal: Absence of physical injury  12/29/2023 2252 by Kari Schreiber RN  Outcome: Progressing  12/29/2023 1146 by Cynthia Pizano RN  Outcome: Progressing     Problem: Skin/Tissue Integrity  Goal: Absence of new skin breakdown  Description: 1.  Monitor for areas of redness and/or skin breakdown  2.  Assess vascular access sites hourly  3.  Every 4-6 hours minimum:  Change oxygen saturation probe site  4.  Every 4-6 hours:  If on nasal continuous positive airway pressure, respiratory therapy assess nares and determine need for appliance change or resting period.  Outcome: Progressing     Problem: Chronic Conditions and Co-morbidities  Goal: Patient's chronic conditions and co-morbidity symptoms are monitored and maintained or improved  Outcome: Progressing

## 2023-12-31 PROBLEM — I10 PRIMARY HYPERTENSION: Status: ACTIVE | Noted: 2023-12-31

## 2023-12-31 LAB
ANION GAP SERPL CALCULATED.3IONS-SCNC: 11 MMOL/L (ref 7–16)
BASOPHILS # BLD: 0.01 K/UL (ref 0–0.2)
BASOPHILS NFR BLD: 0 % (ref 0–2)
BUN SERPL-MCNC: 31 MG/DL (ref 6–23)
CALCIUM SERPL-MCNC: 8.8 MG/DL (ref 8.6–10.2)
CHLORIDE SERPL-SCNC: 103 MMOL/L (ref 98–107)
CO2 SERPL-SCNC: 21 MMOL/L (ref 22–29)
CREAT SERPL-MCNC: 1.1 MG/DL (ref 0.7–1.2)
EOSINOPHIL # BLD: 0.19 K/UL (ref 0.05–0.5)
EOSINOPHILS RELATIVE PERCENT: 6 % (ref 0–6)
ERYTHROCYTE [DISTWIDTH] IN BLOOD BY AUTOMATED COUNT: 12.5 % (ref 11.5–15)
GFR SERPL CREATININE-BSD FRML MDRD: >60 ML/MIN/1.73M2
GLUCOSE BLD-MCNC: 88 MG/DL (ref 74–99)
GLUCOSE SERPL-MCNC: 89 MG/DL (ref 74–99)
HCT VFR BLD AUTO: 33.2 % (ref 37–54)
HGB BLD-MCNC: 11.2 G/DL (ref 12.5–16.5)
IMM GRANULOCYTES # BLD AUTO: <0.03 K/UL (ref 0–0.58)
IMM GRANULOCYTES NFR BLD: 0 % (ref 0–5)
LYMPHOCYTES NFR BLD: 1.22 K/UL (ref 1.5–4)
LYMPHOCYTES RELATIVE PERCENT: 35 % (ref 20–42)
MCH RBC QN AUTO: 33.9 PG (ref 26–35)
MCHC RBC AUTO-ENTMCNC: 33.7 G/DL (ref 32–34.5)
MCV RBC AUTO: 100.6 FL (ref 80–99.9)
MONOCYTES NFR BLD: 0.46 K/UL (ref 0.1–0.95)
MONOCYTES NFR BLD: 13 % (ref 2–12)
NEUTROPHILS NFR BLD: 46 % (ref 43–80)
NEUTS SEG NFR BLD: 1.59 K/UL (ref 1.8–7.3)
PLATELET # BLD AUTO: 183 K/UL (ref 130–450)
PMV BLD AUTO: 9.5 FL (ref 7–12)
POTASSIUM SERPL-SCNC: 4 MMOL/L (ref 3.5–5)
RBC # BLD AUTO: 3.3 M/UL (ref 3.8–5.8)
SODIUM SERPL-SCNC: 135 MMOL/L (ref 132–146)
WBC OTHER # BLD: 3.5 K/UL (ref 4.5–11.5)

## 2023-12-31 PROCEDURE — 82962 GLUCOSE BLOOD TEST: CPT

## 2023-12-31 PROCEDURE — 2580000003 HC RX 258: Performed by: STUDENT IN AN ORGANIZED HEALTH CARE EDUCATION/TRAINING PROGRAM

## 2023-12-31 PROCEDURE — 6360000002 HC RX W HCPCS: Performed by: STUDENT IN AN ORGANIZED HEALTH CARE EDUCATION/TRAINING PROGRAM

## 2023-12-31 PROCEDURE — 85025 COMPLETE CBC W/AUTO DIFF WBC: CPT

## 2023-12-31 PROCEDURE — 6370000000 HC RX 637 (ALT 250 FOR IP): Performed by: STUDENT IN AN ORGANIZED HEALTH CARE EDUCATION/TRAINING PROGRAM

## 2023-12-31 PROCEDURE — 2060000000 HC ICU INTERMEDIATE R&B

## 2023-12-31 PROCEDURE — 80048 BASIC METABOLIC PNL TOTAL CA: CPT

## 2023-12-31 PROCEDURE — 99232 SBSQ HOSP IP/OBS MODERATE 35: CPT | Performed by: INTERNAL MEDICINE

## 2023-12-31 RX ADMIN — MIDODRINE HYDROCHLORIDE 2.5 MG: 5 TABLET ORAL at 14:43

## 2023-12-31 RX ADMIN — CARBIDOPA AND LEVODOPA 2 TABLET: 25; 100 TABLET, EXTENDED RELEASE ORAL at 22:37

## 2023-12-31 RX ADMIN — METOPROLOL SUCCINATE 25 MG: 25 TABLET, EXTENDED RELEASE ORAL at 09:47

## 2023-12-31 RX ADMIN — MIDODRINE HYDROCHLORIDE 2.5 MG: 5 TABLET ORAL at 22:38

## 2023-12-31 RX ADMIN — SODIUM CHLORIDE, PRESERVATIVE FREE 10 ML: 5 INJECTION INTRAVENOUS at 22:46

## 2023-12-31 RX ADMIN — CARBIDOPA AND LEVODOPA 2 TABLET: 25; 100 TABLET, EXTENDED RELEASE ORAL at 17:50

## 2023-12-31 RX ADMIN — PAROXETINE HYDROCHLORIDE 20 MG: 20 TABLET, FILM COATED ORAL at 09:42

## 2023-12-31 RX ADMIN — ACETAMINOPHEN 650 MG: 325 TABLET ORAL at 22:37

## 2023-12-31 RX ADMIN — CARBIDOPA AND LEVODOPA 2 TABLET: 25; 100 TABLET, EXTENDED RELEASE ORAL at 14:43

## 2023-12-31 RX ADMIN — PANTOPRAZOLE SODIUM 40 MG: 40 TABLET, DELAYED RELEASE ORAL at 05:06

## 2023-12-31 RX ADMIN — LEVOTHYROXINE SODIUM 50 MCG: 0.05 TABLET ORAL at 05:06

## 2023-12-31 RX ADMIN — OSELTAMIVIR PHOSPHATE 30 MG: 30 CAPSULE ORAL at 23:23

## 2023-12-31 RX ADMIN — ACETAMINOPHEN 650 MG: 325 TABLET ORAL at 13:10

## 2023-12-31 RX ADMIN — OSELTAMIVIR PHOSPHATE 30 MG: 30 CAPSULE ORAL at 09:43

## 2023-12-31 RX ADMIN — SODIUM CHLORIDE, PRESERVATIVE FREE 10 ML: 5 INJECTION INTRAVENOUS at 09:43

## 2023-12-31 RX ADMIN — Medication 3 MG: at 22:37

## 2023-12-31 RX ADMIN — MIDODRINE HYDROCHLORIDE 2.5 MG: 5 TABLET ORAL at 09:47

## 2023-12-31 RX ADMIN — ENOXAPARIN SODIUM 40 MG: 100 INJECTION SUBCUTANEOUS at 09:45

## 2023-12-31 RX ADMIN — CARBIDOPA AND LEVODOPA 2 TABLET: 25; 100 TABLET, EXTENDED RELEASE ORAL at 09:43

## 2023-12-31 ASSESSMENT — PAIN DESCRIPTION - LOCATION
LOCATION: NECK;BACK
LOCATION: BACK
LOCATION: BACK

## 2023-12-31 ASSESSMENT — PAIN SCALES - GENERAL
PAINLEVEL_OUTOF10: 6
PAINLEVEL_OUTOF10: 0
PAINLEVEL_OUTOF10: 0
PAINLEVEL_OUTOF10: 7
PAINLEVEL_OUTOF10: 3

## 2023-12-31 ASSESSMENT — PAIN DESCRIPTION - FREQUENCY: FREQUENCY: INTERMITTENT

## 2023-12-31 ASSESSMENT — PAIN DESCRIPTION - DESCRIPTORS
DESCRIPTORS: ACHING;DISCOMFORT
DESCRIPTORS: ACHING;DISCOMFORT

## 2023-12-31 ASSESSMENT — PAIN DESCRIPTION - PAIN TYPE: TYPE: ACUTE PAIN

## 2023-12-31 ASSESSMENT — PAIN DESCRIPTION - ONSET: ONSET: ON-GOING

## 2023-12-31 NOTE — PLAN OF CARE
Problem: Safety - Adult  Goal: Free from fall injury  Outcome: Progressing     Problem: ABCDS Injury Assessment  Goal: Absence of physical injury  Outcome: Progressing     Problem: Skin/Tissue Integrity  Goal: Absence of new skin breakdown  Description: 1.  Monitor for areas of redness and/or skin breakdown  2.  Assess vascular access sites hourly  3.  Every 4-6 hours minimum:  Change oxygen saturation probe site  4.  Every 4-6 hours:  If on nasal continuous positive airway pressure, respiratory therapy assess nares and determine need for appliance change or resting period.  Outcome: Progressing     Problem: Chronic Conditions and Co-morbidities  Goal: Patient's chronic conditions and co-morbidity symptoms are monitored and maintained or improved  Outcome: Progressing     Problem: Pain  Goal: Verbalizes/displays adequate comfort level or baseline comfort level  Outcome: Progressing

## 2024-01-01 LAB
ANION GAP SERPL CALCULATED.3IONS-SCNC: 12 MMOL/L (ref 7–16)
BASOPHILS # BLD: 0.01 K/UL (ref 0–0.2)
BASOPHILS NFR BLD: 0 % (ref 0–2)
BUN SERPL-MCNC: 26 MG/DL (ref 6–23)
CALCIUM SERPL-MCNC: 8.8 MG/DL (ref 8.6–10.2)
CHLORIDE SERPL-SCNC: 104 MMOL/L (ref 98–107)
CO2 SERPL-SCNC: 20 MMOL/L (ref 22–29)
CREAT SERPL-MCNC: 1.2 MG/DL (ref 0.7–1.2)
EOSINOPHIL # BLD: 0.1 K/UL (ref 0.05–0.5)
EOSINOPHILS RELATIVE PERCENT: 3 % (ref 0–6)
ERYTHROCYTE [DISTWIDTH] IN BLOOD BY AUTOMATED COUNT: 12.4 % (ref 11.5–15)
GFR SERPL CREATININE-BSD FRML MDRD: >60 ML/MIN/1.73M2
GLUCOSE SERPL-MCNC: 105 MG/DL (ref 74–99)
HCT VFR BLD AUTO: 32.4 % (ref 37–54)
HGB BLD-MCNC: 11.3 G/DL (ref 12.5–16.5)
IMM GRANULOCYTES # BLD AUTO: <0.03 K/UL (ref 0–0.58)
IMM GRANULOCYTES NFR BLD: 0 % (ref 0–5)
LYMPHOCYTES NFR BLD: 0.81 K/UL (ref 1.5–4)
LYMPHOCYTES RELATIVE PERCENT: 25 % (ref 20–42)
MCH RBC QN AUTO: 34.8 PG (ref 26–35)
MCHC RBC AUTO-ENTMCNC: 34.9 G/DL (ref 32–34.5)
MCV RBC AUTO: 99.7 FL (ref 80–99.9)
MONOCYTES NFR BLD: 0.42 K/UL (ref 0.1–0.95)
MONOCYTES NFR BLD: 13 % (ref 2–12)
NEUTROPHILS NFR BLD: 58 % (ref 43–80)
NEUTS SEG NFR BLD: 1.85 K/UL (ref 1.8–7.3)
PLATELET # BLD AUTO: 169 K/UL (ref 130–450)
PMV BLD AUTO: 9.8 FL (ref 7–12)
POTASSIUM SERPL-SCNC: 4.1 MMOL/L (ref 3.5–5)
RBC # BLD AUTO: 3.25 M/UL (ref 3.8–5.8)
SODIUM SERPL-SCNC: 136 MMOL/L (ref 132–146)
WBC OTHER # BLD: 3.2 K/UL (ref 4.5–11.5)

## 2024-01-01 PROCEDURE — 2580000003 HC RX 258: Performed by: STUDENT IN AN ORGANIZED HEALTH CARE EDUCATION/TRAINING PROGRAM

## 2024-01-01 PROCEDURE — 6370000000 HC RX 637 (ALT 250 FOR IP): Performed by: STUDENT IN AN ORGANIZED HEALTH CARE EDUCATION/TRAINING PROGRAM

## 2024-01-01 PROCEDURE — 80048 BASIC METABOLIC PNL TOTAL CA: CPT

## 2024-01-01 PROCEDURE — 85025 COMPLETE CBC W/AUTO DIFF WBC: CPT

## 2024-01-01 PROCEDURE — 6360000002 HC RX W HCPCS: Performed by: STUDENT IN AN ORGANIZED HEALTH CARE EDUCATION/TRAINING PROGRAM

## 2024-01-01 PROCEDURE — 1200000000 HC SEMI PRIVATE

## 2024-01-01 PROCEDURE — 99232 SBSQ HOSP IP/OBS MODERATE 35: CPT | Performed by: INTERNAL MEDICINE

## 2024-01-01 RX ADMIN — CARBIDOPA AND LEVODOPA 2 TABLET: 25; 100 TABLET, EXTENDED RELEASE ORAL at 20:54

## 2024-01-01 RX ADMIN — ASPIRIN 81 MG: 81 TABLET, COATED ORAL at 09:51

## 2024-01-01 RX ADMIN — CARBIDOPA AND LEVODOPA 2 TABLET: 25; 100 TABLET, EXTENDED RELEASE ORAL at 16:30

## 2024-01-01 RX ADMIN — SODIUM CHLORIDE, PRESERVATIVE FREE 10 ML: 5 INJECTION INTRAVENOUS at 10:54

## 2024-01-01 RX ADMIN — PANTOPRAZOLE SODIUM 40 MG: 40 TABLET, DELAYED RELEASE ORAL at 06:48

## 2024-01-01 RX ADMIN — MIDODRINE HYDROCHLORIDE 2.5 MG: 5 TABLET ORAL at 14:55

## 2024-01-01 RX ADMIN — Medication 3 MG: at 20:54

## 2024-01-01 RX ADMIN — SODIUM CHLORIDE, PRESERVATIVE FREE 10 ML: 5 INJECTION INTRAVENOUS at 20:55

## 2024-01-01 RX ADMIN — CARBIDOPA AND LEVODOPA 2 TABLET: 25; 100 TABLET, EXTENDED RELEASE ORAL at 09:52

## 2024-01-01 RX ADMIN — CARBIDOPA AND LEVODOPA 2 TABLET: 25; 100 TABLET, EXTENDED RELEASE ORAL at 13:17

## 2024-01-01 RX ADMIN — ENOXAPARIN SODIUM 40 MG: 100 INJECTION SUBCUTANEOUS at 10:53

## 2024-01-01 RX ADMIN — METOPROLOL SUCCINATE 25 MG: 25 TABLET, EXTENDED RELEASE ORAL at 09:53

## 2024-01-01 RX ADMIN — MIDODRINE HYDROCHLORIDE 2.5 MG: 5 TABLET ORAL at 20:54

## 2024-01-01 RX ADMIN — MIDODRINE HYDROCHLORIDE 2.5 MG: 5 TABLET ORAL at 10:53

## 2024-01-01 RX ADMIN — ACETAMINOPHEN 650 MG: 325 TABLET ORAL at 16:30

## 2024-01-01 RX ADMIN — PAROXETINE HYDROCHLORIDE 20 MG: 20 TABLET, FILM COATED ORAL at 09:52

## 2024-01-01 RX ADMIN — LEVOTHYROXINE SODIUM 50 MCG: 0.05 TABLET ORAL at 06:48

## 2024-01-01 RX ADMIN — OSELTAMIVIR PHOSPHATE 30 MG: 30 CAPSULE ORAL at 09:51

## 2024-01-01 RX ADMIN — OSELTAMIVIR PHOSPHATE 30 MG: 30 CAPSULE ORAL at 20:55

## 2024-01-01 RX ADMIN — ACETAMINOPHEN 650 MG: 325 TABLET ORAL at 06:55

## 2024-01-01 ASSESSMENT — PAIN DESCRIPTION - DESCRIPTORS
DESCRIPTORS: ACHING
DESCRIPTORS: ACHING;DISCOMFORT

## 2024-01-01 ASSESSMENT — PAIN SCALES - GENERAL
PAINLEVEL_OUTOF10: 7
PAINLEVEL_OUTOF10: 0
PAINLEVEL_OUTOF10: 0
PAINLEVEL_OUTOF10: 3
PAINLEVEL_OUTOF10: 0

## 2024-01-01 ASSESSMENT — PAIN DESCRIPTION - LOCATION
LOCATION: BACK
LOCATION: BACK;NECK

## 2024-01-01 NOTE — PLAN OF CARE
Problem: Safety - Adult  Goal: Free from fall injury  1/1/2024 1426 by Minna Barrientos RN  Outcome: Progressing  1/1/2024 0101 by Patricia Delarosa RN  Outcome: Progressing     Problem: ABCDS Injury Assessment  Goal: Absence of physical injury  1/1/2024 1426 by Minna Barrientos RN  Outcome: Progressing  1/1/2024 0101 by Patricia Delarosa RN  Outcome: Progressing     Problem: Skin/Tissue Integrity  Goal: Absence of new skin breakdown  Description: 1.  Monitor for areas of redness and/or skin breakdown  2.  Assess vascular access sites hourly  3.  Every 4-6 hours minimum:  Change oxygen saturation probe site  4.  Every 4-6 hours:  If on nasal continuous positive airway pressure, respiratory therapy assess nares and determine need for appliance change or resting period.  1/1/2024 1426 by Minna Barrientos RN  Outcome: Progressing  1/1/2024 0101 by Patricia Delarosa RN  Outcome: Progressing     Problem: Chronic Conditions and Co-morbidities  Goal: Patient's chronic conditions and co-morbidity symptoms are monitored and maintained or improved  1/1/2024 1426 by Minna Barrientos RN  Outcome: Progressing  1/1/2024 0101 by Patricia Delarosa RN  Outcome: Progressing     Problem: Pain  Goal: Verbalizes/displays adequate comfort level or baseline comfort level  1/1/2024 1426 by Minna Barrientos RN  Outcome: Progressing  1/1/2024 0101 by Patricia Delarosa RN  Outcome: Progressing

## 2024-01-02 PROCEDURE — 99232 SBSQ HOSP IP/OBS MODERATE 35: CPT | Performed by: INTERNAL MEDICINE

## 2024-01-02 PROCEDURE — 6360000002 HC RX W HCPCS: Performed by: STUDENT IN AN ORGANIZED HEALTH CARE EDUCATION/TRAINING PROGRAM

## 2024-01-02 PROCEDURE — 2580000003 HC RX 258: Performed by: STUDENT IN AN ORGANIZED HEALTH CARE EDUCATION/TRAINING PROGRAM

## 2024-01-02 PROCEDURE — 97530 THERAPEUTIC ACTIVITIES: CPT

## 2024-01-02 PROCEDURE — 97535 SELF CARE MNGMENT TRAINING: CPT

## 2024-01-02 PROCEDURE — 6370000000 HC RX 637 (ALT 250 FOR IP): Performed by: STUDENT IN AN ORGANIZED HEALTH CARE EDUCATION/TRAINING PROGRAM

## 2024-01-02 PROCEDURE — 1200000000 HC SEMI PRIVATE

## 2024-01-02 RX ADMIN — LEVOTHYROXINE SODIUM 50 MCG: 0.05 TABLET ORAL at 05:57

## 2024-01-02 RX ADMIN — PAROXETINE HYDROCHLORIDE 20 MG: 20 TABLET, FILM COATED ORAL at 10:05

## 2024-01-02 RX ADMIN — PANTOPRAZOLE SODIUM 40 MG: 40 TABLET, DELAYED RELEASE ORAL at 05:57

## 2024-01-02 RX ADMIN — Medication 3 MG: at 20:30

## 2024-01-02 RX ADMIN — ACETAMINOPHEN 650 MG: 325 TABLET ORAL at 05:56

## 2024-01-02 RX ADMIN — ASPIRIN 81 MG: 81 TABLET, COATED ORAL at 10:05

## 2024-01-02 RX ADMIN — ACETAMINOPHEN 650 MG: 325 TABLET ORAL at 12:19

## 2024-01-02 RX ADMIN — SODIUM CHLORIDE, PRESERVATIVE FREE 10 ML: 5 INJECTION INTRAVENOUS at 20:30

## 2024-01-02 RX ADMIN — CARBIDOPA AND LEVODOPA 2 TABLET: 25; 100 TABLET, EXTENDED RELEASE ORAL at 13:12

## 2024-01-02 RX ADMIN — ACETAMINOPHEN 650 MG: 325 TABLET ORAL at 18:35

## 2024-01-02 RX ADMIN — SODIUM CHLORIDE, PRESERVATIVE FREE 10 ML: 5 INJECTION INTRAVENOUS at 10:18

## 2024-01-02 RX ADMIN — MIDODRINE HYDROCHLORIDE 2.5 MG: 5 TABLET ORAL at 20:30

## 2024-01-02 RX ADMIN — CARBIDOPA AND LEVODOPA 2 TABLET: 25; 100 TABLET, EXTENDED RELEASE ORAL at 17:36

## 2024-01-02 RX ADMIN — ENOXAPARIN SODIUM 40 MG: 100 INJECTION SUBCUTANEOUS at 10:04

## 2024-01-02 RX ADMIN — CARBIDOPA AND LEVODOPA 2 TABLET: 25; 100 TABLET, EXTENDED RELEASE ORAL at 20:30

## 2024-01-02 RX ADMIN — CARBIDOPA AND LEVODOPA 2 TABLET: 25; 100 TABLET, EXTENDED RELEASE ORAL at 10:05

## 2024-01-02 RX ADMIN — METOPROLOL SUCCINATE 25 MG: 25 TABLET, EXTENDED RELEASE ORAL at 10:05

## 2024-01-02 ASSESSMENT — PAIN DESCRIPTION - LOCATION
LOCATION: FOOT
LOCATION: HEAD

## 2024-01-02 ASSESSMENT — PAIN DESCRIPTION - DESCRIPTORS
DESCRIPTORS: ACHING
DESCRIPTORS: ACHING

## 2024-01-02 ASSESSMENT — PAIN SCALES - GENERAL
PAINLEVEL_OUTOF10: 6
PAINLEVEL_OUTOF10: 1
PAINLEVEL_OUTOF10: 3

## 2024-01-02 ASSESSMENT — PAIN DESCRIPTION - ORIENTATION: ORIENTATION: RIGHT;LEFT

## 2024-01-02 NOTE — PATIENT CARE CONFERENCE
[facility] Quality Flow/Interdisciplinary Rounds Progress Note        Quality Flow Rounds held on January 2, 2024    Disciplines Attending:  Bedside Nurse, , , and Nursing Unit Leadership    Toi Grimaldo was admitted on 12/27/2023 12:25 PM    Anticipated Discharge Date:  Expected Discharge Date: 12/28/23    Disposition:    Jordan Score:  Jordan Scale Score: 19    Readmission Risk              Risk of Unplanned Readmission:  22           Discussed patient goal for the day, patient clinical progression, and barriers to discharge.  The following Goal(s) of the Day/Commitment(s) have been identified:  Discharge - Obtain Order await precert      Dia Macias RN  January 2, 2024

## 2024-01-02 NOTE — PLAN OF CARE
Problem: Safety - Adult  Goal: Free from fall injury  1/2/2024 1216 by Ellen Dalal RN  Outcome: Progressing  1/2/2024 0325 by Stephani Lipscomb RN  Outcome: Progressing     Problem: ABCDS Injury Assessment  Goal: Absence of physical injury  1/2/2024 1216 by Ellen Dalal RN  Outcome: Progressing  1/2/2024 0325 by Stephani Lipscomb RN  Outcome: Progressing     Problem: Skin/Tissue Integrity  Goal: Absence of new skin breakdown  Description: 1.  Monitor for areas of redness and/or skin breakdown  2.  Assess vascular access sites hourly  3.  Every 4-6 hours minimum:  Change oxygen saturation probe site  4.  Every 4-6 hours:  If on nasal continuous positive airway pressure, respiratory therapy assess nares and determine need for appliance change or resting period.  1/2/2024 1216 by Ellen Dalal RN  Outcome: Progressing  1/2/2024 0325 by Stephani Lipscomb RN  Outcome: Progressing     Problem: Chronic Conditions and Co-morbidities  Goal: Patient's chronic conditions and co-morbidity symptoms are monitored and maintained or improved  1/2/2024 1216 by Ellen Dalal RN  Outcome: Progressing  1/2/2024 0325 by Stephani Lipscomb RN  Outcome: Progressing     Problem: Pain  Goal: Verbalizes/displays adequate comfort level or baseline comfort level  1/2/2024 1216 by Ellen Dalal RN  Outcome: Progressing  1/2/2024 0325 by Stephani Lipscomb RN  Outcome: Progressing

## 2024-01-02 NOTE — PLAN OF CARE
Problem: Safety - Adult  Goal: Free from fall injury  1/2/2024 0325 by Stephani Lipscomb RN  Outcome: Progressing  1/1/2024 1426 by Minna Barrientos RN  Outcome: Progressing     Problem: ABCDS Injury Assessment  Goal: Absence of physical injury  1/2/2024 0325 by Stephani Lipscomb RN  Outcome: Progressing  1/1/2024 1426 by Minna Barrientos RN  Outcome: Progressing     Problem: Skin/Tissue Integrity  Goal: Absence of new skin breakdown  Description: 1.  Monitor for areas of redness and/or skin breakdown  2.  Assess vascular access sites hourly  3.  Every 4-6 hours minimum:  Change oxygen saturation probe site  4.  Every 4-6 hours:  If on nasal continuous positive airway pressure, respiratory therapy assess nares and determine need for appliance change or resting period.  1/2/2024 0325 by Stephani Lipscomb RN  Outcome: Progressing  1/1/2024 1426 by Minna Barrientos RN  Outcome: Progressing     Problem: Chronic Conditions and Co-morbidities  Goal: Patient's chronic conditions and co-morbidity symptoms are monitored and maintained or improved  1/2/2024 0325 by Stephani Lipscomb RN  Outcome: Progressing  1/1/2024 1426 by Minna Barrientos RN  Outcome: Progressing     Problem: Pain  Goal: Verbalizes/displays adequate comfort level or baseline comfort level  1/2/2024 0325 by Stephani Lipscomb RN  Outcome: Progressing  1/1/2024 1426 by Minna Barrientos RN  Outcome: Progressing

## 2024-01-03 LAB
ALBUMIN SERPL-MCNC: 3.8 G/DL (ref 3.5–5.2)
ALP SERPL-CCNC: 108 U/L (ref 40–129)
ALT SERPL-CCNC: 7 U/L (ref 0–40)
ANION GAP SERPL CALCULATED.3IONS-SCNC: 11 MMOL/L (ref 7–16)
AST SERPL-CCNC: 35 U/L (ref 0–39)
BASOPHILS # BLD: 0 K/UL (ref 0–0.2)
BASOPHILS NFR BLD: 0 % (ref 0–2)
BILIRUB SERPL-MCNC: 0.3 MG/DL (ref 0–1.2)
BUN SERPL-MCNC: 22 MG/DL (ref 6–23)
CALCIUM SERPL-MCNC: 9.3 MG/DL (ref 8.6–10.2)
CHLORIDE SERPL-SCNC: 103 MMOL/L (ref 98–107)
CO2 SERPL-SCNC: 23 MMOL/L (ref 22–29)
CREAT SERPL-MCNC: 1.1 MG/DL (ref 0.7–1.2)
EOSINOPHIL # BLD: 0.08 K/UL (ref 0.05–0.5)
EOSINOPHILS RELATIVE PERCENT: 2 % (ref 0–6)
ERYTHROCYTE [DISTWIDTH] IN BLOOD BY AUTOMATED COUNT: 12.4 % (ref 11.5–15)
GFR SERPL CREATININE-BSD FRML MDRD: >60 ML/MIN/1.73M2
GLUCOSE SERPL-MCNC: 104 MG/DL (ref 74–99)
HCT VFR BLD AUTO: 36.5 % (ref 37–54)
HGB BLD-MCNC: 12.3 G/DL (ref 12.5–16.5)
LYMPHOCYTES NFR BLD: 1.49 K/UL (ref 1.5–4)
LYMPHOCYTES RELATIVE PERCENT: 34 % (ref 20–42)
MAGNESIUM SERPL-MCNC: 1.9 MG/DL (ref 1.6–2.6)
MCH RBC QN AUTO: 34.3 PG (ref 26–35)
MCHC RBC AUTO-ENTMCNC: 33.7 G/DL (ref 32–34.5)
MCV RBC AUTO: 101.7 FL (ref 80–99.9)
MONOCYTES NFR BLD: 0.46 K/UL (ref 0.1–0.95)
MONOCYTES NFR BLD: 10 % (ref 2–12)
NEUTROPHILS NFR BLD: 54 % (ref 43–80)
NEUTS SEG NFR BLD: 2.37 K/UL (ref 1.8–7.3)
PHOSPHATE SERPL-MCNC: 2.9 MG/DL (ref 2.5–4.5)
PLATELET # BLD AUTO: 178 K/UL (ref 130–450)
PMV BLD AUTO: 9.7 FL (ref 7–12)
POTASSIUM SERPL-SCNC: 4.4 MMOL/L (ref 3.5–5)
PROT SERPL-MCNC: 6.6 G/DL (ref 6.4–8.3)
RBC # BLD AUTO: 3.59 M/UL (ref 3.8–5.8)
RBC # BLD: ABNORMAL 10*6/UL
SODIUM SERPL-SCNC: 137 MMOL/L (ref 132–146)
WBC OTHER # BLD: 4.4 K/UL (ref 4.5–11.5)

## 2024-01-03 PROCEDURE — 97530 THERAPEUTIC ACTIVITIES: CPT

## 2024-01-03 PROCEDURE — 6370000000 HC RX 637 (ALT 250 FOR IP): Performed by: STUDENT IN AN ORGANIZED HEALTH CARE EDUCATION/TRAINING PROGRAM

## 2024-01-03 PROCEDURE — 80053 COMPREHEN METABOLIC PANEL: CPT

## 2024-01-03 PROCEDURE — 97535 SELF CARE MNGMENT TRAINING: CPT

## 2024-01-03 PROCEDURE — 99232 SBSQ HOSP IP/OBS MODERATE 35: CPT | Performed by: INTERNAL MEDICINE

## 2024-01-03 PROCEDURE — 84100 ASSAY OF PHOSPHORUS: CPT

## 2024-01-03 PROCEDURE — 1200000000 HC SEMI PRIVATE

## 2024-01-03 PROCEDURE — 83735 ASSAY OF MAGNESIUM: CPT

## 2024-01-03 PROCEDURE — 85025 COMPLETE CBC W/AUTO DIFF WBC: CPT

## 2024-01-03 PROCEDURE — 36415 COLL VENOUS BLD VENIPUNCTURE: CPT

## 2024-01-03 PROCEDURE — 6360000002 HC RX W HCPCS: Performed by: STUDENT IN AN ORGANIZED HEALTH CARE EDUCATION/TRAINING PROGRAM

## 2024-01-03 PROCEDURE — 2580000003 HC RX 258: Performed by: STUDENT IN AN ORGANIZED HEALTH CARE EDUCATION/TRAINING PROGRAM

## 2024-01-03 RX ADMIN — CARBIDOPA AND LEVODOPA 2 TABLET: 25; 100 TABLET, EXTENDED RELEASE ORAL at 08:43

## 2024-01-03 RX ADMIN — ACETAMINOPHEN 650 MG: 325 TABLET ORAL at 17:05

## 2024-01-03 RX ADMIN — CARBIDOPA AND LEVODOPA 2 TABLET: 25; 100 TABLET, EXTENDED RELEASE ORAL at 12:33

## 2024-01-03 RX ADMIN — LEVOTHYROXINE SODIUM 50 MCG: 0.05 TABLET ORAL at 05:29

## 2024-01-03 RX ADMIN — METOPROLOL SUCCINATE 25 MG: 25 TABLET, EXTENDED RELEASE ORAL at 08:43

## 2024-01-03 RX ADMIN — PAROXETINE HYDROCHLORIDE 20 MG: 20 TABLET, FILM COATED ORAL at 08:43

## 2024-01-03 RX ADMIN — MIDODRINE HYDROCHLORIDE 2.5 MG: 5 TABLET ORAL at 17:06

## 2024-01-03 RX ADMIN — CARBIDOPA AND LEVODOPA 2 TABLET: 25; 100 TABLET, EXTENDED RELEASE ORAL at 17:06

## 2024-01-03 RX ADMIN — SODIUM CHLORIDE, PRESERVATIVE FREE 10 ML: 5 INJECTION INTRAVENOUS at 21:10

## 2024-01-03 RX ADMIN — ASPIRIN 81 MG: 81 TABLET, COATED ORAL at 08:43

## 2024-01-03 RX ADMIN — ACETAMINOPHEN 650 MG: 325 TABLET ORAL at 05:30

## 2024-01-03 RX ADMIN — CARBIDOPA AND LEVODOPA 2 TABLET: 25; 100 TABLET, EXTENDED RELEASE ORAL at 21:10

## 2024-01-03 RX ADMIN — Medication 3 MG: at 21:10

## 2024-01-03 RX ADMIN — SODIUM CHLORIDE, PRESERVATIVE FREE 10 ML: 5 INJECTION INTRAVENOUS at 08:44

## 2024-01-03 RX ADMIN — PANTOPRAZOLE SODIUM 40 MG: 40 TABLET, DELAYED RELEASE ORAL at 05:30

## 2024-01-03 RX ADMIN — ENOXAPARIN SODIUM 40 MG: 100 INJECTION SUBCUTANEOUS at 08:43

## 2024-01-03 ASSESSMENT — PAIN DESCRIPTION - DESCRIPTORS
DESCRIPTORS: ACHING
DESCRIPTORS: ACHING

## 2024-01-03 ASSESSMENT — PAIN SCALES - GENERAL
PAINLEVEL_OUTOF10: 6
PAINLEVEL_OUTOF10: 3

## 2024-01-03 ASSESSMENT — PAIN DESCRIPTION - ORIENTATION: ORIENTATION: LOWER

## 2024-01-03 ASSESSMENT — PAIN DESCRIPTION - LOCATION
LOCATION: BACK
LOCATION: HEAD

## 2024-01-03 NOTE — PLAN OF CARE
Problem: Safety - Adult  Goal: Free from fall injury  1/3/2024 0939 by Ellen Dalal RN  Outcome: Progressing  1/3/2024 0113 by Stephani Lipscomb RN  Outcome: Progressing     Problem: ABCDS Injury Assessment  Goal: Absence of physical injury  1/3/2024 0939 by Ellen Dalal RN  Outcome: Progressing  1/3/2024 0113 by Stephani Lipscomb RN  Outcome: Progressing     Problem: Skin/Tissue Integrity  Goal: Absence of new skin breakdown  Description: 1.  Monitor for areas of redness and/or skin breakdown  2.  Assess vascular access sites hourly  3.  Every 4-6 hours minimum:  Change oxygen saturation probe site  4.  Every 4-6 hours:  If on nasal continuous positive airway pressure, respiratory therapy assess nares and determine need for appliance change or resting period.  1/3/2024 0939 by Ellen Dalal RN  Outcome: Progressing  1/3/2024 0113 by Stephani Lipscomb RN  Outcome: Progressing     Problem: Chronic Conditions and Co-morbidities  Goal: Patient's chronic conditions and co-morbidity symptoms are monitored and maintained or improved  1/3/2024 0939 by Ellen Dalal RN  Outcome: Progressing  1/3/2024 0113 by Stephani Lipscomb RN  Outcome: Progressing     Problem: Pain  Goal: Verbalizes/displays adequate comfort level or baseline comfort level  1/3/2024 0939 by Ellen Dalal RN  Outcome: Progressing  1/3/2024 0113 by Stephani Lipscomb RN  Outcome: Progressing

## 2024-01-03 NOTE — CARE COORDINATION
Social work / Discharge Planning:         Social work spoke to liaison from [facility].    Awaiting insurance authorization.     Electronically signed by ALPHONSO Gan on 1/3/2024 at 9:23 AM

## 2024-01-03 NOTE — PLAN OF CARE
Problem: Safety - Adult  Goal: Free from fall injury  1/3/2024 0113 by Stephani Lipscomb RN  Outcome: Progressing  1/2/2024 1216 by Ellen Dalal RN  Outcome: Progressing     Problem: ABCDS Injury Assessment  Goal: Absence of physical injury  1/3/2024 0113 by Stephani Lipscomb RN  Outcome: Progressing  1/2/2024 1216 by Ellen Dalal RN  Outcome: Progressing     Problem: Skin/Tissue Integrity  Goal: Absence of new skin breakdown  Description: 1.  Monitor for areas of redness and/or skin breakdown  2.  Assess vascular access sites hourly  3.  Every 4-6 hours minimum:  Change oxygen saturation probe site  4.  Every 4-6 hours:  If on nasal continuous positive airway pressure, respiratory therapy assess nares and determine need for appliance change or resting period.  1/3/2024 0113 by Stephani Lipscomb RN  Outcome: Progressing  1/2/2024 1216 by Ellen Dalal RN  Outcome: Progressing     Problem: Chronic Conditions and Co-morbidities  Goal: Patient's chronic conditions and co-morbidity symptoms are monitored and maintained or improved  1/3/2024 0113 by Stephani Lipscomb RN  Outcome: Progressing  1/2/2024 1216 by Ellen Dalal RN  Outcome: Progressing     Problem: Pain  Goal: Verbalizes/displays adequate comfort level or baseline comfort level  1/3/2024 0113 by Stephani Lipscomb RN  Outcome: Progressing  1/2/2024 1216 by Ellen Dalal RN  Outcome: Progressing

## 2024-01-03 NOTE — PATIENT CARE CONFERENCE
P Quality Flow/Interdisciplinary Rounds Progress Note        Quality Flow Rounds held on January 3, 2024    Disciplines Attending:  Bedside Nurse, , , and Nursing Unit Leadership    Toi Grimaldo was admitted on 12/27/2023 12:25 PM    Anticipated Discharge Date:  Expected Discharge Date: 12/28/23    Disposition:    Jordan Score:  Jordan Scale Score: 19    Readmission Risk              Risk of Unplanned Readmission:  22           Discussed patient goal for the day, patient clinical progression, and barriers to discharge.  The following Goal(s) of the Day/Commitment(s) have been identified:   Need [facility] for [facility]      Trina Hunter RN  January 3, 2024

## 2024-01-04 VITALS
SYSTOLIC BLOOD PRESSURE: 153 MMHG | HEART RATE: 63 BPM | OXYGEN SATURATION: 98 % | RESPIRATION RATE: 18 BRPM | WEIGHT: 171 LBS | DIASTOLIC BLOOD PRESSURE: 70 MMHG | BODY MASS INDEX: 21.94 KG/M2 | TEMPERATURE: 98 F | HEIGHT: 74 IN

## 2024-01-04 PROCEDURE — 6370000000 HC RX 637 (ALT 250 FOR IP): Performed by: STUDENT IN AN ORGANIZED HEALTH CARE EDUCATION/TRAINING PROGRAM

## 2024-01-04 PROCEDURE — 2580000003 HC RX 258: Performed by: STUDENT IN AN ORGANIZED HEALTH CARE EDUCATION/TRAINING PROGRAM

## 2024-01-04 PROCEDURE — 99239 HOSP IP/OBS DSCHRG MGMT >30: CPT | Performed by: INTERNAL MEDICINE

## 2024-01-04 PROCEDURE — 6360000002 HC RX W HCPCS: Performed by: STUDENT IN AN ORGANIZED HEALTH CARE EDUCATION/TRAINING PROGRAM

## 2024-01-04 RX ADMIN — METOPROLOL SUCCINATE 25 MG: 25 TABLET, EXTENDED RELEASE ORAL at 08:30

## 2024-01-04 RX ADMIN — ENOXAPARIN SODIUM 40 MG: 100 INJECTION SUBCUTANEOUS at 08:30

## 2024-01-04 RX ADMIN — CARBIDOPA AND LEVODOPA 2 TABLET: 25; 100 TABLET, EXTENDED RELEASE ORAL at 13:00

## 2024-01-04 RX ADMIN — ACETAMINOPHEN 650 MG: 325 TABLET ORAL at 05:58

## 2024-01-04 RX ADMIN — PAROXETINE HYDROCHLORIDE 20 MG: 20 TABLET, FILM COATED ORAL at 08:30

## 2024-01-04 RX ADMIN — ACETAMINOPHEN 650 MG: 325 TABLET ORAL at 13:01

## 2024-01-04 RX ADMIN — ASPIRIN 81 MG: 81 TABLET, COATED ORAL at 08:30

## 2024-01-04 RX ADMIN — CARBIDOPA AND LEVODOPA 2 TABLET: 25; 100 TABLET, EXTENDED RELEASE ORAL at 08:30

## 2024-01-04 RX ADMIN — SODIUM CHLORIDE, PRESERVATIVE FREE 10 ML: 5 INJECTION INTRAVENOUS at 08:30

## 2024-01-04 RX ADMIN — LEVOTHYROXINE SODIUM 50 MCG: 0.05 TABLET ORAL at 05:52

## 2024-01-04 RX ADMIN — PANTOPRAZOLE SODIUM 40 MG: 40 TABLET, DELAYED RELEASE ORAL at 05:52

## 2024-01-04 ASSESSMENT — PAIN SCALES - GENERAL: PAINLEVEL_OUTOF10: 6

## 2024-01-04 ASSESSMENT — PAIN DESCRIPTION - LOCATION: LOCATION: BACK

## 2024-01-04 ASSESSMENT — PAIN DESCRIPTION - DESCRIPTORS: DESCRIPTORS: ACHING

## 2024-01-04 ASSESSMENT — PAIN DESCRIPTION - ORIENTATION: ORIENTATION: LOWER

## 2024-01-04 NOTE — CARE COORDINATION
Updated plan of care. Auth approved for [facility]. Ambulance transport set up with [facility] at 1400. Nursing notified. Facility liaison notified. Family notified. tereso GRIMALDO, transport form, 71200 completed and in soft chart.   Electronically signed by Donya Roa RN on 1/4/2024 at 9:29 AM

## 2024-01-04 NOTE — PLAN OF CARE
Problem: Safety - Adult  Goal: Free from fall injury  1/4/2024 1012 by Ellen Dalal RN  Outcome: Adequate for Discharge  1/4/2024 1011 by Ellen Dalal RN  Outcome: Progressing     Problem: ABCDS Injury Assessment  Goal: Absence of physical injury  1/4/2024 1012 by Ellen Dalal RN  Outcome: Adequate for Discharge  1/4/2024 1011 by Ellen Dalal RN  Outcome: Progressing     Problem: Skin/Tissue Integrity  Goal: Absence of new skin breakdown  Description: 1.  Monitor for areas of redness and/or skin breakdown  2.  Assess vascular access sites hourly  3.  Every 4-6 hours minimum:  Change oxygen saturation probe site  4.  Every 4-6 hours:  If on nasal continuous positive airway pressure, respiratory therapy assess nares and determine need for appliance change or resting period.  1/4/2024 1012 by Ellen Dalal RN  Outcome: Adequate for Discharge  1/4/2024 1011 by Ellen Dalal RN  Outcome: Progressing     Problem: Chronic Conditions and Co-morbidities  Goal: Patient's chronic conditions and co-morbidity symptoms are monitored and maintained or improved  1/4/2024 1012 by Ellen Dalal RN  Outcome: Adequate for Discharge  1/4/2024 1011 by Ellen Dalal RN  Outcome: Progressing     Problem: Pain  Goal: Verbalizes/displays adequate comfort level or baseline comfort level  1/4/2024 1012 by Ellen Dalal RN  Outcome: Adequate for Discharge  1/4/2024 1011 by Ellen Dalal RN  Outcome: Progressing

## 2024-01-04 NOTE — PROGRESS NOTES
[facility] Hospitalist   Progress Note    Admitting Date and Time: 12/27/2023 12:25 PM  Admit Dx: Influenza A [J10.1]  History of Parkinson's disease [Z86.69]  Fatigue, unspecified type [R53.83]    Subjective:    Patient was admitted with Influenza A [J10.1]  History of Parkinson's disease [Z86.69]  Fatigue, unspecified type [R53.83]. Patient denies fever, chills, cp, sob, n/v.     sodium chloride flush  5-40 mL IntraVENous 2 times per day    enoxaparin  40 mg SubCUTAneous Daily    melatonin  3 mg Oral Nightly    aspirin  81 mg Oral Daily    carbidopa-levodopa  2 tablet Oral 4x Daily    levothyroxine  50 mcg Oral Daily    metoprolol succinate  25 mg Oral Daily    midodrine  2.5 mg Oral TID    pantoprazole  40 mg Oral QAM AC    PARoxetine  20 mg Oral QAM     sodium chloride flush, 5-40 mL, PRN  sodium chloride, , PRN  potassium chloride, 40 mEq, PRN   Or  potassium alternative oral replacement, 40 mEq, PRN   Or  potassium chloride, 10 mEq, PRN  magnesium sulfate, 2,000 mg, PRN  ondansetron, 4 mg, Q8H PRN   Or  ondansetron, 4 mg, Q6H PRN  polyethylene glycol, 17 g, Daily PRN  acetaminophen, 650 mg, Q6H PRN   Or  acetaminophen, 650 mg, Q6H PRN  dextrose bolus, 125 mL, PRN   Or  dextrose bolus, 250 mL, PRN  glucagon (rDNA), 1 mg, PRN  dextrose, , Continuous PRN  Glucose, 15 g, PRN         Objective:    BP (!) 150/77   Pulse 63   Temp 98.2 °F (36.8 °C) (Oral)   Resp 18   Ht 1.88 m (6' 2\")   Wt 77.1 kg (170 lb)   SpO2 91%   BMI 21.83 kg/m²   Skin: warm and dry, no rash or erythema  Pulmonary/Chest: clear to auscultation bilaterally- no wheezes, rales or rhonchi, normal air movement, no respiratory distress  Cardiovascular: rhythm reg at rate of 64  Abdomen: soft, non-tender, non-distended, normal bowel sounds, no masses or organomegaly  Extremities: no cyanosis, no clubbing, and no edema      Recent Labs     12/31/23  0230 01/01/24  0220    136   K 4.0 4.1    104   CO2 21* 20*   BUN 
     [facility] Hospitalist   Progress Note    Admitting Date and Time: 12/27/2023 12:25 PM  Admit Dx: Influenza A [J10.1]  History of Parkinson's disease [Z86.69]  Fatigue, unspecified type [R53.83]    Subjective:    Patient was admitted with Influenza A [J10.1]  History of Parkinson's disease [Z86.69]  Fatigue, unspecified type [R53.83]. Patient denies fever, chills, cp, sob, n/v.     sodium chloride flush  5-40 mL IntraVENous 2 times per day    enoxaparin  40 mg SubCUTAneous Daily    melatonin  3 mg Oral Nightly    insulin lispro  0-4 Units SubCUTAneous TID WC    insulin lispro  0-4 Units SubCUTAneous Nightly    aspirin  81 mg Oral Daily    carbidopa-levodopa  2 tablet Oral 4x Daily    levothyroxine  50 mcg Oral Daily    metoprolol succinate  25 mg Oral Daily    midodrine  2.5 mg Oral TID    pantoprazole  40 mg Oral QAM AC    PARoxetine  20 mg Oral QAM    oseltamivir  30 mg Oral BID     sodium chloride flush, 5-40 mL, PRN  sodium chloride, , PRN  potassium chloride, 40 mEq, PRN   Or  potassium alternative oral replacement, 40 mEq, PRN   Or  potassium chloride, 10 mEq, PRN  magnesium sulfate, 2,000 mg, PRN  ondansetron, 4 mg, Q8H PRN   Or  ondansetron, 4 mg, Q6H PRN  polyethylene glycol, 17 g, Daily PRN  acetaminophen, 650 mg, Q6H PRN   Or  acetaminophen, 650 mg, Q6H PRN  dextrose bolus, 125 mL, PRN   Or  dextrose bolus, 250 mL, PRN  glucagon (rDNA), 1 mg, PRN  dextrose, , Continuous PRN  Glucose, 15 g, PRN         Objective:    /64   Pulse 61   Temp 98.3 °F (36.8 °C) (Oral)   Resp 18   Ht 1.88 m (6' 2\")   Wt 77.1 kg (170 lb)   SpO2 92%   BMI 21.83 kg/m²   Skin: warm and dry, no rash or erythema  Pulmonary/Chest: clear to auscultation bilaterally- no wheezes, rales or rhonchi, normal air movement, no respiratory distress  Cardiovascular: rhythm reg at rate of 64  Abdomen: soft, non-tender, non-distended, normal bowel sounds, no masses or organomegaly  Extremities: no cyanosis, no 
     [facility] Hospitalist   Progress Note    Admitting Date and Time: 12/27/2023 12:25 PM  Admit Dx: Influenza A [J10.1]  History of Parkinson's disease [Z86.69]  Fatigue, unspecified type [R53.83]    Subjective:    Patient was admitted with Influenza A [J10.1]  History of Parkinson's disease [Z86.69]  Fatigue, unspecified type [R53.83]. Patient denies fever, chills, cp, sob, n/v.     sodium chloride flush  5-40 mL IntraVENous 2 times per day    enoxaparin  40 mg SubCUTAneous Daily    melatonin  3 mg Oral Nightly    aspirin  81 mg Oral Daily    carbidopa-levodopa  2 tablet Oral 4x Daily    levothyroxine  50 mcg Oral Daily    metoprolol succinate  25 mg Oral Daily    midodrine  2.5 mg Oral TID    pantoprazole  40 mg Oral QAM AC    PARoxetine  20 mg Oral QAM     sodium chloride flush, 5-40 mL, PRN  sodium chloride, , PRN  potassium chloride, 40 mEq, PRN   Or  potassium alternative oral replacement, 40 mEq, PRN   Or  potassium chloride, 10 mEq, PRN  magnesium sulfate, 2,000 mg, PRN  ondansetron, 4 mg, Q8H PRN   Or  ondansetron, 4 mg, Q6H PRN  polyethylene glycol, 17 g, Daily PRN  acetaminophen, 650 mg, Q6H PRN   Or  acetaminophen, 650 mg, Q6H PRN  dextrose bolus, 125 mL, PRN   Or  dextrose bolus, 250 mL, PRN  glucagon (rDNA), 1 mg, PRN  dextrose, , Continuous PRN  Glucose, 15 g, PRN         Objective:    /69   Pulse 57   Temp 98.2 °F (36.8 °C) (Oral)   Resp 20   Ht 1.88 m (6' 2\")   Wt 77.6 kg (171 lb 1.6 oz)   SpO2 94%   BMI 21.97 kg/m²   Skin: warm and dry, no rash or erythema  Pulmonary/Chest: clear to auscultation bilaterally- no wheezes, rales or rhonchi, normal air movement, no respiratory distress  Cardiovascular: rhythm reg at rate of 60  Abdomen: soft, non-tender, non-distended, normal bowel sounds, no masses or organomegaly  Extremities: no cyanosis, no clubbing, and no edema      Recent Labs     01/01/24 0220 01/03/24  0822    137   K 4.1 4.4    103   CO2 20* 23   BUN 
     [facility] Hospitalist   Progress Note    Admitting Date and Time: 12/27/2023 12:25 PM  Admit Dx: Influenza A [J10.1]  History of Parkinson's disease [Z86.69]  Fatigue, unspecified type [R53.83]    Subjective:    Patient was admitted with Influenza A [J10.1]  History of Parkinson's disease [Z86.69]  Fatigue, unspecified type [R53.83]. Patient denies fever, chills, cp, sob, n/v.     sodium chloride flush  5-40 mL IntraVENous 2 times per day    enoxaparin  40 mg SubCUTAneous Daily    melatonin  3 mg Oral Nightly    insulin lispro  0-4 Units SubCUTAneous TID WC    insulin lispro  0-4 Units SubCUTAneous Nightly    aspirin  81 mg Oral Daily    carbidopa-levodopa  2 tablet Oral 4x Daily    levothyroxine  50 mcg Oral Daily    methotrexate  7.5 mg Oral Weekly    metoprolol succinate  25 mg Oral Daily    midodrine  2.5 mg Oral TID    pantoprazole  40 mg Oral QAM AC    PARoxetine  20 mg Oral QAM    oseltamivir  30 mg Oral BID     sodium chloride flush, 5-40 mL, PRN  sodium chloride, , PRN  potassium chloride, 40 mEq, PRN   Or  potassium alternative oral replacement, 40 mEq, PRN   Or  potassium chloride, 10 mEq, PRN  magnesium sulfate, 2,000 mg, PRN  ondansetron, 4 mg, Q8H PRN   Or  ondansetron, 4 mg, Q6H PRN  polyethylene glycol, 17 g, Daily PRN  acetaminophen, 650 mg, Q6H PRN   Or  acetaminophen, 650 mg, Q6H PRN  dextrose bolus, 125 mL, PRN   Or  dextrose bolus, 250 mL, PRN  glucagon (rDNA), 1 mg, PRN  dextrose, , Continuous PRN  Glucose, 15 g, PRN         Objective:    /75   Pulse 71   Temp 97.8 °F (36.6 °C) (Oral)   Resp 16   Ht 1.88 m (6' 2\")   Wt 77.1 kg (170 lb)   SpO2 95%   BMI 21.83 kg/m²   Skin: warm and dry, no rash or erythema  Pulmonary/Chest: clear to auscultation bilaterally- no wheezes, rales or rhonchi, normal air movement, no respiratory distress  Cardiovascular: rhythm reg at rate of 72  Abdomen: soft, non-tender, non-distended, normal bowel sounds, no masses or 
     [facility] Hospitalist   Progress Note    Admitting Date and Time: 12/27/2023 12:25 PM  Admit Dx: Influenza A [J10.1]  History of Parkinson's disease [Z86.69]  Fatigue, unspecified type [R53.83]    Subjective:    Patient was admitted with Influenza A [J10.1]  History of Parkinson's disease [Z86.69]  Fatigue, unspecified type [R53.83]. Patient denies fever, chills, cp, sob, n/v.     sodium chloride flush  5-40 mL IntraVENous 2 times per day    enoxaparin  40 mg SubCUTAneous Daily    melatonin  3 mg Oral Nightly    aspirin  81 mg Oral Daily    carbidopa-levodopa  2 tablet Oral 4x Daily    levothyroxine  50 mcg Oral Daily    metoprolol succinate  25 mg Oral Daily    midodrine  2.5 mg Oral TID    pantoprazole  40 mg Oral QAM AC    PARoxetine  20 mg Oral QAM    oseltamivir  30 mg Oral BID     sodium chloride flush, 5-40 mL, PRN  sodium chloride, , PRN  potassium chloride, 40 mEq, PRN   Or  potassium alternative oral replacement, 40 mEq, PRN   Or  potassium chloride, 10 mEq, PRN  magnesium sulfate, 2,000 mg, PRN  ondansetron, 4 mg, Q8H PRN   Or  ondansetron, 4 mg, Q6H PRN  polyethylene glycol, 17 g, Daily PRN  acetaminophen, 650 mg, Q6H PRN   Or  acetaminophen, 650 mg, Q6H PRN  dextrose bolus, 125 mL, PRN   Or  dextrose bolus, 250 mL, PRN  glucagon (rDNA), 1 mg, PRN  dextrose, , Continuous PRN  Glucose, 15 g, PRN         Objective:    /72   Pulse 66   Temp 97.8 °F (36.6 °C) (Axillary)   Resp 16   Ht 1.88 m (6' 2\")   Wt 77.1 kg (170 lb)   SpO2 100%   BMI 21.83 kg/m²   Skin: warm and dry, no rash or erythema  Pulmonary/Chest: clear to auscultation bilaterally- no wheezes, rales or rhonchi, normal air movement, no respiratory distress  Cardiovascular: rhythm reg at rate of 68  Abdomen: soft, non-tender, non-distended, normal bowel sounds, no masses or organomegaly  Extremities: no cyanosis, no clubbing, and no edema      Recent Labs     12/29/23  0515 12/30/23  0444 12/31/23  0230    134 
     [facility] Hospitalist   Progress Note    Admitting Date and Time: 12/27/2023 12:25 PM  Admit Dx: Influenza A [J10.1]  History of Parkinson's disease [Z86.69]  Fatigue, unspecified type [R53.83]    Subjective:    Patient was admitted with Influenza A [J10.1]  History of Parkinson's disease [Z86.69]  Fatigue, unspecified type [R53.83]. Patient denies fever, chills, cp, sob, n/v.     sodium chloride flush  5-40 mL IntraVENous 2 times per day    enoxaparin  40 mg SubCUTAneous Daily    melatonin  3 mg Oral Nightly    insulin lispro  0-4 Units SubCUTAneous TID WC    insulin lispro  0-4 Units SubCUTAneous Nightly    aspirin  81 mg Oral Daily    carbidopa-levodopa  2 tablet Oral 4x Daily    levothyroxine  50 mcg Oral Daily    metoprolol succinate  25 mg Oral Daily    midodrine  2.5 mg Oral TID    pantoprazole  40 mg Oral QAM AC    PARoxetine  20 mg Oral QAM    oseltamivir  30 mg Oral BID     sodium chloride flush, 5-40 mL, PRN  sodium chloride, , PRN  potassium chloride, 40 mEq, PRN   Or  potassium alternative oral replacement, 40 mEq, PRN   Or  potassium chloride, 10 mEq, PRN  magnesium sulfate, 2,000 mg, PRN  ondansetron, 4 mg, Q8H PRN   Or  ondansetron, 4 mg, Q6H PRN  polyethylene glycol, 17 g, Daily PRN  acetaminophen, 650 mg, Q6H PRN   Or  acetaminophen, 650 mg, Q6H PRN  dextrose bolus, 125 mL, PRN   Or  dextrose bolus, 250 mL, PRN  glucagon (rDNA), 1 mg, PRN  dextrose, , Continuous PRN  Glucose, 15 g, PRN         Objective:    /60   Pulse 69   Temp 97.3 °F (36.3 °C) (Oral)   Resp 17   Ht 1.88 m (6' 2\")   Wt 77.1 kg (170 lb)   SpO2 95%   BMI 21.83 kg/m²   Skin: warm and dry, no rash or erythema  Pulmonary/Chest: clear to auscultation bilaterally- no wheezes, rales or rhonchi, normal air movement, no respiratory distress  Cardiovascular: rhythm reg at rate of 72  Abdomen: soft, non-tender, non-distended, normal bowel sounds, no masses or organomegaly  Extremities: no cyanosis, no 
    Pharmacy Note - Renal dose adjustment made per P/T protocol    Original order:  Tamiflu 75mg twice a day    Estimated Creatinine Clearance: 49 mL/min (based on SCr of 1.1 mg/dL).    Recent Labs     12/27/23  1409   BUN 23   CREATININE 1.1       Renally adjusted order:  [id number] once, followed by Tamiflu 30mg twice a day x5 days    Please call pharmacy with any questions.    Thank you,  Alma Rosa Monsalve [facility]  12/27/2023 8:24 PM    
  [facility] Quality Flow/Interdisciplinary Rounds Progress Note        Quality Flow Rounds held on January 1, 2024    Disciplines Attending:  Bedside Nurse and Nursing Unit Leadership    Toi Grimaldo was admitted on 12/27/2023 12:25 PM    Anticipated Discharge Date:  Expected Discharge Date: 12/28/23    Disposition:    Jordan Score:  Jordan Scale Score: 18    Readmission Risk              Risk of Unplanned Readmission:  22           Discussed patient goal for the day, patient clinical progression, and barriers to discharge.  The following Goal(s) of the Day/Commitment(s) have been identified:   await precert.      Ita Villa RN  January 1, 2024        
  [facility] Quality Flow/Interdisciplinary Rounds Progress Note        Quality Flow Rounds held on December 28, 2023    Disciplines Attending:  Bedside Nurse, , , and Nursing Unit Leadership    Toi Grimaldo was admitted on 12/27/2023 12:25 PM    Anticipated Discharge Date:  Expected Discharge Date: 12/28/23    Disposition:    Jordan Score:  Jordan Scale Score: 17    Readmission Risk              Risk of Unplanned Readmission:  17           Discussed patient goal for the day, patient clinical progression, and barriers to discharge.  The following Goal(s) of the Day/Commitment(s) have been identified:   iv fluids, tamiflu      Ita Villa RN  December 28, 2023        
4 Eyes Skin Assessment     NAME:  Toi Grimaldo  YOB: 1933  MEDICAL RECORD NUMBER:  [id number]    The patient is being assessed for  Admission    I agree that at least one RN has performed a thorough Head to Toe Skin Assessment on the patient. ALL assessment sites listed below have been assessed.      Areas assessed by both nurses:    Head, Face, Ears, Shoulders, Back, Chest, Arms, Elbows, Hands, Sacrum. Buttock, Coccyx, Ischium, and Legs. Feet and Heels        Does the Patient have a Wound? No noted wound(s)       Jordan Prevention initiated by RN: No  Wound Care Orders initiated by RN: No    Pressure Injury (Stage 3,4, Unstageable, DTI, NWPT, and Complex wounds) if present, place Wound referral order by RN under : No    New Ostomies, if present place, Ostomy referral order under : No     Nurse 1 eSignature: Electronically signed by Suzette Macias RN on 12/28/23 at 1:40 AM EST    **SHARE this note so that the co-signing nurse can place an eSignature**    Nurse 2 eSignature: Electronically signed by Servando Monroe RN on 12/28/23 at 7:16 AM EST    
Called report to 5W  nurse for transfer to unit.  
Comprehensive Nutrition Assessment    Type and Reason for Visit:  Initial, RD Nutrition Re-Screen/LOS    Nutrition Recommendations/Plan:   Continue current diet and ONS, as tolerated     Nutrition Assessment:    Pt admit 2/2 flu A+. PMH includes DM, Parkinson's, CHF, RA. Intake appears improving since admit. Will add ONS to optimize nutrient intake and continue inpatient monitoring    Nutrition Related Findings:    A&Ox4, no edema, abd WDL, -I/O 7.3, glucose well controlled/stable, Wound Type: None       Current Nutrition Intake & Therapies:    Average Meal Intake: % (last 1-2 days per flowsheets)  Average Supplements Intake: None Ordered  ADULT DIET; Regular  ADULT ORAL NUTRITION SUPPLEMENT; Lunch, Dinner; Frozen Oral Supplement    Anthropometric Measures:  Height: 188 cm (6' 2\")  Ideal Body Weight (IBW): 190 lbs (86 kg)    Admission Body Weight: 77.6 kg (171 lb 1.2 oz) (1/3 bedscale)  Current Body Weight: 77.6 kg (171 lb 1.2 oz), 90 % IBW. Weight Source: Bed Scale (1/3)  Current BMI (kg/m2): 22  Usual Body Weight: 84.7 kg (186 lb 11 oz) (7/4/2023 bedscale)  % Weight Change (Calculated): -8.4                    BMI Categories: Normal Weight (BMI 22.0 to 24.9) age over 65      Nutrition Interventions:   Food and/or Nutrient Delivery: Continue Current Diet, Start Oral Nutrition Supplement (Magic Cup BID)  Nutrition Education/Counseling: No recommendation at this time  Coordination of Nutrition Care: Continue to monitor while inpatient       Goals:     Goals: PO intake 75% or greater, by next RD assessment       Nutrition Monitoring and Evaluation:   Behavioral-Environmental Outcomes: None Identified  Food/Nutrient Intake Outcomes: Food and Nutrient Intake, Supplement Intake  Physical Signs/Symptoms Outcomes: Biochemical Data, GI Status, Fluid Status or Edema, Nutrition Focused Physical Findings, Skin, Weight    Discharge Planning:    Continue Oral Nutrition Supplement     Kandi Llaons RD, CNSC, 
Dereje Rust NP said she wants the patient to go to [facility] telemetry.  
Physical Therapy  Facility/Department: [address] MED SURG  Daily Treatment Note  NAME: Toi Grimaldo  : 1933  MRN: [id number]    Date of Service: 1/3/2024    Patient Diagnosis(es): The primary encounter diagnosis was Influenza A. Diagnoses of Fatigue, unspecified type and History of Parkinson's disease were also pertinent to this visit.      Evaluating Therapist: Trudi Musa, PT      Referring Provider:  Charles Godinez       PT order : PT eval and treat      Room #: 640   DIAGNOSIS: The primary encounter diagnosis was Influenza A. Diagnoses of Fatigue, unspecified type and History of Parkinson's disease were also pertinent to this visit.  PRECAUTIONS:  falls, droplet , decreased vision      Social:  Pt lives with  son and d-I-l  in a 1 floor plan  3 steps and 1 rails to enter. Pt's wife in a SNF   Prior to admission pt walked with  ww.        Initial Evaluation  Date:  2023  Treatment     1/3 Short Term/ Long Term   Goals   Was pt agreeable to Eval/treatment?  Yes   yes     Does pt have pain? None reported   none reported     Bed Mobility  Rolling:  NT   Supine to sit:  mod assist   Sit to supine:  NT   Scooting:  mod assist in sit  rolling:  NT  Supine to sit:  Mod A  Sit to supine:  NT  Scooting:  Mod A to sitting EOB SBA    Transfers Sit to stand:  min to max assist depending on surface   Stand to sit: mod /max assist   Stand pivot:  mod/max assist   sit to stand:  Max A from the bed.  Min A to the bed.   Sit to stand:  Max A  Stand pivot:  Max A with w/w  Min assist    Ambulation     3  feet with  ww  with  min assist   6 feet with w/w Min A and chair follow.   25  feet with  ww  with  CGA    Stair negotiation: ascended and descended NT   NT TBA    LE ROM  AAROM WFL        LE strength  3-/ 5   grossly 3+/5 3+ to 4-/ 5    AM- PAC RAW score           Therapeutic Exercises:  ankle pumps and hip and knee flexion/extension while pt in bed.  Educated pt about ankle pumps, hip 
Pt/family not wanting FS BGL check at this time. Pt/family stating he is not diabetic and never have been told he is, not taking any meds at home. Notified ADIS Rust. Will address in the AM.  
Spoke with Dr. Charles Godinez regarding clarifications of orders. Notified that sliding scale insulin, blood glucose checks and continuous IV fluids could be discontinued at this time.  
Therapeutic activities to facilitate/challenge dynamic balance, stand tolerance for increased safety and independence with ADLs  * Neuro-muscular re-education: facilitation of righting/equilibrium reactions, midline orientation, scapular stability/mobility, normalization of muscle tone, and facilitation of volitional active controled movement  * Positioning to improve skin integrity, interaction with environment and functional independence     Recommended Adaptive Equipment: continue to assess      Home Living: Patient noted that he has been staying with his daughter and son-in law in a one-floor home recently; patient's wife is at a SNF/[facility] currently.  Bathroom Setup: walk-in shower (no seat)  Equipment Owned: walker     Prior Level of Function (PLOF): Patient noted that he is typically independent with ADLs; family members assist with IADLs. Patient indicated that he is usually Mod I with functional mobility (with use of walker).     Pain Level: Pt reports pain in LE's/feet.   Cognition: Awake and alert.  Limited eye opening however pt reports it is due to his macular degeneration. Following directions with min cues.       Functional Assessment:                  AM-PAC Daily Activity Raw Score: 15/24    Initial Eval Status  Date: 12/28/2023 Treatment Status  Date: 1/2/24  Short Term Goals = Long Term Goals   Feeding SBA Min A   Setup   Grooming Mod A Min A   SBA (seated/standing at sink)   UB Dressing Min A Min  A to change gown  SBA   LB Dressing Max A  max A to don slipper socks.  Min A - with use of AE, as needed/appropriate   Bathing Max A  min A UB bathe.   Max A LB bathe.   Pt using bath wipes.    Min A - with use of AE/DME, as needed/appropriate   Toileting Dependent for hygiene (while standing with walker) following evidence of bowel incontinence and use of BSC. Patient with external urinary catheter currently. Pt using pure wick   Max A manjinder hygiene due to leaking.     Mod A   Bed Mobility  Not assessed. 
Dehydration    Primary hypertension  Resolved Problems:    * No resolved hospital problems. *      Plan:  Influenza A tamiflu monitor closely. Encourage activity.   Dehydration given insensible losses encourage po. Monitor off iv fluids  Htn monitor bp and continue med  Parkinsons disease continue med  RA hold mtx  leukovorin given with recent mtx    Await precent. Encourage activity          Electronically signed by Charles Godinez, DO on 1/1/2024 at 6:11 PM    
stated     Patient and or family understand(s) diagnosis, prognosis, and plan of care. - yes     PLAN  PT care will be provided in accordance with the objectives noted above.  Whenever appropriate, clear delegation orders will be provided for nursing staff.  Exercises and functional mobility practice will be used as well as appropriate assistive devices or modalities to obtain goals. Patient and family education will also be administered as needed.        PLAN OF CARE:    Current Treatment Recommendations     [x] Strengthening to improve independence with functional mobility   [] ROM to improve independence with functional mobility   [x] Balance Training to improve static/dynamic balance and to reduce fall risk  [x] Endurance Training to improve activity tolerance during functional mobility   [x] Transfer Training to improve safety and independence with all functional transfers   [x] Gait Training to improve gait mechanics, endurance and assess need for appropriate assistive device  [] Stair Training in preparation for safe discharge home and/or into the community   [x] Positioning to prevent skin breakdown and contractures  [x] Safety and Education Training   [x] Patient/Caregiver Education   [] HEP  [] Other     Frequency of treatments will be 2-5x/week x 7-10 days.    Time in:  1350   Time out: 1420       Evaluation Time includes thorough review of current medical information, gathering information on past medical history/social history and prior level of function, completion of standardized testing/informal observation of tasks, assessment of data and education on plan of care and goals.    CPT codes:  [x] Low Complexity PT evaluation 51037  [] Moderate Complexity PT evaluation 02457  [] High Complexity PT evaluation 74020  [] PT Re-evaluation 70462  [] Gait training 70745  minutes  [x] Therapeutic activities 91942 15 minutes  [] Therapeutic exercises 32039  minutes  [] Neuromuscular reeducation 78822  minutes 
    Comments:  Pt agreeable to therapy.  Upset and verbalizing frustration.  Did not recall events from previous session.  States he has not bathed or been up OOB for days. Pt provided with information from previous session.  ADL completed while seated on the side of the bed.  Posterior lean when standing/transfers.  Cues for safety.  He remained seated in the chair with alarm activated at the end of the session.     Education/treatment:  ADL retraining with facilitation of movement to increase self care skills. Therapeutic activity to address balance and endurance for ADL and transfers.  Pt education of walker safety, daily orientation and transfer safety.       Pt has made  progress towards set goals.       Time In: 155  Time Out: 220     Min Units   Therapeutic Ex 99610     Therapeutic Activities 69072 10 1   ADL/Self Care 64967 15 1   Orthotic Management 64127     Neuro Re-Ed 99199     Non-Billable Time     TOTAL TIMED TREATMENT 25 2         Pao KULKARNI/L [id number]    
care and goals.    Donya Robertson, OTR/L  License Number: [id number]

## 2024-01-04 NOTE — DISCHARGE SUMMARY
[facility] Hospitalist       Hospitalist Physician Discharge Summary       [facility]  [address] [address]  [phone]          Activity level: as paddy    Diet: ADULT DIET; Regular  ADULT ORAL NUTRITION SUPPLEMENT; Lunch, Dinner; Frozen Oral Supplement    Dispo:snf    Condition at discharge: fair        Patient ID:  Toi Grimaldo  [id number]  89 y.o.  11/9/1933    Admit date: 12/27/2023    Discharge date and time:  1/4/2024  10:07 AM    Admission Diagnoses: Principal Problem:    Influenza A  Active Problems:    Rheumatoid arthritis (HCC)    Dehydration    Primary hypertension  Resolved Problems:    * No resolved hospital problems. *      Discharge Diagnoses: Principal Problem:    Influenza A  Active Problems:    Rheumatoid arthritis (HCC)    Dehydration    Primary hypertension  Resolved Problems:    * No resolved hospital problems. *    Influenza A infection  Dehydration  Htn  Parkinsons disease  RA  acidosis        Consults:  None    Procedures: none    Hospital Course: Patient was admitted with Influenza A [J10.1]  History of Parkinson's disease [Z86.69]  Fatigue, unspecified type [R53.83]. Patient is a 89-year-old gentleman with past medical history significant for hypertension hyperlipidemia depression parkinsonism osteoarthritis macular degeneration anxiety coronary artery disease and BPH presented to ED for evaluation of tiredness fatigue and confusion.  History was taken from the patient and from his daughter who stated that since morning he has not been feeling well.  He was more tired and fatigued than usual and did not want to get out of the bed.  Denies any chest pain, belly pain, nausea vomiting diarrhea, cough, lightheadedness or dizziness or any focal neurological signs.  No fever or chills.  He was also found more confused than baseline as per the family.  He has some cough going on for last couple of days not worsening shortness of breath.  Cough is dry.

## 2024-01-26 PROBLEM — J10.1 INFLUENZA A: Status: RESOLVED | Noted: 2023-12-27 | Resolved: 2024-01-26

## 2024-01-27 PROBLEM — E86.0 DEHYDRATION: Status: RESOLVED | Noted: 2023-12-28 | Resolved: 2024-01-27

## 2024-01-30 ENCOUNTER — CARE COORDINATION (OUTPATIENT)
Dept: CASE MANAGEMENT | Age: 89
End: 2024-01-30

## 2024-01-30 DIAGNOSIS — J10.1 INFLUENZA A: Primary | ICD-10-CM

## 2024-01-30 PROCEDURE — 1111F DSCHRG MED/CURRENT MED MERGE: CPT | Performed by: FAMILY MEDICINE

## 2024-02-01 ENCOUNTER — OFFICE VISIT (OUTPATIENT)
Dept: PRIMARY CARE CLINIC | Age: 89
End: 2024-02-01

## 2024-02-01 ENCOUNTER — CARE COORDINATION (OUTPATIENT)
Dept: CARE COORDINATION | Age: 89
End: 2024-02-01

## 2024-02-01 DIAGNOSIS — E11.9 DIET-CONTROLLED DIABETES MELLITUS (HCC): ICD-10-CM

## 2024-02-01 DIAGNOSIS — R33.9 URINARY RETENTION WITH INCOMPLETE BLADDER EMPTYING: ICD-10-CM

## 2024-02-01 DIAGNOSIS — G20.A2 PARKINSON'S DISEASE WITHOUT DYSKINESIA, WITH FLUCTUATING MANIFESTATIONS: Primary | Chronic | ICD-10-CM

## 2024-02-01 DIAGNOSIS — E27.8 ADRENAL NODULE (HCC): ICD-10-CM

## 2024-02-01 DIAGNOSIS — I50.22 CHRONIC SYSTOLIC (CONGESTIVE) HEART FAILURE (HCC): ICD-10-CM

## 2024-02-01 DIAGNOSIS — I71.21 ANEURYSM OF ASCENDING AORTA WITHOUT RUPTURE (HCC): ICD-10-CM

## 2024-02-01 DIAGNOSIS — Z09 HOSPITAL DISCHARGE FOLLOW-UP: ICD-10-CM

## 2024-02-01 NOTE — CARE COORDINATION
-CTN referral.  -ACM attempted to reach patient to offer enrollment into Care Coordination program & RPM services, however no answer and the voice message was Pt's daughter, Trudi (HIPAA).  -HIPAA compliant VM left introducing self, reason for call, and brief explanation of program.  -Left ACM's contact information, requesting call back. If no return call, will attempt outreach again.

## 2024-02-02 ENCOUNTER — CARE COORDINATION (OUTPATIENT)
Dept: CARE COORDINATION | Age: 89
End: 2024-02-02

## 2024-02-02 VITALS
BODY MASS INDEX: 22.96 KG/M2 | RESPIRATION RATE: 17 BRPM | TEMPERATURE: 97.7 F | SYSTOLIC BLOOD PRESSURE: 135 MMHG | HEART RATE: 73 BPM | OXYGEN SATURATION: 93 % | WEIGHT: 178.8 LBS | DIASTOLIC BLOOD PRESSURE: 78 MMHG

## 2024-02-02 PROBLEM — T17.908A ASPIRATION INTO AIRWAY: Status: RESOLVED | Noted: 2020-08-30 | Resolved: 2024-02-02

## 2024-02-02 PROBLEM — E43 SEVERE PROTEIN-CALORIE MALNUTRITION (HCC): Status: RESOLVED | Noted: 2023-06-29 | Resolved: 2024-02-02

## 2024-02-02 PROBLEM — S32.501D: Status: RESOLVED | Noted: 2023-10-04 | Resolved: 2024-02-02

## 2024-02-02 PROBLEM — R55 PRE-SYNCOPE: Status: RESOLVED | Noted: 2023-06-29 | Resolved: 2024-02-02

## 2024-02-02 ASSESSMENT — ENCOUNTER SYMPTOMS
GASTROINTESTINAL NEGATIVE: 1
EYES NEGATIVE: 1
RESPIRATORY NEGATIVE: 1
ALLERGIC/IMMUNOLOGIC NEGATIVE: 1
BACK PAIN: 1

## 2024-02-02 ASSESSMENT — PATIENT HEALTH QUESTIONNAIRE - PHQ9
SUM OF ALL RESPONSES TO PHQ QUESTIONS 1-9: 0
2. FEELING DOWN, DEPRESSED OR HOPELESS: 0
SUM OF ALL RESPONSES TO PHQ QUESTIONS 1-9: 0
1. LITTLE INTEREST OR PLEASURE IN DOING THINGS: 0
SUM OF ALL RESPONSES TO PHQ QUESTIONS 1-9: 0
SUM OF ALL RESPONSES TO PHQ QUESTIONS 1-9: 0
SUM OF ALL RESPONSES TO PHQ9 QUESTIONS 1 & 2: 0

## 2024-02-02 NOTE — CARE COORDINATION
-CTN referral.   -[facility]'s second attempted to reach patient to offer enrollment into Care Coordination program & RPM services, however no answer.  -HIPAA compliant VM left introducing self, reason for call, and brief explanation of program.  -Left [facility]'s contact information, requesting call back. If no return call, will attempt outreach again.  -Will mail Introduction letter to patient.

## 2024-02-02 NOTE — PROGRESS NOTES
Transportation (Medical): No     Lack of Transportation (Non-Medical): No   Physical Activity: Inactive (2/2/2023)    Exercise Vital Sign     Days of Exercise per Week: 0 days     Minutes of Exercise per Session: 0 min   Stress: Not on file   Social Connections: Not on file   Intimate Partner Violence: Not on file   Housing Stability: Low Risk  (12/27/2023)    Housing Stability Vital Sign     Unable to Pay for Housing in the Last Year: No     Number of Places Lived in the Last Year: 1     Unstable Housing in the Last Year: No      Past Medical History:   Diagnosis Date    Anxiety     Ascending aortic aneurysm (HCC)     Aspiration into airway 08/30/2020    BPH (benign prostatic hyperplasia)     CAD (coronary artery disease)     Cancer (HCC)     skin    Closed fracture of right pubis with routine healing 10/04/2023    Coronary arteriosclerosis     Depression     DJD (degenerative joint disease)     Hyperlipidemia     Impacted cerumen     Lumbar spinal stenosis     severe    Macular degeneration     Osteoarthritis     Parkinson disease     Pre-syncope 06/29/2023    RA (rheumatoid arthritis) (HCC)     Sepsis (HCC)      Family History   Problem Relation Age of Onset    Lung Cancer Father     No Known Problems Mother       Past Surgical History:   Procedure Laterality Date    APPENDECTOMY      CATARACT REMOVAL WITH IMPLANT Left 04/19/2020    CHOLECYSTECTOMY      COLONOSCOPY      CORONARY ARTERY BYPASS GRAFT  2002    HERNIA REPAIR Bilateral     inguinal    KNEE SURGERY Left 06/2011    TOTAL KNEE ARTHROPLASTY Right 01/24/2017    tiffanie    UPPER GASTROINTESTINAL ENDOSCOPY      peptic ulcer      Vitals:    02/01/24 1330   BP: 135/78   Pulse: 73   Resp: 17   Temp: 97.7 °F (36.5 °C)   TempSrc: Temporal   SpO2: 93%   Weight: 81.1 kg (178 lb 12.8 oz)       Objective:    Physical Exam  Vitals reviewed.   Constitutional:       Appearance: Normal appearance. He is well-developed.   HENT:      Head: Normocephalic.      Right Ear:

## 2024-02-06 ENCOUNTER — CARE COORDINATION (OUTPATIENT)
Dept: CARE COORDINATION | Age: 89
End: 2024-02-06

## 2024-02-06 NOTE — CARE COORDINATION
-ACM received a voice message from patient's daughter, Mraline Reyes requesting a return call.     -ACM returned call to patient's daughter, Marline Reyes.(HIPAA).   -ACM spoke to Marline to offer enrollment into Care Coordination program & RPM services for patient. .  -Introduced self, reason for call, and explanation of program.   -Marline interested.  Unable to enroll Pt today, scheduled enrollment with Marline.    -Marline reports she is a nurse and she is from [place]. Marline said she is here to care for her parents and attempt to transition them into [facility].. Marline said she is going on tours at [facility]'s.   -Plan: AC will reach out to Marline at an agreed upon date and time for Care Coordination enrollment.

## 2024-02-08 ENCOUNTER — CARE COORDINATION (OUTPATIENT)
Dept: CARE COORDINATION | Age: 89
End: 2024-02-08

## 2024-02-08 SDOH — ECONOMIC STABILITY: TRANSPORTATION INSECURITY
IN THE PAST 12 MONTHS, HAS LACK OF TRANSPORTATION KEPT YOU FROM MEETINGS, WORK, OR FROM GETTING THINGS NEEDED FOR DAILY LIVING?: NO

## 2024-02-08 SDOH — HEALTH STABILITY: PHYSICAL HEALTH: ON AVERAGE, HOW MANY MINUTES DO YOU ENGAGE IN EXERCISE AT THIS LEVEL?: 0 MIN

## 2024-02-08 SDOH — ECONOMIC STABILITY: TRANSPORTATION INSECURITY
IN THE PAST 12 MONTHS, HAS THE LACK OF TRANSPORTATION KEPT YOU FROM MEDICAL APPOINTMENTS OR FROM GETTING MEDICATIONS?: NO

## 2024-02-08 SDOH — ECONOMIC STABILITY: FOOD INSECURITY: WITHIN THE PAST 12 MONTHS, YOU WORRIED THAT YOUR FOOD WOULD RUN OUT BEFORE YOU GOT MONEY TO BUY MORE.: NEVER TRUE

## 2024-02-08 SDOH — HEALTH STABILITY: PHYSICAL HEALTH: ON AVERAGE, HOW MANY DAYS PER WEEK DO YOU ENGAGE IN MODERATE TO STRENUOUS EXERCISE (LIKE A BRISK WALK)?: 0 DAYS

## 2024-02-08 SDOH — ECONOMIC STABILITY: FOOD INSECURITY: WITHIN THE PAST 12 MONTHS, THE FOOD YOU BOUGHT JUST DIDN'T LAST AND YOU DIDN'T HAVE MONEY TO GET MORE.: NEVER TRUE

## 2024-02-08 ASSESSMENT — SOCIAL DETERMINANTS OF HEALTH (SDOH)
HOW HARD IS IT FOR YOU TO PAY FOR THE VERY BASICS LIKE FOOD, HOUSING, MEDICAL CARE, AND HEATING?: NOT HARD AT ALL
HOW OFTEN DO YOU GET TOGETHER WITH FRIENDS OR RELATIVES?: MORE THAN THREE TIMES A WEEK
DO YOU BELONG TO ANY CLUBS OR ORGANIZATIONS SUCH AS CHURCH GROUPS UNIONS, FRATERNAL OR ATHLETIC GROUPS, OR SCHOOL GROUPS?: NO
HOW OFTEN DO YOU ATTENT MEETINGS OF THE CLUB OR ORGANIZATION YOU BELONG TO?: NEVER
HOW OFTEN DO YOU ATTEND CHURCH OR RELIGIOUS SERVICES?: MORE THAN 4 TIMES PER YEAR

## 2024-02-08 NOTE — CARE COORDINATION
Ambulatory Care Coordination Note  2024    Patient Current Location:  Home: [address] [place] [address] [place] [address]    ACM contacted the family by telephone. Verified name and  with family as identifiers. Provided introduction to self, and explanation of the ACM role.     ACM: Justine Goddard RN    Challenges to be reviewed by the provider   Additional needs identified to be addressed with provider: No  none               Method of communication with provider: none.    Offered patient enrollment in the Remote Patient Monitoring (RPM) program for in-home monitoring: Patient declined.    Enrollment  ACM contacted patient's daughter, Marline to complete Care Coordination enrollment regarding Parkinson;s disease, recent hospitalization, attempting to transition to [facility] & explore community resources.   -Specialists noted: EUNICE MARIO (Neiro), Dr Cardona (Rheum), Dr SHIRA Esteban (Sleep), Dr JIN Joe (Ortho), Dr ANDREW Fay (Gen Surg),   -Marline reports Pt is a  but he is not service connected and he does not qualify for VA benefits.   -Pt lives with his wife in a ranch home, 2 steps to enter through [employer], [employer] 1st level and has a basement. Mya said there are no small ramps and Pt does not want any.   -Marline reports Pt has macular degeneration. Pt has eyeglasses, magnifying glass and adequate lighting.   -Pt has a BP monitor, pulse ox and scale.   -Pt has several regular walkers, shower chair, BSC, regular Rolator and a Rolator that turns into a wheelchair.   -Pt has 4 daughters, 3 local, one out of state. Pt has 8 grandchildren and 3 great grandchildren.   -Marline managed Pt's medication in a 7 day medication box. Mya said Pt dispenses the medication.   -Marline reports she does all the cooking. Marline said Pt is not on a special diet. Offered home delivered meals, Marline declined.   -Marline said Pt's exercise is [facility]. Pt has Silver Snreakers.   -Marline reports Pt and his wife, neither

## 2024-02-08 NOTE — CARE COORDINATION
-CTN referral.  -ACM spoke to patient & daughterMarline to offer enrollment into Care Coordination program & RPM services.  -Introduced self, reason for call, and explanation of program.   -Zach accepted.    -Plan: ACM will reach out to patient at an agreed upon date and time for Care Coordination enrollment.

## 2024-02-22 ENCOUNTER — TELEPHONE (OUTPATIENT)
Dept: PRIMARY CARE CLINIC | Age: 89
End: 2024-02-22

## 2024-02-22 NOTE — TELEPHONE ENCOUNTER
H&P and medication list faxed to [facility] [address] attn: Anne Mendoza. Daughter called and they are trying to get patient set up with assisted living.

## 2024-02-23 ENCOUNTER — CARE COORDINATION (OUTPATIENT)
Dept: CARE COORDINATION | Age: 89
End: 2024-02-23

## 2024-02-23 NOTE — CARE COORDINATION
-Patient's daughterMarline returned call.    Ambulatory Care Coordination Note  2024    Patient Current Location:  [place]     ACM contacted the family by telephone. Verified name and  with family as identifiers. Provided introduction to self, and explanation of the ACM role.     Challenges to be reviewed by the provider   Additional needs identified to be addressed with provider: No  none               Method of communication with provider: none.    ACM: Justine Goddard RN    Offered patient enrollment in the Remote Patient Monitoring (RPM) program for in-home monitoring: Patient declined.    ACM contacted patient's daughterMarline to follow up on patient's status regarding Parkinson, transition to [facility] for Care Coordination.  -All the information given today is from Marline.   Pt has macular degeneration.   -Marline reports complete medications compliance.   -Marline reports she has not received the education mailed yet.   -Marline reports she and the family are Pt's transportation.    -Marline reports no recent falls.     [facility]  -Marline reports Patient and his wife were evaluated at [facility] and were accepted.   -Marline reports Pt has a move in date of  at [facility].   -Marline reports Pt is in agreement with this [facility].     -Pt has a BP monitor, pulse ox and scale.   -Marline reports she has not been routinely checking Pt's vital signs.   -24 PCP office visit: /78, HR 73, pulse ox 93%, weight 178 lbs.   -Marline reports Pt intermittently practices vocal exercises to improve his voice.   -Marline reports appetite remains normal. Marline said Pt drinks at least one Ensure or Ensure plus daily.   -Marline reports [facility] to evaluated Pt on 24 to see if he qualifies for additional therapy.   -Mya reports no SOB, cough, wheeze or ankle/feet swelling   -Await mailed monitoring logs for BP, pulse ox, weight.    Resources  -Rheumatology appt 3-4-24 w/Dr Cardona.

## 2024-02-23 NOTE — CARE COORDINATION
-ACM attempted to reach patient's daughterMarline to follow up on patient's status regarding Parkinson, transition to [facility] for Care Coordination, however no answer.  -HIPAA compliant VM left introducing self and reason for call.  -Left ACM's contact information, requesting call back.  -Plan:   If no return call, ACM will attempt outreach again.

## 2024-03-18 ENCOUNTER — CARE COORDINATION (OUTPATIENT)
Dept: CARE COORDINATION | Age: 89
End: 2024-03-18

## 2024-03-18 RX ORDER — CARBIDOPA AND LEVODOPA 50; 200 MG/1; MG/1
1 TABLET, EXTENDED RELEASE ORAL 4 TIMES DAILY
Qty: 120 TABLET | Refills: 0 | Status: SHIPPED | OUTPATIENT
Start: 2024-03-18

## 2024-03-18 NOTE — CARE COORDINATION
Ambulatory Care Coordination Note  3/18/2024    Patient Current Location:  Home: [address] [place] [address]     ACM contacted the family by telephone. Verified name and  with family as identifiers. Provided introduction to self, and explanation of the ACM role.     Challenges to be reviewed by the provider   Additional needs identified to be addressed with provider: No  none               Method of communication with provider: none.    ACM: Justine Goddard, RN    Offered patient enrollment in the Remote Patient Monitoring (RPM) program for in-home monitoring: Patient declined.    -ACM discussed Graduation from Care Coordination with patient.  -Marline feels she is able to manage Pt's health care needs without further assistance from ACM, and feels she and Pt can graduate from Care Coordination.   -AC educated Marline that if Pt needs additional assistance in managing Pt's health care in the future, Marline can call [facility].   -Marline aware [facility] has resources and Social Work for assist with Pt's needs.      AC contacted patient's daughter, Marline to follow up on his status regarding Parkinson, transition to [facility] for Care Coordination.  -All the information given today is from Marline.   -Pt has macular degeneration.   -Marline reports complete medications compliance.   Marline reports Pt has a Benefit card for $45/month. Marline said Pt's Benefit card has not been activated yet. Informed Marline that if Pt does not use the money he may lose it. Marline said she is aware. Marline plans to active the Benefit card.   -Marline reports she has received the education mailed and Pt read it.   -Marline reports she and the family are Pt's transportation.       [facility]  -Marline reports Patient and his wife were evaluated at [facility] and were accepted.  -Marline reports Pt is moving into [facility] Friday, 3/22/24.      -Pt has a BP monitor, pulse ox and scale.   -2- PCP office visit: BP

## 2024-03-25 ENCOUNTER — OFFICE VISIT (OUTPATIENT)
Dept: NEUROLOGY | Age: 89
End: 2024-03-25
Payer: MEDICARE

## 2024-03-25 VITALS
BODY MASS INDEX: 22.85 KG/M2 | WEIGHT: 178 LBS | OXYGEN SATURATION: 94 % | SYSTOLIC BLOOD PRESSURE: 116 MMHG | HEART RATE: 77 BPM | TEMPERATURE: 98.6 F | DIASTOLIC BLOOD PRESSURE: 65 MMHG

## 2024-03-25 DIAGNOSIS — M54.17 LUMBOSACRAL RADICULOPATHY: ICD-10-CM

## 2024-03-25 DIAGNOSIS — R13.10 DYSPHAGIA, UNSPECIFIED TYPE: ICD-10-CM

## 2024-03-25 DIAGNOSIS — G20.A2 PARKINSON'S DISEASE WITHOUT DYSKINESIA, WITH FLUCTUATING MANIFESTATIONS (HCC): Primary | ICD-10-CM

## 2024-03-25 PROCEDURE — 99214 OFFICE O/P EST MOD 30 MIN: CPT | Performed by: CLINICAL NURSE SPECIALIST

## 2024-03-25 PROCEDURE — 1123F ACP DISCUSS/DSCN MKR DOCD: CPT | Performed by: CLINICAL NURSE SPECIALIST

## 2024-04-01 ENCOUNTER — OFFICE VISIT (OUTPATIENT)
Dept: PRIMARY CARE CLINIC | Age: 89
End: 2024-04-01
Payer: MEDICARE

## 2024-04-01 VITALS
WEIGHT: 173 LBS | SYSTOLIC BLOOD PRESSURE: 118 MMHG | DIASTOLIC BLOOD PRESSURE: 68 MMHG | BODY MASS INDEX: 23.43 KG/M2 | HEIGHT: 72 IN | TEMPERATURE: 97.5 F

## 2024-04-01 DIAGNOSIS — R73.01 IMPAIRED FASTING GLUCOSE: ICD-10-CM

## 2024-04-01 DIAGNOSIS — I10 ESSENTIAL HYPERTENSION: ICD-10-CM

## 2024-04-01 DIAGNOSIS — E53.8 VITAMIN B 12 DEFICIENCY: ICD-10-CM

## 2024-04-01 DIAGNOSIS — E03.9 ACQUIRED HYPOTHYROIDISM: ICD-10-CM

## 2024-04-01 DIAGNOSIS — M81.0 AGE-RELATED OSTEOPOROSIS WITHOUT CURRENT PATHOLOGICAL FRACTURE: ICD-10-CM

## 2024-04-01 DIAGNOSIS — Z00.00 MEDICARE ANNUAL WELLNESS VISIT, SUBSEQUENT: Primary | ICD-10-CM

## 2024-04-01 LAB
ALBUMIN SERPL-MCNC: 4.2 G/DL (ref 3.5–5.2)
ALP BLD-CCNC: 124 U/L (ref 40–129)
ALT SERPL-CCNC: <5 U/L (ref 0–40)
ANION GAP SERPL CALCULATED.3IONS-SCNC: 15 MMOL/L (ref 7–16)
AST SERPL-CCNC: 19 U/L (ref 0–39)
BASOPHILS ABSOLUTE: 0.02 K/UL (ref 0–0.2)
BASOPHILS RELATIVE PERCENT: 0 % (ref 0–2)
BILIRUB SERPL-MCNC: 0.4 MG/DL (ref 0–1.2)
BUN BLDV-MCNC: 26 MG/DL (ref 6–23)
CALCIUM SERPL-MCNC: 9.6 MG/DL (ref 8.6–10.2)
CHLORIDE BLD-SCNC: 101 MMOL/L (ref 98–107)
CHOLESTEROL: 142 MG/DL
CO2: 22 MMOL/L (ref 22–29)
CREAT SERPL-MCNC: 1.1 MG/DL (ref 0.7–1.2)
EOSINOPHILS ABSOLUTE: 0.18 K/UL (ref 0.05–0.5)
EOSINOPHILS RELATIVE PERCENT: 3 % (ref 0–6)
GFR SERPL CREATININE-BSD FRML MDRD: 62 ML/MIN/1.73M2
GLUCOSE BLD-MCNC: 85 MG/DL (ref 74–99)
HBA1C MFR BLD: 5.3 % (ref 4–5.6)
HCT VFR BLD CALC: 33.7 % (ref 37–54)
HDLC SERPL-MCNC: 45 MG/DL
HEMOGLOBIN: 11.1 G/DL (ref 12.5–16.5)
IMMATURE GRANULOCYTES %: 1 % (ref 0–5)
IMMATURE GRANULOCYTES ABSOLUTE: 0.03 K/UL (ref 0–0.58)
LDL CHOLESTEROL: 82 MG/DL
LYMPHOCYTES ABSOLUTE: 1.18 K/UL (ref 1.5–4)
LYMPHOCYTES RELATIVE PERCENT: 19 % (ref 20–42)
MCH RBC QN AUTO: 35.6 PG (ref 26–35)
MCHC RBC AUTO-ENTMCNC: 32.9 G/DL (ref 32–34.5)
MCV RBC AUTO: 108 FL (ref 80–99.9)
MONOCYTES ABSOLUTE: 0.82 K/UL (ref 0.1–0.95)
MONOCYTES RELATIVE PERCENT: 13 % (ref 2–12)
NEUTROPHILS ABSOLUTE: 3.96 K/UL (ref 1.8–7.3)
NEUTROPHILS RELATIVE PERCENT: 64 % (ref 43–80)
PDW BLD-RTO: 13.5 % (ref 11.5–15)
PLATELET # BLD: 259 K/UL (ref 130–450)
PMV BLD AUTO: 9.6 FL (ref 7–12)
POTASSIUM SERPL-SCNC: 5 MMOL/L (ref 3.5–5)
RBC # BLD: 3.12 M/UL (ref 3.8–5.8)
SODIUM BLD-SCNC: 138 MMOL/L (ref 132–146)
T4 TOTAL: 5.9 UG/DL (ref 4.5–11.7)
TOTAL PROTEIN: 6.8 G/DL (ref 6.4–8.3)
TRIGL SERPL-MCNC: 76 MG/DL
TSH SERPL DL<=0.05 MIU/L-ACNC: 4.43 UIU/ML (ref 0.27–4.2)
VITAMIN B-12: 454 PG/ML (ref 211–946)
VITAMIN D 25-HYDROXY: 28.8 NG/ML (ref 30–100)
VLDLC SERPL CALC-MCNC: 15 MG/DL
WBC # BLD: 6.2 K/UL (ref 4.5–11.5)

## 2024-04-01 PROCEDURE — 1123F ACP DISCUSS/DSCN MKR DOCD: CPT | Performed by: FAMILY MEDICINE

## 2024-04-01 PROCEDURE — G0439 PPPS, SUBSEQ VISIT: HCPCS | Performed by: FAMILY MEDICINE

## 2024-04-01 SDOH — ECONOMIC STABILITY: FOOD INSECURITY: WITHIN THE PAST 12 MONTHS, YOU WORRIED THAT YOUR FOOD WOULD RUN OUT BEFORE YOU GOT MONEY TO BUY MORE.: NEVER TRUE

## 2024-04-01 SDOH — ECONOMIC STABILITY: FOOD INSECURITY: WITHIN THE PAST 12 MONTHS, THE FOOD YOU BOUGHT JUST DIDN'T LAST AND YOU DIDN'T HAVE MONEY TO GET MORE.: NEVER TRUE

## 2024-04-01 SDOH — ECONOMIC STABILITY: INCOME INSECURITY: HOW HARD IS IT FOR YOU TO PAY FOR THE VERY BASICS LIKE FOOD, HOUSING, MEDICAL CARE, AND HEATING?: NOT HARD AT ALL

## 2024-04-01 ASSESSMENT — PATIENT HEALTH QUESTIONNAIRE - PHQ9
1. LITTLE INTEREST OR PLEASURE IN DOING THINGS: NOT AT ALL
SUM OF ALL RESPONSES TO PHQ QUESTIONS 1-9: 0
SUM OF ALL RESPONSES TO PHQ QUESTIONS 1-9: 0
2. FEELING DOWN, DEPRESSED OR HOPELESS: NOT AT ALL
SUM OF ALL RESPONSES TO PHQ9 QUESTIONS 1 & 2: 0
SUM OF ALL RESPONSES TO PHQ QUESTIONS 1-9: 0
SUM OF ALL RESPONSES TO PHQ QUESTIONS 1-9: 0

## 2024-04-01 ASSESSMENT — LIFESTYLE VARIABLES: HOW MANY STANDARD DRINKS CONTAINING ALCOHOL DO YOU HAVE ON A TYPICAL DAY: PATIENT DOES NOT DRINK

## 2024-04-01 NOTE — PROGRESS NOTES
Medicare Annual Wellness Visit    Toi Grimaldo is here for Medicare AWV    Assessment & Plan   Medicare annual wellness visit, subsequent  Essential hypertension  -     CBC with Auto Differential; Future  -     Comprehensive Metabolic Panel; Future  -     Hemoglobin A1C; Future  -     Lipid Panel; Future  -     T4; Future  -     TSH; Future  -     Vitamin D 25 Hydroxy; Future  -     Vitamin B12; Future  Vitamin B 12 deficiency  -     Vitamin B12; Future  Age-related osteoporosis without current pathological fracture  -     Vitamin D 25 Hydroxy; Future  Acquired hypothyroidism  -     T4; Future  -     TSH; Future  Impaired fasting glucose  -     Hemoglobin A1C; Future    Recommendations for Preventive Services Due: see orders and patient instructions/AVS.  Recommended screening schedule for the next 5-10 years is provided to the patient in written form: see Patient Instructions/AVS.     Return in 4 months (on 8/1/2024).     Subjective   The following acute and/or chronic problems were also addressed today:  Check regarding osteoarthritis back pain blood pressure thyroid orthostatic hypotension anxiety Parkinson's disease thyroid cholesterol.    Patient presents with a daughter from [place] they are coming from for assisted living.  He is in the speech therapy ordered by neurology.  Using a walker.  Incontinent of urine.  Offered referral for urology daughter does not want to proceed right now.  Large amount of earwax was removed.        Patient's complete Health Risk Assessment and screening values have been reviewed and are found in Flowsheets. The following problems were reviewed today and where indicated follow up appointments were made and/or referrals ordered.    Positive Risk Factor Screenings with Interventions:                Activity, Diet, and Weight:  On average, how many days per week do you engage in moderate to strenuous exercise (like a brisk walk)?: 0 days  On average, how many minutes do you

## 2024-04-05 DIAGNOSIS — I95.1 ORTHOSTATIC HYPOTENSION: ICD-10-CM

## 2024-04-06 RX ORDER — MIDODRINE HYDROCHLORIDE 2.5 MG/1
2.5 TABLET ORAL 3 TIMES DAILY
Qty: 270 TABLET | Refills: 3 | Status: SHIPPED | OUTPATIENT
Start: 2024-04-06

## 2024-04-22 RX ORDER — CARBIDOPA AND LEVODOPA 50; 200 MG/1; MG/1
1 TABLET, EXTENDED RELEASE ORAL 4 TIMES DAILY
Qty: 120 TABLET | Refills: 5 | Status: SHIPPED | OUTPATIENT
Start: 2024-04-22

## 2024-04-22 RX ORDER — ASCORBIC ACID 500 MG
500 TABLET ORAL DAILY
Qty: 30 TABLET | Refills: 5 | Status: SHIPPED | OUTPATIENT
Start: 2024-04-22

## 2024-05-02 DIAGNOSIS — D49.0 IPMN (INTRADUCTAL PAPILLARY MUCINOUS NEOPLASM): Primary | ICD-10-CM

## 2024-05-09 RX ORDER — VIT A/VIT C/VIT E/ZINC/COPPER 4296-226
1 CAPSULE ORAL DAILY
Qty: 90 CAPSULE | Refills: 5 | Status: SHIPPED | OUTPATIENT
Start: 2024-05-09

## 2024-05-13 DIAGNOSIS — I10 ESSENTIAL HYPERTENSION: Chronic | ICD-10-CM

## 2024-05-13 RX ORDER — METOPROLOL SUCCINATE 25 MG/1
TABLET, EXTENDED RELEASE ORAL
Qty: 45 TABLET | Refills: 12 | Status: SHIPPED
Start: 2024-05-13 | End: 2024-05-14 | Stop reason: SDUPTHER

## 2024-05-13 RX ORDER — NIACINAMIDE 500 MG
500 TABLET ORAL
Qty: 90 TABLET | Refills: 3 | Status: SHIPPED
Start: 2024-05-13 | End: 2024-05-14 | Stop reason: SDUPTHER

## 2024-05-14 DIAGNOSIS — I10 ESSENTIAL HYPERTENSION: Chronic | ICD-10-CM

## 2024-05-14 RX ORDER — METOPROLOL SUCCINATE 25 MG/1
TABLET, EXTENDED RELEASE ORAL
Qty: 45 TABLET | Refills: 12 | Status: SHIPPED | OUTPATIENT
Start: 2024-05-14

## 2024-05-14 RX ORDER — NIACINAMIDE 500 MG
500 TABLET ORAL
Qty: 90 TABLET | Refills: 3 | Status: SHIPPED | OUTPATIENT
Start: 2024-05-14

## 2024-05-20 ENCOUNTER — TELEPHONE (OUTPATIENT)
Dept: HEMATOLOGY | Age: 89
End: 2024-05-20

## 2024-05-20 DIAGNOSIS — D49.0 IPMN (INTRADUCTAL PAPILLARY MUCINOUS NEOPLASM): Primary | ICD-10-CM

## 2024-05-20 NOTE — TELEPHONE ENCOUNTER
I scheduled patient for his MRI at [facility] on 6/18/24 at 9:30am. I called the patient and left a VM that I got him scheduled for his 1 year MRI in June and asked him to call the office back to get the MRI appt information and instructions and to make a follow up appt.  He will need labs prior to the MRI.    Electronically signed by Pepper Alexis RN on 5/20/2024 at 10:30 AM      Daughter called back and she would like the MRI cancelled.  She spoke to her father and he is not going to  have any further imaging.    Electronically signed by Pepper Alexis RN on 5/21/2024 at 12:32 PM

## 2024-06-04 ENCOUNTER — TELEPHONE (OUTPATIENT)
Dept: PRIMARY CARE CLINIC | Age: 89
End: 2024-06-04

## 2024-06-04 DIAGNOSIS — N30.00 ACUTE CYSTITIS WITHOUT HEMATURIA: ICD-10-CM

## 2024-06-04 RX ORDER — NITROFURANTOIN 25; 75 MG/1; MG/1
100 CAPSULE ORAL 2 TIMES DAILY
Qty: 14 CAPSULE | Refills: 0 | Status: SHIPPED | OUTPATIENT
Start: 2024-06-04

## 2024-06-04 NOTE — TELEPHONE ENCOUNTER
Nurse from [facility] [address] called regarding urine results that were faxed over on patient.  Advised Dr. Pete signed and nurse faxed back this morning (confirmation received).  Dr. Pete ordered Macrobid bid x 7 days

## 2024-06-04 NOTE — TELEPHONE ENCOUNTER
Radha from [facility] states she saw the order for Macrobid 100mg PO BID x7days but medication was never sent to pharmacy. Requesting med to be sent to [facility]      Please send script to pharmacy    Medication pended

## 2024-06-06 NOTE — TELEPHONE ENCOUNTER
Patients last appointment 4/1/2024.  Patients next scheduled appointment   Future Appointments   Date Time Provider Department Center   7/17/2024  1:00 PM Lewis Stephens, FABIANO - CNS [facility] Neuro Neurology -   8/12/2024  3:15 PM Jasvir Pete DO [facility]

## 2024-06-17 ENCOUNTER — APPOINTMENT (OUTPATIENT)
Dept: GENERAL RADIOLOGY | Age: 89
End: 2024-06-17
Payer: MEDICARE

## 2024-06-17 ENCOUNTER — APPOINTMENT (OUTPATIENT)
Dept: CT IMAGING | Age: 89
End: 2024-06-17
Payer: MEDICARE

## 2024-06-17 ENCOUNTER — HOSPITAL ENCOUNTER (EMERGENCY)
Age: 89
Discharge: HOME | End: 2024-06-18
Payer: MEDICARE

## 2024-06-17 VITALS
TEMPERATURE: 97.2 F | BODY MASS INDEX: 24.11 KG/M2 | OXYGEN SATURATION: 97 % | RESPIRATION RATE: 14 BRPM | HEIGHT: 72 IN | DIASTOLIC BLOOD PRESSURE: 67 MMHG | WEIGHT: 178 LBS | HEART RATE: 74 BPM | SYSTOLIC BLOOD PRESSURE: 123 MMHG

## 2024-06-17 DIAGNOSIS — S63.296A: Primary | ICD-10-CM

## 2024-06-17 DIAGNOSIS — S61.206A: Primary | ICD-10-CM

## 2024-06-17 DIAGNOSIS — S20.211A CONTUSION OF RIGHT CHEST WALL, INITIAL ENCOUNTER: ICD-10-CM

## 2024-06-17 PROCEDURE — 73140 X-RAY EXAM OF FINGER(S): CPT

## 2024-06-17 PROCEDURE — 6370000000 HC RX 637 (ALT 250 FOR IP): Performed by: PHYSICIAN ASSISTANT

## 2024-06-17 PROCEDURE — 99284 EMERGENCY DEPT VISIT MOD MDM: CPT

## 2024-06-17 PROCEDURE — 72125 CT NECK SPINE W/O DYE: CPT

## 2024-06-17 PROCEDURE — 70450 CT HEAD/BRAIN W/O DYE: CPT

## 2024-06-17 PROCEDURE — 2500000003 HC RX 250 WO HCPCS: Performed by: PHYSICIAN ASSISTANT

## 2024-06-17 PROCEDURE — 26770 TREAT FINGER DISLOCATION: CPT

## 2024-06-17 PROCEDURE — 71250 CT THORAX DX C-: CPT

## 2024-06-17 RX ORDER — LIDOCAINE HYDROCHLORIDE 10 MG/ML
5 INJECTION, SOLUTION EPIDURAL; INFILTRATION; INTRACAUDAL; PERINEURAL ONCE
Status: COMPLETED | OUTPATIENT
Start: 2024-06-17 | End: 2024-06-17

## 2024-06-17 RX ORDER — ACETAMINOPHEN 325 MG/1
650 TABLET ORAL ONCE
Status: COMPLETED | OUTPATIENT
Start: 2024-06-17 | End: 2024-06-17

## 2024-06-17 RX ADMIN — LIDOCAINE HYDROCHLORIDE 5 ML: 10 INJECTION, SOLUTION EPIDURAL; INFILTRATION; INTRACAUDAL; PERINEURAL at 23:21

## 2024-06-17 RX ADMIN — ACETAMINOPHEN 650 MG: 325 TABLET ORAL at 23:36

## 2024-06-17 ASSESSMENT — PAIN - FUNCTIONAL ASSESSMENT: PAIN_FUNCTIONAL_ASSESSMENT: 0-10

## 2024-06-17 ASSESSMENT — PAIN SCALES - GENERAL: PAINLEVEL_OUTOF10: 7

## 2024-06-17 ASSESSMENT — PAIN DESCRIPTION - LOCATION: LOCATION: BACK

## 2024-06-18 ENCOUNTER — TELEPHONE (OUTPATIENT)
Dept: PRIMARY CARE CLINIC | Age: 89
End: 2024-06-18

## 2024-06-18 RX ORDER — CEFDINIR 300 MG/1
300 CAPSULE ORAL 2 TIMES DAILY
Qty: 14 CAPSULE | Refills: 0 | Status: SHIPPED | OUTPATIENT
Start: 2024-06-18 | End: 2024-06-25

## 2024-06-18 NOTE — ED PROVIDER NOTES
Independent WENDY Visit.      [facility]  Department of Emergency Medicine   ED  Encounter Note  Admit Date/RoomTime: 2024 10:35 PM  ED Room: SMITA/SMITA    NAME: Toi Grimaldo  : 1933  MRN: [id number]     Chief Complaint:  Fall (Pt states he lost his balance and fell, +head injury -LOC. - thinners), Finger Injury (Right hand pinky injury), and Back Pain (Right rib/back pain)    History of Present Illness   Source of history provided by:  EMS personnel and nursing home.  History/Exam Limitations: confusion and history of dementia.     Toi Grimaldo is a 89 y.o. old male who presents to the emergency department by private vehicle, from [facility] for a fall which occured a few minute(s) prior to arrival. The patient was reportedly opening a door and his lanyard got tangled on the door knob and he fell onto his right side prior to incident and was witnessed by nursing home personnel.  As a result the patient may have suffered an injury and has complaints of right small finger pain, posterior ribs pain at this time.  The patients tetanus status is up to date.   NH/EMS states that there was no reported head injury.  He takes no blood thinning agents.    ROS   Pertinent positives and negatives are stated within HPI, all other systems reviewed and are negative.    Past Medical History:  has a past medical history of Anxiety, Ascending aortic aneurysm ([facility]), Aspiration into airway, BPH (benign prostatic hyperplasia), CAD (coronary artery disease), Cancer ([facility]), Closed fracture of right pubis with routine healing, Coronary arteriosclerosis, Depression, DJD (degenerative joint disease), Hyperlipidemia, Impacted cerumen, Lumbar spinal stenosis, Macular degeneration, Osteoarthritis, Parkinson disease ([facility]), Pre-syncope, RA (rheumatoid arthritis) ([facility]), and Sepsis ([facility]).    Surgical History:  has a past surgical history that includes Coronary artery bypass graft ();

## 2024-06-19 NOTE — TELEPHONE ENCOUNTER
Fax confirmation received  
Note faxed to [facility] [address] stating ok to give cefdinir to patient with PCN allergy.  Await fax confirmation  
Okay to give note stating cleared for cefdinir  
Patient was seen in ED yesterday for finger laceration. Put on Cefdinir. [facility] [address] has a Cefdinir allergy documented. Our chart has Penicillin allergy- Cefdinir okay\". Patient has been prescribed Cefdinir multiple times in part. They are needing note from PCP stating that patient is okay to take Cefdinir.     Fax. [phone]  
16

## 2024-06-21 ENCOUNTER — OFFICE VISIT (OUTPATIENT)
Dept: PRIMARY CARE CLINIC | Age: 89
End: 2024-06-21

## 2024-06-21 VITALS — DIASTOLIC BLOOD PRESSURE: 68 MMHG | SYSTOLIC BLOOD PRESSURE: 122 MMHG

## 2024-06-21 DIAGNOSIS — S20.211D CONTUSION OF RIGHT CHEST WALL, SUBSEQUENT ENCOUNTER: ICD-10-CM

## 2024-06-21 DIAGNOSIS — S61.216D LACERATION OF RIGHT LITTLE FINGER WITHOUT FOREIGN BODY WITHOUT DAMAGE TO NAIL, SUBSEQUENT ENCOUNTER: ICD-10-CM

## 2024-06-21 DIAGNOSIS — M24.444: Primary | ICD-10-CM

## 2024-06-21 DIAGNOSIS — Z09 HOSPITAL DISCHARGE FOLLOW-UP: ICD-10-CM

## 2024-06-21 DIAGNOSIS — I95.1 ORTHOSTATIC HYPOTENSION: Chronic | ICD-10-CM

## 2024-06-21 RX ORDER — IPRATROPIUM BROMIDE 42 UG/1
2 SPRAY, METERED NASAL 4 TIMES DAILY PRN
Qty: 15 ML | Refills: 3 | Status: SHIPPED | OUTPATIENT
Start: 2024-06-21

## 2024-06-21 NOTE — PROGRESS NOTES
24  Name: Toi Grimaldo    : 1933    Sex: male    Age: 89 y.o.        Subjective:  Chief Complaint: Patient is here for patient presents for ER follow-up.    Patient presents with his daughter.  Had a fall at the assisted living.  He dislocated his right fifth finger it was set in the emergency room he required 3 sutures spent 4 days he needs sutures out in 6 more days I wrote a note for have the facility do it.  They did a CT of the head CT and cervical spine.  He did bruise his right chest.  Feeling better.  He is on his midodrine 3 times a day he does get little lightheaded when he stands up he has not drink very much fluids because he feels like he urinates too much.        Review of Systems   Constitutional: Negative.    HENT: Negative.     Eyes: Negative.    Respiratory: Negative.     Cardiovascular: Negative.    Gastrointestinal: Negative.    Endocrine: Negative.    Genitourinary: Negative.    Musculoskeletal: Negative.         HPI   Skin: Negative.    Allergic/Immunologic: Negative.    Neurological: Negative.    Hematological: Negative.    Psychiatric/Behavioral: Negative.           Current Outpatient Medications:     ipratropium (ATROVENT) 0.06 % nasal spray, 2 sprays by Each Nostril route 4 times daily as needed for Rhinitis, Disp: 15 mL, Rfl: 3    cefdinir (OMNICEF) 300 MG capsule, Take 1 capsule by mouth 2 times daily for 7 days, Disp: 14 capsule, Rfl: 0    vitamin D 25 MCG (1000 UT) CAPS, Take 1 capsule by mouth nightly, Disp: 90 capsule, Rfl: 3    nitrofurantoin, macrocrystal-monohydrate, (MACROBID) 100 MG capsule, Take 1 capsule by mouth 2 times daily, Disp: 14 capsule, Rfl: 0    niacinamide 500 MG tablet, Take 1 tablet by mouth daily (before dinner), Disp: 90 tablet, Rfl: 3    metoprolol succinate (TOPROL XL) 25 MG extended release tablet, Takes 1/2 tablet daily, Disp: 45 tablet, Rfl: 12    Multiple Vitamins-Minerals (PRESERVISION AREDS) CAPS, Take 1 capsule by mouth daily, Disp: 90

## 2024-06-22 SDOH — ECONOMIC STABILITY: FOOD INSECURITY: WITHIN THE PAST 12 MONTHS, THE FOOD YOU BOUGHT JUST DIDN'T LAST AND YOU DIDN'T HAVE MONEY TO GET MORE.: NEVER TRUE

## 2024-06-22 SDOH — ECONOMIC STABILITY: FOOD INSECURITY: WITHIN THE PAST 12 MONTHS, YOU WORRIED THAT YOUR FOOD WOULD RUN OUT BEFORE YOU GOT MONEY TO BUY MORE.: NEVER TRUE

## 2024-06-22 SDOH — ECONOMIC STABILITY: INCOME INSECURITY: HOW HARD IS IT FOR YOU TO PAY FOR THE VERY BASICS LIKE FOOD, HOUSING, MEDICAL CARE, AND HEATING?: NOT HARD AT ALL

## 2024-06-22 ASSESSMENT — PATIENT HEALTH QUESTIONNAIRE - PHQ9
SUM OF ALL RESPONSES TO PHQ QUESTIONS 1-9: 0
2. FEELING DOWN, DEPRESSED OR HOPELESS: NOT AT ALL
SUM OF ALL RESPONSES TO PHQ QUESTIONS 1-9: 0
SUM OF ALL RESPONSES TO PHQ9 QUESTIONS 1 & 2: 0
1. LITTLE INTEREST OR PLEASURE IN DOING THINGS: NOT AT ALL

## 2024-07-17 ENCOUNTER — OFFICE VISIT (OUTPATIENT)
Dept: NEUROLOGY | Age: 89
End: 2024-07-17
Payer: MEDICARE

## 2024-07-17 VITALS
BODY MASS INDEX: 23.73 KG/M2 | DIASTOLIC BLOOD PRESSURE: 71 MMHG | WEIGHT: 175 LBS | SYSTOLIC BLOOD PRESSURE: 135 MMHG | HEART RATE: 60 BPM | OXYGEN SATURATION: 98 % | TEMPERATURE: 97.9 F

## 2024-07-17 DIAGNOSIS — G20.A2 PARKINSON'S DISEASE WITHOUT DYSKINESIA, WITH FLUCTUATING MANIFESTATIONS (HCC): Primary | ICD-10-CM

## 2024-07-17 DIAGNOSIS — M54.17 LUMBOSACRAL RADICULOPATHY: ICD-10-CM

## 2024-07-17 DIAGNOSIS — R13.10 DYSPHAGIA, UNSPECIFIED TYPE: ICD-10-CM

## 2024-07-17 PROCEDURE — 1123F ACP DISCUSS/DSCN MKR DOCD: CPT | Performed by: CLINICAL NURSE SPECIALIST

## 2024-07-17 PROCEDURE — 99214 OFFICE O/P EST MOD 30 MIN: CPT | Performed by: CLINICAL NURSE SPECIALIST

## 2024-07-17 RX ORDER — TRIAMCINOLONE ACETONIDE 1 MG/G
CREAM TOPICAL
COMMUNITY
Start: 2024-04-30

## 2024-07-17 RX ORDER — DIPHENHYDRAMINE HCL 25 MG
25 TABLET ORAL EVERY 6 HOURS PRN
COMMUNITY

## 2024-07-17 RX ORDER — NITROGLYCERIN 0.4 MG/1
0.4 TABLET SUBLINGUAL EVERY 5 MIN PRN
COMMUNITY

## 2024-07-17 NOTE — PROGRESS NOTES
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    CT C-spine 6/24  No acute abnormality of the cervical spine.  Mild cervical spondylosis.    I personally reviewed the patient's lab and imaging studies at this time.    Assessment:     Patient with Parkinson's disease previously well controlled on 50/200 every 4 hours while awake but now appears to be without this medication and having increased rigidity   Dysphagia most likely secondary to PD    Macular degeneration    LS radiculopathy    Left shoulder injury    Plan:     Continue 50/200 but since it is unclear as to when he last took it we will make it twice a day in a.m. and early afternoon    RTO in the fall    Continue to increase exercise    Lewis Stephens, FABIANO - CNS  4:00 PM  7/17/2024

## 2024-07-19 ENCOUNTER — TELEPHONE (OUTPATIENT)
Dept: NEUROLOGY | Age: 89
End: 2024-07-19

## 2024-07-19 NOTE — TELEPHONE ENCOUNTER
Patient's daughter Sabine called in stating that when they got back to the facility, they told the nurse that the Sinemet was not on the med list that came with him so it was decreased to BID. The nurse said that Felice has been taking it every 4 hours as prescribed so they tore up the orders and are going to keep giving it to him that way. They wanted to make sure you are aware of that.

## 2024-08-08 NOTE — TELEPHONE ENCOUNTER
Patients last appointment 6/21/2024.  Patients next scheduled appointment   Future Appointments   Date Time Provider Department Center   8/12/2024  3:15 PM Jasvir Pete DO [facility]   10/3/2024  1:20 PM Lewis Stephens, FABIANO - CNS [facility] Neuro Neurology -

## 2024-08-09 RX ORDER — VITAMIN B COMPLEX
1 CAPSULE ORAL DAILY
Qty: 90 CAPSULE | Refills: 3 | Status: SHIPPED | OUTPATIENT
Start: 2024-08-09

## 2024-08-12 ENCOUNTER — OFFICE VISIT (OUTPATIENT)
Dept: PRIMARY CARE CLINIC | Age: 89
End: 2024-08-12
Payer: MEDICARE

## 2024-08-12 VITALS
SYSTOLIC BLOOD PRESSURE: 130 MMHG | TEMPERATURE: 97.9 F | OXYGEN SATURATION: 98 % | HEART RATE: 78 BPM | DIASTOLIC BLOOD PRESSURE: 80 MMHG | BODY MASS INDEX: 23.33 KG/M2 | WEIGHT: 172 LBS

## 2024-08-12 DIAGNOSIS — R35.0 BENIGN PROSTATIC HYPERPLASIA WITH URINARY FREQUENCY: Primary | ICD-10-CM

## 2024-08-12 DIAGNOSIS — I10 ESSENTIAL HYPERTENSION: ICD-10-CM

## 2024-08-12 DIAGNOSIS — Z12.5 PROSTATE CANCER SCREENING: ICD-10-CM

## 2024-08-12 DIAGNOSIS — R35.0 BENIGN PROSTATIC HYPERPLASIA WITH URINARY FREQUENCY: ICD-10-CM

## 2024-08-12 DIAGNOSIS — N40.1 BENIGN PROSTATIC HYPERPLASIA WITH URINARY FREQUENCY: ICD-10-CM

## 2024-08-12 DIAGNOSIS — N40.1 BENIGN PROSTATIC HYPERPLASIA WITH URINARY FREQUENCY: Primary | ICD-10-CM

## 2024-08-12 LAB
ALBUMIN: 4 G/DL (ref 3.5–5.2)
ALP BLD-CCNC: 131 U/L (ref 40–129)
ALT SERPL-CCNC: <5 U/L (ref 0–40)
ANION GAP SERPL CALCULATED.3IONS-SCNC: 14 MMOL/L (ref 7–16)
AST SERPL-CCNC: 18 U/L (ref 0–39)
BASOPHILS ABSOLUTE: 0.05 K/UL (ref 0–0.2)
BASOPHILS RELATIVE PERCENT: 1 % (ref 0–2)
BILIRUB SERPL-MCNC: 0.4 MG/DL (ref 0–1.2)
BUN BLDV-MCNC: 24 MG/DL (ref 6–23)
CALCIUM SERPL-MCNC: 9.8 MG/DL (ref 8.6–10.2)
CHLORIDE BLD-SCNC: 104 MMOL/L (ref 98–107)
CO2: 24 MMOL/L (ref 22–29)
CREAT SERPL-MCNC: 1.2 MG/DL (ref 0.7–1.2)
EOSINOPHILS ABSOLUTE: 0.21 K/UL (ref 0.05–0.5)
EOSINOPHILS RELATIVE PERCENT: 3 % (ref 0–6)
GFR, ESTIMATED: 58 ML/MIN/1.73M2
GLUCOSE BLD-MCNC: 109 MG/DL (ref 74–99)
HCT VFR BLD CALC: 33.5 % (ref 37–54)
HEMOGLOBIN: 11.2 G/DL (ref 12.5–16.5)
IMMATURE GRANULOCYTES %: 1 % (ref 0–5)
IMMATURE GRANULOCYTES ABSOLUTE: 0.03 K/UL (ref 0–0.58)
LYMPHOCYTES ABSOLUTE: 1.58 K/UL (ref 1.5–4)
LYMPHOCYTES RELATIVE PERCENT: 25 % (ref 20–42)
MCH RBC QN AUTO: 35.6 PG (ref 26–35)
MCHC RBC AUTO-ENTMCNC: 33.4 G/DL (ref 32–34.5)
MCV RBC AUTO: 106.3 FL (ref 80–99.9)
MONOCYTES ABSOLUTE: 0.78 K/UL (ref 0.1–0.95)
MONOCYTES RELATIVE PERCENT: 12 % (ref 2–12)
NEUTROPHILS ABSOLUTE: 3.74 K/UL (ref 1.8–7.3)
NEUTROPHILS RELATIVE PERCENT: 59 % (ref 43–80)
PDW BLD-RTO: 12.3 % (ref 11.5–15)
PLATELET # BLD: 247 K/UL (ref 130–450)
PMV BLD AUTO: 9.7 FL (ref 7–12)
POTASSIUM SERPL-SCNC: 5.2 MMOL/L (ref 3.5–5)
PROSTATE SPECIFIC ANTIGEN: 1.3 NG/ML (ref 0–4)
RBC # BLD: 3.15 M/UL (ref 3.8–5.8)
SODIUM BLD-SCNC: 142 MMOL/L (ref 132–146)
TOTAL PROTEIN: 6.8 G/DL (ref 6.4–8.3)
WBC # BLD: 6.4 K/UL (ref 4.5–11.5)

## 2024-08-12 PROCEDURE — 99214 OFFICE O/P EST MOD 30 MIN: CPT | Performed by: FAMILY MEDICINE

## 2024-08-12 PROCEDURE — 1123F ACP DISCUSS/DSCN MKR DOCD: CPT | Performed by: FAMILY MEDICINE

## 2024-08-12 RX ORDER — TAMSULOSIN HYDROCHLORIDE 0.4 MG/1
0.4 CAPSULE ORAL DAILY
Qty: 90 CAPSULE | Refills: 5 | Status: SHIPPED | OUTPATIENT
Start: 2024-08-12

## 2024-08-12 SDOH — ECONOMIC STABILITY: FOOD INSECURITY: WITHIN THE PAST 12 MONTHS, YOU WORRIED THAT YOUR FOOD WOULD RUN OUT BEFORE YOU GOT MONEY TO BUY MORE.: NEVER TRUE

## 2024-08-12 SDOH — ECONOMIC STABILITY: FOOD INSECURITY: WITHIN THE PAST 12 MONTHS, THE FOOD YOU BOUGHT JUST DIDN'T LAST AND YOU DIDN'T HAVE MONEY TO GET MORE.: NEVER TRUE

## 2024-08-12 SDOH — ECONOMIC STABILITY: INCOME INSECURITY: HOW HARD IS IT FOR YOU TO PAY FOR THE VERY BASICS LIKE FOOD, HOUSING, MEDICAL CARE, AND HEATING?: NOT HARD AT ALL

## 2024-08-12 ASSESSMENT — PATIENT HEALTH QUESTIONNAIRE - PHQ9
SUM OF ALL RESPONSES TO PHQ QUESTIONS 1-9: 0
SUM OF ALL RESPONSES TO PHQ QUESTIONS 1-9: 0
1. LITTLE INTEREST OR PLEASURE IN DOING THINGS: NOT AT ALL
SUM OF ALL RESPONSES TO PHQ9 QUESTIONS 1 & 2: 0
2. FEELING DOWN, DEPRESSED OR HOPELESS: NOT AT ALL
SUM OF ALL RESPONSES TO PHQ QUESTIONS 1-9: 0
SUM OF ALL RESPONSES TO PHQ QUESTIONS 1-9: 0

## 2024-08-12 NOTE — PROGRESS NOTES
prostatic hyperplasia with urinary frequency  -     [facility] - Al Briggs MD, Urology, [place]  -     tamsulosin (FLOMAX) 0.4 MG capsule; Take 1 capsule by mouth daily At bed  -     CBC with Auto Differential; Future  -     Comprehensive Metabolic Panel; Future    Essential hypertension  -     CBC with Auto Differential; Future  -     Comprehensive Metabolic Panel; Future    Prostate cancer screening  -     PSA Screening; Future        Comments: add floamx--appt urol  Lab    I educated the patient about all medications.  Make sure they were correct and educated  on the risk associated with missing meds or taking them incorrectly.  A list of medications is being sent home with patient today.    Check blood pressure at home twice a day.  Low-salt low caffeine diet.  Call if systolic blood pressure is above 150 and diastolic blood pressures above 85.  Only use a upper arm digital cuff.  A great deal of time spent reviewing medications, diet, exercise, social issues. Also reviewing the chart before entering the room with patient and finishing charting after leaving patient's room. More than half of that time was spent face to face with the patient in counseling and coordinating care.  Aggressive low-fat diet.  Avoid red meats, greasy fried foods, dairy products.  Avoid processed foods.  Take cholesterol medications without food.    I informed patient about the risk associated with noncompliance of medication and taking medications incorrectly.  Appropriate follow-up with myself and all specialist.  Encourage family members to take active role in assisting with medications and medical care.  If any confusion should develop to notify my office immediately to avoid risk of worsening medical condition      Follow Up: Return in about 4 months (around 12/12/2024) for See Referral.     Seen by:  Jasvir Pete DO

## 2024-08-29 LAB
MICROORGANISM SPEC CULT: ABNORMAL
SPECIMEN DESCRIPTION: ABNORMAL

## 2024-09-05 RX ORDER — METHOTREXATE 2.5 MG/1
2.5 TABLET ORAL WEEKLY
COMMUNITY
End: 2024-09-05 | Stop reason: SDUPTHER

## 2024-09-05 RX ORDER — METHOTREXATE 2.5 MG/1
2.5 TABLET ORAL WEEKLY
Qty: 30 TABLET | Refills: 3 | Status: SHIPPED | OUTPATIENT
Start: 2024-09-05

## 2024-09-05 NOTE — TELEPHONE ENCOUNTER
Patients last appointment 8/12/2024.  Patients next scheduled appointment   Future Appointments   Date Time Provider Department Center   10/3/2024  1:20 PM Lewis Stephens, FABIANO - CNS [facility] -   12/10/2024  3:30 PM Jasvir Pete DO [place] [facility] DEP

## 2024-09-09 RX ORDER — ASCORBIC ACID 500 MG
500 TABLET ORAL DAILY
Qty: 30 TABLET | Refills: 5 | Status: SHIPPED | OUTPATIENT
Start: 2024-09-09

## 2024-09-16 ENCOUNTER — OFFICE VISIT (OUTPATIENT)
Dept: PRIMARY CARE CLINIC | Age: 89
End: 2024-09-16
Payer: MEDICARE

## 2024-09-16 VITALS
HEIGHT: 72 IN | SYSTOLIC BLOOD PRESSURE: 118 MMHG | WEIGHT: 176 LBS | DIASTOLIC BLOOD PRESSURE: 52 MMHG | HEART RATE: 76 BPM | OXYGEN SATURATION: 97 % | BODY MASS INDEX: 23.84 KG/M2 | TEMPERATURE: 97.9 F

## 2024-09-16 DIAGNOSIS — I95.1 ORTHOSTATIC HYPOTENSION: Primary | Chronic | ICD-10-CM

## 2024-09-16 DIAGNOSIS — M06.9 RHEUMATOID ARTHRITIS INVOLVING MULTIPLE JOINTS (HCC): ICD-10-CM

## 2024-09-16 DIAGNOSIS — G20.A2 PARKINSON'S DISEASE WITHOUT DYSKINESIA, WITH FLUCTUATING MANIFESTATIONS (HCC): Chronic | ICD-10-CM

## 2024-09-16 DIAGNOSIS — R29.898 WEAKNESS OF BOTH LOWER EXTREMITIES: ICD-10-CM

## 2024-09-16 PROCEDURE — 99214 OFFICE O/P EST MOD 30 MIN: CPT | Performed by: FAMILY MEDICINE

## 2024-09-16 PROCEDURE — 1123F ACP DISCUSS/DSCN MKR DOCD: CPT | Performed by: FAMILY MEDICINE

## 2024-09-16 RX ORDER — CARBIDOPA AND LEVODOPA 50; 200 MG/1; MG/1
1 TABLET, EXTENDED RELEASE ORAL 4 TIMES DAILY
Qty: 120 TABLET | Refills: 5 | Status: SHIPPED | OUTPATIENT
Start: 2024-09-16

## 2024-09-16 SDOH — ECONOMIC STABILITY: INCOME INSECURITY: HOW HARD IS IT FOR YOU TO PAY FOR THE VERY BASICS LIKE FOOD, HOUSING, MEDICAL CARE, AND HEATING?: NOT HARD AT ALL

## 2024-09-16 SDOH — ECONOMIC STABILITY: FOOD INSECURITY: WITHIN THE PAST 12 MONTHS, YOU WORRIED THAT YOUR FOOD WOULD RUN OUT BEFORE YOU GOT MONEY TO BUY MORE.: NEVER TRUE

## 2024-09-16 SDOH — ECONOMIC STABILITY: FOOD INSECURITY: WITHIN THE PAST 12 MONTHS, THE FOOD YOU BOUGHT JUST DIDN'T LAST AND YOU DIDN'T HAVE MONEY TO GET MORE.: NEVER TRUE

## 2024-09-16 ASSESSMENT — ENCOUNTER SYMPTOMS
ALLERGIC/IMMUNOLOGIC NEGATIVE: 1
GASTROINTESTINAL NEGATIVE: 1
EYES NEGATIVE: 1
RESPIRATORY NEGATIVE: 1

## 2024-09-16 ASSESSMENT — PATIENT HEALTH QUESTIONNAIRE - PHQ9
2. FEELING DOWN, DEPRESSED OR HOPELESS: NOT AT ALL
SUM OF ALL RESPONSES TO PHQ QUESTIONS 1-9: 0
1. LITTLE INTEREST OR PLEASURE IN DOING THINGS: NOT AT ALL
SUM OF ALL RESPONSES TO PHQ9 QUESTIONS 1 & 2: 0
SUM OF ALL RESPONSES TO PHQ QUESTIONS 1-9: 0

## 2024-09-30 DIAGNOSIS — F41.9 ANXIETY: ICD-10-CM

## 2024-09-30 DIAGNOSIS — K21.9 GASTROESOPHAGEAL REFLUX DISEASE WITHOUT ESOPHAGITIS: ICD-10-CM

## 2024-10-01 RX ORDER — PAROXETINE 20 MG/1
20 TABLET, FILM COATED ORAL EVERY MORNING
Qty: 90 TABLET | Refills: 3 | Status: SHIPPED | OUTPATIENT
Start: 2024-10-01

## 2024-10-03 ENCOUNTER — OFFICE VISIT (OUTPATIENT)
Dept: NEUROLOGY | Age: 89
End: 2024-10-03
Payer: MEDICARE

## 2024-10-03 VITALS
DIASTOLIC BLOOD PRESSURE: 64 MMHG | HEART RATE: 62 BPM | HEIGHT: 72 IN | BODY MASS INDEX: 23.84 KG/M2 | OXYGEN SATURATION: 97 % | SYSTOLIC BLOOD PRESSURE: 110 MMHG | WEIGHT: 176 LBS | RESPIRATION RATE: 18 BRPM

## 2024-10-03 DIAGNOSIS — G20.A2 PARKINSON'S DISEASE WITHOUT DYSKINESIA, WITH FLUCTUATING MANIFESTATIONS (HCC): Primary | ICD-10-CM

## 2024-10-03 PROCEDURE — 99214 OFFICE O/P EST MOD 30 MIN: CPT | Performed by: CLINICAL NURSE SPECIALIST

## 2024-10-03 PROCEDURE — 1123F ACP DISCUSS/DSCN MKR DOCD: CPT | Performed by: CLINICAL NURSE SPECIALIST

## 2024-10-03 RX ORDER — CARBIDOPA AND LEVODOPA 50; 200 MG/1; MG/1
1 TABLET, EXTENDED RELEASE ORAL 4 TIMES DAILY
Qty: 120 TABLET | Refills: 5 | Status: SHIPPED | OUTPATIENT
Start: 2024-10-03

## 2024-10-03 NOTE — PROGRESS NOTES
Toi Grimaldo is a 89 y.o. right handed male     Patient has been following with Dr. Pedroza for a number of years suffering with Parkinson's disease.    He has been maintained on carbidopa/levodopa 50/200 tablets every 4 hours while awake.  However it appears that this medication was discontinued since his last evaluation. I see it on his med list then but I do not see it on his med list currently.  I will reorder this at this time    Unfortunately he did suffer a fall in June and is also been having increased swallowing difficulties per his wife and daughter who are with him today    Previous EMG demonstrated a diffuse lumbosacral radiculopathy.  MRIs of the lumbosacral spine demonstrated mild to moderate abnormalities as well.  He was not a surgical candidate but did undergo nerve blocks with moderate relief of his discomfort.    Allergies as of 10/03/2024 - Fully Reviewed 10/03/2024   Allergen Reaction Noted    Pcn [penicillins]  05/05/2011    Prednisone  05/05/2011     Objective:     /64 (Site: Left Upper Arm, Position: Sitting, Cuff Size: Medium Adult)   Pulse 62   Resp 18   Ht 1.829 m (6')   Wt 79.8 kg (176 lb)   SpO2 97%   BMI 23.87 kg/m²      General appearance: alert, appears stated age and cooperative  Head: Normocephalic, without obvious abnormality, atraumatic  Extremities: no cyanosis or edema  Pulses: 2+ and symmetric  Skin: no rashes or lesions    Mental Status: Alert, oriented, thought content appropriate    Speech: Slightly dysarthric  Language: appropriate    Masklike facies without Myerson sign    Cranial Nerves:  I: smell    II: visual acuity     II: visual fields Full   II: pupils SORAYA   III,VII: ptosis None   III,IV,VI: extraocular muscles  EOMI without nystagmus -coarse saccadic pursuits with eye movements   V: mastication Normal   V: facial light touch sensation  Normal   V,VII: corneal reflex  Present   VII: facial muscle function - upper     VII: facial muscle function